# Patient Record
Sex: MALE | Race: WHITE | NOT HISPANIC OR LATINO | Employment: OTHER | URBAN - METROPOLITAN AREA
[De-identification: names, ages, dates, MRNs, and addresses within clinical notes are randomized per-mention and may not be internally consistent; named-entity substitution may affect disease eponyms.]

---

## 2018-12-20 ENCOUNTER — HOSPITAL ENCOUNTER (EMERGENCY)
Facility: HOSPITAL | Age: 83
Discharge: HOME | End: 2018-12-20
Attending: EMERGENCY MEDICINE | Admitting: EMERGENCY MEDICINE

## 2018-12-20 VITALS
HEIGHT: 67 IN | TEMPERATURE: 98.3 F | WEIGHT: 169 LBS | OXYGEN SATURATION: 96 % | RESPIRATION RATE: 18 BRPM | BODY MASS INDEX: 26.53 KG/M2 | SYSTOLIC BLOOD PRESSURE: 167 MMHG | DIASTOLIC BLOOD PRESSURE: 84 MMHG | HEART RATE: 78 BPM

## 2018-12-20 DIAGNOSIS — N47.2 PARAPHIMOSIS: Primary | ICD-10-CM

## 2018-12-20 LAB
BACTERIA UR QL AUTO: ABNORMAL /HPF
BILIRUB UR QL STRIP: NEGATIVE
CLARITY UR: CLEAR
COLOR UR: YELLOW
GLUCOSE UR STRIP-MCNC: NEGATIVE MG/DL
HGB UR QL STRIP.AUTO: ABNORMAL
HYALINE CASTS UR QL AUTO: ABNORMAL /LPF
KETONES UR QL STRIP: NEGATIVE
LEUKOCYTE ESTERASE UR QL STRIP.AUTO: NEGATIVE
NITRITE UR QL STRIP: NEGATIVE
PH UR STRIP.AUTO: 6.5 [PH] (ref 5–8)
PROT UR QL STRIP: ABNORMAL
RBC # UR: ABNORMAL /HPF
REF LAB TEST METHOD: ABNORMAL
SP GR UR STRIP: 1.01 (ref 1–1.03)
SQUAMOUS #/AREA URNS HPF: ABNORMAL /HPF
UROBILINOGEN UR QL STRIP: ABNORMAL
WBC UR QL AUTO: ABNORMAL /HPF

## 2018-12-20 PROCEDURE — 81001 URINALYSIS AUTO W/SCOPE: CPT | Performed by: EMERGENCY MEDICINE

## 2018-12-20 PROCEDURE — 99283 EMERGENCY DEPT VISIT LOW MDM: CPT

## 2018-12-20 NOTE — ED TRIAGE NOTES
Pt to ED from [facility] for difficulty urinating and swelling of penis since this morning

## 2018-12-20 NOTE — ED PROVIDER NOTES
" EMERGENCY DEPARTMENT ENCOUNTER    Room Number:  06/06  PCP: Jose Bingham MD  Historian: Patient, Family      HPI  Chief Complaint: Penile Swelling  Context: Alireza Carreon is an 83 y.o. male who reports that he is uncircumcised. Pt presents to the ED c/o penile swelling onset several days ago. Pt states that he has also had penile pain and dysuria. Pt states that when he attempts to urinate, pt's urinary stream will begin to spray.  States he thinks that he did not get his genitals clean.Pt denies documented fever, abdominal pain, N/V/D, chest pain, and dyspnea. [facility] for this and was referred to the ER for further evaluation. There are no other complaints at this time.       Location: Penis  Radiation: None  Character: \"swelling\"  Duration: Onset several days ago  Severity: Moderate  Progression: Unchanged  Aggravating Factors: Nothing  Alleviating Factors: Nothing          PAST MEDICAL HISTORY  Active Ambulatory Problems     Diagnosis Date Noted   • No Active Ambulatory Problems     Resolved Ambulatory Problems     Diagnosis Date Noted   • No Resolved Ambulatory Problems     No Additional Past Medical History         PAST SURGICAL HISTORY  No past surgical history on file.      FAMILY HISTORY  No family history on file.      SOCIAL HISTORY  Social History     Socioeconomic History   • Marital status:      Spouse name: Not on file   • Number of children: Not on file   • Years of education: Not on file   • Highest education level: Not on file   Social Needs   • Financial resource strain: Not on file   • Food insecurity - worry: Not on file   • Food insecurity - inability: Not on file   • Transportation needs - medical: Not on file   • Transportation needs - non-medical: Not on file   Occupational History   • Not on file   Tobacco Use   • Smoking status: Not on file   Substance and Sexual Activity   • Alcohol use: Not on file   • Drug use: Not on file "   • Sexual activity: Not on file   Other Topics Concern   • Not on file   Social History Narrative   • Not on file         ALLERGIES  Patient has no known allergies.        REVIEW OF SYSTEMS  Review of Systems   Constitutional: Negative for chills and fever.   HENT: Negative for congestion, rhinorrhea and sore throat.    Eyes: Negative for pain.   Respiratory: Negative for cough and shortness of breath.    Cardiovascular: Negative for chest pain, palpitations and leg swelling.   Gastrointestinal: Negative for abdominal pain, diarrhea, nausea and vomiting.   Genitourinary: Positive for difficulty urinating, dysuria, penile pain and penile swelling. Negative for flank pain.   Musculoskeletal: Negative for myalgias, neck pain and neck stiffness.   Skin: Negative for rash.   Neurological: Negative for dizziness, speech difficulty, weakness, light-headedness, numbness and headaches.   Psychiatric/Behavioral: Negative.    All other systems reviewed and are negative.           PHYSICAL EXAM  .  ED Triage Vitals   Temp Heart Rate Resp BP SpO2   12/20/18 1319 12/20/18 1319 12/20/18 1319 12/20/18 1410 12/20/18 1319   98.3 °F (36.8 °C) 81 18 169/86 96 %      Temp src Heart Rate Source Patient Position BP Location FiO2 (%)   12/20/18 1319 -- 12/20/18 1410 -- --   Tympanic  Sitting         Physical Exam   Constitutional: He is oriented to person, place, and time. No distress.   HENT:   Head: Normocephalic.   Mouth/Throat: Mucous membranes are normal.   Eyes: EOM are normal. Pupils are equal, round, and reactive to light.   Neck: Normal range of motion. Neck supple.   Cardiovascular: Normal rate, regular rhythm and normal heart sounds.   Pulmonary/Chest: Effort normal and breath sounds normal. No respiratory distress. He has no decreased breath sounds. He has no wheezes. He has no rhonchi. He has no rales.   Abdominal: Soft. There is no tenderness. There is no rebound and no guarding.   Genitourinary:   Genitourinary Comments:  Swollen foreskin trapped behind the meatus c/w paraphimosis.   Musculoskeletal: Normal range of motion.   Neurological: He is alert and oriented to person, place, and time. He has normal sensation.   Skin: Skin is warm and dry.   Psychiatric: Mood and affect normal.   Nursing note and vitals reviewed.          LAB RESULTS  Recent Results (from the past 24 hour(s))   Urinalysis With Microscopic If Indicated (No Culture) - Urine, Clean Catch    Collection Time: 12/20/18  2:27 PM   Result Value Ref Range    Color, UA Yellow Yellow, Straw    Appearance, UA Clear Clear    pH, UA 6.5 5.0 - 8.0    Specific Gravity, UA 1.015 1.005 - 1.030    Glucose, UA Negative Negative    Ketones, UA Negative Negative    Bilirubin, UA Negative Negative    Blood, UA Trace (A) Negative    Protein, UA 30 mg/dL (1+) (A) Negative    Leuk Esterase, UA Negative Negative    Nitrite, UA Negative Negative    Urobilinogen, UA 0.2 E.U./dL 0.2 - 1.0 E.U./dL   Urinalysis, Microscopic Only - Urine, Clean Catch    Collection Time: 12/20/18  2:27 PM   Result Value Ref Range    RBC, UA 3-5 (A) None Seen, 0-2 /HPF    WBC, UA 0-2 None Seen, 0-2 /HPF    Bacteria, UA None Seen None Seen /HPF    Squamous Epithelial Cells, UA 0-2 None Seen, 0-2 /HPF    Hyaline Casts, UA None Seen None Seen /LPF    Methodology Automated Microscopy        Ordered the above labs and reviewed the results.        PROCEDURES  Procedures      MEDICATIONS GIVEN IN ER  Medications - No data to display          PROGRESS AND CONSULTS  ED Course as of Dec 20 1623   Thu Dec 20, 2018   1517 3:18 PM  Patient with paraphimosis.  Was reduced with manual pressure.  States he feels better now.  Able to urinate.  Discussed with Dr. Pedroza who will see patient in the office.  Will hold off on flomax as he is urinating normally after the paraphimosis reduction. Had 200 cc in bladder and then was able to urinate here afterwards.  [SL]      ED Course User Index  [SL] Rio Isabel MD       2:04  PM:  Informed pt that his symptoms are c/w paraphimosis. Pt's foreskin was reduced.     2:06 PM:  Placed call to the urologist to discuss further course of care. Pt will be bladder scanned.     2:16 PM:  Pt's bladder was scanned - there are 218 ccs of urine in the bladder.     Discussed the case with Dr. Pedroza, urologist. He does not want a tracy catheter inserted in pt at this time. He would like pt prescribed with rx for Flomax. He will f/u with pt in the office.     2:18 PM:  UA ordered for further evaluation.     3:12 PM:  Rechecked pt. Pt is resting comfortably and appears in no acute distress. Pt reports that he feels better after the foreskin was reduced. Pt has been able to provide a urine specimen without significant difficulty - Will hold on Flomax at this time. Pt will be referred to the urologist for f/u. RTER warnings given. Pt agrees with plan for discharge.         MEDICAL DECISION MAKING      MDM  Number of Diagnoses or Management Options     Amount and/or Complexity of Data Reviewed  Clinical lab tests: ordered and reviewed  Discuss the patient with other providers: yes (Discussed the case with Dr. Pedroza, urologist.)    Patient Progress  Patient progress: stable             DIAGNOSIS  Final diagnoses:   Paraphimosis           DISPOSITION  Pt discharged.    DISCHARGE    Patient discharged in stable condition.    Reviewed implications of results, diagnosis, meds, responsibility to follow up, warning signs and symptoms of possible worsening, potential complications and reasons to return to ER.    Patient/Family voiced understanding of above instructions.    Discussed plan for discharge, as there is no emergent indication for admission. Pt/family is agreeable and understands need for follow up and repeat testing. Pt is aware that discharge does not mean that nothing is wrong but it indicates no emergency is present that requires admission and they must continue care with follow-up as given below or  physician of their choice.     FOLLOW-UP  [place] Pedroza Jr., MD  [address] [place] [address] [place] [id number]  [phone]    Schedule an appointment as soon as possible for a visit           Latest Documented Vital Signs:  As of 3:34 PM  BP- 169/86 HR- 81 Temp- 98.3 °F (36.8 °C) (Tympanic) O2 sat- 96%        --  Documentation assistance provided by ivania Lock for Dr. Chalo MD.  Information recorded by the scribe was done at my direction and has been verified and validated by me.       Constance Lock  12/20/18 153       Rio Isabel MD  12/20/18 [id number]

## 2019-07-23 ENCOUNTER — TELEPHONE (OUTPATIENT)
Dept: OTHER | Facility: OTHER | Age: 84
End: 2019-07-23

## 2019-07-23 NOTE — TELEPHONE ENCOUNTER
PATIENT CALLED TO MAKE APPOINTMENT WITH DR BRIZUELA HE IS SCHEDULED 9/17/2019 FOR 2 MONTH  FOLLOW UP FOR BLOOD WORK. PRIOR APPOINTMENT WAS IN June. HE WILL NEED LAB ORDERS FOR THIS PRIOR TO HIS APPOINTMENT DUE TO SCHEDULING.   HE WILL ALSO NEED MED REFILL BEFORE HIS APPOINTMENT.     WARFARIN 5 MG TAB  LISINOPRIL 20 MG.    QUANTITY  TORSEMIDE 10 MG  [facility]    PLEASE CALL PATIENT AND ADVISE FOR LABS [phone]  THANK YOU

## 2019-07-24 DIAGNOSIS — Z79.01 ANTICOAGULATED ON COUMADIN: Primary | ICD-10-CM

## 2019-07-24 DIAGNOSIS — R60.9 EDEMA, UNSPECIFIED TYPE: ICD-10-CM

## 2019-07-24 DIAGNOSIS — I10 HYPERTENSION, UNSPECIFIED TYPE: ICD-10-CM

## 2019-07-24 RX ORDER — PHENOBARBITAL 64.8 MG/1
64.8 TABLET ORAL DAILY
COMMUNITY
End: 2019-08-13 | Stop reason: SDUPTHER

## 2019-07-24 RX ORDER — TORSEMIDE 10 MG/1
10 TABLET ORAL DAILY
COMMUNITY
End: 2019-07-24 | Stop reason: SDUPTHER

## 2019-07-24 RX ORDER — LISINOPRIL 20 MG/1
20 TABLET ORAL DAILY
COMMUNITY
End: 2019-07-24 | Stop reason: SDUPTHER

## 2019-07-24 RX ORDER — TORSEMIDE 10 MG/1
10 TABLET ORAL DAILY
Qty: 90 TABLET | Refills: 0 | Status: SHIPPED | OUTPATIENT
Start: 2019-07-24 | End: 2019-07-31 | Stop reason: SDUPTHER

## 2019-07-24 RX ORDER — WARFARIN SODIUM 5 MG/1
5 TABLET ORAL DAILY
Qty: 90 TABLET | Refills: 0 | Status: SHIPPED | OUTPATIENT
Start: 2019-07-24 | End: 2019-07-31 | Stop reason: SDUPTHER

## 2019-07-24 RX ORDER — WARFARIN SODIUM 5 MG/1
5 TABLET ORAL DAILY
COMMUNITY
End: 2019-07-24 | Stop reason: SDUPTHER

## 2019-07-24 RX ORDER — LISINOPRIL 20 MG/1
20 TABLET ORAL 2 TIMES DAILY
Qty: 180 TABLET | Refills: 0 | Status: SHIPPED | OUTPATIENT
Start: 2019-07-24 | End: 2019-07-31 | Stop reason: SDUPTHER

## 2019-07-24 NOTE — TELEPHONE ENCOUNTER
LOV received, I sent over refills. Would you like to put in labs for pt or wait until his 9/17 visit?

## 2019-07-24 NOTE — TELEPHONE ENCOUNTER
If he is just needing the INR we can do it in office day of visit.  Unfortunately, I do not yet have the old labs or chart so am unsure what he will need.  Likely best to just wait for the visit to do the labs.

## 2019-07-31 DIAGNOSIS — I10 HYPERTENSION, UNSPECIFIED TYPE: ICD-10-CM

## 2019-07-31 DIAGNOSIS — R60.9 EDEMA, UNSPECIFIED TYPE: ICD-10-CM

## 2019-07-31 DIAGNOSIS — Z79.01 ANTICOAGULATED ON COUMADIN: ICD-10-CM

## 2019-07-31 RX ORDER — WARFARIN SODIUM 5 MG/1
5 TABLET ORAL DAILY
Qty: 90 TABLET | Refills: 0 | Status: SHIPPED | OUTPATIENT
Start: 2019-07-31 | End: 2019-09-17 | Stop reason: SDUPTHER

## 2019-07-31 RX ORDER — LISINOPRIL 20 MG/1
20 TABLET ORAL 2 TIMES DAILY
Qty: 180 TABLET | Refills: 0 | Status: SHIPPED | OUTPATIENT
Start: 2019-07-31 | End: 2020-01-13 | Stop reason: SDUPTHER

## 2019-07-31 RX ORDER — TORSEMIDE 10 MG/1
10 TABLET ORAL DAILY
Qty: 90 TABLET | Refills: 0 | Status: SHIPPED | OUTPATIENT
Start: 2019-07-31 | End: 2019-09-17 | Stop reason: SDUPTHER

## 2019-08-13 DIAGNOSIS — G40.909 NONINTRACTABLE EPILEPSY WITHOUT STATUS EPILEPTICUS, UNSPECIFIED EPILEPSY TYPE (HCC): ICD-10-CM

## 2019-08-13 DIAGNOSIS — R56.9 GENERALIZED CONVULSIVE SEIZURE (HCC): Primary | ICD-10-CM

## 2019-08-13 DIAGNOSIS — R60.9 EDEMA, UNSPECIFIED TYPE: ICD-10-CM

## 2019-08-13 RX ORDER — PHENOBARBITAL 64.8 MG/1
64.8 TABLET ORAL DAILY
Qty: 90 TABLET | Refills: 0 | Status: SHIPPED | OUTPATIENT
Start: 2019-08-13 | End: 2019-11-15 | Stop reason: SDUPTHER

## 2019-09-16 ENCOUNTER — TELEPHONE (OUTPATIENT)
Dept: FAMILY MEDICINE CLINIC | Facility: CLINIC | Age: 84
End: 2019-09-16

## 2019-09-16 NOTE — TELEPHONE ENCOUNTER
Hpi Title: Evaluation of Skin Lesions When we see this patient in the office on Tuesday we can check his vision using the children's shape chart.

## 2019-09-16 NOTE — TELEPHONE ENCOUNTER
Pt spouse call to get directions to office.   Pt spouse also wanted you know that the pt hearing and eyesight has gotten worse and he has memorized the eye test. She is concerned and he should not be driving.

## 2019-09-17 ENCOUNTER — OFFICE VISIT (OUTPATIENT)
Dept: FAMILY MEDICINE CLINIC | Facility: CLINIC | Age: 84
End: 2019-09-17

## 2019-09-17 VITALS
BODY MASS INDEX: 27.03 KG/M2 | WEIGHT: 172.2 LBS | DIASTOLIC BLOOD PRESSURE: 74 MMHG | SYSTOLIC BLOOD PRESSURE: 142 MMHG | HEIGHT: 67 IN | OXYGEN SATURATION: 96 % | HEART RATE: 62 BPM | TEMPERATURE: 97.7 F

## 2019-09-17 DIAGNOSIS — R60.9 EDEMA, UNSPECIFIED TYPE: ICD-10-CM

## 2019-09-17 DIAGNOSIS — Z79.01 ANTICOAGULATED ON WARFARIN: ICD-10-CM

## 2019-09-17 DIAGNOSIS — Z79.01 ANTICOAGULATED ON COUMADIN: ICD-10-CM

## 2019-09-17 DIAGNOSIS — I48.20 CHRONIC ATRIAL FIBRILLATION (HCC): ICD-10-CM

## 2019-09-17 DIAGNOSIS — Z00.00 MEDICARE ANNUAL WELLNESS VISIT, SUBSEQUENT: Primary | ICD-10-CM

## 2019-09-17 DIAGNOSIS — R41.3 MILD MEMORY DISTURBANCES NOT AMOUNTING TO DEMENTIA: ICD-10-CM

## 2019-09-17 DIAGNOSIS — E78.5 HYPERLIPIDEMIA, UNSPECIFIED HYPERLIPIDEMIA TYPE: ICD-10-CM

## 2019-09-17 DIAGNOSIS — I10 ESSENTIAL HYPERTENSION: ICD-10-CM

## 2019-09-17 PROBLEM — M10.9 GOUT: Status: ACTIVE | Noted: 2019-09-17

## 2019-09-17 PROBLEM — I48.91 ATRIAL FIBRILLATION (HCC): Status: ACTIVE | Noted: 2019-09-17

## 2019-09-17 LAB — INR PPP: 1.3 (ref 2–3)

## 2019-09-17 PROCEDURE — 85610 PROTHROMBIN TIME: CPT | Performed by: INTERNAL MEDICINE

## 2019-09-17 PROCEDURE — 99213 OFFICE O/P EST LOW 20 MIN: CPT | Performed by: INTERNAL MEDICINE

## 2019-09-17 PROCEDURE — G0439 PPPS, SUBSEQ VISIT: HCPCS | Performed by: INTERNAL MEDICINE

## 2019-09-17 PROCEDURE — 96160 PT-FOCUSED HLTH RISK ASSMT: CPT | Performed by: INTERNAL MEDICINE

## 2019-09-17 PROCEDURE — 36416 COLLJ CAPILLARY BLOOD SPEC: CPT | Performed by: INTERNAL MEDICINE

## 2019-09-17 RX ORDER — TORSEMIDE 10 MG/1
10 TABLET ORAL DAILY
Qty: 90 TABLET | Refills: 1 | Status: SHIPPED | OUTPATIENT
Start: 2019-09-17 | End: 2020-01-13 | Stop reason: SDUPTHER

## 2019-09-17 RX ORDER — WARFARIN SODIUM 5 MG/1
TABLET ORAL
Qty: 180 TABLET | Refills: 0 | Status: SHIPPED | OUTPATIENT
Start: 2019-09-17 | End: 2020-01-13 | Stop reason: SDUPTHER

## 2019-09-17 NOTE — PROGRESS NOTES
Subsequent Medicare Wellness Visit   The ABC's of the Annual Wellness Visit    Chief Complaint   Patient presents with   • A1C     check up       HPI:  Alireza Carreon, -1935, is a 84 y.o. male who presents for a Subsequent Medicare Wellness Visit.  Here for follow-up of diabetes.  Patient states to have been compliant with medications.  Blood sugar monitoring - patient states has not been followed at home.  No episodes of hypoglycemia, nausea, vomiting, new rashes, syncope or other issues.  Denies any difficulties with the current medication regimen  Follow-up for hypertension.  Currently has been feeling well and asymptomatic without any headaches,vision changes, cough, chest pain, shortness of breath, swelling, focal neurologic deficit, memory loss or syncope.  Has been taking the medications regularly and adherent with the regimen, Denies medication side effects and no significant interval events.   Wife has been concerned about patients vision and hearing.  Concerned about him driving.Patient has no seizures in the last 6 months.  Here for follow up INR evaluation.  Currently anticoagulated with warfarin for atrial fibrillation.  The patient is taking warfarin 10 M/W/F and 7.5 mg rest of the week.  The current INR goal is 2-3.  The INR is currently 1.3.  No significant interval events, easy bleeding, bruising, epistaxis, fever, weakness or numbness.    Has had a rash on the right forearm after poison ivy exposure 2 weeks ago and is resolving.  Was draining but not now.  Has fallen 3 times in the last year.  Once lost balance on golf course, once throwing the garden hose and once trying to sit on barstool.    Recent Hospitalizations:  No hospitalization(s) within the last year..    Current Medical Providers:  Patient Care Team:  Jose Bingham MD as PCP - General (Internal Medicine)    Health Habits and Functional and Cognitive Screening and Depression Screening:  No flowsheet data  found.    Compared to one year ago, the patient feels his physical health is the same and his mental health is the same.    Depression Screen:  PHQ-2/PHQ-9 Depression Screening 9/17/2019   Little interest or pleasure in doing things 0   Feeling down, depressed, or hopeless 0   Total Score 0         Past Medical/Family/Social History:  The following portions of the patient's history were reviewed and updated as appropriate: allergies, current medications, past family history, past medical history, past social history, past surgical history and problem list.    Allergies   Allergen Reactions   • Codeine Itching   • Levetiracetam Dizziness   • Phenytoin Itching   • Pseudoephedrine Dizziness   • Statins Itching         Current Outpatient Medications:   •  cimetidine (TAGAMET) 200 MG tablet, , Disp: , Rfl:   •  lisinopril (PRINIVIL,ZESTRIL) 20 MG tablet, Take 1 tablet by mouth 2 (Two) Times a Day., Disp: 180 tablet, Rfl: 0  •  PHENobarbital (LUMINAL) 64.8 MG tablet, Take 1 tablet by mouth Daily., Disp: 90 tablet, Rfl: 0  •  Potassium 99 MG tablet, Take 99 mg by mouth Daily., Disp: , Rfl:   •  torsemide (DEMADEX) 10 MG tablet, Take 1 tablet by mouth Daily., Disp: 90 tablet, Rfl: 0  •  warfarin (COUMADIN) 5 MG tablet, Take 1 tablet by mouth Daily., Disp: 90 tablet, Rfl: 0    Aspirin use counseling: Does not need ASA (and currently is not on it)    Current medication list contains no high risk medications.  No harmful drug interactions have been identified.     Family History   Problem Relation Age of Onset   • Hyperlipidemia Mother    • Dementia Mother        Social History     Tobacco Use   • Smoking status: Former Smoker   Substance Use Topics   • Alcohol use: Yes       Past Surgical History:   Procedure Laterality Date   • CORONARY ARTERY BYPASS GRAFT     • OTHER SURGICAL HISTORY      carotid thromboandarterectomy   • SHOULDER SURGERY         Patient Active Problem List   Diagnosis   • Hypertension   • Anal pruritus   •  "Atrial fibrillation (CMS/HCC)   • Edema   • Gout   • Hyperlipidemia       Review of Systems   Constitutional: Negative for fatigue, fever and unexpected weight change.   HENT: Negative for rhinorrhea, trouble swallowing and voice change.    Eyes: Positive for visual disturbance (wife concerned).   Respiratory: Negative for cough, shortness of breath and wheezing.    Cardiovascular: Negative for chest pain, palpitations and leg swelling.   Gastrointestinal: Negative for abdominal pain, blood in stool, constipation, diarrhea, nausea and vomiting.   Genitourinary: Negative for difficulty urinating and dysuria.   Neurological: Negative for syncope, weakness and headaches.   Hematological: Negative for adenopathy. Does not bruise/bleed easily.   Psychiatric/Behavioral: Negative for decreased concentration, hallucinations and sleep disturbance. The patient is not nervous/anxious.        Objective     Vitals:    09/17/19 1010   BP: 142/74   BP Location: Left arm   Patient Position: Sitting   Cuff Size: Adult   Pulse: 62   Temp: 97.7 °F (36.5 °C)   SpO2: 96%   Weight: 78.1 kg (172 lb 3.2 oz)   Height: 170.2 cm (67\")   PainSc: 0-No pain       Patient's Body mass index is 26.97 kg/m². BMI is above normal parameters. Recommendations include: exercise counseling and nutrition counseling.      No exam data present    The patient has no evidence of cognitive impairment. Clock drawing test was normal. (see scanned document)    Physical Exam   Constitutional: He is oriented to person, place, and time. He appears well-developed and well-nourished. No distress.   HENT:   Head: Normocephalic and atraumatic.   Right Ear: External ear normal.   Left Ear: External ear normal.   Nose: Nose normal.   Mouth/Throat: Oropharynx is clear and moist.   Eyes: Conjunctivae and EOM are normal. Pupils are equal, round, and reactive to light.   Neck: Normal range of motion. Neck supple. No tracheal deviation present. No thyromegaly present. "   Cardiovascular: Normal rate, regular rhythm, normal heart sounds and intact distal pulses. Exam reveals no gallop and no friction rub.   No murmur heard.  Pulmonary/Chest: Effort normal and breath sounds normal. No respiratory distress.   Abdominal: Soft. Bowel sounds are normal. He exhibits no mass. There is no tenderness. There is no guarding.   Musculoskeletal: Normal range of motion. He exhibits no edema.   Lymphadenopathy:     He has no cervical adenopathy.   Neurological: He is alert and oriented to person, place, and time. He displays normal reflexes. He exhibits normal muscle tone.   Skin: Skin is warm and dry. Capillary refill takes less than 2 seconds. No rash noted. He is not diaphoretic.   Psychiatric: He has a normal mood and affect. His behavior is normal. Judgment and thought content normal.   Nursing note and vitals reviewed.      Recent Lab Results:     Lab Results   Component Value Date    TRIG 150 09/06/2014    HDL 44 09/06/2014       Assessment/Plan   Age-appropriate Screening Schedule:  Refer to the list below for future screening recommendations based on patient's age, sex and/or medical conditions.      Health Maintenance   Topic Date Due   • TDAP/TD VACCINES (1 - Tdap) 03/11/1954   • ZOSTER VACCINE (2 of 3) 07/15/2015   • INFLUENZA VACCINE  08/01/2019   • LIPID PANEL  09/16/2019   • PNEUMOCOCCAL VACCINES (65+ LOW/MEDIUM RISK)  Completed       Medicare Risks and Personalized Health Plan:  Advance Directive Discussion  Fall Risk  Glaucoma Risk  Hearing Problem  Obesity/Overweight       CMS-Preventive Services Quick Reference  Medicare Preventive Services Addressed:  Annual Wellness Visit (AWV)  Glaucoma screening (for individuals with diabetes mellitus, family history of glaucoma, -Americans (> or =) age 50, -Americans (> or =) age 65)  Influenza Vaccine and Administration    Advance Care Planning:  Patient has an advance directive - a copy has not been provided. Have asked the  patient to send this to us to add to record    There are no diagnoses linked to this encounter.    An After Visit Summary and PPPS with all of these plans were given to the patient.      Follow Up:  No Follow-up on file.        Recommend avoiding throw rugs and using grab bars in the bathroom.  Discussed the hearing and continued hearing issues even with the hearing aids.  Recommend annual eye exams for evaluation and monitor for glaucoma.  Recommend flu shot annually in October.  No evidence of significant memory or vision issues.  Continue the current medications and encouraged to follow a healthy diet and exercise as tolerated.    Take 10 mg for the next 2 days then return to the 10 mg on M/W/F and 7.5 mg rest of the week.  Follow up in 2 weeks to recheck the iNR.

## 2019-09-30 ENCOUNTER — OFFICE VISIT (OUTPATIENT)
Dept: FAMILY MEDICINE CLINIC | Facility: CLINIC | Age: 84
End: 2019-09-30

## 2019-09-30 VITALS
HEART RATE: 68 BPM | SYSTOLIC BLOOD PRESSURE: 130 MMHG | TEMPERATURE: 98 F | WEIGHT: 170.4 LBS | DIASTOLIC BLOOD PRESSURE: 72 MMHG | OXYGEN SATURATION: 96 % | HEIGHT: 67 IN | BODY MASS INDEX: 26.74 KG/M2

## 2019-09-30 DIAGNOSIS — Z79.01 ANTICOAGULATED ON WARFARIN: Primary | ICD-10-CM

## 2019-09-30 DIAGNOSIS — I48.20 CHRONIC ATRIAL FIBRILLATION (HCC): ICD-10-CM

## 2019-09-30 LAB — INR PPP: 1.7 (ref 2–3)

## 2019-09-30 PROCEDURE — 99213 OFFICE O/P EST LOW 20 MIN: CPT | Performed by: INTERNAL MEDICINE

## 2019-09-30 PROCEDURE — 36416 COLLJ CAPILLARY BLOOD SPEC: CPT | Performed by: INTERNAL MEDICINE

## 2019-09-30 PROCEDURE — 85610 PROTHROMBIN TIME: CPT | Performed by: INTERNAL MEDICINE

## 2019-09-30 RX ORDER — CLOBETASOL PROPIONATE 0.5 MG/G
CREAM TOPICAL
COMMUNITY
Start: 2019-09-19 | End: 2021-11-30

## 2019-09-30 NOTE — PROGRESS NOTES
Subjective   Alireza Carreon is a 84 y.o. male.     Chief Complaint   Patient presents with   • Atrial Fibrillation     f/u       History of Present Illness   Here for follow up INR evaluation.  Currently anticoagulated with warfarin for atrial fibrillation.  The patient is taking warfarin 10 M/W/F and 7.5 mg rest of the week.  The current INR goal is 2-3.  The INR is currently 1.3.  No significant interval events, easy bleeding, bruising, epistaxis, fever, weakness or numbness.    Has rash behind the right knee for the last month and using cream from the dermatologist given for poison Ivy last month.    The following portions of the patient's history were reviewed and updated as appropriate: allergies, current medications, past family history, past medical history, past social history, past surgical history and problem list.    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    • Nocturnal leg cramps    • OA (osteoarthritis)    • Prepatellar effusion    • Pulmonary embolism (CMS/HCC)    • Puncture wound of hand, right    • Screening for cardiovascular condition    • Stasis dermatitis    • Statin intolerance    • Taking drug for chronic disease    • Thoracic back pain    • Transient ischemic attack        Past Surgical History:   Procedure Laterality Date   • CORONARY ARTERY BYPASS GRAFT     • OTHER SURGICAL HISTORY      carotid thromboandarterectomy   • SHOULDER SURGERY         Family History   Problem Relation Age of Onset   • Hyperlipidemia Mother    • Dementia Mother        Social History     Socioeconomic  History   • Marital status:      Spouse name: Not on file   • Number of children: Not on file   • Years of education: Not on file   • Highest education level: Not on file   Tobacco Use   • Smoking status: Former Smoker   • Smokeless tobacco: Never Used   Substance and Sexual Activity   • Alcohol use: Yes   • Drug use: No   • Sexual activity: Defer       Review of Systems   Constitutional: Negative for activity change, appetite change, fatigue, unexpected weight gain and unexpected weight loss.   HENT: Negative for nosebleeds, rhinorrhea and trouble swallowing.    Eyes: Negative for visual disturbance.   Respiratory: Negative for cough, chest tightness, shortness of breath and wheezing.    Cardiovascular: Negative for chest pain, palpitations and leg swelling.   Gastrointestinal: Negative for abdominal pain, blood in stool, constipation, diarrhea, nausea, vomiting, GERD and indigestion.   Genitourinary: Negative for dysuria and hematuria.   Musculoskeletal: Negative for arthralgias, back pain and myalgias.   Skin: Negative for rash and bruise.   Neurological: Negative for dizziness, tremors, weakness, light-headedness, numbness and headache.   Hematological: Negative for adenopathy. Does not bruise/bleed easily.   Psychiatric/Behavioral: Negative for sleep disturbance and depressed mood. The patient is not nervous/anxious.        Objective   Vitals:    09/30/19 1007   BP: 130/72   Pulse: 68   Temp: 98 °F (36.7 °C)   SpO2: 96%     Body mass index is 26.69 kg/m².  Physical Exam   Constitutional: He is oriented to person, place, and time. He appears well-developed and well-nourished. No distress.   HENT:   Head: Normocephalic and atraumatic.   Right Ear: External ear normal.   Left Ear: External ear normal.   Eyes: Conjunctivae and EOM are normal. Pupils are equal, round, and reactive to light.   Neck: Normal range of motion. Neck supple. No tracheal deviation present. No thyromegaly present.   Cardiovascular:  Normal rate and intact distal pulses. An irregularly irregular rhythm present. Exam reveals no gallop and no friction rub.   No murmur heard.  Pulmonary/Chest: Effort normal and breath sounds normal. No respiratory distress.   Abdominal: Soft. Bowel sounds are normal. He exhibits no mass. There is no tenderness. There is no guarding.   Musculoskeletal: Normal range of motion. He exhibits no edema.   Lymphadenopathy:     He has no cervical adenopathy.   Neurological: He is alert and oriented to person, place, and time. He displays normal reflexes. He exhibits normal muscle tone.   Skin: Skin is warm and dry. Capillary refill takes less than 2 seconds. No rash noted. He is not diaphoretic.   Psychiatric: He has a normal mood and affect. His behavior is normal. Judgment and thought content normal.   Nursing note and vitals reviewed.      Recent Results (from the past 2016 hour(s))   POC INR    Collection Time: 09/17/19  1:01 PM   Result Value Ref Range    INR 1.30 (A) 2 - 3   POC INR    Collection Time: 09/30/19 10:43 AM   Result Value Ref Range    INR 1.70 (A) 2 - 3     Assessment/Plan   Alireza was seen today for atrial fibrillation.    Diagnoses and all orders for this visit:    Anticoagulated on warfarin  -     POC INR    Chronic atrial fibrillation  -     POC INR        Will increase the warfarin to 10 mg on S/T/Th/Sat and 7.5 mg M/W/F secondary to insufficient anticoagulation dose of warfarin.  Follow up in 2-3 weeks.

## 2019-10-14 ENCOUNTER — OFFICE VISIT (OUTPATIENT)
Dept: FAMILY MEDICINE CLINIC | Facility: CLINIC | Age: 84
End: 2019-10-14

## 2019-10-14 VITALS
TEMPERATURE: 97.7 F | HEIGHT: 67 IN | HEART RATE: 64 BPM | SYSTOLIC BLOOD PRESSURE: 118 MMHG | DIASTOLIC BLOOD PRESSURE: 60 MMHG | WEIGHT: 173 LBS | BODY MASS INDEX: 27.15 KG/M2 | OXYGEN SATURATION: 95 %

## 2019-10-14 DIAGNOSIS — Z79.01 ANTICOAGULATED ON WARFARIN: Primary | ICD-10-CM

## 2019-10-14 DIAGNOSIS — I10 ESSENTIAL HYPERTENSION: ICD-10-CM

## 2019-10-14 DIAGNOSIS — E78.5 HYPERLIPIDEMIA, UNSPECIFIED HYPERLIPIDEMIA TYPE: ICD-10-CM

## 2019-10-14 DIAGNOSIS — I48.20 CHRONIC ATRIAL FIBRILLATION (HCC): ICD-10-CM

## 2019-10-14 LAB — INR PPP: 2.2 (ref 2–3)

## 2019-10-14 PROCEDURE — 99213 OFFICE O/P EST LOW 20 MIN: CPT | Performed by: INTERNAL MEDICINE

## 2019-10-14 PROCEDURE — 85610 PROTHROMBIN TIME: CPT | Performed by: INTERNAL MEDICINE

## 2019-10-14 PROCEDURE — 36416 COLLJ CAPILLARY BLOOD SPEC: CPT | Performed by: INTERNAL MEDICINE

## 2019-10-14 NOTE — PROGRESS NOTES
Subjective   Alireza Carreon is a 84 y.o. male.     Chief Complaint   Patient presents with   • Anticoagulated on warfarin       History of Present Illness   Here for follow up INR evaluation.  Currently anticoagulated with warfarin for atrial fibrillation.  The patient is taking warfarin 10 M/W/F and 7.5 mg rest of the week.  The current INR goal is 2-3.  The INR is currently 1.3.  No significant interval events, easy bleeding, bruising, epistaxis, fever, weakness or numbness.    Patient seeing Dr Ruiz cardiology and given repatha but had rash and could not tolerate.  Patient is no longer taking.  The following portions of the patient's history were reviewed and updated as appropriate: allergies, current medications, past family history, past medical history, past social history, past surgical history and problem list.    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    • Nocturnal leg cramps    • OA (osteoarthritis)    • Prepatellar effusion    • Pulmonary embolism (CMS/HCC)    • Puncture wound of hand, right    • Screening for cardiovascular condition    • Stasis dermatitis    • Statin intolerance    • Taking drug for chronic disease    • Thoracic back pain    • Transient ischemic attack        Past Surgical History:   Procedure Laterality Date   • CORONARY ARTERY BYPASS GRAFT     • OTHER SURGICAL HISTORY      carotid thromboandarterectomy   • SHOULDER SURGERY         Family History   Problem Relation Age of Onset   • Hyperlipidemia Mother    • Dementia Mother        Social History     Socioeconomic History    • Marital status:      Spouse name: Not on file   • Number of children: Not on file   • Years of education: Not on file   • Highest education level: Not on file   Tobacco Use   • Smoking status: Former Smoker   • Smokeless tobacco: Never Used   Substance and Sexual Activity   • Alcohol use: Yes   • Drug use: No   • Sexual activity: Defer       Review of Systems   Constitutional: Negative for activity change, appetite change, fatigue, fever, unexpected weight gain and unexpected weight loss.   HENT: Negative for nosebleeds, rhinorrhea, trouble swallowing and voice change.    Eyes: Negative for visual disturbance.   Respiratory: Negative for cough, chest tightness, shortness of breath and wheezing.    Cardiovascular: Positive for leg swelling. Negative for chest pain and palpitations.   Gastrointestinal: Negative for abdominal pain, blood in stool, constipation, diarrhea, nausea, vomiting, GERD and indigestion.   Genitourinary: Negative for dysuria, frequency and hematuria.   Musculoskeletal: Negative for arthralgias, back pain and myalgias.   Skin: Negative for rash and bruise.   Neurological: Negative for dizziness, tremors, weakness, light-headedness, numbness, headache and memory problem.   Hematological: Negative for adenopathy. Does not bruise/bleed easily.   Psychiatric/Behavioral: Negative for sleep disturbance and depressed mood. The patient is not nervous/anxious.        Objective   Vitals:    10/14/19 1008   BP: 118/60   Pulse: 64   Temp: 97.7 °F (36.5 °C)   SpO2: 95%     Body mass index is 27.1 kg/m².  Physical Exam   Constitutional: He is oriented to person, place, and time. He appears well-developed and well-nourished. No distress.   HENT:   Head: Normocephalic and atraumatic.   Right Ear: External ear normal.   Left Ear: External ear normal.   Nose: Nose normal.   Mouth/Throat: Oropharynx is clear and moist.   Eyes: Conjunctivae and EOM are normal. Pupils are equal, round, and reactive to  light.   Neck: Normal range of motion. Neck supple. No tracheal deviation present. No thyromegaly present.   Cardiovascular: Normal rate, normal heart sounds and intact distal pulses. An irregularly irregular rhythm present. Exam reveals no gallop and no friction rub.   No murmur heard.  Pulmonary/Chest: Effort normal and breath sounds normal. No respiratory distress.   Abdominal: Soft. Bowel sounds are normal. He exhibits no mass. There is no tenderness. There is no guarding.   Musculoskeletal: Normal range of motion. He exhibits no edema.   Lymphadenopathy:     He has no cervical adenopathy.   Neurological: He is alert and oriented to person, place, and time. He displays normal reflexes. He exhibits normal muscle tone.   Skin: Skin is warm and dry. Capillary refill takes less than 2 seconds. No rash noted. He is not diaphoretic.   Psychiatric: He has a normal mood and affect. His behavior is normal. Judgment and thought content normal.   Nursing note and vitals reviewed.      Recent Results (from the past 2016 hour(s))   POC INR    Collection Time: 09/17/19  1:01 PM   Result Value Ref Range    INR 1.30 (A) 2 - 3   POC INR    Collection Time: 09/30/19 10:43 AM   Result Value Ref Range    INR 1.70 (A) 2 - 3   POC INR    Collection Time: 10/14/19 10:22 AM   Result Value Ref Range    INR 2.20 (A) 2 - 3     Assessment/Plan   Alireza was seen today for anticoagulated on warfarin.    Diagnoses and all orders for this visit:    Anticoagulated on warfarin    Chronic atrial fibrillation    Essential hypertension    Hyperlipidemia, unspecified hyperlipidemia type    Other orders  -     POC INR      Continue the current warfarin dosing unchanged.  Discussed other medications and the cholesterol.  Patient intolerant to repatha and statins.  He refuses to try additional medications.  Follow up in 1 month.

## 2019-10-25 ENCOUNTER — OFFICE VISIT (OUTPATIENT)
Dept: FAMILY MEDICINE CLINIC | Facility: CLINIC | Age: 84
End: 2019-10-25

## 2019-10-25 VITALS
BODY MASS INDEX: 27.62 KG/M2 | HEART RATE: 67 BPM | HEIGHT: 67 IN | OXYGEN SATURATION: 95 % | WEIGHT: 176 LBS | SYSTOLIC BLOOD PRESSURE: 124 MMHG | DIASTOLIC BLOOD PRESSURE: 62 MMHG | TEMPERATURE: 98.1 F

## 2019-10-25 DIAGNOSIS — L03.116 CELLULITIS OF LEFT LOWER EXTREMITY: ICD-10-CM

## 2019-10-25 DIAGNOSIS — Z79.01 ANTICOAGULATED ON WARFARIN: ICD-10-CM
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
## 2019-10-25 DIAGNOSIS — S81.852A CAT BITE OF LEFT LOWER LEG, INITIAL ENCOUNTER: Primary | ICD-10-CM

## 2019-10-25 PROCEDURE — 99213 OFFICE O/P EST LOW 20 MIN: CPT | Performed by: NURSE PRACTITIONER

## 2019-10-25 RX ORDER — AMOXICILLIN AND CLAVULANATE POTASSIUM 500; 125 MG/1; MG/1
1 TABLET, FILM COATED ORAL 3 TIMES DAILY
Qty: 30 TABLET | Refills: 0 | Status: SHIPPED | OUTPATIENT
Start: 2019-10-25 | End: 2019-11-04

## 2019-10-25 NOTE — PROGRESS NOTES
Subjective   Alireza Carreon is a 84 y.o. male.     Chief Complaint   Patient presents with   • Chills   • Fever     last night    • Animal Bite     cat bite on left LLE       History of Present Illness   Patient presents with c/o cat bite on left LLE; when trying to put cat in cage 2 days ago to take to vet the cat bit his leg; had a lot of bleeding from site at the time; bled for about 15 minutes and then stopped bleeding; has had some drainage from area; had fever and chills during the night last night and felt ill this morning; temp 100.7; has only had 1/2 piece of toast and banana today, typically a good eater; has had some weakness, no dizziness; has pain in leg at site of bite.    F/U CAC: takes Warfarin daily for atrial fibrillation; last INR 2.2; takes Warfarin 7.5 mg on Mondays, Wednesdays, and Fridays and Warfarin 10 mg all other days, no changes in diet; no more bleeding since initial cat bite.    Also, has spot behind left knee would like checked; no tenderness; sees derm regularly.    Here with spouse.    The following portions of the patient's history were reviewed and updated as appropriate: allergies, current medications, past family history, past medical history, past social history, past surgical history and problem list.    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    • Nocturnal leg cramps    • OA (osteoarthritis)    • Prepatellar effusion    • Pulmonary embolism (CMS/HCC)    • Puncture wound of hand, right    • Screening for cardiovascular condition    • Stasis dermatitis    • Statin  "intolerance    • Taking drug for chronic disease    • Thoracic back pain    • Transient ischemic attack        Past Surgical History:   Procedure Laterality Date   • CORONARY ARTERY BYPASS GRAFT     • OTHER SURGICAL HISTORY      carotid thromboandarterectomy   • SHOULDER SURGERY         Family History   Problem Relation Age of Onset   • Hyperlipidemia Mother    • Dementia Mother        Social History     Socioeconomic History   • Marital status:      Spouse name: Not on file   • Number of children: Not on file   • Years of education: Not on file   • Highest education level: Not on file   Tobacco Use   • Smoking status: Former Smoker   • Smokeless tobacco: Never Used   Substance and Sexual Activity   • Alcohol use: Yes   • Drug use: No   • Sexual activity: Defer       Review of Systems   Constitutional: Positive for appetite change, fatigue and fever. Negative for unexpected weight gain and unexpected weight loss.   HENT: Negative for ear pain and sore throat. Hearing loss: wears hearing aids.    Respiratory: Negative for cough, chest tightness and shortness of breath.    Cardiovascular: Positive for leg swelling (bilaterally, takes water pill; has not taken meds yet today). Negative for chest pain and palpitations.   Gastrointestinal: Negative for abdominal pain, blood in stool, constipation, diarrhea, nausea and vomiting.   Genitourinary: Positive for frequency (unchanged due to water pill). Negative for dysuria.   Musculoskeletal: Back pain: some discomfort in left lower back off and on, applying pressure with golf ball resolves.   Neurological: Negative for dizziness, light-headedness and headache (occasional slight headaches).       Objective   Vitals:    10/25/19 1143   BP: 124/62   BP Location: Right arm   Patient Position: Sitting   Cuff Size: Adult   Pulse: 67   Temp: 98.1 °F (36.7 °C)   SpO2: 95%   Weight: 79.8 kg (176 lb)   Height: 170.2 cm (67\")     Body mass index is 27.57 kg/m².       Physical " Exam   Constitutional: He is oriented to person, place, and time. Vital signs are normal. He appears well-developed and well-nourished. No distress.   HENT:   Head: Normocephalic.   Eyes: Conjunctivae are normal.   Neck: Normal range of motion. Neck supple. Carotid bruit is not present.   Cardiovascular: Normal rate, regular rhythm, normal heart sounds and intact distal pulses.   No murmur heard.  Pulmonary/Chest: Effort normal and breath sounds normal. No respiratory distress.   Abdominal: Soft. There is no tenderness.   Musculoskeletal: He exhibits edema (2+ bilaterally).   Neurological: He is alert and oriented to person, place, and time. Abnormal gait: limping on left.   Skin: Skin is warm and dry.        Psychiatric: He has a normal mood and affect. Judgment and thought content normal.   Nursing note and vitals reviewed.    2/12/19 BMP WNL except eGFR 48    Assessment/Plan .  Problem List Items Addressed This Visit     Anticoagulated on warfarin    Current Assessment & Plan     Will recheck PT/INR in 1 week.         Cat bite of left lower leg - Primary    Current Assessment & Plan     Make sure drinking adequate liquids.         Relevant Medications    amoxicillin-clavulanate (AUGMENTIN) 500-125 MG per tablet    Other Relevant Orders    Culture, Routine - Swab, Leg, Left    Cellulitis of left lower extremity    Current Assessment & Plan     Elevate legs when sitting.         Relevant Medications    amoxicillin-clavulanate (AUGMENTIN) 500-125 MG per tablet    Other Relevant Orders    Culture, Routine - Swab, Leg, Left          Return in about 1 week (around 11/1/2019), or if symptoms worsen or fail to improve, for Recheck.  ER warnings given.

## 2019-10-25 NOTE — PATIENT INSTRUCTIONS
Elevate legs when sitting.  Make sure drinking adequate liquids.  Follow up pending lab results.  Follow up in 1 week, or sooner if symptoms persist or worsen.

## 2019-10-28 LAB
BACTERIA SPEC AEROBE CULT: NORMAL
BACTERIA SPEC CULT: NORMAL

## 2019-10-30 ENCOUNTER — OFFICE VISIT (OUTPATIENT)
Dept: FAMILY MEDICINE CLINIC | Facility: CLINIC | Age: 84
End: 2019-10-30

## 2019-10-30 VITALS
TEMPERATURE: 98.2 F | BODY MASS INDEX: 27.21 KG/M2 | HEIGHT: 67 IN | DIASTOLIC BLOOD PRESSURE: 72 MMHG | OXYGEN SATURATION: 95 % | HEART RATE: 68 BPM | WEIGHT: 173.4 LBS | SYSTOLIC BLOOD PRESSURE: 130 MMHG
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
## 2019-10-30 DIAGNOSIS — S81.852D CAT BITE OF LEFT LOWER LEG, SUBSEQUENT ENCOUNTER: Primary | ICD-10-CM

## 2019-10-30 DIAGNOSIS — L03.116 CELLULITIS OF LEFT LOWER EXTREMITY: ICD-10-CM

## 2019-10-30 DIAGNOSIS — Z79.01 ANTICOAGULATED ON WARFARIN: ICD-10-CM

## 2019-10-30 LAB — INR PPP: 3 (ref 0.9–1.1)

## 2019-10-30 PROCEDURE — 85610 PROTHROMBIN TIME: CPT | Performed by: NURSE PRACTITIONER

## 2019-10-30 PROCEDURE — 99213 OFFICE O/P EST LOW 20 MIN: CPT | Performed by: NURSE PRACTITIONER

## 2019-10-30 PROCEDURE — 36416 COLLJ CAPILLARY BLOOD SPEC: CPT | Performed by: NURSE PRACTITIONER

## 2019-10-30 NOTE — PROGRESS NOTES
Subjective   Alireza Carreon is a 84 y.o. male.     Chief Complaint   Patient presents with   • Animal Bite     cat bite follow up        History of Present Illness   Patient presents for follow up cat bite infection; has been taking Augmentin TID; has had pus draining from wound x2 on left leg; wound seems to be improving at this point, but still has some swelling and warmth; redness was worse yesterday, but a little better today; no more fever; temp has been decreased; has been trying to elevate leg at times, could do better; has some soreness in leg, no pain with walking; no decrease in appetite.    F/U CAC: takes Warfarin daily for atrial fibrillation; takes Warfarin 10 mg on M-W-F and 7.5 mg all other days; INR goal 2-3; INR 3.0 today; took 2 tabs this morning; no signs of bleeding, no blood in BM, no epistaxis.    F/U HTN/edema: takes Lisinopril and Torsemide daily; stopped taking KCL 2 days ago due to concern should not be taking; was taking potassium gluconate for muscle cramps; started taking magnesium OTC and has not had any more leg cramps; has had hypokalemia in past; also drinks pickle juice for muscle cramps.    The following portions of the patient's history were reviewed and updated as appropriate: allergies, current medications, past family history, past medical history, past social history, past surgical history and problem list.    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    • Nocturnal leg cramps    • OA (osteoarthritis)    • Prepatellar effusion    • Pulmonary embolism (CMS/HCC)    •  "Puncture wound of hand, right    • Screening for cardiovascular condition    • Stasis dermatitis    • Statin intolerance    • Taking drug for chronic disease    • Thoracic back pain    • Transient ischemic attack        Past Surgical History:   Procedure Laterality Date   • CORONARY ARTERY BYPASS GRAFT     • OTHER SURGICAL HISTORY      carotid thromboandarterectomy   • SHOULDER SURGERY         Family History   Problem Relation Age of Onset   • Hyperlipidemia Mother    • Dementia Mother        Social History     Socioeconomic History   • Marital status:      Spouse name: Not on file   • Number of children: Not on file   • Years of education: Not on file   • Highest education level: Not on file   Tobacco Use   • Smoking status: Former Smoker     Types: Cigarettes     Last attempt to quit: 10/30/1979     Years since quittin.0   • Smokeless tobacco: Never Used   Substance and Sexual Activity   • Alcohol use: Yes   • Drug use: No   • Sexual activity: Defer       Review of Systems   Constitutional: Positive for fatigue. Negative for appetite change, fever, unexpected weight gain and unexpected weight loss.   HENT: Negative for ear pain and sore throat.    Respiratory: Negative for cough, chest tightness and shortness of breath.    Cardiovascular: Positive for leg swelling. Negative for chest pain and palpitations.   Gastrointestinal: Negative for abdominal pain, constipation, diarrhea, GERD and indigestion.   Neurological: Negative for dizziness and headache.       Objective   Vitals:    10/30/19 0924   BP: 130/72   BP Location: Left arm   Patient Position: Sitting   Cuff Size: Adult   Pulse: 68   Temp: 98.2 °F (36.8 °C)   SpO2: 95%   Weight: 78.7 kg (173 lb 6.4 oz)   Height: 170.2 cm (67\")     Body mass index is 27.16 kg/m².    Physical Exam   Constitutional: He is oriented to person, place, and time. Vital signs are normal. He appears well-developed and well-nourished. No distress.   HENT:   Head: " Normocephalic.   Eyes: Conjunctivae are normal.   Neck: Normal range of motion. Neck supple.   Cardiovascular: Normal rate, regular rhythm, normal heart sounds and intact distal pulses.   No murmur heard.  Pulmonary/Chest: Effort normal and breath sounds normal. No respiratory distress.   Abdominal: Soft. Bowel sounds are normal. There is no hepatosplenomegaly. There is no tenderness.   Musculoskeletal: He exhibits edema (1-2+ edema of LLE, trace edema of RLE).   Neurological: He is alert and oriented to person, place, and time. Abnormal gait: limping on left.   Skin: Skin is warm and dry.        Psychiatric: He has a normal mood and affect. Judgment and thought content normal.   Nursing note and vitals reviewed.        Assessment/Plan .  Problem List Items Addressed This Visit     Anticoagulated on warfarin    Current Assessment & Plan     Take Warfarin 5 mg 1 tablet tomorrow and then resume normal dosing of 10 mg on M-W-F and 7.5 mg all other days.         Relevant Orders    POCT INR (Completed)    Cat bite of left lower leg - Primary    Cellulitis of left lower extremity    Current Assessment & Plan     Elevate left leg when sitting.  Finish Augmentin as prescribed.         Relevant Orders    Comprehensive Metabolic Panel    CBC & Differential          Return in about 1 week (around 11/6/2019) for Recheck.  Patient had already taking Warfarin this morning; instructed to take Warfarin 1 tab only tomorrow and then back on normal schedule; will recheck PT/INR in 1 week.

## 2019-10-30 NOTE — PATIENT INSTRUCTIONS
Take Warfarin 5 mg 1 tablet tomorrow and then resume normal dosing of 10 mg on M-W-F and 7.5 mg all other days.  Elevate left leg when sitting.  Finish Augmentin as prescribed.  Follow up pending lab results.  Follow up in 1 week, or sooner if symptoms persist or worsen.

## 2019-10-31 LAB
ALBUMIN SERPL-MCNC: 4.2 G/DL (ref 3.5–5.2)
ALBUMIN/GLOB SERPL: 1.8 G/DL
ALP SERPL-CCNC: 58 U/L (ref 39–117)
ALT SERPL-CCNC: 22 U/L (ref 1–41)
AST SERPL-CCNC: 22 U/L (ref 1–40)
BASOPHILS # BLD AUTO: 0.07 10*3/MM3 (ref 0–0.2)
BASOPHILS NFR BLD AUTO: 1 % (ref 0–1.5)
BILIRUB SERPL-MCNC: 0.2 MG/DL (ref 0.2–1.2)
BUN SERPL-MCNC: 23 MG/DL (ref 8–23)
BUN/CREAT SERPL: 17 (ref 7–25)
CALCIUM SERPL-MCNC: 9.5 MG/DL (ref 8.6–10.5)
CHLORIDE SERPL-SCNC: 97 MMOL/L (ref 98–107)
CO2 SERPL-SCNC: 29.7 MMOL/L (ref 22–29)
CREAT SERPL-MCNC: 1.35 MG/DL (ref 0.76–1.27)
EOSINOPHIL # BLD AUTO: 0.21 10*3/MM3 (ref 0–0.4)
EOSINOPHIL NFR BLD AUTO: 3.1 % (ref 0.3–6.2)
ERYTHROCYTE [DISTWIDTH] IN BLOOD BY AUTOMATED COUNT: 13.1 % (ref 12.3–15.4)
GLOBULIN SER CALC-MCNC: 2.4 GM/DL
GLUCOSE SERPL-MCNC: 87 MG/DL (ref 65–99)
HCT VFR BLD AUTO: 42.8 % (ref 37.5–51)
HGB BLD-MCNC: 14.2 G/DL (ref 13–17.7)
IMM GRANULOCYTES # BLD AUTO: 0.08 10*3/MM3 (ref 0–0.05)
IMM GRANULOCYTES NFR BLD AUTO: 1.2 % (ref 0–0.5)
LYMPHOCYTES # BLD AUTO: 1.48 10*3/MM3 (ref 0.7–3.1)
LYMPHOCYTES NFR BLD AUTO: 21.7 % (ref 19.6–45.3)
MCH RBC QN AUTO: 29.8 PG (ref 26.6–33)
MCHC RBC AUTO-ENTMCNC: 33.2 G/DL (ref 31.5–35.7)
MCV RBC AUTO: 89.9 FL (ref 79–97)
MONOCYTES # BLD AUTO: 0.56 10*3/MM3 (ref 0.1–0.9)
MONOCYTES NFR BLD AUTO: 8.2 % (ref 5–12)
NEUTROPHILS # BLD AUTO: 4.43 10*3/MM3 (ref 1.7–7)
NEUTROPHILS NFR BLD AUTO: 64.8 % (ref 42.7–76)
NRBC BLD AUTO-RTO: 0 /100 WBC (ref 0–0.2)
PLATELET # BLD AUTO: 324 10*3/MM3 (ref 140–450)
POTASSIUM SERPL-SCNC: 5.2 MMOL/L (ref 3.5–5.2)
PROT SERPL-MCNC: 6.6 G/DL (ref 6–8.5)
RBC # BLD AUTO: 4.76 10*6/MM3 (ref 4.14–5.8)
SODIUM SERPL-SCNC: 138 MMOL/L (ref 136–145)
WBC # BLD AUTO: 6.83 10*3/MM3 (ref 3.4–10.8)

## 2019-10-31 NOTE — ASSESSMENT & PLAN NOTE
Take Warfarin 5 mg 1 tablet tomorrow and then resume normal dosing of 10 mg on M-W-F and 7.5 mg all other days.

## 2019-11-06 ENCOUNTER — OFFICE VISIT (OUTPATIENT)
Dept: FAMILY MEDICINE CLINIC | Facility: CLINIC | Age: 84
End: 2019-11-06

## 2019-11-06 VITALS
TEMPERATURE: 98.2 F | DIASTOLIC BLOOD PRESSURE: 66 MMHG | HEIGHT: 67 IN | BODY MASS INDEX: 27.12 KG/M2 | OXYGEN SATURATION: 95 % | WEIGHT: 172.8 LBS | HEART RATE: 70 BPM | SYSTOLIC BLOOD PRESSURE: 112 MMHG
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
## 2019-11-06 DIAGNOSIS — S81.852D CAT BITE OF LEFT LOWER LEG, SUBSEQUENT ENCOUNTER: Primary | ICD-10-CM

## 2019-11-06 DIAGNOSIS — Z79.01 ANTICOAGULATED ON WARFARIN: ICD-10-CM

## 2019-11-06 DIAGNOSIS — L03.116 CELLULITIS OF LEFT LOWER EXTREMITY: ICD-10-CM

## 2019-11-06 LAB — INR PPP: 1.6 (ref 0.9–1.1)

## 2019-11-06 PROCEDURE — 99213 OFFICE O/P EST LOW 20 MIN: CPT | Performed by: NURSE PRACTITIONER

## 2019-11-06 PROCEDURE — 85610 PROTHROMBIN TIME: CPT | Performed by: NURSE PRACTITIONER

## 2019-11-06 PROCEDURE — 36416 COLLJ CAPILLARY BLOOD SPEC: CPT | Performed by: NURSE PRACTITIONER

## 2019-11-06 RX ORDER — DOXYCYCLINE 100 MG/1
100 CAPSULE ORAL 2 TIMES DAILY
Qty: 20 CAPSULE | Refills: 0 | Status: SHIPPED | OUTPATIENT
Start: 2019-11-06 | End: 2019-11-16

## 2019-11-06 NOTE — PROGRESS NOTES
Subjective   Alireza Carreon is a 84 y.o. male.     Chief Complaint   Patient presents with   • Ankle Injury     follow up cat bite    • Fall       History of Present Illness   Patient presents for follow up infection on leg due to cat bite; finished Augmentin on 11/3/19 and symptoms have not resolved; no more fever; no decrease in appetite; has discomfort in left leg, has throbbing; no malaise; has been trying to keep leg elevated, could do better; swelling improved in mornings and then worse as day goes on; some days swelling better than others depending upon how much keeps it up; today area is a little more red than yesterday; leg feels warm; has noticed pus at area of puncture, no drainage of wound; will clean with peroxide and look better, then next day more noted yellowish discoloration at area of puncture.    F/U CAC: takes Warfarin 10 mg on M-W-F and 7.5 mg all other days; takes Warfarin for atrial fib; last INR 3.0 and only took 5 mg tab on 10/31/19 instead of 7.5 mg, then resumed normal dosing; INR goal 2-3; INR 1.6 today; no missed doses; no problems with bleeding; takes Warfarin in mornings.    Also, had fall 2 days ago; cat got out and pt went outside after him; fell over root and landed on his left side; did not hit head; got bump on left forearm and scratch on left leg; was not able to get up himself; spouse took him is cane, terrain is uneven, and he was able to walk back to house; no change in gait; no pain in arm; has bruise in area on left arm; no decreased ROM of arm.    The following portions of the patient's history were reviewed and updated as appropriate: allergies, current medications, past family history, past medical history, past social history, past surgical history and problem list.    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    • Nocturnal leg cramps    • OA (osteoarthritis)    • Prepatellar effusion    • Pulmonary embolism (CMS/HCC)    • Puncture wound of hand, right    • Screening for cardiovascular condition    • Stasis dermatitis    • Statin intolerance    • Taking drug for chronic disease    • Thoracic back pain    • Transient ischemic attack        Past Surgical History:   Procedure Laterality Date   • CORONARY ARTERY BYPASS GRAFT     • OTHER SURGICAL HISTORY      carotid thromboandarterectomy   • SHOULDER SURGERY         Family History   Problem Relation Age of Onset   • Hyperlipidemia Mother    • Dementia Mother        Social History     Socioeconomic History   • Marital status:      Spouse name: Not on file   • Number of children: Not on file   • Years of education: Not on file   • Highest education level: Not on file   Tobacco Use   • Smoking status: Former Smoker     Types: Cigarettes     Last attempt to quit: 10/30/1979     Years since quittin.0   • Smokeless tobacco: Never Used   Substance and Sexual Activity   • Alcohol use: Yes   • Drug use: No   • Sexual activity: Defer       Review of Systems   Constitutional: Negative for appetite change, chills, fatigue, fever, unexpected weight gain and unexpected weight loss.   HENT: Negative for ear pain and sore throat.    Respiratory: Negative for cough, chest tightness and shortness of breath.    Cardiovascular: Negative for chest pain and palpitations.   Gastrointestinal: Negative for abdominal pain, blood in stool, constipation, diarrhea, GERD and indigestion.   Neurological: Negative for dizziness and headache (occasional mild headaches).       Objective   Vitals:    19 0903   BP: 112/66   BP Location: Left  "arm   Patient Position: Sitting   Cuff Size: Adult   Pulse: 70   Temp: 98.2 °F (36.8 °C)   SpO2: 95%   Weight: 78.4 kg (172 lb 12.8 oz)   Height: 170.2 cm (67\")     Body mass index is 27.06 kg/m².    Physical Exam   Constitutional: He is oriented to person, place, and time. Vital signs are normal. He appears well-developed and well-nourished. No distress.   HENT:   Head: Normocephalic.   Eyes: Conjunctivae are normal.   Neck: Normal range of motion. Neck supple. Carotid bruit is not present.   Cardiovascular: Normal rate, regular rhythm, normal heart sounds and intact distal pulses.   No murmur heard.  Pulmonary/Chest: Effort normal and breath sounds normal. No respiratory distress.   Abdominal: Soft. Bowel sounds are normal. There is no tenderness.   Musculoskeletal: He exhibits edema (1+ LLE pretibial).   Bruising noted on left posterior forearm; no tenderness with palpation of left arm; full ROM of left arm; strength equal bilaterally   Neurological: He is alert and oriented to person, place, and time. Gait (limping on left) abnormal.   Skin: Skin is warm and dry.        No bruising on chest or abdomen   Psychiatric: He has a normal mood and affect. Judgment and thought content normal.   Nursing note and vitals reviewed.        Assessment/Plan .  Problem List Items Addressed This Visit     Anticoagulated on warfarin    Current Assessment & Plan     Increase Warfarin to 10 mg (2 tablets) tomorrow.  Then, resume normal dosing schedule of Warfarin 10 mg on M-W-F and 7.5 mg all other days.  Will recheck PT/INR next week.         Relevant Orders    POC INR (Completed)    Cat bite of left lower leg - Primary    Relevant Medications    doxycycline (MONODOX) 100 MG capsule    Cellulitis of left lower extremity    Current Assessment & Plan     Continue to elevate left leg when sitting.         Relevant Medications    doxycycline (MONODOX) 100 MG capsule          Return in about 1 week (around 11/13/2019) for Recheck. or " sooner if symptoms persist or worsen.  Patient will check with pharmacy about tetanus vaccine.

## 2019-11-06 NOTE — PATIENT INSTRUCTIONS
Make sure drinking adequate liquids.  Keep left leg elevated when sitting.  Try to stay off leg until wound has healed.  Increase Warfarin to 10 mg (2 tablets) tomorrow.  Then, resume normal dosing schedule of Warfarin 10 mg on M-W-F and 7.5 mg all other days.  Follow up in 1 week, or sooner if symptoms persist or worsen.

## 2019-11-06 NOTE — ASSESSMENT & PLAN NOTE
Increase Warfarin to 10 mg (2 tablets) tomorrow.  Then, resume normal dosing schedule of Warfarin 10 mg on M-W-F and 7.5 mg all other days.  Will recheck PT/INR next week.

## 2019-11-13 ENCOUNTER — OFFICE VISIT (OUTPATIENT)
Dept: FAMILY MEDICINE CLINIC | Facility: CLINIC | Age: 84
End: 2019-11-13

## 2019-11-13 VITALS
WEIGHT: 173 LBS | HEART RATE: 56 BPM | TEMPERATURE: 98.2 F | OXYGEN SATURATION: 95 % | SYSTOLIC BLOOD PRESSURE: 118 MMHG | DIASTOLIC BLOOD PRESSURE: 70 MMHG | BODY MASS INDEX: 27.15 KG/M2 | HEIGHT: 67 IN
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
## 2019-11-13 DIAGNOSIS — S81.852D CAT BITE OF LEFT LOWER LEG, SUBSEQUENT ENCOUNTER: Primary | ICD-10-CM

## 2019-11-13 DIAGNOSIS — Z79.01 ANTICOAGULATED ON WARFARIN: ICD-10-CM

## 2019-11-13 DIAGNOSIS — L03.116 CELLULITIS OF LEFT LOWER EXTREMITY: ICD-10-CM

## 2019-11-13 LAB — INR PPP: 4.1 (ref 2–3)

## 2019-11-13 PROCEDURE — 36416 COLLJ CAPILLARY BLOOD SPEC: CPT | Performed by: NURSE PRACTITIONER

## 2019-11-13 PROCEDURE — 99213 OFFICE O/P EST LOW 20 MIN: CPT | Performed by: NURSE PRACTITIONER

## 2019-11-13 PROCEDURE — 85610 PROTHROMBIN TIME: CPT | Performed by: NURSE PRACTITIONER

## 2019-11-13 RX ORDER — TETANUS TOXOID, REDUCED DIPHTHERIA TOXOID AND ACELLULAR PERTUSSIS VACCINE, ADSORBED 5; 2.5; 8; 8; 2.5 [IU]/.5ML; [IU]/.5ML; UG/.5ML; UG/.5ML; UG/.5ML
SUSPENSION INTRAMUSCULAR
COMMUNITY
Start: 2019-11-08 | End: 2019-11-13

## 2019-11-13 RX ORDER — INFLUENZA A VIRUS A/MICHIGAN/45/2015 X-275 (H1N1) ANTIGEN (FORMALDEHYDE INACTIVATED), INFLUENZA A VIRUS A/SINGAPORE/INFIMH-16-0019/2016 IVR-186 (H3N2) ANTIGEN (FORMALDEHYDE INACTIVATED), AND INFLUENZA B VIRUS B/MARYLAND/15/2016 BX-69A (A B/COLORADO/6/2017-LIKE VIRUS) ANTIGEN (FORMALDEHYDE INACTIVATED) 60; 60; 60 UG/.5ML; UG/.5ML; UG/.5ML
INJECTION, SUSPENSION INTRAMUSCULAR
COMMUNITY
Start: 2019-11-08 | End: 2019-11-13

## 2019-11-13 NOTE — PROGRESS NOTES
Subjective   Alireza Carreon is a 84 y.o. male.     Chief Complaint   Patient presents with   • Animal Bite     follow up       History of Present Illness   Patient presents for follow up cat bite infection; pt finished Augmentin and symptoms persisted; currently taking Doxycycline and symptoms have not improved; slight improvement in swelling; has had some drainage from area; slight pain with walking; has continued to be very active and does not sit much to keep elevated; no fever; no malaise; has 3 more days of antibiotic left.    F/U CAC: takes warfarin daily for atrial fib; takes warfarin 10 mg on M-W-F and 7.5 mg all other days; INR goal 2-3; last PT/INR check 1.6 and took extra 1/2 tab x1 day; INR 4.1 today; no epistaxis; no signs of bleeding.    The following portions of the patient's history were reviewed and updated as appropriate: allergies, current medications, past family history, past medical history, past social history, past surgical history and problem list.    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    • Nocturnal leg cramps    • OA (osteoarthritis)    • Prepatellar effusion    • Pulmonary embolism (CMS/HCC)    • Puncture wound of hand, right    • Screening for cardiovascular condition    • Stasis dermatitis    • Statin intolerance    • Taking drug for chronic disease    • Thoracic back pain    • Transient ischemic attack        Past Surgical History:   Procedure Laterality Date   • CORONARY ARTERY BYPASS GRAFT     • OTHER SURGICAL HISTORY      carotid thromboandarterectomy   • SHOULDER  "SURGERY         Family History   Problem Relation Age of Onset   • Hyperlipidemia Mother    • Dementia Mother        Social History     Socioeconomic History   • Marital status:      Spouse name: Not on file   • Number of children: Not on file   • Years of education: Not on file   • Highest education level: Not on file   Tobacco Use   • Smoking status: Former Smoker     Types: Cigarettes     Last attempt to quit: 10/30/1979     Years since quittin.0   • Smokeless tobacco: Never Used   Substance and Sexual Activity   • Alcohol use: Yes   • Drug use: No   • Sexual activity: Defer       Review of Systems   Constitutional: Negative for appetite change, fatigue, fever, unexpected weight gain and unexpected weight loss.   Respiratory: Negative for cough, chest tightness and shortness of breath.    Cardiovascular: Negative for chest pain and palpitations.   Gastrointestinal: Negative for blood in stool, diarrhea, nausea, vomiting, GERD and indigestion.   Genitourinary: Negative for hematuria.   Musculoskeletal: Back pain: has lower back discomfort with prolonged standing.   Neurological: Negative for dizziness and headache.       Objective   Vitals:    19 1319 19 1410   BP: 118/70    BP Location: Left arm    Patient Position: Sitting    Cuff Size: Large Adult    Pulse: 53 56   Temp: 98.2 °F (36.8 °C)    TempSrc: Oral    SpO2: 95%    Weight: 78.5 kg (173 lb)    Height: 170.2 cm (67\")    PainSc:   2    PainLoc: Leg      Body mass index is 27.1 kg/m².    Physical Exam   Constitutional: He is oriented to person, place, and time. Vital signs are normal. He appears well-developed and well-nourished. No distress.   HENT:   Head: Normocephalic.   Eyes: Conjunctivae are normal.   Neck: Normal range of motion. Neck supple. Carotid bruit is not present.   Cardiovascular: Normal rate, regular rhythm, normal heart sounds and intact distal pulses.   No murmur heard.  Pulmonary/Chest: Effort normal and breath " sounds normal. No respiratory distress.   Abdominal: Soft. There is no tenderness.   Musculoskeletal: He exhibits edema (1+ pretibial on right, 2+ pretibial and ankle on left).   Neurological: He is alert and oriented to person, place, and time. Abnormal gait: limping on left.   Skin: Skin is warm and dry.        Psychiatric: He has a normal mood and affect. His behavior is normal. Judgment and thought content normal.   Nursing note and vitals reviewed.        Assessment/Plan .  Problem List Items Addressed This Visit     Anticoagulated on warfarin    Current Assessment & Plan     Only take Warfarin 5 mg (1 tablet) tomorrow.  Then, resume normal dosing schedule of Warfarin 10 mg on Mondays, Wednesdays, and Fridays and 7.5 mg all other days.         Relevant Orders    POC INR (Completed)    Cat bite of left lower leg - Primary    Relevant Medications    mupirocin (BACTROBAN) 2 % ointment    Other Relevant Orders    Ambulatory Referral to Wound Clinic    CBC & Differential    Wound Culture - Wound, Leg, Left    Cellulitis of left lower extremity    Current Assessment & Plan     Elevate legs when sitting.         Relevant Orders    Basic Metabolic Panel          Return if symptoms worsen or fail to improve, for follow up as scheduled on 11/25/19.  Will consider extra dose of Torsemide pending lab results.

## 2019-11-13 NOTE — ASSESSMENT & PLAN NOTE
Only take Warfarin 5 mg (1 tablet) tomorrow.  Then, resume normal dosing schedule of Warfarin 10 mg on Mondays, Wednesdays, and Fridays and 7.5 mg all other days.

## 2019-11-14 LAB
BASOPHILS # BLD AUTO: 0.1 X10E3/UL (ref 0–0.2)
BASOPHILS NFR BLD AUTO: 1 %
BUN SERPL-MCNC: 16 MG/DL (ref 8–27)
BUN/CREAT SERPL: 13 (ref 10–24)
CALCIUM SERPL-MCNC: 9.2 MG/DL (ref 8.6–10.2)
CHLORIDE SERPL-SCNC: 100 MMOL/L (ref 96–106)
CO2 SERPL-SCNC: 26 MMOL/L (ref 20–29)
CREAT SERPL-MCNC: 1.21 MG/DL (ref 0.76–1.27)
EOSINOPHIL # BLD AUTO: 0.2 X10E3/UL (ref 0–0.4)
EOSINOPHIL NFR BLD AUTO: 4 %
ERYTHROCYTE [DISTWIDTH] IN BLOOD BY AUTOMATED COUNT: 14.3 % (ref 12.3–15.4)
GLUCOSE SERPL-MCNC: 86 MG/DL (ref 65–99)
HCT VFR BLD AUTO: 41.9 % (ref 37.5–51)
HGB BLD-MCNC: 13.9 G/DL (ref 13–17.7)
IMM GRANULOCYTES # BLD AUTO: 0 X10E3/UL (ref 0–0.1)
IMM GRANULOCYTES NFR BLD AUTO: 0 %
LYMPHOCYTES # BLD AUTO: 1.9 X10E3/UL (ref 0.7–3.1)
LYMPHOCYTES NFR BLD AUTO: 29 %
MCH RBC QN AUTO: 29.4 PG (ref 26.6–33)
MCHC RBC AUTO-ENTMCNC: 33.2 G/DL (ref 31.5–35.7)
MCV RBC AUTO: 89 FL (ref 79–97)
MONOCYTES # BLD AUTO: 0.5 X10E3/UL (ref 0.1–0.9)
MONOCYTES NFR BLD AUTO: 8 %
NEUTROPHILS # BLD AUTO: 3.8 X10E3/UL (ref 1.4–7)
NEUTROPHILS NFR BLD AUTO: 58 %
PLATELET # BLD AUTO: 307 X10E3/UL (ref 150–450)
POTASSIUM SERPL-SCNC: 4.6 MMOL/L (ref 3.5–5.2)
RBC # BLD AUTO: 4.72 X10E6/UL (ref 4.14–5.8)
SODIUM SERPL-SCNC: 141 MMOL/L (ref 134–144)
WBC # BLD AUTO: 6.5 X10E3/UL (ref 3.4–10.8)

## 2019-11-15 DIAGNOSIS — R56.9 GENERALIZED CONVULSIVE SEIZURE (HCC): ICD-10-CM

## 2019-11-17 LAB
BACTERIA SPEC AEROBE CULT: NORMAL
BACTERIA SPEC CULT: NORMAL

## 2019-11-18 RX ORDER — PHENOBARBITAL 64.8 MG/1
TABLET ORAL
Qty: 90 TABLET | Refills: 5 | Status: SHIPPED | OUTPATIENT
Start: 2019-11-18 | End: 2020-07-13

## 2019-11-25 ENCOUNTER — OFFICE VISIT (OUTPATIENT)
Dept: FAMILY MEDICINE CLINIC | Facility: CLINIC | Age: 84
End: 2019-11-25

## 2019-11-25 VITALS
HEART RATE: 65 BPM | OXYGEN SATURATION: 96 % | DIASTOLIC BLOOD PRESSURE: 68 MMHG | WEIGHT: 171.4 LBS | HEIGHT: 67 IN | BODY MASS INDEX: 26.9 KG/M2 | SYSTOLIC BLOOD PRESSURE: 132 MMHG | TEMPERATURE: 98.1 F

## 2019-11-25 DIAGNOSIS — R60.0 LOCALIZED EDEMA: ICD-10-CM

## 2019-11-25 DIAGNOSIS — I48.20 CHRONIC ATRIAL FIBRILLATION (HCC): ICD-10-CM

## 2019-11-25 DIAGNOSIS — Z79.01 ANTICOAGULATED ON WARFARIN: Primary | ICD-10-CM

## 2019-11-25 LAB — INR PPP: 6 (ref 2–3)

## 2019-11-25 PROCEDURE — 85610 PROTHROMBIN TIME: CPT | Performed by: INTERNAL MEDICINE

## 2019-11-25 PROCEDURE — 99213 OFFICE O/P EST LOW 20 MIN: CPT | Performed by: INTERNAL MEDICINE

## 2019-11-25 PROCEDURE — 36416 COLLJ CAPILLARY BLOOD SPEC: CPT | Performed by: INTERNAL MEDICINE

## 2019-11-25 RX ORDER — TRIAMCINOLONE ACETONIDE 1 MG/G
CREAM TOPICAL
COMMUNITY
Start: 2019-09-03 | End: 2021-11-30

## 2019-11-25 NOTE — PROGRESS NOTES
Subjective   Alireza Carreon is a 84 y.o. male.     Chief Complaint   Patient presents with   • Atrial Fibrillation       History of Present Illness   Here for follow up INR evaluation.  Currently anticoagulated with warfarin for atrial fibrillation.  The patient is taking warfarin 10 M/W/F and 7.5 mg rest of the week.  The current INR goal is 2-3.  The INR is currently 6.0 (this after decreased dose 10 days ago).  No significant interval events, easy bleeding, bruising, epistaxis, fever, weakness or numbness.  Patient took an extra torsemide a week ago that helped the swelling slightly.  Patient did not make it to the wound care center.  Still with a small non-healing wound of the left leg with no drainage now.    The following portions of the patient's history were reviewed and updated as appropriate: allergies, current medications, past family history, past medical history, past social history, past surgical history and problem list.    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    • Nocturnal leg cramps    • OA (osteoarthritis)    • Prepatellar effusion    • Pulmonary embolism (CMS/HCC)    • Puncture wound of hand, right    • Screening for cardiovascular condition    • Stasis dermatitis    • Statin intolerance    • Taking drug for chronic disease    • Thoracic back pain    • Transient ischemic attack        Past Surgical History:   Procedure Laterality Date   • CORONARY ARTERY BYPASS GRAFT     • OTHER SURGICAL HISTORY      carotid thromboandarterectomy   • SHOULDER SURGERY         Family History   Problem  Relation Age of Onset   • Hyperlipidemia Mother    • Dementia Mother        Social History     Socioeconomic History   • Marital status:      Spouse name: Not on file   • Number of children: Not on file   • Years of education: Not on file   • Highest education level: Not on file   Tobacco Use   • Smoking status: Former Smoker     Types: Cigarettes     Last attempt to quit: 10/30/1979     Years since quittin.0   • Smokeless tobacco: Never Used   Substance and Sexual Activity   • Alcohol use: Yes   • Drug use: No   • Sexual activity: Defer       Current Outpatient Medications   Medication Sig Dispense Refill   • cimetidine (TAGAMET) 200 MG tablet      • clobetasol (TEMOVATE) 0.05 % cream      • lisinopril (PRINIVIL,ZESTRIL) 20 MG tablet Take 1 tablet by mouth 2 (Two) Times a Day. 180 tablet 0   • PHENobarbital (LUMINAL) 64.8 MG tablet TAKE ONE TABLET BY MOUTH DAILY 90 tablet 5   • Potassium 99 MG tablet Take 99 mg by mouth Daily.     • torsemide (DEMADEX) 10 MG tablet Take 1 tablet by mouth Daily. (Patient taking differently: Take 10 mg by mouth Daily. Taking 2 tablets daily for legs swelling) 90 tablet 1   • triamcinolone (KENALOG) 0.1 % cream      • warfarin (COUMADIN) 5 MG tablet Use as directed up to 2 tabs per day. 180 tablet 0     No current facility-administered medications for this visit.        Review of Systems   Constitutional: Negative for activity change, appetite change, fatigue, fever, unexpected weight gain and unexpected weight loss.   HENT: Negative for nosebleeds, rhinorrhea, trouble swallowing and voice change.    Eyes: Negative for visual disturbance.   Respiratory: Negative for cough, chest tightness, shortness of breath and wheezing.    Cardiovascular: Positive for leg swelling. Negative for chest pain and palpitations.   Gastrointestinal: Negative for abdominal pain, blood in stool, constipation, diarrhea, nausea, vomiting, GERD and indigestion.   Genitourinary: Negative for  dysuria, frequency and hematuria.   Musculoskeletal: Negative for arthralgias, back pain and myalgias.   Skin: Negative for rash and bruise.   Neurological: Negative for dizziness, tremors, weakness, light-headedness, numbness, headache and memory problem.   Hematological: Negative for adenopathy. Does not bruise/bleed easily.   Psychiatric/Behavioral: Negative for sleep disturbance and depressed mood. The patient is not nervous/anxious.        Objective   Vitals:    11/25/19 0955   BP: 132/68   Pulse: 65   Temp: 98.1 °F (36.7 °C)   SpO2: 96%     Body mass index is 26.85 kg/m².  Physical Exam   Constitutional: He is oriented to person, place, and time. He appears well-developed and well-nourished. No distress.   HENT:   Head: Normocephalic and atraumatic.   Right Ear: External ear normal.   Left Ear: External ear normal.   Nose: Nose normal.   Mouth/Throat: Oropharynx is clear and moist.   Eyes: Conjunctivae and EOM are normal. Pupils are equal, round, and reactive to light.   Neck: Normal range of motion. Neck supple. No tracheal deviation present. No thyromegaly present.   Cardiovascular: Normal rate, regular rhythm, normal heart sounds and intact distal pulses. Exam reveals no gallop and no friction rub.   No murmur heard.  Pulmonary/Chest: Effort normal and breath sounds normal. No respiratory distress.   Abdominal: Soft. Bowel sounds are normal. He exhibits no mass. There is no tenderness. There is no guarding.   Musculoskeletal: Normal range of motion. He exhibits no edema.   Lymphadenopathy:     He has no cervical adenopathy.   Neurological: He is alert and oriented to person, place, and time. He displays normal reflexes. He exhibits normal muscle tone.   Skin: Skin is warm and dry. Capillary refill takes less than 2 seconds. No rash noted. He is not diaphoretic.   Left distal leg with 1+ swelling with a 0.5 cm ulcer with no drainage.   Psychiatric: He has a normal mood and affect. His behavior is normal.  Judgment and thought content normal.   Nursing note and vitals reviewed.      Recent Results (from the past 2016 hour(s))   POC INR    Collection Time: 09/17/19  1:01 PM   Result Value Ref Range    INR 1.30 (A) 2 - 3   POC INR    Collection Time: 09/30/19 10:43 AM   Result Value Ref Range    INR 1.70 (A) 2 - 3   POC INR    Collection Time: 10/14/19 10:22 AM   Result Value Ref Range    INR 2.20 (A) 2 - 3   Culture, Routine - Swab, Leg, Left    Collection Time: 10/25/19  2:40 PM   Result Value Ref Range    Culture Final report     Result 1 Comment    POCT INR    Collection Time: 10/30/19 10:14 AM   Result Value Ref Range    INR 3.0 (A) 0.9 - 1.1   Comprehensive Metabolic Panel    Collection Time: 10/30/19 10:34 AM   Result Value Ref Range    Glucose 87 65 - 99 mg/dL    BUN 23 8 - 23 mg/dL    Creatinine 1.35 (H) 0.76 - 1.27 mg/dL    eGFR Non African Am 50 (L) >60 mL/min/1.73    eGFR African Am 61 >60 mL/min/1.73    BUN/Creatinine Ratio 17.0 7.0 - 25.0    Sodium 138 136 - 145 mmol/L    Potassium 5.2 3.5 - 5.2 mmol/L    Chloride 97 (L) 98 - 107 mmol/L    Total CO2 29.7 (H) 22.0 - 29.0 mmol/L    Calcium 9.5 8.6 - 10.5 mg/dL    Total Protein 6.6 6.0 - 8.5 g/dL    Albumin 4.20 3.50 - 5.20 g/dL    Globulin 2.4 gm/dL    A/G Ratio 1.8 g/dL    Total Bilirubin 0.2 0.2 - 1.2 mg/dL    Alkaline Phosphatase 58 39 - 117 U/L    AST (SGOT) 22 1 - 40 U/L    ALT (SGPT) 22 1 - 41 U/L   CBC & Differential    Collection Time: 10/30/19 10:34 AM   Result Value Ref Range    WBC 6.83 3.40 - 10.80 10*3/mm3    RBC 4.76 4.14 - 5.80 10*6/mm3    Hemoglobin 14.2 13.0 - 17.7 g/dL    Hematocrit 42.8 37.5 - 51.0 %    MCV 89.9 79.0 - 97.0 fL    MCH 29.8 26.6 - 33.0 pg    MCHC 33.2 31.5 - 35.7 g/dL    RDW 13.1 12.3 - 15.4 %    Platelets 324 140 - 450 10*3/mm3    Neutrophil Rel % 64.8 42.7 - 76.0 %    Lymphocyte Rel % 21.7 19.6 - 45.3 %    Monocyte Rel % 8.2 5.0 - 12.0 %    Eosinophil Rel % 3.1 0.3 - 6.2 %    Basophil Rel % 1.0 0.0 - 1.5 %    Neutrophils  Absolute 4.43 1.70 - 7.00 10*3/mm3    Lymphocytes Absolute 1.48 0.70 - 3.10 10*3/mm3    Monocytes Absolute 0.56 0.10 - 0.90 10*3/mm3    Eosinophils Absolute 0.21 0.00 - 0.40 10*3/mm3    Basophils Absolute 0.07 0.00 - 0.20 10*3/mm3    Immature Granulocyte Rel % 1.2 (H) 0.0 - 0.5 %    Immature Grans Absolute 0.08 (H) 0.00 - 0.05 10*3/mm3    nRBC 0.0 0.0 - 0.2 /100 WBC   POC INR    Collection Time: 11/06/19 10:16 AM   Result Value Ref Range    INR 1.6 (A) 0.9 - 1.1   POC INR    Collection Time: 11/13/19  1:28 PM   Result Value Ref Range    INR 4.10 (A) 2 - 3   Basic Metabolic Panel    Collection Time: 11/13/19  2:09 PM   Result Value Ref Range    Glucose 86 65 - 99 mg/dL    BUN 16 8 - 27 mg/dL    Creatinine 1.21 0.76 - 1.27 mg/dL    eGFR Non African Am 55 (L) >59 mL/min/1.73    eGFR African Am 63 >59 mL/min/1.73    BUN/Creatinine Ratio 13 10 - 24    Sodium 141 134 - 144 mmol/L    Potassium 4.6 3.5 - 5.2 mmol/L    Chloride 100 96 - 106 mmol/L    Total CO2 26 20 - 29 mmol/L    Calcium 9.2 8.6 - 10.2 mg/dL   CBC & Differential    Collection Time: 11/13/19  2:09 PM   Result Value Ref Range    WBC 6.5 3.4 - 10.8 x10E3/uL    RBC 4.72 4.14 - 5.80 x10E6/uL    Hemoglobin 13.9 13.0 - 17.7 g/dL    Hematocrit 41.9 37.5 - 51.0 %    MCV 89 79 - 97 fL    MCH 29.4 26.6 - 33.0 pg    MCHC 33.2 31.5 - 35.7 g/dL    RDW 14.3 12.3 - 15.4 %    Platelets 307 150 - 450 x10E3/uL    Neutrophil Rel % 58 Not Estab. %    Lymphocyte Rel % 29 Not Estab. %    Monocyte Rel % 8 Not Estab. %    Eosinophil Rel % 4 Not Estab. %    Basophil Rel % 1 Not Estab. %    Neutrophils Absolute 3.8 1.4 - 7.0 x10E3/uL    Lymphocytes Absolute 1.9 0.7 - 3.1 x10E3/uL    Monocytes Absolute 0.5 0.1 - 0.9 x10E3/uL    Eosinophils Absolute 0.2 0.0 - 0.4 x10E3/uL    Basophils Absolute 0.1 0.0 - 0.2 x10E3/uL    Immature Granulocyte Rel % 0 Not Estab. %    Immature Grans Absolute 0.0 0.0 - 0.1 x10E3/uL   Culture, Routine - ,    Collection Time: 11/13/19  2:40 PM   Result Value  Ref Range    Culture Final report     Result 1 Mixed skin hermelindo    POC INR    Collection Time: 11/25/19 10:07 AM   Result Value Ref Range    INR 6.00 (A) 2 - 3     Assessment/Plan   Alireza was seen today for atrial fibrillation.    Diagnoses and all orders for this visit:    Anticoagulated on warfarin  -     Protime-INR    Chronic atrial fibrillation  -     Protime-INR    Localized edema    Other orders  -     POC INR    Hold the warfarin till 11/28/19 then 7.5 mg every day except Monday and Friday 10 mg.  Repeat the INR in 1-2 weeks.  Encouraged patient to contact wound care and elevation of the leg.

## 2019-11-26 LAB
INR PPP: 3.11 (ref 0.9–1.1)
PROTHROMBIN TIME: 31.7 SECONDS (ref 11.7–14.2)

## 2019-11-27 ENCOUNTER — TELEPHONE (OUTPATIENT)
Dept: FAMILY MEDICINE CLINIC | Facility: CLINIC | Age: 84
End: 2019-11-27

## 2019-11-27 NOTE — TELEPHONE ENCOUNTER
Called and spoke with patient and explained the letter for INR , patient understood.              ----- Message from Jose Bingham MD sent at 11/26/2019  7:35 PM EST -----  Please call patient and inform that the INR is actually lower than what we found in the office.  He can restart the warfarin now and continue the dosing as we discussed in the office.

## 2019-12-04 ENCOUNTER — OFFICE VISIT (OUTPATIENT)
Dept: WOUND CARE | Facility: HOSPITAL | Age: 84
End: 2019-12-04

## 2019-12-04 ENCOUNTER — LAB REQUISITION (OUTPATIENT)
Dept: LAB | Facility: HOSPITAL | Age: 84
End: 2019-12-04

## 2019-12-04 DIAGNOSIS — L97.822 NON-PRESSURE CHRONIC ULCER OF OTHER PART OF LEFT LOWER LEG WITH FAT LAYER EXPOSED (HCC): ICD-10-CM

## 2019-12-04 PROCEDURE — 87205 SMEAR GRAM STAIN: CPT | Performed by: NURSE PRACTITIONER

## 2019-12-04 PROCEDURE — G0463 HOSPITAL OUTPT CLINIC VISIT: HCPCS

## 2019-12-04 PROCEDURE — 87076 CULTURE ANAEROBE IDENT EACH: CPT | Performed by: NURSE PRACTITIONER

## 2019-12-04 PROCEDURE — 87075 CULTR BACTERIA EXCEPT BLOOD: CPT | Performed by: NURSE PRACTITIONER

## 2019-12-04 PROCEDURE — 87070 CULTURE OTHR SPECIMN AEROBIC: CPT | Performed by: NURSE PRACTITIONER

## 2019-12-04 PROCEDURE — 87147 CULTURE TYPE IMMUNOLOGIC: CPT | Performed by: NURSE PRACTITIONER

## 2019-12-06 LAB
BACTERIA SPEC AEROBE CULT: ABNORMAL
GRAM STN SPEC: ABNORMAL

## 2019-12-09 ENCOUNTER — OFFICE VISIT (OUTPATIENT)
Dept: FAMILY MEDICINE CLINIC | Facility: CLINIC | Age: 84
End: 2019-12-09

## 2019-12-09 VITALS
HEART RATE: 65 BPM | TEMPERATURE: 98.2 F | OXYGEN SATURATION: 95 % | SYSTOLIC BLOOD PRESSURE: 128 MMHG | WEIGHT: 172 LBS | HEIGHT: 67 IN | BODY MASS INDEX: 27 KG/M2 | DIASTOLIC BLOOD PRESSURE: 64 MMHG

## 2019-12-09 DIAGNOSIS — Z79.01 ANTICOAGULATED ON WARFARIN: Primary | ICD-10-CM

## 2019-12-09 DIAGNOSIS — I48.20 CHRONIC ATRIAL FIBRILLATION (HCC): ICD-10-CM

## 2019-12-09 LAB — INR PPP: 3.5 (ref 2–3)

## 2019-12-09 PROCEDURE — 99213 OFFICE O/P EST LOW 20 MIN: CPT | Performed by: INTERNAL MEDICINE

## 2019-12-09 PROCEDURE — 36416 COLLJ CAPILLARY BLOOD SPEC: CPT | Performed by: INTERNAL MEDICINE

## 2019-12-09 PROCEDURE — 85610 PROTHROMBIN TIME: CPT | Performed by: INTERNAL MEDICINE

## 2019-12-09 NOTE — PROGRESS NOTES
Subjective   Alireza Carreon is a 84 y.o. male.     Chief Complaint   Patient presents with   • Anticoagulation       History of Present Illness   Here for follow up INR evaluation.  Currently anticoagulated with warfarin for atrial fibrillation.  The patient is taking warfarin 10 M/W/F and 7.5 mg rest of the week.  The current INR goal is 2-3.  The INR is currently 6.0 (this after decreased dose 10 days ago).  No significant interval events, easy bleeding, bruising, epistaxis, fever, weakness or numbness.  Patient went to wound care and had the wound excised and having home health [facility] coming for wound care and the swelling is improved.  Follow-up for hypertension.  Currently has been feeling well and asymptomatic without any headaches,vision changes, cough, chest pain, shortness of breath, swelling, focal neurologic deficit, memory loss or syncope.  Has been taking the medications regularly and adherent with the regimen, Denies medication side effects and no significant interval events.     The following portions of the patient's history were reviewed and updated as appropriate: allergies, current medications, past family history, past medical history, past social history, past surgical history and problem list.    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    • Nocturnal leg cramps    • OA (osteoarthritis)    • Prepatellar effusion    • Pulmonary embolism (CMS/HCC)    • Puncture wound of hand, right    • Screening for cardiovascular condition    • Stasis dermatitis    • Statin intolerance    • Taking drug  for chronic disease    • Thoracic back pain    • Transient ischemic attack        Past Surgical History:   Procedure Laterality Date   • CORONARY ARTERY BYPASS GRAFT     • OTHER SURGICAL HISTORY      carotid thromboandarterectomy   • SHOULDER SURGERY         Family History   Problem Relation Age of Onset   • Hyperlipidemia Mother    • Dementia Mother        Social History     Socioeconomic History   • Marital status:      Spouse name: Not on file   • Number of children: Not on file   • Years of education: Not on file   • Highest education level: Not on file   Tobacco Use   • Smoking status: Former Smoker     Types: Cigarettes     Last attempt to quit: 10/30/1979     Years since quittin.1   • Smokeless tobacco: Never Used   Substance and Sexual Activity   • Alcohol use: Yes   • Drug use: No   • Sexual activity: Defer       Current Outpatient Medications   Medication Sig Dispense Refill   • clobetasol (TEMOVATE) 0.05 % cream      • lisinopril (PRINIVIL,ZESTRIL) 20 MG tablet Take 1 tablet by mouth 2 (Two) Times a Day. 180 tablet 0   • PHENobarbital (LUMINAL) 64.8 MG tablet TAKE ONE TABLET BY MOUTH DAILY 90 tablet 5   • Potassium 99 MG tablet Take 99 mg by mouth Daily.     • torsemide (DEMADEX) 10 MG tablet Take 1 tablet by mouth Daily. (Patient taking differently: Take 10 mg by mouth Daily. Taking 2 tablets daily for legs swelling) 90 tablet 1   • triamcinolone (KENALOG) 0.1 % cream      • warfarin (COUMADIN) 5 MG tablet Use as directed up to 2 tabs per day. 180 tablet 0   • cimetidine (TAGAMET) 200 MG tablet        No current facility-administered medications for this visit.        Review of Systems   Constitutional: Negative for activity change, appetite change, fatigue, fever, unexpected weight gain and unexpected weight loss.   HENT: Negative for nosebleeds, rhinorrhea, trouble swallowing and voice change.    Eyes: Negative for visual disturbance.   Respiratory: Negative for cough, chest tightness,  shortness of breath and wheezing.    Cardiovascular: Positive for leg swelling. Negative for chest pain and palpitations.   Gastrointestinal: Negative for abdominal pain, blood in stool, constipation, diarrhea, nausea, vomiting, GERD and indigestion.   Genitourinary: Negative for dysuria, frequency and hematuria.   Musculoskeletal: Negative for arthralgias, back pain and myalgias.   Skin: Negative for rash and bruise.   Neurological: Negative for dizziness, tremors, weakness, light-headedness, numbness, headache and memory problem.   Hematological: Negative for adenopathy. Does not bruise/bleed easily.   Psychiatric/Behavioral: Negative for sleep disturbance and depressed mood. The patient is not nervous/anxious.        Objective   Vitals:    12/09/19 0803   BP: 128/64   Pulse: 65   Temp: 98.2 °F (36.8 °C)   SpO2: 95%     Body mass index is 26.93 kg/m².  Physical Exam   Constitutional: He is oriented to person, place, and time. He appears well-developed and well-nourished. No distress.   HENT:   Head: Normocephalic and atraumatic.   Right Ear: External ear normal.   Left Ear: External ear normal.   Nose: Nose normal.   Mouth/Throat: Oropharynx is clear and moist.   Eyes: Pupils are equal, round, and reactive to light. Conjunctivae and EOM are normal.   Neck: Normal range of motion. Neck supple. No tracheal deviation present. No thyromegaly present.   Cardiovascular: Normal rate, regular rhythm, normal heart sounds and intact distal pulses. Exam reveals no gallop and no friction rub.   No murmur heard.  Pulmonary/Chest: Effort normal and breath sounds normal. No respiratory distress.   Abdominal: Soft. Bowel sounds are normal. He exhibits no mass. There is no tenderness. There is no guarding.   Musculoskeletal: Normal range of motion. He exhibits no edema.   Lymphadenopathy:     He has no cervical adenopathy.   Neurological: He is alert and oriented to person, place, and time. He displays normal reflexes. He exhibits  normal muscle tone.   Skin: Skin is warm and dry. Capillary refill takes less than 2 seconds. No rash noted. He is not diaphoretic.   Left distal leg with swelling bandaged by wound care..    Psychiatric: He has a normal mood and affect. His behavior is normal. Judgment and thought content normal.   Nursing note and vitals reviewed.      Recent Results (from the past 2016 hour(s))   POC INR    Collection Time: 09/17/19  1:01 PM   Result Value Ref Range    INR 1.30 (A) 2 - 3   POC INR    Collection Time: 09/30/19 10:43 AM   Result Value Ref Range    INR 1.70 (A) 2 - 3   POC INR    Collection Time: 10/14/19 10:22 AM   Result Value Ref Range    INR 2.20 (A) 2 - 3   Culture, Routine - Swab, Leg, Left    Collection Time: 10/25/19  2:40 PM   Result Value Ref Range    Culture Final report     Result 1 Comment    POCT INR    Collection Time: 10/30/19 10:14 AM   Result Value Ref Range    INR 3.0 (A) 0.9 - 1.1   Comprehensive Metabolic Panel    Collection Time: 10/30/19 10:34 AM   Result Value Ref Range    Glucose 87 65 - 99 mg/dL    BUN 23 8 - 23 mg/dL    Creatinine 1.35 (H) 0.76 - 1.27 mg/dL    eGFR Non African Am 50 (L) >60 mL/min/1.73    eGFR African Am 61 >60 mL/min/1.73    BUN/Creatinine Ratio 17.0 7.0 - 25.0    Sodium 138 136 - 145 mmol/L    Potassium 5.2 3.5 - 5.2 mmol/L    Chloride 97 (L) 98 - 107 mmol/L    Total CO2 29.7 (H) 22.0 - 29.0 mmol/L    Calcium 9.5 8.6 - 10.5 mg/dL    Total Protein 6.6 6.0 - 8.5 g/dL    Albumin 4.20 3.50 - 5.20 g/dL    Globulin 2.4 gm/dL    A/G Ratio 1.8 g/dL    Total Bilirubin 0.2 0.2 - 1.2 mg/dL    Alkaline Phosphatase 58 39 - 117 U/L    AST (SGOT) 22 1 - 40 U/L    ALT (SGPT) 22 1 - 41 U/L   CBC & Differential    Collection Time: 10/30/19 10:34 AM   Result Value Ref Range    WBC 6.83 3.40 - 10.80 10*3/mm3    RBC 4.76 4.14 - 5.80 10*6/mm3    Hemoglobin 14.2 13.0 - 17.7 g/dL    Hematocrit 42.8 37.5 - 51.0 %    MCV 89.9 79.0 - 97.0 fL    MCH 29.8 26.6 - 33.0 pg    MCHC 33.2 31.5 - 35.7 g/dL     RDW 13.1 12.3 - 15.4 %    Platelets 324 140 - 450 10*3/mm3    Neutrophil Rel % 64.8 42.7 - 76.0 %    Lymphocyte Rel % 21.7 19.6 - 45.3 %    Monocyte Rel % 8.2 5.0 - 12.0 %    Eosinophil Rel % 3.1 0.3 - 6.2 %    Basophil Rel % 1.0 0.0 - 1.5 %    Neutrophils Absolute 4.43 1.70 - 7.00 10*3/mm3    Lymphocytes Absolute 1.48 0.70 - 3.10 10*3/mm3    Monocytes Absolute 0.56 0.10 - 0.90 10*3/mm3    Eosinophils Absolute 0.21 0.00 - 0.40 10*3/mm3    Basophils Absolute 0.07 0.00 - 0.20 10*3/mm3    Immature Granulocyte Rel % 1.2 (H) 0.0 - 0.5 %    Immature Grans Absolute 0.08 (H) 0.00 - 0.05 10*3/mm3    nRBC 0.0 0.0 - 0.2 /100 WBC   POC INR    Collection Time: 11/06/19 10:16 AM   Result Value Ref Range    INR 1.6 (A) 0.9 - 1.1   POC INR    Collection Time: 11/13/19  1:28 PM   Result Value Ref Range    INR 4.10 (A) 2 - 3   Basic Metabolic Panel    Collection Time: 11/13/19  2:09 PM   Result Value Ref Range    Glucose 86 65 - 99 mg/dL    BUN 16 8 - 27 mg/dL    Creatinine 1.21 0.76 - 1.27 mg/dL    eGFR Non African Am 55 (L) >59 mL/min/1.73    eGFR African Am 63 >59 mL/min/1.73    BUN/Creatinine Ratio 13 10 - 24    Sodium 141 134 - 144 mmol/L    Potassium 4.6 3.5 - 5.2 mmol/L    Chloride 100 96 - 106 mmol/L    Total CO2 26 20 - 29 mmol/L    Calcium 9.2 8.6 - 10.2 mg/dL   CBC & Differential    Collection Time: 11/13/19  2:09 PM   Result Value Ref Range    WBC 6.5 3.4 - 10.8 x10E3/uL    RBC 4.72 4.14 - 5.80 x10E6/uL    Hemoglobin 13.9 13.0 - 17.7 g/dL    Hematocrit 41.9 37.5 - 51.0 %    MCV 89 79 - 97 fL    MCH 29.4 26.6 - 33.0 pg    MCHC 33.2 31.5 - 35.7 g/dL    RDW 14.3 12.3 - 15.4 %    Platelets 307 150 - 450 x10E3/uL    Neutrophil Rel % 58 Not Estab. %    Lymphocyte Rel % 29 Not Estab. %    Monocyte Rel % 8 Not Estab. %    Eosinophil Rel % 4 Not Estab. %    Basophil Rel % 1 Not Estab. %    Neutrophils Absolute 3.8 1.4 - 7.0 x10E3/uL    Lymphocytes Absolute 1.9 0.7 - 3.1 x10E3/uL    Monocytes Absolute 0.5 0.1 - 0.9 x10E3/uL     Eosinophils Absolute 0.2 0.0 - 0.4 x10E3/uL    Basophils Absolute 0.1 0.0 - 0.2 x10E3/uL    Immature Granulocyte Rel % 0 Not Estab. %    Immature Grans Absolute 0.0 0.0 - 0.1 x10E3/uL   Culture, Routine - ,    Collection Time: 11/13/19  2:40 PM   Result Value Ref Range    Culture Final report     Result 1 Mixed skin hermelindo    POC INR    Collection Time: 11/25/19 10:07 AM   Result Value Ref Range    INR 6.00 (A) 2 - 3   Protime-INR    Collection Time: 11/25/19 10:59 AM   Result Value Ref Range    INR 3.11 (H) 0.90 - 1.10    Protime 31.7 (H) 11.7 - 14.2 Seconds   Wound Culture - Swab, Leg, Left    Collection Time: 12/04/19  7:10 PM   Result Value Ref Range    Wound Culture Rare Staphylococcus, coagulase negative (A)     Gram Stain No WBCs or organisms seen    Anaerobic Culture - Swab, Leg, Left    Collection Time: 12/04/19  7:10 PM   Result Value Ref Range    Anaerobic Culture No anaerobes isolated at 3 days    POC INR    Collection Time: 12/09/19  8:20 AM   Result Value Ref Range    INR 3.50 (A) 2 - 3     Assessment/Plan   Alireza was seen today for anticoagulation.    Diagnoses and all orders for this visit:    Anticoagulated on warfarin    Chronic atrial fibrillation    Other orders  -     POC INR    Hold the warfarin till for one day then 7.5 mg every day except Monday and Friday 10 mg.  Follow up with wound care and home health as scheduled.  No changes needed at this time.

## 2019-12-11 ENCOUNTER — OFFICE VISIT (OUTPATIENT)
Dept: WOUND CARE | Facility: HOSPITAL | Age: 84
End: 2019-12-11

## 2019-12-11 LAB — BACTERIA SPEC ANAEROBE CULT: ABNORMAL

## 2019-12-11 PROCEDURE — G0463 HOSPITAL OUTPT CLINIC VISIT: HCPCS

## 2019-12-18 ENCOUNTER — OFFICE VISIT (OUTPATIENT)
Dept: WOUND CARE | Facility: HOSPITAL | Age: 84
End: 2019-12-18

## 2019-12-18 PROCEDURE — G0463 HOSPITAL OUTPT CLINIC VISIT: HCPCS

## 2020-01-06 ENCOUNTER — OFFICE VISIT (OUTPATIENT)
Dept: FAMILY MEDICINE CLINIC | Facility: CLINIC | Age: 85
End: 2020-01-06

## 2020-01-06 VITALS
HEIGHT: 67 IN | BODY MASS INDEX: 27 KG/M2 | DIASTOLIC BLOOD PRESSURE: 80 MMHG | HEART RATE: 72 BPM | SYSTOLIC BLOOD PRESSURE: 124 MMHG | WEIGHT: 172 LBS | OXYGEN SATURATION: 96 %

## 2020-01-06 DIAGNOSIS — Z79.01 ANTICOAGULATED ON WARFARIN: Primary | ICD-10-CM

## 2020-01-06 DIAGNOSIS — I48.20 CHRONIC ATRIAL FIBRILLATION (HCC): ICD-10-CM
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
## 2020-01-06 DIAGNOSIS — S81.852D CAT BITE OF LEFT LOWER LEG, SUBSEQUENT ENCOUNTER: ICD-10-CM

## 2020-01-06 PROBLEM — L03.116 CELLULITIS OF LEFT LOWER EXTREMITY: Status: RESOLVED | Noted: 2019-10-25 | Resolved: 2020-01-06

## 2020-01-06 LAB — INR PPP: 3.2 (ref 2–3)

## 2020-01-06 PROCEDURE — 99213 OFFICE O/P EST LOW 20 MIN: CPT | Performed by: INTERNAL MEDICINE

## 2020-01-06 PROCEDURE — 85610 PROTHROMBIN TIME: CPT | Performed by: INTERNAL MEDICINE

## 2020-01-06 RX ORDER — PREDNISOLONE ACETATE 10 MG/ML
SUSPENSION/ DROPS OPHTHALMIC AS NEEDED
COMMUNITY
Start: 2019-12-27 | End: 2021-11-30

## 2020-01-06 NOTE — PROGRESS NOTES
Subjective   Alireza Carreon is a 84 y.o. male.     Chief Complaint   Patient presents with   • Anticoagulation       History of Present Illness   Here for follow up INR evaluation.  Currently anticoagulated with warfarin for atrial fibrillation.  The patient is taking warfarin 10 M/W/F and 7.5 mg rest of the week.  The current INR goal is 2-3.  The INR is currently 3.2.  No significant interval events, easy bleeding, bruising, epistaxis, fever, weakness or numbness.  Patient went to wound care and had the wound excised and having home health [facility] coming for wound care and the swelling is improved.  Has appointment with eye doctor on 1/31/20 for the eyes and chronic dry eyes with Asiya.    The following portions of the patient's history were reviewed and updated as appropriate: allergies, current medications, past family history, past medical history, past social history, past surgical history and problem list.    Depression Screen:  PHQ-2/PHQ-9 Depression Screening 9/17/2019   Little interest or pleasure in doing things 0   Feeling down, depressed, or hopeless 0   Trouble falling or staying asleep, or sleeping too much 0   Feeling tired or having little energy 0   Poor appetite or overeating 0   Feeling bad about yourself - or that you are a failure or have let yourself or your family down 0   Trouble concentrating on things, such as reading the newspaper or watching television 0   Moving or speaking so slowly that other people could have noticed. Or the opposite - being so fidgety or restless that you have been moving around a lot more than usual 0   Thoughts that you would be better off dead, or of hurting yourself in some way 0   Total Score 0       Past Medical History:   Diagnosis Date   • 3-vessel CAD    • Anticoagulated on warfarin    • Atrial fibrillation (CMS/[facility])    • BMI 28.0-28.9,adult    • Bunion, right foot    • Complaints of memory disturbance    • Edema    • Encounter for immunization     • Generalized convulsive seizure (CMS/HCC)    • Gout    • History of double vision    • Hyperlipidemia    • Hypertension    • Left foot pain    • 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    • Nocturnal leg cramps    • OA (osteoarthritis)    • Prepatellar effusion    • Pulmonary embolism (CMS/HCC)    • Puncture wound of hand, right    • Screening for cardiovascular condition    • Stasis dermatitis    • Statin intolerance    • Taking drug for chronic disease    • Thoracic back pain    • Transient ischemic attack        Past Surgical History:   Procedure Laterality Date   • CORONARY ARTERY BYPASS GRAFT     • OTHER SURGICAL HISTORY      carotid thromboandarterectomy   • SHOULDER SURGERY         Family History   Problem Relation Age of Onset   • Hyperlipidemia Mother    • Dementia Mother        Social History     Socioeconomic History   • Marital status:      Spouse name: Not on file   • Number of children: Not on file   • Years of education: Not on file   • Highest education level: Not on file   Tobacco Use   • Smoking status: Former Smoker     Types: Cigarettes     Last attempt to quit: 10/30/1979     Years since quittin.2   • Smokeless tobacco: Never Used   Substance and Sexual Activity   • Alcohol use: Yes   • Drug use: No   • Sexual activity: Defer       Current Outpatient Medications   Medication Sig Dispense Refill   • cimetidine (TAGAMET) 200 MG tablet      • clobetasol (TEMOVATE) 0.05 % cream      • lisinopril (PRINIVIL,ZESTRIL) 20 MG tablet Take 1 tablet by mouth 2 (Two) Times a Day. 180 tablet 0   • Magnesium Hydroxide (MAGNESIA PO) Take  by mouth.     • PHENobarbital (LUMINAL) 64.8 MG tablet TAKE ONE TABLET BY MOUTH DAILY 90 tablet 5   • torsemide (DEMADEX) 10 MG tablet Take 1 tablet by mouth Daily. (Patient taking differently: Take 10 mg by mouth Daily. Taking 2 tablets daily for legs swelling) 90 tablet 1   • triamcinolone (KENALOG) 0.1 % cream      • warfarin (COUMADIN) 5 MG tablet  "Use as directed up to 2 tabs per day. 180 tablet 0   • Potassium 99 MG tablet Take 99 mg by mouth Daily.     • prednisoLONE acetate (PRED FORTE) 1 % ophthalmic suspension        No current facility-administered medications for this visit.        Review of Systems   Constitutional: Negative for activity change, appetite change, fatigue, fever, unexpected weight gain and unexpected weight loss.   HENT: Negative for nosebleeds, rhinorrhea, trouble swallowing and voice change.    Eyes: Negative for visual disturbance.   Respiratory: Negative for cough, chest tightness, shortness of breath and wheezing.    Cardiovascular: Negative for chest pain, palpitations and leg swelling.   Gastrointestinal: Negative for abdominal pain, blood in stool, constipation, diarrhea, nausea, vomiting, GERD and indigestion.   Genitourinary: Negative for dysuria, frequency and hematuria.   Musculoskeletal: Negative for arthralgias, back pain and myalgias.   Skin: Negative for rash and bruise.   Neurological: Negative for dizziness, tremors, weakness, light-headedness, numbness, headache and memory problem.   Hematological: Negative for adenopathy. Does not bruise/bleed easily.   Psychiatric/Behavioral: Negative for sleep disturbance and depressed mood. The patient is not nervous/anxious.        Objective   /80 (BP Location: Left arm, Patient Position: Sitting, Cuff Size: Adult)   Pulse 72   Ht 170.2 cm (67.01\")   Wt 78 kg (172 lb)   SpO2 96%   BMI 26.93 kg/m²     Physical Exam   Constitutional: He is oriented to person, place, and time. He appears well-developed and well-nourished. No distress.   HENT:   Head: Normocephalic and atraumatic.   Right Ear: External ear normal.   Left Ear: External ear normal.   Nose: Nose normal.   Mouth/Throat: Oropharynx is clear and moist.   Eyes: Pupils are equal, round, and reactive to light. Conjunctivae and EOM are normal.   Neck: Normal range of motion. Neck supple. No tracheal deviation " present. No thyromegaly present.   Cardiovascular: Normal rate, regular rhythm, normal heart sounds and intact distal pulses. Exam reveals no gallop and no friction rub.   No murmur heard.  Pulmonary/Chest: Effort normal and breath sounds normal. No respiratory distress.   Abdominal: Soft. Bowel sounds are normal. He exhibits no mass. There is no tenderness. There is no guarding.   Musculoskeletal: Normal range of motion. He exhibits no edema.   Lymphadenopathy:     He has no cervical adenopathy.   Neurological: He is alert and oriented to person, place, and time. He displays normal reflexes. He exhibits normal muscle tone.   Skin: Skin is warm and dry. Capillary refill takes less than 2 seconds. No rash noted. He is not diaphoretic.   Left leg with 2 mm scabbed lesion on the distal left leg without erythema.   Psychiatric: He has a normal mood and affect. His behavior is normal. Judgment and thought content normal.   Nursing note and vitals reviewed.      Recent Results (from the past 2016 hour(s))   POC INR    Collection Time: 10/14/19 10:22 AM   Result Value Ref Range    INR 2.20 (A) 2 - 3   Culture, Routine - Swab, Leg, Left    Collection Time: 10/25/19  2:40 PM   Result Value Ref Range    Culture Final report     Result 1 Comment    POCT INR    Collection Time: 10/30/19 10:14 AM   Result Value Ref Range    INR 3.0 (A) 0.9 - 1.1   Comprehensive Metabolic Panel    Collection Time: 10/30/19 10:34 AM   Result Value Ref Range    Glucose 87 65 - 99 mg/dL    BUN 23 8 - 23 mg/dL    Creatinine 1.35 (H) 0.76 - 1.27 mg/dL    eGFR Non African Am 50 (L) >60 mL/min/1.73    eGFR African Am 61 >60 mL/min/1.73    BUN/Creatinine Ratio 17.0 7.0 - 25.0    Sodium 138 136 - 145 mmol/L    Potassium 5.2 3.5 - 5.2 mmol/L    Chloride 97 (L) 98 - 107 mmol/L    Total CO2 29.7 (H) 22.0 - 29.0 mmol/L    Calcium 9.5 8.6 - 10.5 mg/dL    Total Protein 6.6 6.0 - 8.5 g/dL    Albumin 4.20 3.50 - 5.20 g/dL    Globulin 2.4 gm/dL    A/G Ratio 1.8  g/dL    Total Bilirubin 0.2 0.2 - 1.2 mg/dL    Alkaline Phosphatase 58 39 - 117 U/L    AST (SGOT) 22 1 - 40 U/L    ALT (SGPT) 22 1 - 41 U/L   CBC & Differential    Collection Time: 10/30/19 10:34 AM   Result Value Ref Range    WBC 6.83 3.40 - 10.80 10*3/mm3    RBC 4.76 4.14 - 5.80 10*6/mm3    Hemoglobin 14.2 13.0 - 17.7 g/dL    Hematocrit 42.8 37.5 - 51.0 %    MCV 89.9 79.0 - 97.0 fL    MCH 29.8 26.6 - 33.0 pg    MCHC 33.2 31.5 - 35.7 g/dL    RDW 13.1 12.3 - 15.4 %    Platelets 324 140 - 450 10*3/mm3    Neutrophil Rel % 64.8 42.7 - 76.0 %    Lymphocyte Rel % 21.7 19.6 - 45.3 %    Monocyte Rel % 8.2 5.0 - 12.0 %    Eosinophil Rel % 3.1 0.3 - 6.2 %    Basophil Rel % 1.0 0.0 - 1.5 %    Neutrophils Absolute 4.43 1.70 - 7.00 10*3/mm3    Lymphocytes Absolute 1.48 0.70 - 3.10 10*3/mm3    Monocytes Absolute 0.56 0.10 - 0.90 10*3/mm3    Eosinophils Absolute 0.21 0.00 - 0.40 10*3/mm3    Basophils Absolute 0.07 0.00 - 0.20 10*3/mm3    Immature Granulocyte Rel % 1.2 (H) 0.0 - 0.5 %    Immature Grans Absolute 0.08 (H) 0.00 - 0.05 10*3/mm3    nRBC 0.0 0.0 - 0.2 /100 WBC   POC INR    Collection Time: 11/06/19 10:16 AM   Result Value Ref Range    INR 1.6 (A) 0.9 - 1.1   POC INR    Collection Time: 11/13/19  1:28 PM   Result Value Ref Range    INR 4.10 (A) 2 - 3   Basic Metabolic Panel    Collection Time: 11/13/19  2:09 PM   Result Value Ref Range    Glucose 86 65 - 99 mg/dL    BUN 16 8 - 27 mg/dL    Creatinine 1.21 0.76 - 1.27 mg/dL    eGFR Non African Am 55 (L) >59 mL/min/1.73    eGFR African Am 63 >59 mL/min/1.73    BUN/Creatinine Ratio 13 10 - 24    Sodium 141 134 - 144 mmol/L    Potassium 4.6 3.5 - 5.2 mmol/L    Chloride 100 96 - 106 mmol/L    Total CO2 26 20 - 29 mmol/L    Calcium 9.2 8.6 - 10.2 mg/dL   CBC & Differential    Collection Time: 11/13/19  2:09 PM   Result Value Ref Range    WBC 6.5 3.4 - 10.8 x10E3/uL    RBC 4.72 4.14 - 5.80 x10E6/uL    Hemoglobin 13.9 13.0 - 17.7 g/dL    Hematocrit 41.9 37.5 - 51.0 %    MCV 89 79  - 97 fL    MCH 29.4 26.6 - 33.0 pg    MCHC 33.2 31.5 - 35.7 g/dL    RDW 14.3 12.3 - 15.4 %    Platelets 307 150 - 450 x10E3/uL    Neutrophil Rel % 58 Not Estab. %    Lymphocyte Rel % 29 Not Estab. %    Monocyte Rel % 8 Not Estab. %    Eosinophil Rel % 4 Not Estab. %    Basophil Rel % 1 Not Estab. %    Neutrophils Absolute 3.8 1.4 - 7.0 x10E3/uL    Lymphocytes Absolute 1.9 0.7 - 3.1 x10E3/uL    Monocytes Absolute 0.5 0.1 - 0.9 x10E3/uL    Eosinophils Absolute 0.2 0.0 - 0.4 x10E3/uL    Basophils Absolute 0.1 0.0 - 0.2 x10E3/uL    Immature Granulocyte Rel % 0 Not Estab. %    Immature Grans Absolute 0.0 0.0 - 0.1 x10E3/uL   Culture, Routine - ,    Collection Time: 11/13/19  2:40 PM   Result Value Ref Range    Culture Final report     Result 1 Mixed skin hermelindo    POC INR    Collection Time: 11/25/19 10:07 AM   Result Value Ref Range    INR 6.00 (A) 2 - 3   Protime-INR    Collection Time: 11/25/19 10:59 AM   Result Value Ref Range    INR 3.11 (H) 0.90 - 1.10    Protime 31.7 (H) 11.7 - 14.2 Seconds   Wound Culture - Swab, Leg, Left    Collection Time: 12/04/19  7:10 PM   Result Value Ref Range    Wound Culture Rare Staphylococcus, coagulase negative (A)     Gram Stain No WBCs or organisms seen    Anaerobic Culture - Swab, Leg, Left    Collection Time: 12/04/19  7:10 PM   Result Value Ref Range    Anaerobic Culture Prevotella bivia (A)    POC INR    Collection Time: 12/09/19  8:20 AM   Result Value Ref Range    INR 3.50 (A) 2 - 3   POC INR    Collection Time: 01/06/20  8:46 AM   Result Value Ref Range    INR 3.20 (A) 2 - 3     Assessment/Plan   Alireza was seen today for anticoagulation.    Diagnoses and all orders for this visit:    Anticoagulated on warfarin    Chronic atrial fibrillation    Cat bite of left lower leg, subsequent encounter    Other orders  -     POC INR    Hold the warfarin till for one day then 7.5 mg every day except Friday 10 mg.  Recheck in 1 month.  The leg wound from cat bite is nearly healed  completely.  No further home health needed and not in wound care now.  BP is controlled at this time.

## 2020-01-13 ENCOUNTER — TELEPHONE (OUTPATIENT)
Dept: FAMILY MEDICINE CLINIC | Facility: CLINIC | Age: 85
End: 2020-01-13

## 2020-01-13 DIAGNOSIS — R60.9 EDEMA, UNSPECIFIED TYPE: ICD-10-CM

## 2020-01-13 DIAGNOSIS — I10 HYPERTENSION, UNSPECIFIED TYPE: ICD-10-CM

## 2020-01-13 DIAGNOSIS — Z79.01 ANTICOAGULATED ON COUMADIN: ICD-10-CM

## 2020-01-13 RX ORDER — TORSEMIDE 10 MG/1
10 TABLET ORAL DAILY
Qty: 180 TABLET | Refills: 0 | Status: SHIPPED | OUTPATIENT
Start: 2020-01-13 | End: 2022-01-01 | Stop reason: HOSPADM

## 2020-01-13 RX ORDER — WARFARIN SODIUM 5 MG/1
TABLET ORAL
Qty: 180 TABLET | Refills: 0 | Status: SHIPPED | OUTPATIENT
Start: 2020-01-13 | End: 2020-06-25 | Stop reason: SDUPTHER

## 2020-01-13 RX ORDER — LISINOPRIL 20 MG/1
20 TABLET ORAL 2 TIMES DAILY
Qty: 180 TABLET | Refills: 0 | Status: SHIPPED | OUTPATIENT
Start: 2020-01-13 | End: 2020-06-25 | Stop reason: SDUPTHER

## 2020-01-13 NOTE — TELEPHONE ENCOUNTER
REFILL on his Warfarin 5 mg tab  Lisinopril 20 mg 2 tab daily  Torsemide 10 mg daily  Sent to his new mail order pharm

## 2020-02-03 ENCOUNTER — TELEPHONE (OUTPATIENT)
Dept: FAMILY MEDICINE CLINIC | Facility: CLINIC | Age: 85
End: 2020-02-03

## 2020-02-03 ENCOUNTER — OFFICE VISIT (OUTPATIENT)
Dept: FAMILY MEDICINE CLINIC | Facility: CLINIC | Age: 85
End: 2020-02-03

## 2020-02-03 VITALS
HEIGHT: 67 IN | BODY MASS INDEX: 27.47 KG/M2 | HEART RATE: 73 BPM | OXYGEN SATURATION: 95 % | DIASTOLIC BLOOD PRESSURE: 72 MMHG | WEIGHT: 175 LBS | SYSTOLIC BLOOD PRESSURE: 138 MMHG

## 2020-02-03 DIAGNOSIS — Z79.01 ANTICOAGULATED ON WARFARIN: Primary | ICD-10-CM

## 2020-02-03 DIAGNOSIS — I10 ESSENTIAL HYPERTENSION: ICD-10-CM

## 2020-02-03 DIAGNOSIS — I48.20 CHRONIC ATRIAL FIBRILLATION (HCC): ICD-10-CM

## 2020-02-03 LAB — INR PPP: 1.6 (ref 2–3)

## 2020-02-03 PROCEDURE — 85610 PROTHROMBIN TIME: CPT | Performed by: INTERNAL MEDICINE

## 2020-02-03 PROCEDURE — 99213 OFFICE O/P EST LOW 20 MIN: CPT | Performed by: INTERNAL MEDICINE

## 2020-02-03 NOTE — PROGRESS NOTES
Subjective   Alireza Carreon is a 84 y.o. male.     Chief Complaint   Patient presents with   • Anticoagulation       History of Present Illness   Here for follow up INR evaluation.  Currently anticoagulated with warfarin for atrial fibrillation.  The patient is taking warfarin 10 M/W/F and 7.5 mg rest of the week.  The current INR goal is 2-3.  The INR is currently 1.6.  No significant interval events, easy bleeding, bruising, epistaxis, fever, weakness or numbness.  Patient went to wound care and had the wound excised and having home health [facility] coming for wound care and the swelling is improved.    The following portions of the patient's history were reviewed and updated as appropriate: allergies, current medications, past family history, past medical history, past social history, past surgical history and problem list.    Depression Screen:  PHQ-2/PHQ-9 Depression Screening 9/17/2019   Little interest or pleasure in doing things 0   Feeling down, depressed, or hopeless 0   Trouble falling or staying asleep, or sleeping too much 0   Feeling tired or having little energy 0   Poor appetite or overeating 0   Feeling bad about yourself - or that you are a failure or have let yourself or your family down 0   Trouble concentrating on things, such as reading the newspaper or watching television 0   Moving or speaking so slowly that other people could have noticed. Or the opposite - being so fidgety or restless that you have been moving around a lot more than usual 0   Thoughts that you would be better off dead, or of hurting yourself in some way 0   Total Score 0       Past Medical History:   Diagnosis Date   • 3-vessel CAD    • Anticoagulated on warfarin    • Atrial fibrillation (CMS/HCC)    • BMI 28.0-28.9,adult    • Bunion, right foot    • Complaints of memory disturbance    • Edema    • Encounter for immunization    • Generalized convulsive seizure (CMS/HCC)    • Gout    • History of double vision    •  Hyperlipidemia    • Hypertension    • Left foot pain    • 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    • Nocturnal leg cramps    • OA (osteoarthritis)    • Prepatellar effusion    • Pulmonary embolism (CMS/HCC)    • Puncture wound of hand, right    • Screening for cardiovascular condition    • Stasis dermatitis    • Statin intolerance    • Taking drug for chronic disease    • Thoracic back pain    • Transient ischemic attack        Past Surgical History:   Procedure Laterality Date   • CORONARY ARTERY BYPASS GRAFT     • OTHER SURGICAL HISTORY      carotid thromboandarterectomy   • SHOULDER SURGERY         Family History   Problem Relation Age of Onset   • Hyperlipidemia Mother    • Dementia Mother        Social History     Socioeconomic History   • Marital status:      Spouse name: Not on file   • Number of children: Not on file   • Years of education: Not on file   • Highest education level: Not on file   Tobacco Use   • Smoking status: Former Smoker     Types: Cigarettes     Last attempt to quit: 10/30/1979     Years since quittin.2   • Smokeless tobacco: Never Used   Substance and Sexual Activity   • Alcohol use: Yes   • Drug use: No   • Sexual activity: Defer       Current Outpatient Medications   Medication Sig Dispense Refill   • cimetidine (TAGAMET) 200 MG tablet      • clobetasol (TEMOVATE) 0.05 % cream      • lisinopril (PRINIVIL,ZESTRIL) 20 MG tablet Take 1 tablet by mouth 2 (Two) Times a Day. 180 tablet 0   • Magnesium Hydroxide (MAGNESIA PO) Take  by mouth.     • PHENobarbital (LUMINAL) 64.8 MG tablet TAKE ONE TABLET BY MOUTH DAILY 90 tablet 5   • Potassium 99 MG tablet Take 99 mg by mouth Daily.     • prednisoLONE acetate (PRED FORTE) 1 % ophthalmic suspension      • torsemide (DEMADEX) 10 MG tablet Take 1 tablet by mouth Daily. 180 tablet 0   • triamcinolone (KENALOG) 0.1 % cream      • warfarin (COUMADIN) 5 MG tablet Use as directed up to 2 tabs per day. 180 tablet 0     No  "current facility-administered medications for this visit.        Review of Systems   Constitutional: Negative for activity change, appetite change, fatigue, fever, unexpected weight gain and unexpected weight loss.   HENT: Negative for nosebleeds, rhinorrhea, trouble swallowing and voice change.    Eyes: Negative for visual disturbance.   Respiratory: Negative for cough, chest tightness, shortness of breath and wheezing.    Cardiovascular: Negative for chest pain, palpitations and leg swelling.   Gastrointestinal: Negative for abdominal pain, blood in stool, constipation, diarrhea, nausea, vomiting, GERD and indigestion.   Genitourinary: Negative for dysuria, frequency and hematuria.   Musculoskeletal: Negative for arthralgias, back pain and myalgias.   Skin: Negative for rash and bruise.   Neurological: Negative for dizziness, tremors, weakness, light-headedness, numbness, headache and memory problem.   Hematological: Negative for adenopathy. Does not bruise/bleed easily.   Psychiatric/Behavioral: Negative for sleep disturbance and depressed mood. The patient is not nervous/anxious.        Objective   /72 (BP Location: Left arm, Patient Position: Sitting, Cuff Size: Adult)   Pulse 73   Ht 170.2 cm (67.01\")   Wt 79.4 kg (175 lb)   SpO2 95%   BMI 27.40 kg/m²     Physical Exam   Constitutional: He is oriented to person, place, and time. He appears well-developed and well-nourished. No distress.   HENT:   Head: Normocephalic and atraumatic.   Right Ear: External ear normal.   Left Ear: External ear normal.   Nose: Nose normal.   Mouth/Throat: Oropharynx is clear and moist.   Eyes: Pupils are equal, round, and reactive to light. Conjunctivae and EOM are normal.   Neck: Normal range of motion. Neck supple. No tracheal deviation present. No thyromegaly present.   Cardiovascular: Normal rate, regular rhythm, normal heart sounds and intact distal pulses. Exam reveals no gallop and no friction rub.   No murmur " heard.  Pulmonary/Chest: Effort normal and breath sounds normal. No respiratory distress.   Abdominal: Soft. Bowel sounds are normal. He exhibits no mass. There is no tenderness. There is no guarding.   Musculoskeletal: Normal range of motion. He exhibits no edema.   Lymphadenopathy:     He has no cervical adenopathy.   Neurological: He is alert and oriented to person, place, and time. He displays normal reflexes. He exhibits normal muscle tone.   Skin: Skin is warm and dry. Capillary refill takes less than 2 seconds. No rash noted. He is not diaphoretic.   Psychiatric: He has a normal mood and affect. His behavior is normal. Judgment and thought content normal.   Nursing note and vitals reviewed.      Recent Results (from the past 2016 hour(s))   POC INR    Collection Time: 11/13/19  1:28 PM   Result Value Ref Range    INR 4.10 (A) 2 - 3   Basic Metabolic Panel    Collection Time: 11/13/19  2:09 PM   Result Value Ref Range    Glucose 86 65 - 99 mg/dL    BUN 16 8 - 27 mg/dL    Creatinine 1.21 0.76 - 1.27 mg/dL    eGFR Non African Am 55 (L) >59 mL/min/1.73    eGFR African Am 63 >59 mL/min/1.73    BUN/Creatinine Ratio 13 10 - 24    Sodium 141 134 - 144 mmol/L    Potassium 4.6 3.5 - 5.2 mmol/L    Chloride 100 96 - 106 mmol/L    Total CO2 26 20 - 29 mmol/L    Calcium 9.2 8.6 - 10.2 mg/dL   CBC & Differential    Collection Time: 11/13/19  2:09 PM   Result Value Ref Range    WBC 6.5 3.4 - 10.8 x10E3/uL    RBC 4.72 4.14 - 5.80 x10E6/uL    Hemoglobin 13.9 13.0 - 17.7 g/dL    Hematocrit 41.9 37.5 - 51.0 %    MCV 89 79 - 97 fL    MCH 29.4 26.6 - 33.0 pg    MCHC 33.2 31.5 - 35.7 g/dL    RDW 14.3 12.3 - 15.4 %    Platelets 307 150 - 450 x10E3/uL    Neutrophil Rel % 58 Not Estab. %    Lymphocyte Rel % 29 Not Estab. %    Monocyte Rel % 8 Not Estab. %    Eosinophil Rel % 4 Not Estab. %    Basophil Rel % 1 Not Estab. %    Neutrophils Absolute 3.8 1.4 - 7.0 x10E3/uL    Lymphocytes Absolute 1.9 0.7 - 3.1 x10E3/uL    Monocytes  Absolute 0.5 0.1 - 0.9 x10E3/uL    Eosinophils Absolute 0.2 0.0 - 0.4 x10E3/uL    Basophils Absolute 0.1 0.0 - 0.2 x10E3/uL    Immature Granulocyte Rel % 0 Not Estab. %    Immature Grans Absolute 0.0 0.0 - 0.1 x10E3/uL   Culture, Routine - ,    Collection Time: 11/13/19  2:40 PM   Result Value Ref Range    Culture Final report     Result 1 Mixed skin hermelindo    POC INR    Collection Time: 11/25/19 10:07 AM   Result Value Ref Range    INR 6.00 (A) 2 - 3   Protime-INR    Collection Time: 11/25/19 10:59 AM   Result Value Ref Range    INR 3.11 (H) 0.90 - 1.10    Protime 31.7 (H) 11.7 - 14.2 Seconds   Wound Culture - Swab, Leg, Left    Collection Time: 12/04/19  7:10 PM   Result Value Ref Range    Wound Culture Rare Staphylococcus, coagulase negative (A)     Gram Stain No WBCs or organisms seen    Anaerobic Culture - Swab, Leg, Left    Collection Time: 12/04/19  7:10 PM   Result Value Ref Range    Anaerobic Culture Prevotella bivia (A)    POC INR    Collection Time: 12/09/19  8:20 AM   Result Value Ref Range    INR 3.50 (A) 2 - 3   POC INR    Collection Time: 01/06/20  8:46 AM   Result Value Ref Range    INR 3.20 (A) 2 - 3   POC INR    Collection Time: 02/03/20 10:05 AM   Result Value Ref Range    INR 1.60 (A) 2 - 3     Assessment/Plan   Alireza was seen today for anticoagulation.    Diagnoses and all orders for this visit:    Anticoagulated on warfarin    Chronic atrial fibrillation    Essential hypertension    Other orders  -     POC INR    Take an extra 1/2 tab tomorrow and then 10 mg Monday and Friday.  Recheck in 1-2 weeks.  If still thick at that time then increase the dose.  Continue the current medications otherwise unchanged.

## 2020-02-17 ENCOUNTER — OFFICE VISIT (OUTPATIENT)
Dept: FAMILY MEDICINE CLINIC | Facility: CLINIC | Age: 85
End: 2020-02-17

## 2020-02-17 VITALS
HEIGHT: 67 IN | DIASTOLIC BLOOD PRESSURE: 78 MMHG | SYSTOLIC BLOOD PRESSURE: 124 MMHG | OXYGEN SATURATION: 95 % | HEART RATE: 71 BPM | BODY MASS INDEX: 27.31 KG/M2 | WEIGHT: 174 LBS

## 2020-02-17 DIAGNOSIS — Z79.01 ANTICOAGULATED ON WARFARIN: Primary | ICD-10-CM

## 2020-02-17 DIAGNOSIS — R60.0 LOCALIZED EDEMA: ICD-10-CM

## 2020-02-17 DIAGNOSIS — I48.20 CHRONIC ATRIAL FIBRILLATION (HCC): ICD-10-CM

## 2020-02-17 LAB — INR PPP: 2.1 (ref 2–3)

## 2020-02-17 PROCEDURE — 85610 PROTHROMBIN TIME: CPT | Performed by: INTERNAL MEDICINE

## 2020-02-17 PROCEDURE — 99213 OFFICE O/P EST LOW 20 MIN: CPT | Performed by: INTERNAL MEDICINE

## 2020-02-17 PROCEDURE — 36416 COLLJ CAPILLARY BLOOD SPEC: CPT | Performed by: INTERNAL MEDICINE

## 2020-02-17 RX ORDER — DICLOFENAC SODIUM 1 MG/ML
SOLUTION/ DROPS OPHTHALMIC AS NEEDED
COMMUNITY
Start: 2020-02-11 | End: 2021-11-30

## 2020-02-17 NOTE — PROGRESS NOTES
Subjective   Alireza Carreon is a 84 y.o. male.     Chief Complaint   Patient presents with   • Leg Swelling     left    • Anticoagulation       History of Present Illness   Here for follow up INR evaluation.  Currently anticoagulated with warfarin for atrial fibrillation.  The patient is taking warfarin 10 M/W/F and 7.5 mg rest of the week.  The current INR goal is 2-3.  The INR is currently 1.6.  No significant interval events, easy bleeding, bruising, epistaxis, fever, weakness or numbness.  Patient went to wound care and had the wound excised and having home health [facility] coming for wound care and the swelling is improved today.  The swelling has been present for 12 weeks.  Does not elevate the leg.    The following portions of the patient's history were reviewed and updated as appropriate: allergies, current medications, past family history, past medical history, past social history, past surgical history and problem list.    Depression Screen:  PHQ-2/PHQ-9 Depression Screening 9/17/2019   Little interest or pleasure in doing things 0   Feeling down, depressed, or hopeless 0   Trouble falling or staying asleep, or sleeping too much 0   Feeling tired or having little energy 0   Poor appetite or overeating 0   Feeling bad about yourself - or that you are a failure or have let yourself or your family down 0   Trouble concentrating on things, such as reading the newspaper or watching television 0   Moving or speaking so slowly that other people could have noticed. Or the opposite - being so fidgety or restless that you have been moving around a lot more than usual 0   Thoughts that you would be better off dead, or of hurting yourself in some way 0   Total Score 0       Past Medical History:   Diagnosis Date   • 3-vessel CAD    • Anticoagulated on warfarin    • Atrial fibrillation (CMS/[facility])    • BMI 28.0-28.9,adult    • Bunion, right foot    • Complaints of memory disturbance    • Edema    • Encounter for  immunization    • Generalized convulsive seizure (CMS/HCC)    • Gout    • History of double vision    • Hyperlipidemia    • Hypertension    • Left foot pain    • 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    • Nocturnal leg cramps    • OA (osteoarthritis)    • Prepatellar effusion    • Pulmonary embolism (CMS/HCC)    • Puncture wound of hand, right    • Screening for cardiovascular condition    • Stasis dermatitis    • Statin intolerance    • Taking drug for chronic disease    • Thoracic back pain    • Transient ischemic attack        Past Surgical History:   Procedure Laterality Date   • CORONARY ARTERY BYPASS GRAFT     • OTHER SURGICAL HISTORY      carotid thromboandarterectomy   • SHOULDER SURGERY         Family History   Problem Relation Age of Onset   • Hyperlipidemia Mother    • Dementia Mother        Social History     Socioeconomic History   • Marital status:      Spouse name: Not on file   • Number of children: Not on file   • Years of education: Not on file   • Highest education level: Not on file   Tobacco Use   • Smoking status: Former Smoker     Types: Cigarettes     Last attempt to quit: 10/30/1979     Years since quittin.3   • Smokeless tobacco: Never Used   Substance and Sexual Activity   • Alcohol use: Yes   • Drug use: No   • Sexual activity: Defer       Current Outpatient Medications   Medication Sig Dispense Refill   • cimetidine (TAGAMET) 200 MG tablet      • clobetasol (TEMOVATE) 0.05 % cream      • diclofenac (VOLTAREN) 0.1 % ophthalmic solution      • lisinopril (PRINIVIL,ZESTRIL) 20 MG tablet Take 1 tablet by mouth 2 (Two) Times a Day. 180 tablet 0   • Magnesium Hydroxide (MAGNESIA PO) Take  by mouth.     • PHENobarbital (LUMINAL) 64.8 MG tablet TAKE ONE TABLET BY MOUTH DAILY 90 tablet 5   • Potassium 99 MG tablet Take 99 mg by mouth Daily.     • prednisoLONE acetate (PRED FORTE) 1 % ophthalmic suspension      • torsemide (DEMADEX) 10 MG tablet Take 1 tablet by mouth  "Daily. 180 tablet 0   • triamcinolone (KENALOG) 0.1 % cream      • warfarin (COUMADIN) 5 MG tablet Use as directed up to 2 tabs per day. 180 tablet 0     No current facility-administered medications for this visit.        Review of Systems   Constitutional: Negative for activity change, appetite change, fatigue, fever, unexpected weight gain and unexpected weight loss.   HENT: Negative for nosebleeds, rhinorrhea, trouble swallowing and voice change.    Eyes: Negative for visual disturbance.   Respiratory: Negative for cough, chest tightness, shortness of breath and wheezing.    Cardiovascular: Positive for leg swelling. Negative for chest pain and palpitations.   Gastrointestinal: Negative for abdominal pain, blood in stool, constipation, diarrhea, nausea, vomiting, GERD and indigestion.   Genitourinary: Negative for dysuria, frequency and hematuria.   Musculoskeletal: Negative for arthralgias, back pain and myalgias.   Skin: Negative for rash and bruise.   Neurological: Negative for dizziness, tremors, weakness, light-headedness, numbness, headache and memory problem.   Hematological: Negative for adenopathy. Does not bruise/bleed easily.   Psychiatric/Behavioral: Negative for sleep disturbance and depressed mood. The patient is not nervous/anxious.        Objective   /78 (BP Location: Left arm, Patient Position: Sitting, Cuff Size: Adult)   Pulse 71   Ht 170.2 cm (67.01\")   Wt 78.9 kg (174 lb)   SpO2 95%   BMI 27.25 kg/m²     Physical Exam   Constitutional: He is oriented to person, place, and time. He appears well-developed and well-nourished. No distress.   HENT:   Head: Normocephalic and atraumatic.   Right Ear: External ear normal.   Left Ear: External ear normal.   Nose: Nose normal.   Mouth/Throat: Oropharynx is clear and moist.   Using hearing aids bilaterally.   Eyes: Pupils are equal, round, and reactive to light. Conjunctivae and EOM are normal.   Neck: Normal range of motion. Neck supple. No " tracheal deviation present. No thyromegaly present.   Cardiovascular: Normal rate, regular rhythm, normal heart sounds and intact distal pulses. Exam reveals no gallop and no friction rub.   No murmur heard.  Pulmonary/Chest: Effort normal and breath sounds normal. No respiratory distress.   Abdominal: Soft. Bowel sounds are normal. He exhibits no mass. There is no tenderness. There is no guarding.   Musculoskeletal: Normal range of motion. He exhibits edema.   Left greater than right distal lower extremity edema with no erythema or drainage.  The old cat bite wound is nearly completely healed.  Arthritic changes in the hands.  Walks with mild shuffling gain but no assistive devices.   Lymphadenopathy:     He has no cervical adenopathy.   Neurological: He is alert and oriented to person, place, and time. He displays normal reflexes. He exhibits normal muscle tone.   Skin: Skin is warm and dry. Capillary refill takes less than 2 seconds. No rash noted. He is not diaphoretic.   Psychiatric: He has a normal mood and affect. His behavior is normal. Judgment and thought content normal.   Nursing note and vitals reviewed.      Recent Results (from the past 2016 hour(s))   Protime-INR    Collection Time: 11/25/19 10:59 AM   Result Value Ref Range    INR 3.11 (H) 0.90 - 1.10    Protime 31.7 (H) 11.7 - 14.2 Seconds   Wound Culture - Swab, Leg, Left    Collection Time: 12/04/19  7:10 PM   Result Value Ref Range    Wound Culture Rare Staphylococcus, coagulase negative (A)     Gram Stain No WBCs or organisms seen    Anaerobic Culture - Swab, Leg, Left    Collection Time: 12/04/19  7:10 PM   Result Value Ref Range    Anaerobic Culture Prevotella bivia (A)    POC INR    Collection Time: 12/09/19  8:20 AM   Result Value Ref Range    INR 3.50 (A) 2 - 3   POC INR    Collection Time: 01/06/20  8:46 AM   Result Value Ref Range    INR 3.20 (A) 2 - 3   POC INR    Collection Time: 02/03/20 10:05 AM   Result Value Ref Range    INR 1.60  (A) 2 - 3   POC INR    Collection Time: 02/17/20 10:06 AM   Result Value Ref Range    INR 2.10 (A) 2 - 3     Assessment/Plan   Alireza was seen today for leg swelling and anticoagulation.    Diagnoses and all orders for this visit:    Anticoagulated on warfarin    Chronic atrial fibrillation    Localized edema    Other orders  -     POC INR    Continue the current medication and warfarin dosage unchanged.  Follow up INR in 4-6 weeks.  Discussed the leg edema and encouraged to elevate the feet and legs multiple times a day and encouraged to use the compression stockings at home.

## 2020-05-01 ENCOUNTER — OFFICE VISIT (OUTPATIENT)
Dept: FAMILY MEDICINE CLINIC | Facility: CLINIC | Age: 85
End: 2020-05-01

## 2020-05-01 DIAGNOSIS — I48.20 CHRONIC ATRIAL FIBRILLATION (HCC): Primary | ICD-10-CM

## 2020-05-01 DIAGNOSIS — R60.0 LOCALIZED EDEMA: ICD-10-CM

## 2020-05-01 DIAGNOSIS — Z79.01 ANTICOAGULATED ON WARFARIN: ICD-10-CM

## 2020-05-01 PROCEDURE — 99442 PR PHYS/QHP TELEPHONE EVALUATION 11-20 MIN: CPT | Performed by: INTERNAL MEDICINE

## 2020-05-01 NOTE — PROGRESS NOTES
Subjective   Alireza Carreon is a 85 y.o. male.     Chief Complaint   Patient presents with   • anticoagulation   • swelling       History of Present Illness   You have chosen to receive care through a telephone visit. Do you consent to use a telephone visit for your medical care today? Yes  Wife was present during the history-taking and subsequent discussion (and for part of the physical exam) with this patient.  Patient agrees to the presence of the individual during this visit.      Here for follow up INR evaluation.  Currently anticoagulated with warfarin for atrial fibrillation.  The patient is taking warfarin 10 M/W/F and 7.5 mg rest of the week.  The current INR goal is 2-3.  The last INR was currently 2.1 on 2/17/2020.  No significant interval events, easy bleeding, bruising, epistaxis, fever, weakness or numbness.    Patient wife concerned about persistent swelling.  The swelling has been present for 4-5 months.  Does not elevate the leg.  Denies pain or redness currently.  Patient is not so much worried about it as his wife is.  Home weight was 170 pounds today.  On questioning patient admits he has not taken the torsemide for the last 2 days.    The following portions of the patient's history were reviewed and updated as appropriate: allergies, current medications, past family history, past medical history, past social history, past surgical history and problem list.    Depression Screen:  PHQ-2/PHQ-9 Depression Screening 9/17/2019   Little interest or pleasure in doing things 0   Feeling down, depressed, or hopeless 0   Trouble falling or staying asleep, or sleeping too much 0   Feeling tired or having little energy 0   Poor appetite or overeating 0   Feeling bad about yourself - or that you are a failure or have let yourself or your family down 0   Trouble concentrating on things, such as reading the newspaper or watching television 0   Moving or speaking so slowly that other people could have  noticed. Or the opposite - being so fidgety or restless that you have been moving around a lot more than usual 0   Thoughts that you would be better off dead, or of hurting yourself in some way 0   Total Score 0       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    • Nocturnal leg cramps    • OA (osteoarthritis)    • Prepatellar effusion    • Pulmonary embolism (CMS/HCC)    • Puncture wound of hand, right    • Screening for cardiovascular condition    • Stasis dermatitis    • Statin intolerance    • Taking drug for chronic disease    • Thoracic back pain    • Transient ischemic attack        Past Surgical History:   Procedure Laterality Date   • CORONARY ARTERY BYPASS GRAFT     • OTHER SURGICAL HISTORY      carotid thromboandarterectomy   • SHOULDER SURGERY         Family History   Problem Relation Age of Onset   • Hyperlipidemia Mother    • Dementia Mother        Social History     Socioeconomic History   • Marital status:      Spouse name: Not on file   • Number of children: Not on file   • Years of education: Not on file   • Highest education level: Not on file   Tobacco Use   • Smoking status: Former Smoker     Types: Cigarettes     Last attempt to quit: 10/30/1979     Years since quittin.5   • Smokeless tobacco: Never Used   Substance and Sexual Activity   • Alcohol use: Yes   • Drug use: No   • Sexual activity: Defer       Current Outpatient Medications   Medication Sig Dispense Refill   • Hydrocort-Pramoxine, Perianal, (PROCTOFOAM-HS) 1-1 % rectal foam Insert 1 applicator into the rectum.     •  cimetidine (TAGAMET) 200 MG tablet      • clobetasol (TEMOVATE) 0.05 % cream      • diclofenac (VOLTAREN) 0.1 % ophthalmic solution      • lisinopril (PRINIVIL,ZESTRIL) 20 MG tablet Take 1 tablet by mouth 2 (Two) Times a Day. 180 tablet 0   • Magnesium Hydroxide (MAGNESIA PO) Take  by mouth.     • PHENobarbital (LUMINAL) 64.8 MG tablet TAKE ONE TABLET BY MOUTH DAILY 90 tablet 5   • Potassium 99 MG tablet Take 99 mg by mouth Daily.     • prednisoLONE acetate (PRED FORTE) 1 % ophthalmic suspension      • torsemide (DEMADEX) 10 MG tablet Take 1 tablet by mouth Daily. 180 tablet 0   • triamcinolone (KENALOG) 0.1 % cream      • warfarin (COUMADIN) 5 MG tablet Use as directed up to 2 tabs per day. 180 tablet 0     No current facility-administered medications for this visit.        Review of Systems   Constitutional: Negative for activity change, appetite change, fatigue, fever, unexpected weight gain and unexpected weight loss.   HENT: Negative for nosebleeds, rhinorrhea, trouble swallowing and voice change.    Eyes: Negative for visual disturbance.   Respiratory: Negative for cough, chest tightness, shortness of breath and wheezing.    Cardiovascular: Negative for chest pain, palpitations and leg swelling.   Gastrointestinal: Negative for abdominal pain, blood in stool, constipation, diarrhea, nausea, vomiting, GERD and indigestion.   Genitourinary: Negative for dysuria, frequency and hematuria.   Musculoskeletal: Negative for arthralgias, back pain and myalgias.   Skin: Negative for rash and bruise.   Neurological: Negative for dizziness, tremors, weakness, light-headedness, numbness, headache and memory problem.   Hematological: Negative for adenopathy. Does not bruise/bleed easily.   Psychiatric/Behavioral: Negative for sleep disturbance and depressed mood. The patient is not nervous/anxious.        Objective   There were no vitals taken for this visit.    Physical Exam   Constitutional: No distress.   Limited  secondary to telephone visit.   Pulmonary/Chest: Effort normal.  No respiratory distress.  Neurological: He is alert.   Psychiatric: He has a normal mood and affect. He mood appears normal. His affect is normal. His behavior is normal. Thought content is normal. He does not express abnormal judgement.       Recent Results (from the past 2016 hour(s))   POC INR    Collection Time: 02/17/20 10:06 AM   Result Value Ref Range    INR 2.10 (A) 2 - 3     Assessment/Plan   Alireza was seen today for anticoagulation and swelling.    Diagnoses and all orders for this visit:    Chronic atrial fibrillation  -     Protime-INR    Anticoagulated on warfarin  -     Protime-INR    Localized edema  -     Basic Metabolic Panel      Continue the current medication and warfarin dosage unchanged.  Will have patient go to [facility] draw station to have INR and BMP tested.  Follow up INR in 4-6 weeks if INR is in range.  Discussed the leg edema and encouraged to elevate the feet and legs multiple times a day and encouraged to use the compression stockings at home.  He is to take the torsemide every day and for the next 2 days to take an extra 10 mg torsemide and follow the daily weights at home.       Telephone visit completed.  Time of visit in discussion and care of patient and one-on-one care not including charting time of 13 minutes.

## 2020-05-02 LAB
BUN SERPL-MCNC: 17 MG/DL (ref 8–23)
BUN/CREAT SERPL: 14.5 (ref 7–25)
CALCIUM SERPL-MCNC: 9.4 MG/DL (ref 8.6–10.5)
CHLORIDE SERPL-SCNC: 98 MMOL/L (ref 98–107)
CO2 SERPL-SCNC: 28.9 MMOL/L (ref 22–29)
CREAT SERPL-MCNC: 1.17 MG/DL (ref 0.76–1.27)
GLUCOSE SERPL-MCNC: 115 MG/DL (ref 65–99)
INR PPP: 1.22 (ref 0.9–1.1)
POTASSIUM SERPL-SCNC: 4.3 MMOL/L (ref 3.5–5.2)
PROTHROMBIN TIME: 15.1 SECONDS (ref 11.7–14.2)
SODIUM SERPL-SCNC: 138 MMOL/L (ref 136–145)

## 2020-05-04 DIAGNOSIS — I48.20 CHRONIC ATRIAL FIBRILLATION (HCC): Primary | ICD-10-CM

## 2020-05-04 DIAGNOSIS — Z79.01 ANTICOAGULATED ON WARFARIN: ICD-10-CM

## 2020-05-18 ENCOUNTER — RESULTS ENCOUNTER (OUTPATIENT)
Dept: FAMILY MEDICINE CLINIC | Facility: CLINIC | Age: 85
End: 2020-05-18

## 2020-05-18 DIAGNOSIS — Z79.01 ANTICOAGULATED ON WARFARIN: ICD-10-CM

## 2020-05-18 DIAGNOSIS — I48.20 CHRONIC ATRIAL FIBRILLATION (HCC): ICD-10-CM

## 2020-06-25 DIAGNOSIS — Z79.01 ANTICOAGULATED ON COUMADIN: ICD-10-CM

## 2020-06-25 DIAGNOSIS — I10 HYPERTENSION, UNSPECIFIED TYPE: ICD-10-CM

## 2020-06-25 RX ORDER — WARFARIN SODIUM 5 MG/1
TABLET ORAL
Qty: 180 TABLET | Refills: 0 | Status: SHIPPED | OUTPATIENT
Start: 2020-06-25 | End: 2020-10-14

## 2020-06-25 RX ORDER — LISINOPRIL 20 MG/1
20 TABLET ORAL 2 TIMES DAILY
Qty: 180 TABLET | Refills: 0 | Status: SHIPPED | OUTPATIENT
Start: 2020-06-25 | End: 2021-01-27 | Stop reason: SDUPTHER

## 2020-06-25 NOTE — TELEPHONE ENCOUNTER
PATIENT NEEDS REFILLS ON THE FOLLOWING MEDICATIONS:    warfarin (COUMADIN) 5 MG tablet (COMPLETELY OUT OF MEDICATION)    lisinopril (PRINIVIL,ZESTRIL) 20 MG tablet     [facility] - [place], [place] - [address] [facility] - [phone] [facility] [phone]   [phone]    PLEASE CALL TO ADVISE -[phone]

## 2020-07-06 ENCOUNTER — OFFICE VISIT (OUTPATIENT)
Dept: FAMILY MEDICINE CLINIC | Facility: CLINIC | Age: 85
End: 2020-07-06

## 2020-07-06 VITALS
WEIGHT: 173 LBS | HEIGHT: 67 IN | SYSTOLIC BLOOD PRESSURE: 136 MMHG | DIASTOLIC BLOOD PRESSURE: 74 MMHG | HEART RATE: 68 BPM | TEMPERATURE: 98.6 F | BODY MASS INDEX: 27.15 KG/M2 | OXYGEN SATURATION: 98 %

## 2020-07-06 DIAGNOSIS — Z79.01 ANTICOAGULATED ON WARFARIN: ICD-10-CM

## 2020-07-06 DIAGNOSIS — F03.91 DEMENTIA WITH BEHAVIORAL DISTURBANCE, UNSPECIFIED DEMENTIA TYPE: ICD-10-CM

## 2020-07-06 DIAGNOSIS — N50.89 SWELLING OF RIGHT TESTICLE: ICD-10-CM

## 2020-07-06 DIAGNOSIS — R26.9 GAIT ABNORMALITY: ICD-10-CM

## 2020-07-06 DIAGNOSIS — R60.0 LOCALIZED EDEMA: ICD-10-CM

## 2020-07-06 DIAGNOSIS — I48.20 CHRONIC ATRIAL FIBRILLATION (HCC): Primary | ICD-10-CM

## 2020-07-06 PROBLEM — F03.90 DEMENTIA: Status: ACTIVE | Noted: 2020-07-06

## 2020-07-06 PROBLEM — S81.852A CAT BITE OF LEFT LOWER LEG: Status: RESOLVED | Noted: 2019-10-25 | Resolved: 2020-07-06
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
## 2020-07-06 LAB — INR PPP: 1.4 (ref 2–3)

## 2020-07-06 PROCEDURE — 99214 OFFICE O/P EST MOD 30 MIN: CPT | Performed by: INTERNAL MEDICINE

## 2020-07-06 PROCEDURE — 36416 COLLJ CAPILLARY BLOOD SPEC: CPT | Performed by: INTERNAL MEDICINE

## 2020-07-06 PROCEDURE — 85610 PROTHROMBIN TIME: CPT | Performed by: INTERNAL MEDICINE

## 2020-07-06 RX ORDER — EZETIMIBE 10 MG/1
10 TABLET ORAL DAILY
COMMUNITY
Start: 2020-06-29 | End: 2022-01-01 | Stop reason: HOSPADM

## 2020-07-06 RX ORDER — DONEPEZIL HYDROCHLORIDE 5 MG/1
5 TABLET, FILM COATED ORAL NIGHTLY
Qty: 30 TABLET | Refills: 3 | Status: SHIPPED | OUTPATIENT
Start: 2020-07-06 | End: 2020-08-25

## 2020-07-06 NOTE — PROGRESS NOTES
Subjective   Alireza Carreon is a 85 y.o. male.     Chief Complaint   Patient presents with   • Cerumen Impaction   • Groin Swelling   • Edema   • Immobility   • Anticoagulation       History of Present Illness   Here for follow up INR evaluation.  Currently anticoagulated with warfarin for atrial fibrillation.  The patient is taking warfarin 10 M/W/F and 7.5 mg rest of the week.  The current INR goal is 2-3.  The INR is currently 1.4.  No significant interval events, easy bleeding, bruising, epistaxis, fever, weakness or numbness.  Was out of the warfarin for a few weeks and started back about a week ago.    Has surgery for the eyes with Dr Wheeler on 7/20/2020 for excessive watering of the eyes and has been told he must be off of his warfarin for least 5 days before.    For the last 3 months has noted the testicle has been bigger with no pain and no injuries    Has had increasing difficulty hearing.  Has hearing aids in place but not working well now    Seen by Dr Ruiz and started on the zetia.  Is tolerating well and no new issues with the medication.    Wife is concerned with worsening memory issues and difficulty hearing.  He has been having increased mobility and walking issues.  Now using a cane to walk.  The swelling in the legs continues and has been present for 6 or more months.    The following portions of the patient's history were reviewed and updated as appropriate: allergies, current medications, past family history, past medical history, past social history, past surgical history and problem list.    Depression Screen:  PHQ-2/PHQ-9 Depression Screening 9/17/2019   Little interest or pleasure in doing things 0   Feeling down, depressed, or hopeless 0   Trouble falling or staying asleep, or sleeping too much 0   Feeling tired or having little energy 0   Poor appetite or overeating 0   Feeling bad about yourself - or that you are a failure or have let yourself or your family down 0   Trouble  concentrating on things, such as reading the newspaper or watching television 0   Moving or speaking so slowly that other people could have noticed. Or the opposite - being so fidgety or restless that you have been moving around a lot more than usual 0   Thoughts that you would be better off dead, or of hurting yourself in some way 0   Total Score 0       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    • Nocturnal leg cramps    • OA (osteoarthritis)    • Prepatellar effusion    • Pulmonary embolism (CMS/HCC)    • Puncture wound of hand, right    • Screening for cardiovascular condition    • Stasis dermatitis    • Statin intolerance    • Taking drug for chronic disease    • Thoracic back pain    • Transient ischemic attack        Past Surgical History:   Procedure Laterality Date   • CORONARY ARTERY BYPASS GRAFT     • OTHER SURGICAL HISTORY      carotid thromboandarterectomy   • SHOULDER SURGERY         Family History   Problem Relation Age of Onset   • Hyperlipidemia Mother    • Dementia Mother        Social History     Socioeconomic History   • Marital status:      Spouse name: Not on file   • Number of children: Not on file   • Years of education: Not on file   • Highest education level: Not on file   Tobacco Use   • Smoking status: Former Smoker     Types: Cigarettes     Last attempt to quit: 10/30/1979     Years since quittin.7   • Smokeless tobacco: Never Used   Substance and Sexual Activity   • Alcohol use: Yes   • Drug use: No   • Sexual activity: Defer       Current Outpatient Medications    Medication Sig Dispense Refill   • cimetidine (TAGAMET) 200 MG tablet      • clobetasol (TEMOVATE) 0.05 % cream      • diclofenac (VOLTAREN) 0.1 % ophthalmic solution      • ezetimibe (ZETIA) 10 MG tablet Take 10 mg by mouth Daily.     • Hydrocort-Pramoxine, Perianal, (PROCTOFOAM-HS) 1-1 % rectal foam Insert 1 applicator into the rectum.     • lisinopril (PRINIVIL,ZESTRIL) 20 MG tablet Take 1 tablet by mouth 2 (Two) Times a Day. 180 tablet 0   • Magnesium Hydroxide (MAGNESIA PO) Take  by mouth.     • PHENobarbital (LUMINAL) 64.8 MG tablet TAKE ONE TABLET BY MOUTH DAILY 90 tablet 5   • Potassium 99 MG tablet Take 99 mg by mouth Daily.     • prednisoLONE acetate (PRED FORTE) 1 % ophthalmic suspension      • torsemide (DEMADEX) 10 MG tablet Take 1 tablet by mouth Daily. 180 tablet 0   • triamcinolone (KENALOG) 0.1 % cream      • warfarin (COUMADIN) 5 MG tablet Use as directed up to 2 tabs per day. 180 tablet 0   • donepezil (ARICEPT) 5 MG tablet Take 1 tablet by mouth Every Night. 30 tablet 3     No current facility-administered medications for this visit.        Review of Systems   Constitutional: Negative for activity change, appetite change, fatigue, fever, unexpected weight gain and unexpected weight loss.   HENT: Negative for nosebleeds, rhinorrhea, trouble swallowing and voice change.    Eyes: Negative for visual disturbance.   Respiratory: Negative for cough, chest tightness, shortness of breath and wheezing.    Cardiovascular: Positive for leg swelling. Negative for chest pain and palpitations.   Gastrointestinal: Negative for abdominal pain, blood in stool, constipation, diarrhea, nausea, vomiting, GERD and indigestion.   Genitourinary: Negative for dysuria, frequency and hematuria.   Musculoskeletal: Positive for gait problem. Negative for arthralgias, back pain and myalgias.   Skin: Negative for rash and bruise.   Neurological: Positive for memory problem. Negative for dizziness, tremors, weakness,  "light-headedness, numbness and headache.   Hematological: Negative for adenopathy. Does not bruise/bleed easily.   Psychiatric/Behavioral: Negative for sleep disturbance and depressed mood. The patient is not nervous/anxious.        Objective   /74 (BP Location: Left arm, Patient Position: Sitting, Cuff Size: Large Adult)   Pulse 68   Temp 98.6 °F (37 °C) (Temporal)   Ht 170.2 cm (67.01\")   Wt 78.5 kg (173 lb)   SpO2 98%   BMI 27.09 kg/m²     Physical Exam   Constitutional: He is oriented to person, place, and time. He appears well-developed and well-nourished. No distress.   HENT:   Head: Normocephalic and atraumatic.   Right Ear: External ear normal.   Left Ear: External ear normal.   Nose: Nose normal.   Mouth/Throat: Oropharynx is clear and moist.   Hearing aids present bilaterally. Right TM with chronic perforation.  No cerumen present bilaterally.   Eyes: Pupils are equal, round, and reactive to light. Conjunctivae and EOM are normal.   Neck: Normal range of motion. Neck supple. No tracheal deviation present. No thyromegaly present.   Cardiovascular: Normal rate, regular rhythm, normal heart sounds and intact distal pulses. Exam reveals no gallop and no friction rub.   No murmur heard.  Pulmonary/Chest: Effort normal and breath sounds normal. No respiratory distress.   Abdominal: Soft. Bowel sounds are normal. He exhibits no mass. There is no tenderness. There is no guarding.   Musculoskeletal: Normal range of motion. He exhibits edema.   Arthritic changes in hands.  Walks slowly with shuffling gait.  Using cane.  Bilateral lower extremities with 2+ edema from midshin down.  No pain cords or discoloration.   Lymphadenopathy:     He has no cervical adenopathy.   Neurological: He is alert and oriented to person, place, and time. He displays normal reflexes. He exhibits normal muscle tone.   Skin: Skin is warm and dry. Capillary refill takes less than 2 seconds. No rash noted. He is not diaphoretic. " "  Psychiatric: He has a normal mood and affect. His behavior is normal. Judgment and thought content normal.   Nursing note and vitals reviewed.  MMSE  Orientation   What is the year? Season? Date? Day of the Week? Month? (5 points)score 5    Where are we now: State? Country? Town/city? Hospital? Floor? (5 points)score5    Registration  The examiner names 3 unrelated objects clearly and slowly, then asked the patient to name all 3 of them.  The patient's response is used for scoring. Examiner repeats them until patient learns all of them if possible.(3 points)score3    Attention and Calculation(5 points)  \"I would like you to count backward from 100 by sevens\".(93, 86,79,72,65)  Stop after 5 answers.  Alternative: \"Spell WORLD backwards\"(D-L-R-O-W)(5 points)score3    Recall   \"Earlier I told you the names of 3 things can you remember what those were?\"(3 points)score3    Language and Praxis  Naming  Show the patient to simple objects, symptoms such as a watch and a pencil, and asked the patient to name them.(2 points)score2    Repetition  \"Repeat the phrase: \"No if's, and's, or but's.\"\"(1point)score1    3 stage command  \"Take the paper in your right hand, folded in half, and put it on the floor.\"  (The examiner gives the patient a piece of blank paper)(3points)score3    Reading  \"Please read this and do what it says.\"  (Written instruction is \"close your eyes.\")(1point)score1    Writing  \"Make up and write a sentence about anything.\"  (This sentence must contain a noun and a verb.)(1 point)score1    Copying  \"Please copy this picture\" (the examiner gives the patient a blank is a paper and ask him or her to draw the symbol of 2 intersecting pentagons.  All 10 angles must be present and to must intersect.)(1 point)score0    Total Score 27 out of possible 30    Interpretation :  Cutoff :<24 Abnormal  Range: <21 increased odds of dementia; >25 decreased odds of dementia  Education: <21 abnormal for 8th grade " education;<23 abnormal high school education;<24  abnormal for college education  Severity: 24-30 no cognitive impairment;18-23 mild cognitive impairment; 0-17 severe cognitive              impairment      Recent Results (from the past 2016 hour(s))   Protime-INR    Collection Time: 05/01/20 11:53 AM   Result Value Ref Range    INR 1.22 (H) 0.90 - 1.10    Protime 15.1 (H) 11.7 - 14.2 Seconds   Basic Metabolic Panel    Collection Time: 05/01/20 11:53 AM   Result Value Ref Range    Glucose 115 (H) 65 - 99 mg/dL    BUN 17 8 - 23 mg/dL    Creatinine 1.17 0.76 - 1.27 mg/dL    eGFR Non African Am 59 (L) >60 mL/min/1.73    eGFR African Am 72 >60 mL/min/1.73    BUN/Creatinine Ratio 14.5 7.0 - 25.0    Sodium 138 136 - 145 mmol/L    Potassium 4.3 3.5 - 5.2 mmol/L    Chloride 98 98 - 107 mmol/L    Total CO2 28.9 22.0 - 29.0 mmol/L    Calcium 9.4 8.6 - 10.5 mg/dL   POC INR    Collection Time: 07/06/20 10:24 AM   Result Value Ref Range    INR 1.40 (A) 2 - 3     Assessment/Plan   Alireza was seen today for cerumen impaction, groin swelling, edema, immobility and anticoagulation.    Diagnoses and all orders for this visit:    Chronic atrial fibrillation (CMS/HCC)    Anticoagulated on warfarin    Localized edema    Dementia with behavioral disturbance, unspecified dementia type (CMS/HCC)  -     donepezil (ARICEPT) 5 MG tablet; Take 1 tablet by mouth Every Night.  -     Ambulatory Referral to Neurology    Gait abnormality    Swelling of right testicle  -     US Scrotum & Testicles; Future    Other orders  -     POC INR    Patient with increasing worsening memory issues and gait disturbance.  Gait is more shuffling and more akin to Parkinson's type gait but no tremor is been noted.  MMSE has recently changed from a 30 out of 30 to 27 out of 30 today.  Will add donepizil for the memory and refer to neurology for evaluation of the memory    Patient is to take 10 mg of warfarin for the next 3 days then go back to his regular routine is  7.5 mg every day except for Monday and Friday 10 mg.  Would normally ask for him to come back within approximately 2 weeks but is going to be off of the warfarin in 1 week for surgery.  Therefore, follow up 2 weeks after the eye surgery when back on the warfarin.  Recheck the hearing aids.  Ultasound of the right testicle for the swelling which may be a hydrocele.  Based upon his results we will determine if need to be sent to urology.  Patient with chronic swelling in the lower extremities bilaterally without pain.  We will continue with his torsemide unchanged and again encouraged elevating his legs and staying away from salt.

## 2020-07-10 ENCOUNTER — HOSPITAL ENCOUNTER (OUTPATIENT)
Dept: ULTRASOUND IMAGING | Facility: HOSPITAL | Age: 85
Discharge: HOME | End: 2020-07-10
Admitting: INTERNAL MEDICINE

## 2020-07-10 DIAGNOSIS — N50.89 SWELLING OF RIGHT TESTICLE: ICD-10-CM

## 2020-07-10 DIAGNOSIS — R56.9 GENERALIZED CONVULSIVE SEIZURE (HCC): ICD-10-CM

## 2020-07-10 PROCEDURE — 76870 US EXAM SCROTUM: CPT

## 2020-07-13 RX ORDER — PHENOBARBITAL 64.8 MG/1
TABLET ORAL
Qty: 90 TABLET | Refills: 4 | Status: SHIPPED | OUTPATIENT
Start: 2020-07-13 | End: 2021-02-08

## 2020-07-14 ENCOUNTER — TELEPHONE (OUTPATIENT)
Dept: FAMILY MEDICINE CLINIC | Facility: CLINIC | Age: 85
End: 2020-07-14

## 2020-07-14 DIAGNOSIS — N50.89 SCROTUM SWELLING: ICD-10-CM

## 2020-07-14 DIAGNOSIS — N50.89 SWELLING OF RIGHT TESTICLE: Primary | ICD-10-CM

## 2020-08-18 ENCOUNTER — TELEPHONE (OUTPATIENT)
Dept: NEUROLOGY | Facility: CLINIC | Age: 85
End: 2020-08-18

## 2020-08-18 NOTE — TELEPHONE ENCOUNTER
MELLY, spoke to pt's wife. Informed her that unfortunately until we have established care with the patient there isn't too much that we can do. I did advise her to reach out to pcp in the meantime of waiting for appt next week, as well as seek emergent treatment for pt if she felt he was a danger to her or himself. She stated understanding and thanks.

## 2020-08-18 NOTE — TELEPHONE ENCOUNTER
Received call from Patient spouse Jeanette who wanted to let us know that patient is having some issues that she wants us to be aware of. She state that the other day she noticed that the phone was in use and she picked up the phone and she heard the person on line was with 911 and  had called 911. When she asked what he was calling the police for he said he wanted to let them know how they can get rid of the protesters downtown and it was not in a nice way.     She stated that this is one example of many that she wanted us to be aware of prior to his appt,

## 2020-08-25 ENCOUNTER — OFFICE VISIT (OUTPATIENT)
Dept: NEUROLOGY | Facility: CLINIC | Age: 85
End: 2020-08-25

## 2020-08-25 ENCOUNTER — LAB (OUTPATIENT)
Dept: LAB | Facility: HOSPITAL | Age: 85
End: 2020-08-25

## 2020-08-25 VITALS
SYSTOLIC BLOOD PRESSURE: 122 MMHG | DIASTOLIC BLOOD PRESSURE: 78 MMHG | OXYGEN SATURATION: 91 % | BODY MASS INDEX: 26.06 KG/M2 | HEART RATE: 88 BPM | HEIGHT: 67 IN | WEIGHT: 166 LBS

## 2020-08-25 DIAGNOSIS — G62.9 NEUROPATHY: ICD-10-CM

## 2020-08-25 DIAGNOSIS — R26.89 BALANCE DISORDER: ICD-10-CM

## 2020-08-25 DIAGNOSIS — I21.A9 OTHER MYOCARDIAL INFARCTION TYPE (HCC): ICD-10-CM

## 2020-08-25 DIAGNOSIS — R41.3 MEMORY LOSS: Primary | ICD-10-CM

## 2020-08-25 LAB
HBA1C MFR BLD: 5.5 % (ref 4.8–5.6)
TSH SERPL DL<=0.05 MIU/L-ACNC: 1.77 UIU/ML (ref 0.27–4.2)
VIT B12 BLD-MCNC: 668 PG/ML (ref 211–946)

## 2020-08-25 PROCEDURE — 83036 HEMOGLOBIN GLYCOSYLATED A1C: CPT | Performed by: PSYCHIATRY & NEUROLOGY

## 2020-08-25 PROCEDURE — 82784 ASSAY IGA/IGD/IGG/IGM EACH: CPT | Performed by: PSYCHIATRY & NEUROLOGY

## 2020-08-25 PROCEDURE — 84443 ASSAY THYROID STIM HORMONE: CPT | Performed by: PSYCHIATRY & NEUROLOGY

## 2020-08-25 PROCEDURE — 36415 COLL VENOUS BLD VENIPUNCTURE: CPT | Performed by: PSYCHIATRY & NEUROLOGY

## 2020-08-25 PROCEDURE — 86334 IMMUNOFIX E-PHORESIS SERUM: CPT | Performed by: PSYCHIATRY & NEUROLOGY

## 2020-08-25 PROCEDURE — 99205 OFFICE O/P NEW HI 60 MIN: CPT | Performed by: PSYCHIATRY & NEUROLOGY

## 2020-08-25 PROCEDURE — 84155 ASSAY OF PROTEIN SERUM: CPT | Performed by: PSYCHIATRY & NEUROLOGY

## 2020-08-25 PROCEDURE — 82607 VITAMIN B-12: CPT | Performed by: PSYCHIATRY & NEUROLOGY

## 2020-08-25 PROCEDURE — 84165 PROTEIN E-PHORESIS SERUM: CPT | Performed by: PSYCHIATRY & NEUROLOGY

## 2020-08-25 RX ORDER — DONEPEZIL HYDROCHLORIDE 10 MG/1
10 TABLET, FILM COATED ORAL NIGHTLY
Qty: 30 TABLET | Refills: 11 | Status: SHIPPED | OUTPATIENT
Start: 2020-08-25 | End: 2021-03-01 | Stop reason: SDUPTHER

## 2020-08-25 NOTE — PATIENT INSTRUCTIONS
**Eat a high fiber diet    **Exercise regularly (physically and mentally)    **Floss daily    **Consider eating yogurt regularly    **Consider drinking green tea    **Limit soda and alcohol    **Ensure safety in the home    **Check out th Alzheimer's Association (www.alz.org).    **If you are interested in participating in a clinical trial, check out the following centers:   [facility] [facility] at [facility]:  [email or web address].[email or web address]      [facility]:  [email or web address]     [facility]:  [email or web address]    **If you live in [facility], consider [facility]. It is a free agency that helps find placement for seniors. They do an assessment and find out the need and know which facilities have openings and would be the best fit. They help figure out the financial aspects as well.  Bethany Mina is the nurse navigator and her number is [phone].

## 2020-08-26 LAB
ALBUMIN SERPL-MCNC: 3.5 G/DL (ref 2.9–4.4)
ALBUMIN/GLOB SERPL: 1.4 {RATIO} (ref 0.7–1.7)
ALPHA1 GLOB FLD ELPH-MCNC: 0.2 G/DL (ref 0–0.4)
ALPHA2 GLOB SERPL ELPH-MCNC: 0.5 G/DL (ref 0.4–1)
B-GLOBULIN SERPL ELPH-MCNC: 0.9 G/DL (ref 0.7–1.3)
GAMMA GLOB SERPL ELPH-MCNC: 0.8 G/DL (ref 0.4–1.8)
GLOBULIN SER CALC-MCNC: 2.5 G/DL (ref 2.2–3.9)
IGA SERPL-MCNC: 177 MG/DL (ref 61–437)
IGG SERPL-MCNC: 772 MG/DL (ref 603–1613)
IGM SERPL-MCNC: 43 MG/DL (ref 15–143)
Lab: NORMAL
M-SPIKE: NORMAL G/DL
PROT PATTERN SERPL ELPH-IMP: NORMAL
PROT PATTERN SERPL IFE-IMP: NORMAL
PROT SERPL-MCNC: 6 G/DL (ref 6–8.5)

## 2020-08-27 ENCOUNTER — TELEPHONE (OUTPATIENT)
Dept: NEUROLOGY | Facility: CLINIC | Age: 85
End: 2020-08-27

## 2020-08-27 ENCOUNTER — TELEPHONE (OUTPATIENT)
Dept: ONCOLOGY | Facility: CLINIC | Age: 85
End: 2020-08-27

## 2020-08-27 DIAGNOSIS — D47.2 MONOCLONAL GAMMOPATHY: Primary | ICD-10-CM

## 2020-08-27 NOTE — TELEPHONE ENCOUNTER
I spoke to the patient's wife regarding his abnormal blood work.  I would like to send him to a hematologist.  Unfortunately during the conversation we got cut off.  I called them immediately back and there was no answer.

## 2020-08-28 ENCOUNTER — TELEPHONE (OUTPATIENT)
Dept: NEUROLOGY | Facility: CLINIC | Age: 85
End: 2020-08-28

## 2020-08-28 DIAGNOSIS — R41.3 MEMORY LOSS: Primary | ICD-10-CM

## 2020-08-28 NOTE — TELEPHONE ENCOUNTER
SHANTANU FROM  CALLED REGARDING THE OT REFERRAL PUT IN BY DR. GOMEZ. SHE SAID THE DX LISTED IS MEMORY LOSS AND THAT PT WOULD NEED A SPEECH THERAPY ORDER PUT IN AS WELL FOR THE DX LISTED. SHE SPOKE WITH PT'S SPOUSE WHO SAID HE WAS HAVING UPPER BODY ISSUES SO THE OT ORDER IS FINE TO LEAVE BUT A SPEECH ORDER WOULD NEED TO BE ADDED. SHE STATES IT'S FINE TO BE PUT INTO Epic.

## 2020-08-28 NOTE — TELEPHONE ENCOUNTER
"Okay to place order for speech therapy?    Please review.   Thank you

## 2020-09-09 ENCOUNTER — APPOINTMENT (OUTPATIENT)
Dept: PHYSICAL THERAPY | Facility: HOSPITAL | Age: 85
End: 2020-09-09

## 2020-09-09 ENCOUNTER — APPOINTMENT (OUTPATIENT)
Dept: OCCUPATIONAL THERAPY | Facility: HOSPITAL | Age: 85
End: 2020-09-09

## 2020-09-09 ENCOUNTER — APPOINTMENT (OUTPATIENT)
Dept: SPEECH THERAPY | Facility: HOSPITAL | Age: 85
End: 2020-09-09

## 2020-09-11 ENCOUNTER — HOSPITAL ENCOUNTER (OUTPATIENT)
Dept: MRI IMAGING | Facility: HOSPITAL | Age: 85
Discharge: HOME | End: 2020-09-11
Admitting: PSYCHIATRY & NEUROLOGY

## 2020-09-11 DIAGNOSIS — R41.3 MEMORY LOSS: ICD-10-CM

## 2020-09-11 PROCEDURE — 70551 MRI BRAIN STEM W/O DYE: CPT

## 2020-09-14 ENCOUNTER — OFFICE VISIT (OUTPATIENT)
Dept: FAMILY MEDICINE CLINIC | Facility: CLINIC | Age: 85
End: 2020-09-14

## 2020-09-14 ENCOUNTER — TELEPHONE (OUTPATIENT)
Dept: FAMILY MEDICINE CLINIC | Facility: CLINIC | Age: 85
End: 2020-09-14

## 2020-09-14 ENCOUNTER — TELEPHONE (OUTPATIENT)
Dept: NEUROLOGY | Facility: CLINIC | Age: 85
End: 2020-09-14

## 2020-09-14 DIAGNOSIS — F01.518 VASCULAR DEMENTIA WITH BEHAVIOR DISTURBANCE (HCC): Primary | ICD-10-CM

## 2020-09-14 PROCEDURE — 99442 PR PHYS/QHP TELEPHONE EVALUATION 11-20 MIN: CPT | Performed by: INTERNAL MEDICINE

## 2020-09-14 NOTE — TELEPHONE ENCOUNTER
Patient wants Dr. Bingham to call him.  Patient is unset because he doesn't want another MD to setup his appts he wants Dr. Bingham to take care of him.    Please advise  Verified phone #

## 2020-09-14 NOTE — TELEPHONE ENCOUNTER
----- Message from Zahira Gee MD sent at 9/14/2020 11:48 AM EDT -----  Regarding: f/u appt  This patient was supposed to f/u with an APRN after testing.  He just had his MRI completed, but no f/u has been scheduled.  Thanks!

## 2020-09-14 NOTE — TELEPHONE ENCOUNTER
Patient is scheduled for a telephone visit, he is concerned about a few doctors who he sees and is not happy about it so he wants Dr. Bingham to call and speak to him

## 2020-09-14 NOTE — PROGRESS NOTES
Subjective   Alireza Carreon is a 85 y.o. male.     Chief Complaint   Patient presents with   • memory issues   • question about other providers       History of Present Illness   You have chosen to receive care through a telephone visit. Do you consent to use a telephone visit for your medical care today? Yes  Patient was seen by Dr. Gee of neurology.  Noted to have vascular dementia and vascular parkinsonism.  He does not believe that he has any problems however his memory test at the neurologist did show that he had some issues and abnormalities.  An MRI was ordered but patient is concerned and wants to hear from me and not from his specialist about the result.  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  He was supposed to have his 's evaluation test but he declined to do that because we did not order it.  He wants this to be from his primary doctor telling him to do it.  Otherwise he denies any other new problems or changes.  The following portions of the patient's history were reviewed and updated as appropriate: allergies, current medications, past family history, past medical history, past social history, past surgical history and problem list.    Depression Screen:  PHQ-2/PHQ-9 Depression Screening 9/17/2019   Little interest or pleasure in doing things 0   Feeling down, depressed, or hopeless 0   Trouble falling or staying asleep, or sleeping too much 0   Feeling tired or having little energy 0   Poor appetite or overeating 0   Feeling bad about yourself - or that you are a failure or have let yourself or your family down 0   Trouble concentrating on things, such as reading the newspaper or watching television 0   Moving or speaking so slowly that other people could have noticed. Or the opposite - being so fidgety or restless that you have been moving around a lot more than usual 0    Thoughts that you would be better off dead, or of hurting yourself in some way 0   Total Score 0       Past Medical History:   Diagnosis Date   • 3-vessel CAD    • Anticoagulated on warfarin    • Atrial fibrillation (CMS/HCC)    • BMI 28.0-28.9,adult    • Bunion, right foot    • Complaints of memory disturbance    • Dementia (CMS/HCC)    • Edema    • Encounter for immunization    • Generalized convulsive seizure (CMS/HCC)    • Gout    • History of double vision    • HL (hearing loss)    • Hyperlipidemia    • Hypertension    • Left foot pain    • 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    • Movement disorder    • Nocturnal leg cramps    • OA (osteoarthritis)    • Prepatellar effusion    • Pulmonary embolism (CMS/HCC)    • Puncture wound of hand, right    • Screening for cardiovascular condition    • Sleep apnea    • Stasis dermatitis    • Statin intolerance    • Stroke (CMS/HCC)    • Taking drug for chronic disease    • Thoracic back pain    • Transient ischemic attack        Past Surgical History:   Procedure Laterality Date   • APPENDECTOMY     • CATARACT EXTRACTION, BILATERAL     • CORONARY ARTERY BYPASS GRAFT     • CORONARY ARTERY BYPASS GRAFT     • CYSTOSCOPY TRANSURETHRAL RESECTION OF PROSTATE     • HERNIA REPAIR     • OTHER SURGICAL HISTORY      carotid thromboandarterectomy   • SHOULDER SURGERY         Family History   Problem Relation Age of Onset   • Hyperlipidemia Mother    • Dementia Mother        Social History     Socioeconomic History   • Marital status:      Spouse name: Not on file   • Number of children: Not on file   • Years of education: Not on file   • Highest education level: Not on file   Tobacco Use   • Smoking status: Former Smoker     Types: Cigarettes     Quit date: 10/30/1979     Years since quittin.9   • Smokeless tobacco: Never Used   Substance and Sexual Activity   • Alcohol use: Yes   • Drug use: No   • Sexual activity: Defer       Current Outpatient  Medications   Medication Sig Dispense Refill   • cimetidine (TAGAMET) 200 MG tablet      • clobetasol (TEMOVATE) 0.05 % cream      • diclofenac (VOLTAREN) 0.1 % ophthalmic solution As Needed.     • donepezil (ARICEPT) 10 MG tablet Take 1 tablet by mouth Every Night. 30 tablet 11   • ezetimibe (ZETIA) 10 MG tablet Take 10 mg by mouth Daily.     • Hydrocort-Pramoxine, Perianal, (PROCTOFOAM-HS) 1-1 % rectal foam Insert 1 applicator into the rectum.     • lisinopril (PRINIVIL,ZESTRIL) 20 MG tablet Take 1 tablet by mouth 2 (Two) Times a Day. 180 tablet 0   • Magnesium Hydroxide (MAGNESIA PO) Take  by mouth.     • PHENobarbital (LUMINAL) 64.8 MG tablet TAKE ONE TABLET BY MOUTH DAILY 90 tablet 4   • prednisoLONE acetate (PRED FORTE) 1 % ophthalmic suspension As Needed.     • torsemide (DEMADEX) 10 MG tablet Take 1 tablet by mouth Daily. 180 tablet 0   • triamcinolone (KENALOG) 0.1 % cream      • warfarin (COUMADIN) 5 MG tablet Use as directed up to 2 tabs per day. 180 tablet 0     No current facility-administered medications for this visit.        Review of Systems   Constitutional: Negative for activity change, appetite change, fatigue, fever, unexpected weight gain and unexpected weight loss.   HENT: Negative for nosebleeds, rhinorrhea, trouble swallowing and voice change.    Eyes: Negative for visual disturbance.   Respiratory: Negative for cough, chest tightness, shortness of breath and wheezing.    Cardiovascular: Positive for leg swelling. Negative for chest pain and palpitations.   Gastrointestinal: Negative for abdominal pain, blood in stool, constipation, diarrhea, nausea, vomiting, GERD and indigestion.   Genitourinary: Negative for dysuria, frequency and hematuria.   Musculoskeletal: Positive for gait problem. Negative for arthralgias, back pain and myalgias.   Skin: Negative for rash and bruise.   Neurological: Positive for memory problem. Negative for dizziness, tremors, weakness, light-headedness, numbness  and headache.   Hematological: Negative for adenopathy. Does not bruise/bleed easily.   Psychiatric/Behavioral: Negative for sleep disturbance and depressed mood. The patient is not nervous/anxious.        Objective   There were no vitals taken for this visit.    Physical Exam   Constitutional: No distress.   Pulmonary/Chest: Effort normal.  No respiratory distress.  Neurological: He is alert.   Psychiatric: He mood appears normal. His affect is normal. His behavior is normal. Thought content is normal. He does not express abnormal judgement.       Recent Results (from the past 2016 hour(s))   POC INR    Collection Time: 07/06/20 10:24 AM    Specimen: Blood   Result Value Ref Range    INR 1.40 (A) 2 - 3   Vitamin B12    Collection Time: 08/25/20 11:01 AM    Specimen: Blood   Result Value Ref Range    Vitamin B-12 668 211 - 946 pg/mL   TSH Rfx On Abnormal To Free T4    Collection Time: 08/25/20 11:01 AM    Specimen: Blood   Result Value Ref Range    TSH 1.770 0.270 - 4.200 uIU/mL   Protein Elec + Interp, Serum    Collection Time: 08/25/20 11:01 AM    Specimen: Blood   Result Value Ref Range    Total Protein 6.0 6.0 - 8.5 g/dL    Albumin 3.5 2.9 - 4.4 g/dL    Alpha-1-Globulin 0.2 0.0 - 0.4 g/dL    Alpha-2-Globulin 0.5 0.4 - 1.0 g/dL    Beta Globulin 0.9 0.7 - 1.3 g/dL    Gamma Globulin 0.8 0.4 - 1.8 g/dL    M-Michael Comment: Not Observed g/dL    Globulin 2.5 2.2 - 3.9 g/dL    A/G Ratio 1.4 0.7 - 1.7    Please note Comment     SPE Interpretation Comment    Immunofixation, Serum    Collection Time: 08/25/20 11:01 AM    Specimen: Blood   Result Value Ref Range    Immunofixation Result, Serum Comment     IgG 772 603 - 1613 mg/dL    IgA 177 61 - 437 mg/dL    IgM 43 15 - 143 mg/dL   Hemoglobin A1c    Collection Time: 08/25/20 11:01 AM    Specimen: Blood   Result Value Ref Range    Hemoglobin A1C 5.50 4.80 - 5.60 %     Assessment/Plan   Alireza was seen today for memory issues and question about other providers.    Diagnoses  and all orders for this visit:    Vascular dementia with behavior disturbance (CMS/[facility])      Recommend to follow up with Dr Gee and to have the drivers test at [facility].  Discussed the MRI and findings of old strokes but no acute issues or masses.  Continue the current medications at this time unchanged.  He will need to be following up for his INR testing for his anticoagulation.    Telephone visit completed.  Time of visit in discussion and care of patient and one-on-one care not including charting time of 16 minutes.

## 2020-09-15 ENCOUNTER — TELEPHONE (OUTPATIENT)
Dept: NEUROLOGY | Facility: CLINIC | Age: 85
End: 2020-09-15

## 2020-09-15 NOTE — TELEPHONE ENCOUNTER
----- Message from Zahira Gee MD sent at 9/15/2020 12:52 PM EDT -----  Regarding: MRI results  Can you let his family know that his brain MRI is overall ok?  There is nothing on there that would change his plan of care.  His brain has gotten smaller since his last MRI, but that can be expected with progressive memory loss.  We can go over in more detail at his clinic f/u.  Thanks!  ----- Message -----  From: luma-idise In  Sent: 9/11/2020   4:31 PM EDT  To: Zahira Gee MD

## 2020-09-16 ENCOUNTER — APPOINTMENT (OUTPATIENT)
Dept: OCCUPATIONAL THERAPY | Facility: HOSPITAL | Age: 85
End: 2020-09-16

## 2020-09-16 ENCOUNTER — APPOINTMENT (OUTPATIENT)
Dept: PHYSICAL THERAPY | Facility: HOSPITAL | Age: 85
End: 2020-09-16

## 2020-09-16 ENCOUNTER — APPOINTMENT (OUTPATIENT)
Dept: SPEECH THERAPY | Facility: HOSPITAL | Age: 85
End: 2020-09-16

## 2020-09-21 ENCOUNTER — CONSULT (OUTPATIENT)
Dept: ONCOLOGY | Facility: CLINIC | Age: 85
End: 2020-09-21

## 2020-09-21 ENCOUNTER — LAB (OUTPATIENT)
Dept: LAB | Facility: HOSPITAL | Age: 85
End: 2020-09-21

## 2020-09-21 VITALS
OXYGEN SATURATION: 92 % | HEIGHT: 69 IN | HEART RATE: 72 BPM | RESPIRATION RATE: 14 BRPM | SYSTOLIC BLOOD PRESSURE: 149 MMHG | BODY MASS INDEX: 24.78 KG/M2 | WEIGHT: 167.3 LBS | TEMPERATURE: 99.1 F | DIASTOLIC BLOOD PRESSURE: 73 MMHG

## 2020-09-21 DIAGNOSIS — D47.2 MONOCLONAL GAMMOPATHY: Primary | ICD-10-CM

## 2020-09-21 DIAGNOSIS — D47.2 MONOCLONAL GAMMOPATHY OF UNKNOWN SIGNIFICANCE (MGUS): Primary | ICD-10-CM

## 2020-09-21 LAB
BASOPHILS # BLD AUTO: 0.07 10*3/MM3 (ref 0–0.2)
BASOPHILS NFR BLD AUTO: 0.9 % (ref 0–1.5)
DEPRECATED RDW RBC AUTO: 47.4 FL (ref 37–54)
EOSINOPHIL # BLD AUTO: 0.2 10*3/MM3 (ref 0–0.4)
EOSINOPHIL NFR BLD AUTO: 2.5 % (ref 0.3–6.2)
ERYTHROCYTE [DISTWIDTH] IN BLOOD BY AUTOMATED COUNT: 14.7 % (ref 12.3–15.4)
HCT VFR BLD AUTO: 43.7 % (ref 37.5–51)
HGB BLD-MCNC: 14.6 G/DL (ref 13–17.7)
IMM GRANULOCYTES # BLD AUTO: 0.02 10*3/MM3 (ref 0–0.05)
IMM GRANULOCYTES NFR BLD AUTO: 0.2 % (ref 0–0.5)
LYMPHOCYTES # BLD AUTO: 1.51 10*3/MM3 (ref 0.7–3.1)
LYMPHOCYTES NFR BLD AUTO: 18.9 % (ref 19.6–45.3)
MCH RBC QN AUTO: 29.1 PG (ref 26.6–33)
MCHC RBC AUTO-ENTMCNC: 33.4 G/DL (ref 31.5–35.7)
MCV RBC AUTO: 87.1 FL (ref 79–97)
MONOCYTES # BLD AUTO: 0.62 10*3/MM3 (ref 0.1–0.9)
MONOCYTES NFR BLD AUTO: 7.7 % (ref 5–12)
NEUTROPHILS NFR BLD AUTO: 5.59 10*3/MM3 (ref 1.7–7)
NEUTROPHILS NFR BLD AUTO: 69.8 % (ref 42.7–76)
NRBC BLD AUTO-RTO: 0 /100 WBC (ref 0–0.2)
PLATELET # BLD AUTO: 191 10*3/MM3 (ref 140–450)
PMV BLD AUTO: 9.1 FL (ref 6–12)
RBC # BLD AUTO: 5.02 10*6/MM3 (ref 4.14–5.8)
WBC # BLD AUTO: 8.01 10*3/MM3 (ref 3.4–10.8)

## 2020-09-21 PROCEDURE — 36415 COLL VENOUS BLD VENIPUNCTURE: CPT

## 2020-09-21 PROCEDURE — 85025 COMPLETE CBC W/AUTO DIFF WBC: CPT

## 2020-09-21 PROCEDURE — 99203 OFFICE O/P NEW LOW 30 MIN: CPT | Performed by: INTERNAL MEDICINE

## 2020-09-21 NOTE — PROGRESS NOTES
Subjective     REASON FOR CONSULTATION:  Abnormal SPEP/QUOC  Provide an opinion on any further workup or treatment                             REQUESTING PHYSICIAN:  Dr. Gee    RECORDS OBTAINED:  Records of the patients history including those obtained from the referring provider were reviewed and summarized in detail.    HISTORY OF PRESENT ILLNESS:  The patient is a 85 y.o. year old male who is here for an opinion about the above issue.    History of Present Illness   This is an 85-year-old man with multiple comorbidities listed below.  The patient was recently seen by neurology 8/25/2020 for evaluation of memory loss.  As a part of his evaluation he had a serum protein electrophoresis and immunofixation performed the day of visit which showed an asymmetrical gamma band on the electrophoresis but no measurable M spike.  Immunofixation showed an IgG monoclonal protein with kappa light chain specificity.  The patient has no history of significant renal dysfunction or hypercalcemia.  He has no significant anemia or thrombocytopenia.    Past Medical History:   Diagnosis Date   • 3-vessel CAD    • Anticoagulated on warfarin    • Atrial fibrillation (CMS/HCC)    • BMI 28.0-28.9,adult    • Bunion, right foot    • Complaints of memory disturbance    • Dementia (CMS/HCC)    • Edema    • Encounter for immunization    • Generalized convulsive seizure (CMS/HCC)    • Gout    • History of double vision    • HL (hearing loss)    • Hyperlipidemia    • Hypertension    • Left foot pain    • 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    • Movement disorder    • Nocturnal leg cramps    • OA (osteoarthritis)    • Prepatellar effusion    • Pulmonary embolism (CMS/HCC)    • Puncture wound of hand, right    • Screening for cardiovascular condition    • Sleep apnea    • Stasis dermatitis    • Statin intolerance    • Stroke (CMS/HCC)    • Taking drug for chronic disease    • Thoracic back pain    • Transient ischemic attack          Past Surgical History:   Procedure Laterality Date   • ADENOIDECTOMY     • APPENDECTOMY     • CATARACT EXTRACTION, BILATERAL     • CORONARY ARTERY BYPASS GRAFT     • CORONARY ARTERY BYPASS GRAFT     • CYSTOSCOPY TRANSURETHRAL RESECTION OF PROSTATE     • HERNIA REPAIR     • OTHER SURGICAL HISTORY      carotid thromboendarterectomy   • SHOULDER SURGERY          Current Outpatient Medications on File Prior to Visit   Medication Sig Dispense Refill   • cimetidine (TAGAMET) 200 MG tablet      • clobetasol (TEMOVATE) 0.05 % cream      • diclofenac (VOLTAREN) 0.1 % ophthalmic solution As Needed.     • donepezil (ARICEPT) 10 MG tablet Take 1 tablet by mouth Every Night. 30 tablet 11   • ezetimibe (ZETIA) 10 MG tablet Take 10 mg by mouth Daily.     • Hydrocort-Pramoxine, Perianal, (PROCTOFOAM-HS) 1-1 % rectal foam Insert 1 applicator into the rectum.     • lisinopril (PRINIVIL,ZESTRIL) 20 MG tablet Take 1 tablet by mouth 2 (Two) Times a Day. 180 tablet 0   • Magnesium Hydroxide (MAGNESIA PO) Take  by mouth.     • PHENobarbital (LUMINAL) 64.8 MG tablet TAKE ONE TABLET BY MOUTH DAILY 90 tablet 4   • prednisoLONE acetate (PRED FORTE) 1 % ophthalmic suspension As Needed.     • torsemide (DEMADEX) 10 MG tablet Take 1 tablet by mouth Daily. 180 tablet 0   • triamcinolone (KENALOG) 0.1 % cream      • warfarin (COUMADIN) 5 MG tablet Use as directed up to 2 tabs per day. 180 tablet 0     No current facility-administered medications on file prior to visit.         ALLERGIES:    Allergies   Allergen Reactions   • Codeine Itching   • Levetiracetam Dizziness   • Phenytoin Itching   • Pseudoephedrine Dizziness   • Repatha [Evolocumab] Rash   • Statins Itching        Social History     Socioeconomic History   • Marital status:      Spouse name: Elizabeth   • Number of children: Not on file   • Years of education: Not on file   • Highest education level: Not on file   Occupational History     Employer: RETIRED   Tobacco Use   •  "Smoking status: Former Smoker     Types: Cigarettes     Quit date: 10/30/1979     Years since quittin.9   • Smokeless tobacco: Never Used   Substance and Sexual Activity   • Alcohol use: Yes   • Drug use: No   • Sexual activity: Defer        Family History   Problem Relation Age of Onset   • Hyperlipidemia Mother    • Dementia Mother         Review of Systems   HENT: Negative.    Respiratory: Negative.    Cardiovascular: Negative.    Gastrointestinal: Negative.    Genitourinary: Negative.    Musculoskeletal: Positive for arthralgias and back pain.   Neurological: Positive for weakness. Negative for dizziness, seizures and light-headedness.   Hematological: Negative for adenopathy. Does not bruise/bleed easily.   Psychiatric/Behavioral: Negative for confusion and dysphoric mood.        Mild memory impairment reported          Objective     Vitals:    20 1431   BP: 149/73   Pulse: 72   Resp: 14   Temp: 99.1 °F (37.3 °C)   TempSrc: Tympanic   SpO2: 92%   Weight: 75.9 kg (167 lb 4.8 oz)   Height: 175 cm (68.9\")  Comment: new ht   PainSc: 0-No pain     Current Status 2020   ECOG score 1       Physical Exam    CON: pleasant well-developed elderly man  HEENT: no icterus, no thrush, moist membranes  NECK: no jvd  LYMPH: no cervical or supraclavicular lad  RESP: cta bilat, no wheezing, no rales  GI: soft, non-tender, no splenomegaly, +bs  MUSC: no edema, normal gait  NEURO: alert and oriented x3, normal strength  PSYCH: normal mood and affect  Skin: No excessive bruising, no induration  RECENT LABS:  Hematology WBC   Date Value Ref Range Status   2020 8.01 3.40 - 10.80 10*3/mm3 Final   2019 6.5 3.4 - 10.8 x10E3/uL Final     RBC   Date Value Ref Range Status   2020 5.02 4.14 - 5.80 10*6/mm3 Final   2019 4.72 4.14 - 5.80 x10E6/uL Final     Hemoglobin   Date Value Ref Range Status   2020 14.6 13.0 - 17.7 g/dL Final     Hematocrit   Date Value Ref Range Status   2020 43.7 37.5 " - 51.0 % Final     Platelets   Date Value Ref Range Status   09/21/2020 191 140 - 450 10*3/mm3 Final        Lab Results   Component Value Date    GLUCOSE 88 09/05/2014    BUN 17 05/01/2020    CREATININE 1.17 05/01/2020    EGFRIFNONA 59 (L) 05/01/2020    EGFRIFAFRI 72 05/01/2020    BCR 14.5 05/01/2020    K 4.3 05/01/2020    CO2 28.9 05/01/2020    CALCIUM 9.4 05/01/2020    PROTENTOTREF 6.0 08/25/2020    ALBUMIN 3.5 08/25/2020    LABIL2 1.4 08/25/2020    AST 22 10/30/2019    ALT 22 10/30/2019         Assessment/Plan     This is an 85-year-old man with an abnormal protein electrophoresis showing an asymmetrical gamma band with no measurable M spike.  Immunofixation shows an IgG monoclonal protein with kappa light chain specificity.  The patient has no history of anemia, thrombocytopenia, significant renal dysfunction or hypercalcemia.  The abnormal QUOC is likely inconsequential for the patient.  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  I recommended repeat labs in 6 months with CBC, CMP, SPEP, QUOC and a free light chain ratio.    Thank you for allowing me to participate in the patient's care.

## 2020-09-23 ENCOUNTER — APPOINTMENT (OUTPATIENT)
Dept: PHYSICAL THERAPY | Facility: HOSPITAL | Age: 85
End: 2020-09-23

## 2020-09-23 ENCOUNTER — APPOINTMENT (OUTPATIENT)
Dept: SPEECH THERAPY | Facility: HOSPITAL | Age: 85
End: 2020-09-23

## 2020-09-23 ENCOUNTER — APPOINTMENT (OUTPATIENT)
Dept: OCCUPATIONAL THERAPY | Facility: HOSPITAL | Age: 85
End: 2020-09-23

## 2020-09-30 ENCOUNTER — APPOINTMENT (OUTPATIENT)
Dept: OCCUPATIONAL THERAPY | Facility: HOSPITAL | Age: 85
End: 2020-09-30

## 2020-09-30 ENCOUNTER — APPOINTMENT (OUTPATIENT)
Dept: PHYSICAL THERAPY | Facility: HOSPITAL | Age: 85
End: 2020-09-30

## 2020-09-30 ENCOUNTER — APPOINTMENT (OUTPATIENT)
Dept: SPEECH THERAPY | Facility: HOSPITAL | Age: 85
End: 2020-09-30

## 2020-10-02 ENCOUNTER — TELEPHONE (OUTPATIENT)
Dept: NEUROLOGY | Facility: CLINIC | Age: 85
End: 2020-10-02

## 2020-10-02 NOTE — TELEPHONE ENCOUNTER
Pt called upset that he has an appt with Brooke Arango and that Dr. Gee referred him for a driving eval. Pt sts he does not need a driving eval. Pt sts he does not want to continue to take aricept, he doesn't need it and he does not want to f/u here. I cancelled his appt with Brooke Arango.

## 2020-10-02 NOTE — TELEPHONE ENCOUNTER
Called pt to offer phone consult for 10/08/20 visit due to pt high score on COVID-19 risk of complications. No answer and no voicemail.

## 2020-10-07 ENCOUNTER — APPOINTMENT (OUTPATIENT)
Dept: PHYSICAL THERAPY | Facility: HOSPITAL | Age: 85
End: 2020-10-07

## 2020-10-07 ENCOUNTER — APPOINTMENT (OUTPATIENT)
Dept: OCCUPATIONAL THERAPY | Facility: HOSPITAL | Age: 85
End: 2020-10-07

## 2020-10-07 ENCOUNTER — APPOINTMENT (OUTPATIENT)
Dept: SPEECH THERAPY | Facility: HOSPITAL | Age: 85
End: 2020-10-07

## 2020-10-14 ENCOUNTER — APPOINTMENT (OUTPATIENT)
Dept: OCCUPATIONAL THERAPY | Facility: HOSPITAL | Age: 85
End: 2020-10-14

## 2020-10-14 ENCOUNTER — APPOINTMENT (OUTPATIENT)
Dept: PHYSICAL THERAPY | Facility: HOSPITAL | Age: 85
End: 2020-10-14

## 2020-10-14 ENCOUNTER — OFFICE VISIT (OUTPATIENT)
Dept: FAMILY MEDICINE CLINIC | Facility: CLINIC | Age: 85
End: 2020-10-14

## 2020-10-14 ENCOUNTER — APPOINTMENT (OUTPATIENT)
Dept: SPEECH THERAPY | Facility: HOSPITAL | Age: 85
End: 2020-10-14

## 2020-10-14 VITALS — TEMPERATURE: 98.3 F | HEIGHT: 69 IN | BODY MASS INDEX: 24.29 KG/M2 | WEIGHT: 164 LBS

## 2020-10-14 DIAGNOSIS — F01.518 VASCULAR DEMENTIA WITH BEHAVIOR DISTURBANCE (HCC): ICD-10-CM

## 2020-10-14 DIAGNOSIS — Z79.01 ANTICOAGULATED ON WARFARIN: Primary | ICD-10-CM

## 2020-10-14 DIAGNOSIS — I48.20 CHRONIC ATRIAL FIBRILLATION (HCC): ICD-10-CM

## 2020-10-14 LAB — INR PPP: 1.1 (ref 2–3)

## 2020-10-14 PROCEDURE — 99214 OFFICE O/P EST MOD 30 MIN: CPT | Performed by: INTERNAL MEDICINE

## 2020-10-14 PROCEDURE — 36416 COLLJ CAPILLARY BLOOD SPEC: CPT | Performed by: INTERNAL MEDICINE

## 2020-10-14 PROCEDURE — 85610 PROTHROMBIN TIME: CPT | Performed by: INTERNAL MEDICINE

## 2020-10-14 RX ORDER — INFLUENZA A VIRUS A/MICHIGAN/45/2015 X-275 (H1N1) ANTIGEN (FORMALDEHYDE INACTIVATED), INFLUENZA A VIRUS A/SINGAPORE/INFIMH-16-0019/2016 IVR-186 (H3N2) ANTIGEN (FORMALDEHYDE INACTIVATED), INFLUENZA B VIRUS B/PHUKET/3073/2013 ANTIGEN (FORMALDEHYDE INACTIVATED), AND INFLUENZA B VIRUS B/MARYLAND/15/2016 BX-69A ANTIGEN (FORMALDEHYDE INACTIVATED) 60; 60; 60; 60 UG/.7ML; UG/.7ML; UG/.7ML; UG/.7ML
INJECTION, SUSPENSION INTRAMUSCULAR
COMMUNITY
Start: 2020-09-02 | End: 2021-11-30

## 2020-10-14 NOTE — PROGRESS NOTES
Subjective   Alireza Carreon is a 85 y.o. male.     Chief Complaint   Patient presents with   • INR Check   • MRI results       History of Present Illness   Here for follow up INR evaluation.  Currently anticoagulated with warfarin for atrial fibrillation.  The patient is not taking warfarin 7.5 mg every Monday and Friday 10 mg,  Has only been taking 1 or 2 times per week.  The current INR goal is 2-3.  The INR is currently 1.1.  No significant interval events, easy bleeding, bruising, epistaxis, fever, weakness or numbness.  Patient fell in a parking lot 4-6 weeks ago.  Does not fall frequently but patient wife is watching him.    The following portions of the patient's history were reviewed and updated as appropriate: allergies, current medications, past family history, past medical history, past social history, past surgical history and problem list.    Depression Screen:  PHQ-2/PHQ-9 Depression Screening 9/21/2020   Little interest or pleasure in doing things 0   Feeling down, depressed, or hopeless 0   Trouble falling or staying asleep, or sleeping too much 0   Feeling tired or having little energy 0   Poor appetite or overeating 0   Feeling bad about yourself - or that you are a failure or have let yourself or your family down 0   Trouble concentrating on things, such as reading the newspaper or watching television 0   Moving or speaking so slowly that other people could have noticed. Or the opposite - being so fidgety or restless that you have been moving around a lot more than usual 0   Thoughts that you would be better off dead, or of hurting yourself in some way 0   Total Score 0   If you checked off any problems, how difficult have these problems made it for you to do your work, take care of things at home, or get along with other people? Not difficult at all       Past Medical History:   Diagnosis Date   • 3-vessel CAD    • Anticoagulated on warfarin    • Atrial fibrillation (CMS/HCC)    • BMI  28.0-28.9,adult    • Bunion, right foot    • Complaints of memory disturbance    • Dementia (CMS/HCC)    • Edema    • Encounter for immunization    • Generalized convulsive seizure (CMS/HCC)    • Gout    • History of double vision    • HL (hearing loss)    • Hyperlipidemia    • Hypertension    • Left foot pain    • 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    • Movement disorder    • Nocturnal leg cramps    • OA (osteoarthritis)    • Prepatellar effusion    • Pulmonary embolism (CMS/HCC)    • Puncture wound of hand, right    • Screening for cardiovascular condition    • Sleep apnea    • Stasis dermatitis    • Statin intolerance    • Stroke (CMS/HCC)    • Taking drug for chronic disease    • Thoracic back pain    • Transient ischemic attack        Past Surgical History:   Procedure Laterality Date   • ADENOIDECTOMY     • APPENDECTOMY     • CATARACT EXTRACTION, BILATERAL     • CORONARY ARTERY BYPASS GRAFT     • CORONARY ARTERY BYPASS GRAFT     • CYSTOSCOPY TRANSURETHRAL RESECTION OF PROSTATE     • HERNIA REPAIR     • OTHER SURGICAL HISTORY      carotid thromboendarterectomy   • SHOULDER SURGERY         Family History   Problem Relation Age of Onset   • Hyperlipidemia Mother    • Dementia Mother        Social History     Socioeconomic History   • Marital status:      Spouse name: Elizabeth   • Number of children: Not on file   • Years of education: Not on file   • Highest education level: Not on file   Occupational History     Employer: RETIRED   Tobacco Use   • Smoking status: Former Smoker     Types: Cigarettes     Quit date: 10/30/1979     Years since quittin.9   • Smokeless tobacco: Never Used   Substance and Sexual Activity   • Alcohol use: Yes   • Drug use: No   • Sexual activity: Defer       Current Outpatient Medications   Medication Sig Dispense Refill   • cimetidine (TAGAMET) 200 MG tablet      • clobetasol (TEMOVATE) 0.05 % cream      • diclofenac (VOLTAREN) 0.1 % ophthalmic solution As  Needed.     • donepezil (ARICEPT) 10 MG tablet Take 1 tablet by mouth Every Night. 30 tablet 11   • lisinopril (PRINIVIL,ZESTRIL) 20 MG tablet Take 1 tablet by mouth 2 (Two) Times a Day. 180 tablet 0   • Magnesium Hydroxide (MAGNESIA PO) Take  by mouth.     • PHENobarbital (LUMINAL) 64.8 MG tablet TAKE ONE TABLET BY MOUTH DAILY 90 tablet 4   • torsemide (DEMADEX) 10 MG tablet Take 1 tablet by mouth Daily. 180 tablet 0   • ezetimibe (ZETIA) 10 MG tablet Take 10 mg by mouth Daily.     • Fluzone High-Dose Quadrivalent 0.7 ML suspension prefilled syringe      • Hydrocort-Pramoxine, Perianal, (PROCTOFOAM-HS) 1-1 % rectal foam Insert 1 applicator into the rectum.     • prednisoLONE acetate (PRED FORTE) 1 % ophthalmic suspension As Needed.     • rivaroxaban (Xarelto) 15 MG tablet Take 1 tablet by mouth Daily. 30 tablet 3   • triamcinolone (KENALOG) 0.1 % cream        No current facility-administered medications for this visit.        Review of Systems   Constitutional: Negative for activity change, appetite change, fatigue, fever, unexpected weight gain and unexpected weight loss.   HENT: Negative for nosebleeds, rhinorrhea, trouble swallowing and voice change.    Eyes: Negative for visual disturbance.   Respiratory: Negative for cough, chest tightness, shortness of breath and wheezing.    Cardiovascular: Negative for chest pain, palpitations and leg swelling.   Gastrointestinal: Negative for abdominal pain, blood in stool, constipation, diarrhea, nausea, vomiting, GERD and indigestion.   Genitourinary: Negative for dysuria, frequency and hematuria.   Musculoskeletal: Positive for gait problem. Negative for arthralgias, back pain and myalgias.   Skin: Negative for rash and bruise.   Neurological: Positive for memory problem. Negative for dizziness, tremors, weakness, light-headedness, numbness and headache.   Hematological: Negative for adenopathy. Does not bruise/bleed easily.   Psychiatric/Behavioral: Negative for sleep  "disturbance and depressed mood. The patient is not nervous/anxious.        Objective   Temp 98.3 °F (36.8 °C) (Infrared)   Ht 175 cm (68.9\")   Wt 74.4 kg (164 lb)   BMI 24.29 kg/m²     Physical Exam  Vitals signs and nursing note reviewed.   Constitutional:       General: He is not in acute distress.     Appearance: He is well-developed. He is not diaphoretic.   HENT:      Head: Normocephalic and atraumatic.      Right Ear: External ear normal.      Left Ear: External ear normal.      Nose: Nose normal.   Eyes:      Conjunctiva/sclera: Conjunctivae normal.      Pupils: Pupils are equal, round, and reactive to light.   Neck:      Musculoskeletal: Normal range of motion and neck supple.      Thyroid: No thyromegaly.      Trachea: No tracheal deviation.   Cardiovascular:      Rate and Rhythm: Normal rate and regular rhythm.      Heart sounds: Normal heart sounds. No murmur. No friction rub. No gallop.    Pulmonary:      Effort: Pulmonary effort is normal. No respiratory distress.      Breath sounds: Normal breath sounds.   Abdominal:      General: Bowel sounds are normal.      Palpations: Abdomen is soft. There is no mass.      Tenderness: There is no abdominal tenderness. There is no guarding.   Musculoskeletal: Normal range of motion.   Lymphadenopathy:      Cervical: No cervical adenopathy.   Skin:     General: Skin is warm and dry.      Capillary Refill: Capillary refill takes less than 2 seconds.      Findings: No rash.   Neurological:      Mental Status: He is alert and oriented to person, place, and time.      Motor: No abnormal muscle tone.      Deep Tendon Reflexes: Reflexes normal.      Comments: Patient with some some mild short-term memory issues but becomes defensive when discussing his memory.  He has a short shuffling type gait and utilizes a cane.   Psychiatric:         Behavior: Behavior normal.         Thought Content: Thought content normal.         Judgment: Judgment normal.         Recent " Results (from the past 2016 hour(s))   Vitamin B12    Collection Time: 08/25/20 11:01 AM    Specimen: Blood   Result Value Ref Range    Vitamin B-12 668 211 - 946 pg/mL   TSH Rfx On Abnormal To Free T4    Collection Time: 08/25/20 11:01 AM    Specimen: Blood   Result Value Ref Range    TSH 1.770 0.270 - 4.200 uIU/mL   Protein Elec + Interp, Serum    Collection Time: 08/25/20 11:01 AM    Specimen: Blood   Result Value Ref Range    Total Protein 6.0 6.0 - 8.5 g/dL    Albumin 3.5 2.9 - 4.4 g/dL    Alpha-1-Globulin 0.2 0.0 - 0.4 g/dL    Alpha-2-Globulin 0.5 0.4 - 1.0 g/dL    Beta Globulin 0.9 0.7 - 1.3 g/dL    Gamma Globulin 0.8 0.4 - 1.8 g/dL    M-Michael Comment: Not Observed g/dL    Globulin 2.5 2.2 - 3.9 g/dL    A/G Ratio 1.4 0.7 - 1.7    Please note Comment     SPE Interpretation Comment    Immunofixation, Serum    Collection Time: 08/25/20 11:01 AM    Specimen: Blood   Result Value Ref Range    Immunofixation Result, Serum Comment     IgG 772 603 - 1613 mg/dL    IgA 177 61 - 437 mg/dL    IgM 43 15 - 143 mg/dL   Hemoglobin A1c    Collection Time: 08/25/20 11:01 AM    Specimen: Blood   Result Value Ref Range    Hemoglobin A1C 5.50 4.80 - 5.60 %   CBC Auto Differential    Collection Time: 09/21/20  2:15 PM    Specimen: Blood   Result Value Ref Range    WBC 8.01 3.40 - 10.80 10*3/mm3    RBC 5.02 4.14 - 5.80 10*6/mm3    Hemoglobin 14.6 13.0 - 17.7 g/dL    Hematocrit 43.7 37.5 - 51.0 %    MCV 87.1 79.0 - 97.0 fL    MCH 29.1 26.6 - 33.0 pg    MCHC 33.4 31.5 - 35.7 g/dL    RDW 14.7 12.3 - 15.4 %    RDW-SD 47.4 37.0 - 54.0 fl    MPV 9.1 6.0 - 12.0 fL    Platelets 191 140 - 450 10*3/mm3    Neutrophil % 69.8 42.7 - 76.0 %    Lymphocyte % 18.9 (L) 19.6 - 45.3 %    Monocyte % 7.7 5.0 - 12.0 %    Eosinophil % 2.5 0.3 - 6.2 %    Basophil % 0.9 0.0 - 1.5 %    Immature Grans % 0.2 0.0 - 0.5 %    Neutrophils, Absolute 5.59 1.70 - 7.00 10*3/mm3    Lymphocytes, Absolute 1.51 0.70 - 3.10 10*3/mm3    Monocytes, Absolute 0.62 0.10 - 0.90  10*3/mm3    Eosinophils, Absolute 0.20 0.00 - 0.40 10*3/mm3    Basophils, Absolute 0.07 0.00 - 0.20 10*3/mm3    Immature Grans, Absolute 0.02 0.00 - 0.05 10*3/mm3    nRBC 0.0 0.0 - 0.2 /100 WBC   POCT INR    Collection Time: 10/14/20 10:49 AM    Specimen: Blood   Result Value Ref Range    INR 1.1 (A) 2.00 - 3.00     Assessment/Plan   Diagnoses and all orders for this visit:    1. Anticoagulated on warfarin (Primary)  -     POCT INR    2. Chronic atrial fibrillation (CMS/HCC)  -     rivaroxaban (Xarelto) 15 MG tablet; Take 1 tablet by mouth Daily.  Dispense: 30 tablet; Refill: 3    3. Vascular dementia with behavior disturbance (CMS/HCC)    Other orders  -     Cancel: POCT INR    Discussed and reviewed his anticoagulation secondary to atrial fibrillation.  He has not really been compliant with the warfarin and I believe that in the past he has not been compliant as well.  I discussed with he and his wife that we may try an alternative to the warfarin that does not require checking the levels but he should take on a daily basis.  Wife and he were agreeable to try and a prescription for Xarelto was sent to the pharmacy.  15 mg was sent secondary to some mild renal insufficiency.  Reviewed the MRI results with patient indicating he has the consistent findings of vascular dementia with encephalomalacia or atrophy of the brain.  He has had multiple strokes some which have been large and surprisingly he has a few as few side effects from this as he could have.  We will continue his current medications other than stopping the warfarin and trying Xarelto.

## 2020-10-21 ENCOUNTER — APPOINTMENT (OUTPATIENT)
Dept: OCCUPATIONAL THERAPY | Facility: HOSPITAL | Age: 85
End: 2020-10-21

## 2020-10-21 ENCOUNTER — APPOINTMENT (OUTPATIENT)
Dept: PHYSICAL THERAPY | Facility: HOSPITAL | Age: 85
End: 2020-10-21

## 2020-10-21 ENCOUNTER — APPOINTMENT (OUTPATIENT)
Dept: SPEECH THERAPY | Facility: HOSPITAL | Age: 85
End: 2020-10-21

## 2020-10-28 ENCOUNTER — APPOINTMENT (OUTPATIENT)
Dept: OCCUPATIONAL THERAPY | Facility: HOSPITAL | Age: 85
End: 2020-10-28

## 2020-10-28 ENCOUNTER — APPOINTMENT (OUTPATIENT)
Dept: PHYSICAL THERAPY | Facility: HOSPITAL | Age: 85
End: 2020-10-28

## 2020-10-28 ENCOUNTER — APPOINTMENT (OUTPATIENT)
Dept: SPEECH THERAPY | Facility: HOSPITAL | Age: 85
End: 2020-10-28

## 2020-10-30 ENCOUNTER — HOSPITAL ENCOUNTER (EMERGENCY)
Facility: HOSPITAL | Age: 85
Discharge: HOME | End: 2020-10-30
Attending: EMERGENCY MEDICINE | Admitting: EMERGENCY MEDICINE

## 2020-10-30 VITALS
OXYGEN SATURATION: 99 % | RESPIRATION RATE: 15 BRPM | WEIGHT: 160 LBS | BODY MASS INDEX: 26.66 KG/M2 | TEMPERATURE: 98.5 F | DIASTOLIC BLOOD PRESSURE: 87 MMHG | HEIGHT: 65 IN | HEART RATE: 68 BPM | SYSTOLIC BLOOD PRESSURE: 154 MMHG

## 2020-10-30 DIAGNOSIS — L24.5 IRRITANT CONTACT DERMATITIS DUE TO OTHER CHEMICAL PRODUCTS: Primary | ICD-10-CM

## 2020-10-30 PROCEDURE — 99282 EMERGENCY DEPT VISIT SF MDM: CPT

## 2020-10-30 RX ORDER — MUPIROCIN CALCIUM 20 MG/G
CREAM TOPICAL 3 TIMES DAILY
Qty: 15 G | Refills: 0 | Status: SHIPPED | OUTPATIENT
Start: 2020-10-30 | End: 2021-11-30

## 2020-10-30 RX ORDER — HYDROXYZINE HYDROCHLORIDE 25 MG/1
25 TABLET, FILM COATED ORAL EVERY 6 HOURS PRN
Qty: 12 TABLET | Refills: 0 | Status: SHIPPED | OUTPATIENT
Start: 2020-10-30 | End: 2021-11-30

## 2020-11-04 ENCOUNTER — APPOINTMENT (OUTPATIENT)
Dept: SPEECH THERAPY | Facility: HOSPITAL | Age: 85
End: 2020-11-04

## 2020-11-04 ENCOUNTER — APPOINTMENT (OUTPATIENT)
Dept: OCCUPATIONAL THERAPY | Facility: HOSPITAL | Age: 85
End: 2020-11-04

## 2020-11-04 ENCOUNTER — APPOINTMENT (OUTPATIENT)
Dept: PHYSICAL THERAPY | Facility: HOSPITAL | Age: 85
End: 2020-11-04

## 2020-11-11 ENCOUNTER — APPOINTMENT (OUTPATIENT)
Dept: PHYSICAL THERAPY | Facility: HOSPITAL | Age: 85
End: 2020-11-11

## 2020-11-11 ENCOUNTER — APPOINTMENT (OUTPATIENT)
Dept: SPEECH THERAPY | Facility: HOSPITAL | Age: 85
End: 2020-11-11

## 2020-11-11 ENCOUNTER — APPOINTMENT (OUTPATIENT)
Dept: OCCUPATIONAL THERAPY | Facility: HOSPITAL | Age: 85
End: 2020-11-11

## 2020-11-18 ENCOUNTER — APPOINTMENT (OUTPATIENT)
Dept: SPEECH THERAPY | Facility: HOSPITAL | Age: 85
End: 2020-11-18

## 2020-11-18 ENCOUNTER — APPOINTMENT (OUTPATIENT)
Dept: OCCUPATIONAL THERAPY | Facility: HOSPITAL | Age: 85
End: 2020-11-18

## 2020-11-18 ENCOUNTER — APPOINTMENT (OUTPATIENT)
Dept: PHYSICAL THERAPY | Facility: HOSPITAL | Age: 85
End: 2020-11-18

## 2020-11-25 ENCOUNTER — APPOINTMENT (OUTPATIENT)
Dept: OCCUPATIONAL THERAPY | Facility: HOSPITAL | Age: 85
End: 2020-11-25

## 2020-11-25 ENCOUNTER — APPOINTMENT (OUTPATIENT)
Dept: PHYSICAL THERAPY | Facility: HOSPITAL | Age: 85
End: 2020-11-25

## 2020-11-25 ENCOUNTER — APPOINTMENT (OUTPATIENT)
Dept: SPEECH THERAPY | Facility: HOSPITAL | Age: 85
End: 2020-11-25

## 2020-12-02 ENCOUNTER — APPOINTMENT (OUTPATIENT)
Dept: OCCUPATIONAL THERAPY | Facility: HOSPITAL | Age: 85
End: 2020-12-02

## 2020-12-02 ENCOUNTER — APPOINTMENT (OUTPATIENT)
Dept: PHYSICAL THERAPY | Facility: HOSPITAL | Age: 85
End: 2020-12-02

## 2020-12-02 ENCOUNTER — APPOINTMENT (OUTPATIENT)
Dept: SPEECH THERAPY | Facility: HOSPITAL | Age: 85
End: 2020-12-02

## 2021-01-27 ENCOUNTER — OFFICE VISIT (OUTPATIENT)
Dept: FAMILY MEDICINE CLINIC | Facility: CLINIC | Age: 86
End: 2021-01-27

## 2021-01-27 VITALS
TEMPERATURE: 98 F | HEIGHT: 65 IN | BODY MASS INDEX: 26.66 KG/M2 | SYSTOLIC BLOOD PRESSURE: 152 MMHG | WEIGHT: 160 LBS | HEART RATE: 74 BPM | OXYGEN SATURATION: 98 % | DIASTOLIC BLOOD PRESSURE: 88 MMHG

## 2021-01-27 DIAGNOSIS — Z79.01 CHRONIC ANTICOAGULATION: ICD-10-CM

## 2021-01-27 DIAGNOSIS — Z00.00 MEDICARE ANNUAL WELLNESS VISIT, SUBSEQUENT: Primary | ICD-10-CM

## 2021-01-27 DIAGNOSIS — E78.5 HYPERLIPIDEMIA, UNSPECIFIED HYPERLIPIDEMIA TYPE: ICD-10-CM

## 2021-01-27 DIAGNOSIS — I10 ESSENTIAL HYPERTENSION: ICD-10-CM

## 2021-01-27 DIAGNOSIS — I48.20 CHRONIC ATRIAL FIBRILLATION (HCC): ICD-10-CM

## 2021-01-27 DIAGNOSIS — F01.518 VASCULAR DEMENTIA WITH BEHAVIOR DISTURBANCE (HCC): ICD-10-CM

## 2021-01-27 DIAGNOSIS — I10 HYPERTENSION, UNSPECIFIED TYPE: ICD-10-CM

## 2021-01-27 PROCEDURE — G0439 PPPS, SUBSEQ VISIT: HCPCS | Performed by: INTERNAL MEDICINE

## 2021-01-27 RX ORDER — LISINOPRIL 20 MG/1
20 TABLET ORAL 2 TIMES DAILY
Qty: 180 TABLET | Refills: 1 | Status: SHIPPED | OUTPATIENT
Start: 2021-01-27 | End: 2021-08-02

## 2021-01-27 NOTE — PROGRESS NOTES
Subsequent Medicare Wellness Visit   The ABC's of the Annual Wellness Visit    Chief Complaint   Patient presents with   • Annual Exam       HPI:  Alireza Carreon, -1935, is a 85 y.o. male who presents for a Subsequent Medicare Wellness Visit.  Here for follow up INR evaluation.  Currently anticoagulated with xarelto for atrial fibrillation.  The patient is not taking warfarin anymore and is on xarelto daily now.  No significant interval events, easy bleeding, bruising, epistaxis, fever, weakness or numbness.    Follow-up for hypertension.  Currently has been feeling well and asymptomatic without any headaches, vision changes, cough, chest pain, shortness of breath, swelling, focal neurologic deficit, memory loss or syncope.  Has been taking the medications regularly and adherent with the regimen of lisinopril 20 mg daily and torsemide 10 mg daily.  Denies medication side effects and no significant interval events.      Follow-up for cholesterol.  Currently, has been feeling well without any myalgias, muscle aches, weakness, numbness, chest pain, short of breath or other issues.  Currently, is adherent with medication regimen of Zetia 10 mg daily and denies medication side effects. Is due for lab follow-up.    Patient has had some history of memory problems which is mostly concerned by the wife.  We will continue on the donepezil 10 mg daily      Recent Hospitalizations:  No hospitalization(s) within the last year..    Current Medical Providers:  Patient Care Team:  Jose Bingham MD as PCP - General (Internal Medicine)  Yennifer Ruiz MD as Consulting Physician (Cardiology)  Cristian Taylor OD (Optometry)  PARKER Wheeler MD as Consulting Physician (Ophthalmology)  Obdulio Morelos MD as Consulting Physician (Hematology and Oncology)  Zahira Gee MD as Referring Physician (Neurology)    Health Habits and Functional and Cognitive Screening and Depression  Screening:  Functional & Cognitive Status 1/27/2021   Do you have difficulty preparing food and eating? No   Do you have difficulty bathing yourself, getting dressed or grooming yourself? No   Do you have difficulty using the toilet? No   Do you have difficulty moving around from place to place? No   Do you have trouble with steps or getting out of a bed or a chair? No   Current Diet Well Balanced Diet   Dental Exam Up to date   Eye Exam Up to date   Exercise (times per week) 2 times per week   Current Exercises Include Other   Current Exercise Activities Include -   Do you need help using the phone?  No   Are you deaf or do you have serious difficulty hearing?  Yes   Do you need help with transportation? No   Do you need help shopping? No   Do you need help preparing meals?  No   Do you need help with housework?  No   Do you need help with laundry? No   Do you need help taking your medications? No   Do you need help managing money? No   Do you ever drive or ride in a car without wearing a seat belt? No   Have you felt unusual stress, anger or loneliness in the last month? No   Who do you live with? Spouse   If you need help, do you have trouble finding someone available to you? No   Have you been bothered in the last four weeks by sexual problems? No   Do you have difficulty concentrating, remembering or making decisions? No       Compared to one year ago, the patient feels his physical health is the same and his mental health is the same.    Depression Screen:  PHQ-2/PHQ-9 Depression Screening 1/27/2021   Little interest or pleasure in doing things 0   Feeling down, depressed, or hopeless 0   Trouble falling or staying asleep, or sleeping too much 0   Feeling tired or having little energy 0   Poor appetite or overeating 0   Feeling bad about yourself - or that you are a failure or have let yourself or your family down 0   Trouble concentrating on things, such as reading the newspaper or watching television 0    Moving or speaking so slowly that other people could have noticed. Or the opposite - being so fidgety or restless that you have been moving around a lot more than usual 0   Thoughts that you would be better off dead, or of hurting yourself in some way 0   Total Score 0   If you checked off any problems, how difficult have these problems made it for you to do your work, take care of things at home, or get along with other people? -       Falls Risk Assessment:  MANDA Fall Risk Clinician Key Questions   Have you fallen in the past year?: Yes      Past Medical/Family/Social History:  The following portions of the patient's history were reviewed and updated as appropriate: allergies, current medications, past family history, past medical history, past social history, past surgical history and problem list.    Allergies   Allergen Reactions   • Codeine Itching   • Levetiracetam Dizziness   • Phenytoin Itching   • Pseudoephedrine Dizziness   • Repatha [Evolocumab] Rash   • Statins Itching         Current Outpatient Medications:   •  cimetidine (TAGAMET) 200 MG tablet, , Disp: , Rfl:   •  clobetasol (TEMOVATE) 0.05 % cream, , Disp: , Rfl:   •  diclofenac (VOLTAREN) 0.1 % ophthalmic solution, As Needed., Disp: , Rfl:   •  donepezil (ARICEPT) 10 MG tablet, Take 1 tablet by mouth Every Night., Disp: 30 tablet, Rfl: 11  •  ezetimibe (ZETIA) 10 MG tablet, Take 10 mg by mouth Daily., Disp: , Rfl:   •  Fluzone High-Dose Quadrivalent 0.7 ML suspension prefilled syringe, , Disp: , Rfl:   •  Hydrocort-Pramoxine, Perianal, (PROCTOFOAM-HS) 1-1 % rectal foam, Insert 1 applicator into the rectum., Disp: , Rfl:   •  hydrOXYzine (ATARAX) 25 MG tablet, Take 1 tablet by mouth Every 6 (Six) Hours As Needed for Itching., Disp: 12 tablet, Rfl: 0  •  lisinopril (PRINIVIL,ZESTRIL) 20 MG tablet, Take 1 tablet by mouth 2 (Two) Times a Day., Disp: 180 tablet, Rfl: 1  •  Magnesium Hydroxide (MAGNESIA PO), Take  by mouth., Disp: , Rfl:   •   mupirocin (BACTROBAN) 2 % cream, Apply  topically to the appropriate area as directed 3 (Three) Times a Day., Disp: 15 g, Rfl: 0  •  PHENobarbital (LUMINAL) 64.8 MG tablet, TAKE ONE TABLET BY MOUTH DAILY, Disp: 90 tablet, Rfl: 4  •  prednisoLONE acetate (PRED FORTE) 1 % ophthalmic suspension, As Needed., Disp: , Rfl:   •  rivaroxaban (Xarelto) 15 MG tablet, Take 1 tablet by mouth Daily., Disp: 30 tablet, Rfl: 5  •  torsemide (DEMADEX) 10 MG tablet, Take 1 tablet by mouth Daily., Disp: 180 tablet, Rfl: 0  •  triamcinolone (KENALOG) 0.1 % cream, , Disp: , Rfl:     Aspirin use counseling: Does not need ASA (and currently is not on it)    Current medication list contains no high risk medications.  No harmful drug interactions have been identified.     Family History   Problem Relation Age of Onset   • Hyperlipidemia Mother    • Dementia Mother        Social History     Tobacco Use   • Smoking status: Former Smoker     Types: Cigarettes     Quit date: 10/30/1979     Years since quittin.2   • Smokeless tobacco: Never Used   Substance Use Topics   • Alcohol use: Yes       Past Surgical History:   Procedure Laterality Date   • ADENOIDECTOMY     • APPENDECTOMY     • CATARACT EXTRACTION, BILATERAL     • CORONARY ARTERY BYPASS GRAFT     • CORONARY ARTERY BYPASS GRAFT     • CYSTOSCOPY TRANSURETHRAL RESECTION OF PROSTATE     • HERNIA REPAIR     • OTHER SURGICAL HISTORY      carotid thromboendarterectomy   • SHOULDER SURGERY     • SKIN CANCER EXCISION         Patient Active Problem List   Diagnosis   • Hypertension   • Anal pruritus   • Atrial fibrillation (CMS/HCC)   • Edema   • Gout   • Hyperlipidemia   • Medicare annual wellness visit, subsequent   • 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   • Dementia (CMS/HCC)   • Swelling of right testicle   • Gait abnormality   • Vascular dementia with behavior disturbance (CMS/HCC)   • Monoclonal gammopathy of unknown significance (MGUS)   • Chronic anticoagulation  "      Review of Systems   Constitutional: Negative for activity change, appetite change, fatigue, fever, unexpected weight gain and unexpected weight loss.   HENT: Negative for nosebleeds, rhinorrhea, trouble swallowing and voice change.    Eyes: Negative for visual disturbance.   Respiratory: Negative for cough, chest tightness, shortness of breath and wheezing.    Cardiovascular: Negative for chest pain, palpitations and leg swelling.   Gastrointestinal: Negative for abdominal pain, blood in stool, constipation, diarrhea, nausea, vomiting, GERD and indigestion.   Genitourinary: Negative for dysuria, frequency and hematuria.   Musculoskeletal: Positive for gait problem. Negative for arthralgias, back pain and myalgias.   Skin: Negative for rash and bruise.   Neurological: Positive for memory problem. Negative for dizziness, tremors, weakness, light-headedness, numbness and headache.   Hematological: Negative for adenopathy. Does not bruise/bleed easily.   Psychiatric/Behavioral: Negative for sleep disturbance and depressed mood. The patient is not nervous/anxious.        Objective     Vitals:    01/27/21 1027   BP: 152/88   BP Location: Right arm   Patient Position: Sitting   Cuff Size: Adult   Pulse: 74   Temp: 98 °F (36.7 °C)   TempSrc: Temporal   SpO2: 98%   Weight: 72.6 kg (160 lb)   Height: 165.1 cm (65\")       Patient's Body mass index is 26.63 kg/m². BMI is above normal parameters. Recommendations include: exercise counseling and nutrition counseling.      No exam data present    Patient has history of some cognitive difficulties and is currently on medications.    Physical Exam  Vitals signs and nursing note reviewed.   Constitutional:       General: He is not in acute distress.     Appearance: He is well-developed. He is not diaphoretic.   HENT:      Head: Normocephalic and atraumatic.      Right Ear: External ear normal.      Left Ear: External ear normal.      Ears:      Comments: Using bilateral hearing " aids.     Nose: Nose normal.   Eyes:      Conjunctiva/sclera: Conjunctivae normal.      Pupils: Pupils are equal, round, and reactive to light.   Neck:      Musculoskeletal: Normal range of motion and neck supple.      Thyroid: No thyromegaly.      Trachea: No tracheal deviation.   Cardiovascular:      Rate and Rhythm: Normal rate. Rhythm irregularly irregular.      Heart sounds: Normal heart sounds. No murmur. No friction rub. No gallop.    Pulmonary:      Effort: Pulmonary effort is normal. No respiratory distress.      Breath sounds: Normal breath sounds.   Abdominal:      General: Bowel sounds are normal.      Palpations: Abdomen is soft. There is no mass.      Tenderness: There is no abdominal tenderness. There is no guarding.   Musculoskeletal: Normal range of motion.   Lymphadenopathy:      Cervical: No cervical adenopathy.   Skin:     General: Skin is warm and dry.      Capillary Refill: Capillary refill takes less than 2 seconds.      Findings: No rash.      Comments: Forehead healing central wound from dermatology surgery.   Neurological:      Mental Status: He is alert and oriented to person, place, and time.      Motor: No abnormal muscle tone.      Deep Tendon Reflexes: Reflexes normal.   Psychiatric:         Behavior: Behavior normal.         Thought Content: Thought content normal.         Judgment: Judgment normal.         Recent Lab Results:  Lab Results   Component Value Date     (H) 05/01/2020     Lab Results   Component Value Date    TRIG 150 09/06/2014    HDL 44 09/06/2014       Assessment/Plan   Age-appropriate Screening Schedule:  Refer to the list below for future screening recommendations based on patient's age, sex and/or medical conditions.      Health Maintenance   Topic Date Due   • ZOSTER VACCINE (2 of 3) 07/15/2015   • LIPID PANEL  09/16/2019   • TDAP/TD VACCINES (2 - Td) 11/08/2029   • INFLUENZA VACCINE  Completed   • HEMOGLOBIN A1C  Discontinued   • DIABETIC EYE EXAM   Discontinued   • URINE MICROALBUMIN  Discontinued       Medicare Risks and Personalized Health Plan:  Advance Directive Discussion  Glaucoma Risk  Hearing Problem  Immunizations Discussed/Encouraged (specific immunizations; Shingrix )  Obesity/Overweight       CMS-Preventive Services Quick Reference  Medicare Preventive Services Addressed:  Annual Wellness Visit (AWV)  Diabetes Screening-Lab Order for either glucose quantitative blood (except reagent strip), glucose;post glucose dose(includes glucose), or glucose tolerance test-3 specimens(includes glucose)  Glaucoma screening (for individuals with diabetes mellitus, family history of glaucoma, -Americans (> or =) age 50, -Americans (> or =) age 65)    Advance Care Planning:  ACP discussion was held with the patient during this visit. Patient has an advance directive (not in EMR), copy requested.    Diagnoses and all orders for this visit:    1. Medicare annual wellness visit, subsequent (Primary)    2. Essential hypertension  -     Comprehensive Metabolic Panel  -     Lipid Panel    3. Chronic anticoagulation    4. Vascular dementia with behavior disturbance (CMS/[facility])    5. Chronic atrial fibrillation (CMS/[facility])  -     rivaroxaban (Xarelto) 15 MG tablet; Take 1 tablet by mouth Daily.  Dispense: 30 tablet; Refill: 5    6. Hypertension, unspecified type  -     lisinopril (PRINIVIL,ZESTRIL) 20 MG tablet; Take 1 tablet by mouth 2 (Two) Times a Day.  Dispense: 180 tablet; Refill: 1  -     Comprehensive Metabolic Panel  -     Lipid Panel    7. Hyperlipidemia, unspecified hyperlipidemia type  -     Comprehensive Metabolic Panel  -     Lipid Panel      Annual wellness visit reviewed with patient.  All past history, medications, social history, and problem list were reviewed.  Discussed advanced directives and living will.  Patient has living will: Living will: yes and patient will bring copy to office.  Discussed fall risk and precautions encourage removing  throw rugs and using grab bars within the home and bathroom.  Will check the labs as ordered above to evaluate the blood sugars, kidney, liver, cholesterol for screening.  Discussed flu shot recommended to get the high-dose influenza vaccine annually in the fall.  Prevnar-13 and pneumovax-23 up to date and appropriate.  Shingrix vaccination series recommended.  Encourage follow-up with the eye doctor on annual basis for glaucoma evaluation.  Discussed weight and encouraged exercise as tolerated while following a healthy diet.        An After Visit Summary and PPPS with all of these plans were given to the patient.      Follow Up:  Return in about 6 months (around 7/27/2021) for Next scheduled follow up.           · COVID-19 Precautions - Patient was compliant in wearing a mask. When I saw the patient, I used appropriate personal protective equipment (PPE) including mask and eye shield (standard procedure).  Additionally, I used gown and gloves if indicated.  Hand hygiene was completed before and after seeing the patient.  · Dictated utilizing Dragon Dictation

## 2021-01-28 LAB
ALBUMIN SERPL-MCNC: 4.2 G/DL (ref 3.5–5.2)
ALBUMIN/GLOB SERPL: 1.9 G/DL
ALP SERPL-CCNC: 53 U/L (ref 39–117)
ALT SERPL-CCNC: 18 U/L (ref 1–41)
AST SERPL-CCNC: 27 U/L (ref 1–40)
BILIRUB SERPL-MCNC: 0.4 MG/DL (ref 0–1.2)
BUN SERPL-MCNC: 17 MG/DL (ref 8–23)
BUN/CREAT SERPL: 16.3 (ref 7–25)
CALCIUM SERPL-MCNC: 9.5 MG/DL (ref 8.6–10.5)
CHLORIDE SERPL-SCNC: 101 MMOL/L (ref 98–107)
CHOLEST SERPL-MCNC: 221 MG/DL (ref 0–200)
CO2 SERPL-SCNC: 28 MMOL/L (ref 22–29)
CREAT SERPL-MCNC: 1.04 MG/DL (ref 0.76–1.27)
GLOBULIN SER CALC-MCNC: 2.2 GM/DL
GLUCOSE SERPL-MCNC: 89 MG/DL (ref 65–99)
HDLC SERPL-MCNC: 52 MG/DL (ref 40–60)
LDLC SERPL CALC-MCNC: 145 MG/DL (ref 0–100)
POTASSIUM SERPL-SCNC: 4.7 MMOL/L (ref 3.5–5.2)
PROT SERPL-MCNC: 6.4 G/DL (ref 6–8.5)
SODIUM SERPL-SCNC: 139 MMOL/L (ref 136–145)
TRIGL SERPL-MCNC: 135 MG/DL (ref 0–150)
VLDLC SERPL CALC-MCNC: 24 MG/DL (ref 5–40)

## 2021-02-08 DIAGNOSIS — R56.9 GENERALIZED CONVULSIVE SEIZURE (HCC): ICD-10-CM

## 2021-02-08 RX ORDER — PHENOBARBITAL 64.8 MG/1
TABLET ORAL
Qty: 90 TABLET | Refills: 3 | Status: SHIPPED | OUTPATIENT
Start: 2021-02-08 | End: 2021-10-06

## 2021-02-18 DIAGNOSIS — R56.9 GENERALIZED CONVULSIVE SEIZURE (HCC): ICD-10-CM

## 2021-02-18 RX ORDER — PHENOBARBITAL 64.8 MG/1
64.8 TABLET ORAL DAILY
Qty: 90 TABLET | Refills: 3 | OUTPATIENT
Start: 2021-02-18

## 2021-02-18 NOTE — TELEPHONE ENCOUNTER
LOV 01/27/2021   NOV 07/28/2021   Last filled 02/08/2021 qty 90 w/ 3 refills     Called pharmacy, they have new script on file. Going to fill prescription for patient today.

## 2021-02-18 NOTE — TELEPHONE ENCOUNTER
Caller: Alireza Carreon    Relationship: Self    Best call back number: [phone]    Medication needed:   Requested Prescriptions     Pending Prescriptions Disp Refills   • PHENobarbital (LUMINAL) 64.8 MG tablet 90 tablet 3     Sig: Take 1 tablet by mouth Daily.       When do you need the refill by: TODAY 02/18/2021    What details did the patient provide when requesting the medication: PATIENT IS COMPLETELY OUT OF MEDICATION. PHARMACY HAS NO PRESCRIPTION FOR REFILLS    Does the patient have less than a 3 day supply:  [x] Yes  [] No    What is the patient's preferred pharmacy: [facility] [address] [place] [address] AT [address] - [phone]  - [phone] FX

## 2021-03-01 ENCOUNTER — TELEPHONE (OUTPATIENT)
Dept: ONCOLOGY | Facility: CLINIC | Age: 86
End: 2021-03-01

## 2021-03-01 ENCOUNTER — TELEPHONE (OUTPATIENT)
Dept: FAMILY MEDICINE CLINIC | Facility: CLINIC | Age: 86
End: 2021-03-01

## 2021-03-01 ENCOUNTER — OFFICE VISIT (OUTPATIENT)
Dept: FAMILY MEDICINE CLINIC | Facility: CLINIC | Age: 86
End: 2021-03-01

## 2021-03-01 ENCOUNTER — APPOINTMENT (OUTPATIENT)
Dept: LAB | Facility: HOSPITAL | Age: 86
End: 2021-03-01

## 2021-03-01 DIAGNOSIS — D47.2 MONOCLONAL GAMMOPATHY OF UNKNOWN SIGNIFICANCE (MGUS): ICD-10-CM

## 2021-03-01 DIAGNOSIS — F03.91 DEMENTIA WITH BEHAVIORAL DISTURBANCE, UNSPECIFIED DEMENTIA TYPE: Primary | ICD-10-CM

## 2021-03-01 DIAGNOSIS — F01.518 VASCULAR DEMENTIA WITH BEHAVIOR DISTURBANCE (HCC): ICD-10-CM

## 2021-03-01 PROCEDURE — 99443 PR PHYS/QHP TELEPHONE EVALUATION 21-30 MIN: CPT | Performed by: INTERNAL MEDICINE

## 2021-03-01 RX ORDER — DONEPEZIL HYDROCHLORIDE 10 MG/1
10 TABLET, FILM COATED ORAL NIGHTLY
Qty: 90 TABLET | Refills: 1 | Status: SHIPPED | OUTPATIENT
Start: 2021-03-01 | End: 2021-07-28 | Stop reason: SDUPTHER

## 2021-03-01 NOTE — TELEPHONE ENCOUNTER
PT IS CALLING STATING THAT DR GOMEZ IS MAKING APPOINTMENTS FOR HIM TO SEE DR BACON AND HE STATES THAT DR LINO IS HIS PROVIDER NOT DR BACON AND HE WANTS TO BE CALLED BACK ABOUT THIS.    PT CALL BACK  [phone]

## 2021-03-01 NOTE — TELEPHONE ENCOUNTER
Alireza called to cancel his 03/01 and 03/08 appts. I told him he can call back to reschedule whenever.    Ph# [phone]

## 2021-03-01 NOTE — PROGRESS NOTES
Subjective   Alireza Carreon is a 85 y.o. male.     Chief Complaint   Patient presents with   • memory   • questions about hematology appt.       History of Present Illness   You have chosen to receive care through a telephone visit. Do you consent to use a telephone visit for your medical care today? Yes  Wife was present during the history-taking and subsequent discussion with this patient on a second line.  Patient agrees to the presence of the individual during this visit.    Patient has concerns.  States he was seen by Dr. Gee 6-7 months ago and for some reason is being referred to a hematologist now.  He does not want to go to his hematologist and has canceled it.  Patient states he does not want to have Dr. Gee doing his care and he does not understand what is going on.  He does not remember ever seeing a Dr. Morelos before.  Patient does have a history of vascular dementia but appears he has not been taking his donepezil because had Dr. Gee's name on it.    The following portions of the patient's history were reviewed and updated as appropriate: allergies, current medications, past family history, past medical history, past social history, past surgical history and problem list.    Depression Screen:  PHQ-2/PHQ-9 Depression Screening 1/27/2021   Little interest or pleasure in doing things 0   Feeling down, depressed, or hopeless 0   Trouble falling or staying asleep, or sleeping too much 0   Feeling tired or having little energy 0   Poor appetite or overeating 0   Feeling bad about yourself - or that you are a failure or have let yourself or your family down 0   Trouble concentrating on things, such as reading the newspaper or watching television 0   Moving or speaking so slowly that other people could have noticed. Or the opposite - being so fidgety or restless that you have been moving around a lot more than usual 0   Thoughts that you would be better off dead, or of hurting yourself in some  way 0   Total Score 0   If you checked off any problems, how difficult have these problems made it for you to do your work, take care of things at home, or get along with other people? -       Past Medical History:   Diagnosis Date   • 3-vessel CAD    • Anticoagulated on warfarin    • Atrial fibrillation (CMS/HCC)    • BMI 28.0-28.9,adult    • Bunion, right foot    • Complaints of memory disturbance    • Dementia (CMS/HCC)    • Edema    • Encounter for immunization    • Generalized convulsive seizure (CMS/HCC)    • Gout    • History of double vision    • HL (hearing loss)    • Hyperlipidemia    • Hypertension    • Left foot pain    • 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    • Movement disorder    • Nocturnal leg cramps    • OA (osteoarthritis)    • Prepatellar effusion    • Pulmonary embolism (CMS/HCC)    • Puncture wound of hand, right    • Screening for cardiovascular condition    • Sleep apnea    • Stasis dermatitis    • Statin intolerance    • Stroke (CMS/HCC)    • Taking drug for chronic disease    • Thoracic back pain    • Transient ischemic attack        Past Surgical History:   Procedure Laterality Date   • ADENOIDECTOMY     • APPENDECTOMY     • CATARACT EXTRACTION, BILATERAL     • CORONARY ARTERY BYPASS GRAFT     • CORONARY ARTERY BYPASS GRAFT     • CYSTOSCOPY TRANSURETHRAL RESECTION OF PROSTATE     • HERNIA REPAIR     • OTHER SURGICAL HISTORY      carotid thromboendarterectomy   • SHOULDER SURGERY     • SKIN CANCER EXCISION         Family History   Problem Relation Age of Onset   • Hyperlipidemia Mother    • Dementia Mother        Social History     Socioeconomic History   • Marital status:      Spouse name: Elizabeth   • Number of children: Not on file   • Years of education: Not on file   • Highest education level: Not on file   Occupational History     Employer: RETIRED   Tobacco Use   • Smoking status: Former Smoker     Types: Cigarettes     Quit date: 10/30/1979     Years since  quittin.3   • Smokeless tobacco: Never Used   Substance and Sexual Activity   • Alcohol use: Yes   • Drug use: No   • Sexual activity: Defer       Current Outpatient Medications   Medication Sig Dispense Refill   • cimetidine (TAGAMET) 200 MG tablet      • clobetasol (TEMOVATE) 0.05 % cream      • diclofenac (VOLTAREN) 0.1 % ophthalmic solution As Needed.     • donepezil (ARICEPT) 10 MG tablet Take 1 tablet by mouth Every Night. 90 tablet 1   • ezetimibe (ZETIA) 10 MG tablet Take 10 mg by mouth Daily.     • Fluzone High-Dose Quadrivalent 0.7 ML suspension prefilled syringe      • Hydrocort-Pramoxine, Perianal, (PROCTOFOAM-HS) 1-1 % rectal foam Insert 1 applicator into the rectum.     • hydrOXYzine (ATARAX) 25 MG tablet Take 1 tablet by mouth Every 6 (Six) Hours As Needed for Itching. 12 tablet 0   • lisinopril (PRINIVIL,ZESTRIL) 20 MG tablet Take 1 tablet by mouth 2 (Two) Times a Day. 180 tablet 1   • Magnesium Hydroxide (MAGNESIA PO) Take  by mouth.     • mupirocin (BACTROBAN) 2 % cream Apply  topically to the appropriate area as directed 3 (Three) Times a Day. 15 g 0   • PHENobarbital (LUMINAL) 64.8 MG tablet TAKE ONE TABLET BY MOUTH DAILY 90 tablet 3   • prednisoLONE acetate (PRED FORTE) 1 % ophthalmic suspension As Needed.     • rivaroxaban (Xarelto) 15 MG tablet Take 1 tablet by mouth Daily. 30 tablet 5   • torsemide (DEMADEX) 10 MG tablet Take 1 tablet by mouth Daily. 180 tablet 0   • triamcinolone (KENALOG) 0.1 % cream        No current facility-administered medications for this visit.        Review of Systems   Constitutional: Negative for activity change, appetite change, fatigue, fever, unexpected weight gain and unexpected weight loss.   HENT: Negative for nosebleeds, rhinorrhea, trouble swallowing and voice change.    Eyes: Negative for visual disturbance.   Respiratory: Negative for cough, chest tightness, shortness of breath and wheezing.    Cardiovascular: Negative for chest pain, palpitations  and leg swelling.   Gastrointestinal: Negative for abdominal pain, blood in stool, constipation, diarrhea, nausea, vomiting, GERD and indigestion.   Genitourinary: Negative for dysuria, frequency and hematuria.   Musculoskeletal: Negative for arthralgias, back pain and myalgias.   Skin: Negative for rash and bruise.   Neurological: Positive for memory problem. Negative for dizziness, tremors, weakness, light-headedness, numbness and headache.   Hematological: Negative for adenopathy. Does not bruise/bleed easily.   Psychiatric/Behavioral: Negative for sleep disturbance and depressed mood. The patient is not nervous/anxious.        Objective   There were no vitals taken for this visit.    Physical Exam   Constitutional: No distress.   Pulmonary/Chest: Effort normal.  No respiratory distress.  Neurological: He is alert.   Poor memory on discussion does not remember seeing Dr. Morelos but there is actually a note documented in September.   Psychiatric: He mood appears normal. His affect is normal. His behavior is normal. Thought content is normal. He does not express abnormal judgement.       Recent Results (from the past 2016 hour(s))   Comprehensive Metabolic Panel    Collection Time: 01/27/21 11:04 AM    Specimen: Blood   Result Value Ref Range    Glucose 89 65 - 99 mg/dL    BUN 17 8 - 23 mg/dL    Creatinine 1.04 0.76 - 1.27 mg/dL    eGFR Non African Am 68 >60 mL/min/1.73    eGFR African Am 82 >60 mL/min/1.73    BUN/Creatinine Ratio 16.3 7.0 - 25.0    Sodium 139 136 - 145 mmol/L    Potassium 4.7 3.5 - 5.2 mmol/L    Chloride 101 98 - 107 mmol/L    Total CO2 28.0 22.0 - 29.0 mmol/L    Calcium 9.5 8.6 - 10.5 mg/dL    Total Protein 6.4 6.0 - 8.5 g/dL    Albumin 4.20 3.50 - 5.20 g/dL    Globulin 2.2 gm/dL    A/G Ratio 1.9 g/dL    Total Bilirubin 0.4 0.0 - 1.2 mg/dL    Alkaline Phosphatase 53 39 - 117 U/L    AST (SGOT) 27 1 - 40 U/L    ALT (SGPT) 18 1 - 41 U/L   Lipid Panel    Collection Time: 01/27/21 11:04 AM     Specimen: Blood   Result Value Ref Range    Total Cholesterol 221 (H) 0 - 200 mg/dL    Triglycerides 135 0 - 150 mg/dL    HDL Cholesterol 52 40 - 60 mg/dL    VLDL Cholesterol Robert 24 5 - 40 mg/dL    LDL Chol Calc (NIH) 145 (H) 0 - 100 mg/dL     Assessment/Plan   Diagnoses and all orders for this visit:    1. Dementia with behavioral disturbance, unspecified dementia type (CMS/HCC) (Primary)  -     donepezil (ARICEPT) 10 MG tablet; Take 1 tablet by mouth Every Night.  Dispense: 90 tablet; Refill: 1    2. Vascular dementia with behavior disturbance (CMS/HCC)  -     donepezil (ARICEPT) 10 MG tablet; Take 1 tablet by mouth Every Night.  Dispense: 90 tablet; Refill: 1    3. Monoclonal gammopathy of unknown significance (MGUS)    I discussed with patient and his wife concerning the referral to hematology.  This was due to a possible monoclonal gammopathy and he was actually seen by Dr. Morelos at that time.  Patient refuses to go back and does not remember even talking to Dr. Morelos.  After discussion he and his wife are agreeable that when he comes in on his next follow-up in approximately 3 months we will be doing the test that Dr. Morelos would have been doing along with her own.  I did want him to start back on his donepezil for his memory which she is agreeable to.  Patient and wife are understanding and agreeable to this plan of action.      Telephone visit completed.    I spent 25 minutes caring for Alireza on this date of service. This time includes time spent by me in the following activities:reviewing tests, obtaining and/or reviewing a separately obtained history, counseling and educating the patient/family/caregiver, ordering medications, tests, or procedures and documenting information in the medical record

## 2021-04-15 ENCOUNTER — TELEPHONE (OUTPATIENT)
Dept: FAMILY MEDICINE CLINIC | Facility: CLINIC | Age: 86
End: 2021-04-15

## 2021-04-15 NOTE — TELEPHONE ENCOUNTER
The only thing that I can recommend is a vitamin B12 or B complex vitamin that is available over-the-counter that he can take daily.

## 2021-04-15 NOTE — TELEPHONE ENCOUNTER
Patient states he has been having some energy issues, he says he gets tired pretty easy.  He wants to know if there is a vitamin or something that could be  Prescribed or that he could take that would help him have more energy.  He says he has too much work to do around the house to not have energy.  His phone number is [phone].  Pharmacy is [facility].

## 2021-04-29 ENCOUNTER — TELEPHONE (OUTPATIENT)
Dept: FAMILY MEDICINE CLINIC | Facility: CLINIC | Age: 86
End: 2021-04-29

## 2021-04-29 DIAGNOSIS — H92.03 EAR PAIN, BILATERAL: Primary | ICD-10-CM

## 2021-04-29 NOTE — TELEPHONE ENCOUNTER
Caller: Alireza Carreon    Relationship to patient: Self    Best call back number: [phone]    Patient is needing: PATIENT IS CALLING TO REQUEST A REFERRAL FOR AN ENT SPECIALIST.  HE STATES HE IS HAVING SORENESS IN BOTH EARS.  HE STATES IT IS FROM A DEVICE THAT IS SUPPOSE TO HELP WITH HEARING.    PLEASE ADVISE.

## 2021-07-28 ENCOUNTER — OFFICE VISIT (OUTPATIENT)
Dept: FAMILY MEDICINE CLINIC | Facility: CLINIC | Age: 86
End: 2021-07-28

## 2021-07-28 VITALS
SYSTOLIC BLOOD PRESSURE: 122 MMHG | HEART RATE: 75 BPM | TEMPERATURE: 97.9 F | BODY MASS INDEX: 25.66 KG/M2 | HEIGHT: 65 IN | OXYGEN SATURATION: 97 % | WEIGHT: 154 LBS | DIASTOLIC BLOOD PRESSURE: 84 MMHG

## 2021-07-28 DIAGNOSIS — E78.5 HYPERLIPIDEMIA, UNSPECIFIED HYPERLIPIDEMIA TYPE: ICD-10-CM

## 2021-07-28 DIAGNOSIS — F01.518 VASCULAR DEMENTIA WITH BEHAVIOR DISTURBANCE (HCC): ICD-10-CM

## 2021-07-28 DIAGNOSIS — I10 ESSENTIAL HYPERTENSION: Primary | ICD-10-CM

## 2021-07-28 DIAGNOSIS — F03.91 DEMENTIA WITH BEHAVIORAL DISTURBANCE, UNSPECIFIED DEMENTIA TYPE: ICD-10-CM

## 2021-07-28 PROBLEM — I63.9 CEREBROVASCULAR ACCIDENT (CVA): Status: ACTIVE | Noted: 2021-07-28

## 2021-07-28 PROBLEM — I25.10 ARTERIOSCLEROSIS OF CORONARY ARTERY: Status: ACTIVE | Noted: 2021-07-28

## 2021-07-28 PROBLEM — G40.909 SEIZURE DISORDER (HCC): Status: ACTIVE | Noted: 2021-07-28

## 2021-07-28 PROCEDURE — 99213 OFFICE O/P EST LOW 20 MIN: CPT | Performed by: INTERNAL MEDICINE

## 2021-07-28 RX ORDER — DONEPEZIL HYDROCHLORIDE 10 MG/1
10 TABLET, FILM COATED ORAL NIGHTLY
Qty: 90 TABLET | Refills: 3 | Status: SHIPPED | OUTPATIENT
Start: 2021-07-28 | End: 2022-01-01

## 2021-07-28 NOTE — PROGRESS NOTES
Subjective   Alireza Carreon is a 86 y.o. male.     Chief Complaint   Patient presents with   • Hypertension   • Dementia       History of Present Illness     Follow-up for hypertension.  Currently has been feeling well and asymptomatic without any headaches, vision changes, cough, chest pain, shortness of breath, focal neurologic deficit, memory loss or syncope.  Has had issues with some swelling in the distal legs with no pain or issues with it.  Has been taking the medications regularly and adherent with the regimen of lisinopril 20 mg daily and torsemide 10 mg daily.  Denies medication side effects and no significant interval events.       Follow-up for cholesterol.  Currently, has been feeling well without any myalgias, muscle aches, weakness, numbness, chest pain, short of breath or other issues.  Currently, is adherent with medication regimen of Zetia 10 mg daily and denies medication side effects. Is due for lab follow-up.     Patient has had some history of memory problems which is mostly concerned by the wife.  We will continue on the donepezil 10 mg daily but ran out about a month ago.  No problems or side effects with the medication.  Patient complains that his wife has memory problems during the discussion.          The following portions of the patient's history were reviewed and updated as appropriate: allergies, current medications, past family history, past medical history, past social history, past surgical history and problem list.    Depression Screen:  PHQ-2/PHQ-9 Depression Screening 1/27/2021   Little interest or pleasure in doing things 0   Feeling down, depressed, or hopeless 0   Trouble falling or staying asleep, or sleeping too much 0   Feeling tired or having little energy 0   Poor appetite or overeating 0   Feeling bad about yourself - or that you are a failure or have let yourself or your family down 0   Trouble concentrating on things, such as reading the newspaper or watching  television 0   Moving or speaking so slowly that other people could have noticed. Or the opposite - being so fidgety or restless that you have been moving around a lot more than usual 0   Thoughts that you would be better off dead, or of hurting yourself in some way 0   Total Score 0   If you checked off any problems, how difficult have these problems made it for you to do your work, take care of things at home, or get along with other people? -       Past Medical History:   Diagnosis Date   • 3-vessel CAD    • Anticoagulated on warfarin    • Atrial fibrillation (CMS/HCC)    • BMI 28.0-28.9,adult    • Bunion, right foot    • Complaints of memory disturbance    • Dementia (CMS/HCC)    • Edema    • Encounter for immunization    • Generalized convulsive seizure (CMS/HCC)    • Gout    • History of double vision    • HL (hearing loss)    • Hyperlipidemia    • Hypertension    • Left foot pain    • 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    • Movement disorder    • Nocturnal leg cramps    • OA (osteoarthritis)    • Prepatellar effusion    • Pulmonary embolism (CMS/HCC)    • Puncture wound of hand, right    • Screening for cardiovascular condition    • Sleep apnea    • Stasis dermatitis    • Statin intolerance    • Stroke (CMS/HCC)    • Taking drug for chronic disease    • Thoracic back pain    • Transient ischemic attack        Past Surgical History:   Procedure Laterality Date   • ADENOIDECTOMY     • APPENDECTOMY     • CATARACT EXTRACTION, BILATERAL     • CORONARY ARTERY BYPASS GRAFT     • CORONARY ARTERY BYPASS GRAFT     • CYSTOSCOPY TRANSURETHRAL RESECTION OF PROSTATE     • HERNIA REPAIR     • OTHER SURGICAL HISTORY      carotid thromboendarterectomy   • SHOULDER SURGERY     • SKIN CANCER EXCISION         Family History   Problem Relation Age of Onset   • Hyperlipidemia Mother    • Dementia Mother        Social History     Socioeconomic History   • Marital status:      Spouse name: Elizabeth   • [place]  of children: Not on file   • Years of education: Not on file   • Highest education level: Not on file   Tobacco Use   • Smoking status: Former Smoker     Types: Cigarettes     Quit date: 10/30/1979     Years since quittin.7   • Smokeless tobacco: Never Used   Substance and Sexual Activity   • Alcohol use: Yes   • Drug use: No   • Sexual activity: Defer       Current Outpatient Medications   Medication Sig Dispense Refill   • clobetasol (TEMOVATE) 0.05 % cream      • diclofenac (VOLTAREN) 0.1 % ophthalmic solution As Needed.     • donepezil (ARICEPT) 10 MG tablet Take 1 tablet by mouth Every Night. 90 tablet 3   • ezetimibe (ZETIA) 10 MG tablet Take 10 mg by mouth Daily.     • Fluzone High-Dose Quadrivalent 0.7 ML suspension prefilled syringe      • Hydrocort-Pramoxine, Perianal, (PROCTOFOAM-HS) 1-1 % rectal foam Insert 1 applicator into the rectum.     • hydrOXYzine (ATARAX) 25 MG tablet Take 1 tablet by mouth Every 6 (Six) Hours As Needed for Itching. 12 tablet 0   • lisinopril (PRINIVIL,ZESTRIL) 20 MG tablet Take 1 tablet by mouth 2 (Two) Times a Day. 180 tablet 1   • Magnesium Hydroxide (MAGNESIA PO) Take  by mouth.     • mupirocin (BACTROBAN) 2 % cream Apply  topically to the appropriate area as directed 3 (Three) Times a Day. 15 g 0   • PHENobarbital (LUMINAL) 64.8 MG tablet TAKE ONE TABLET BY MOUTH DAILY 90 tablet 3   • prednisoLONE acetate (PRED FORTE) 1 % ophthalmic suspension As Needed.     • rivaroxaban (Xarelto) 15 MG tablet Take 1 tablet by mouth Daily. 30 tablet 5   • torsemide (DEMADEX) 10 MG tablet Take 1 tablet by mouth Daily. 180 tablet 0   • triamcinolone (KENALOG) 0.1 % cream        No current facility-administered medications for this visit.       Review of Systems   Constitutional: Negative for activity change, appetite change, fatigue, fever, unexpected weight gain and unexpected weight loss.   HENT: Negative for nosebleeds, rhinorrhea, trouble swallowing and voice change.    Eyes:  "Negative for visual disturbance.   Respiratory: Negative for cough, chest tightness, shortness of breath and wheezing.    Cardiovascular: Negative for chest pain, palpitations and leg swelling.   Gastrointestinal: Negative for abdominal pain, blood in stool, constipation, diarrhea, nausea, vomiting, GERD and indigestion.   Genitourinary: Negative for dysuria, frequency and hematuria.   Musculoskeletal: Negative for arthralgias, back pain and myalgias.   Skin: Negative for rash and bruise.   Neurological: Positive for memory problem. Negative for dizziness, tremors, weakness, light-headedness, numbness and headache.   Hematological: Negative for adenopathy. Does not bruise/bleed easily.   Psychiatric/Behavioral: Negative for sleep disturbance and depressed mood. The patient is not nervous/anxious.        Objective   /84 (BP Location: Left arm, Patient Position: Sitting, Cuff Size: Adult)   Pulse 75   Temp 97.9 °F (36.6 °C) (Temporal)   Ht 165.1 cm (65\")   Wt 69.9 kg (154 lb)   SpO2 97%   BMI 25.63 kg/m²     Physical Exam  Vitals and nursing note reviewed.   Constitutional:       General: He is not in acute distress.     Appearance: He is well-developed. He is not diaphoretic.   HENT:      Head: Normocephalic and atraumatic.      Right Ear: External ear normal.      Left Ear: External ear normal.      Ears:      Comments: Hearing aids in place bilaterally.     Nose: Nose normal.   Eyes:      Conjunctiva/sclera: Conjunctivae normal.      Pupils: Pupils are equal, round, and reactive to light.   Neck:      Thyroid: No thyromegaly.      Trachea: No tracheal deviation.   Cardiovascular:      Rate and Rhythm: Normal rate and regular rhythm.      Heart sounds: Normal heart sounds. No murmur heard.   No friction rub. No gallop.    Pulmonary:      Effort: Pulmonary effort is normal. No respiratory distress.      Breath sounds: Normal breath sounds.   Abdominal:      General: Bowel sounds are normal.      " Palpations: Abdomen is soft. There is no mass.      Tenderness: There is no abdominal tenderness. There is no guarding.   Musculoskeletal:         General: Normal range of motion.      Cervical back: Normal range of motion and neck supple.      Comments: Using cane for mobility with some shuffling in gait   Lymphadenopathy:      Cervical: No cervical adenopathy.   Skin:     General: Skin is warm and dry.      Capillary Refill: Capillary refill takes less than 2 seconds.      Findings: No rash.   Neurological:      Mental Status: He is alert and oriented to person, place, and time.      Motor: No abnormal muscle tone.      Deep Tendon Reflexes: Reflexes normal.   Psychiatric:         Behavior: Behavior normal.         Thought Content: Thought content normal.         Judgment: Judgment normal.      Comments: Mild memory issues         No results found for this or any previous visit (from the past 2016 hour(s)).  Assessment/Plan   Diagnoses and all orders for this visit:    1. Essential hypertension (Primary)    2. Vascular dementia with behavior disturbance (CMS/HCC)  -     donepezil (ARICEPT) 10 MG tablet; Take 1 tablet by mouth Every Night.  Dispense: 90 tablet; Refill: 3    3. Dementia with behavioral disturbance, unspecified dementia type (CMS/HCC)  -     donepezil (ARICEPT) 10 MG tablet; Take 1 tablet by mouth Every Night.  Dispense: 90 tablet; Refill: 3    4. Hyperlipidemia, unspecified hyperlipidemia type      Hypertension currently well controlled.  Continue current medication unchanged.  I discussed with patient concerning his memory which is a chronic issue.  We will continue the donepezil 10 mg once a day and have restarted this.  He is concerned about his wife and I instructed him that unless she is having an issue or expresses concern there is not much that we can do for her and we need to focus on him.         · COVID-19 Precautions - Patient was compliant in wearing a mask. When I saw the patient, I  used appropriate personal protective equipment (PPE) including mask and eye shield (standard procedure).  Additionally, I used gown and gloves if indicated.  Hand hygiene was completed before and after seeing the patient.  · Dictated utilizing Dragon Dictation

## 2021-07-31 DIAGNOSIS — I10 HYPERTENSION, UNSPECIFIED TYPE: ICD-10-CM

## 2021-08-02 RX ORDER — LISINOPRIL 20 MG/1
TABLET ORAL
Qty: 180 TABLET | Refills: 1 | Status: SHIPPED | OUTPATIENT
Start: 2021-08-02 | End: 2022-01-01 | Stop reason: SDUPTHER

## 2021-08-20 ENCOUNTER — TELEPHONE (OUTPATIENT)
Dept: FAMILY MEDICINE CLINIC | Facility: CLINIC | Age: 86
End: 2021-08-20

## 2021-08-20 NOTE — TELEPHONE ENCOUNTER
Caller: RAVEN HERNANDEZ    Relationship: Other    Best call back number: [phone]    What is the best time to reach you: ANY TIME    Who are you requesting to speak with (clinical staff, provider,  specific staff member): CLINICAL    What was the call regarding: PATIENT'S DAUGHTER - NOT ON VERBAL - CALLED SAYING THAT PATIENT HAS BEEN  HAVING MUSCLE CRAMPS IN THE BACK OF HIS LEFT LEG RIGHT ABOVE HIS KNEE. HIS LEG IS CURRENTLY SWOLLEN AND PITTED. SHE CALLED TO ASK DR. LINO WHAT HE SHOULD DO FOR THIS.  PATIENT'S DAUGHTER NOTED THAT HE TAKES 1 POTASSIUM PILL IN THE MORNING AND 2 MAGNESIUM PILLS AT NIGHT, BUT PATIENT ACCIDENTALLY TOOK 2 POTASSIUM PILLS TODAY. DAUGHTER NOTED THAT HE HAS DEMENTIA AND IS FORGETFUL SOMETIMES. SHE ALSO NOTED THAT HE USUALLY TAKES WATER PILLS IN MORNING BUT HAS NOT TAKEN IT YET.    PATIENT'S DAUGHTER ALSO ASKED IF THEY WOULD BE ABLE TO START GETTING A [facility] NURSE TO COME CHECK ON HIM.  DAUGHTER MAY NOT BE WITH PATIENT WHEN THE OFFICE CALLS BACK, AND SHE IS NOT SURE HOW TO GO ABOUT PERMISSION FOR THEM TO SPEAK WITH HER.

## 2021-08-31 ENCOUNTER — OFFICE VISIT (OUTPATIENT)
Dept: FAMILY MEDICINE CLINIC | Facility: CLINIC | Age: 86
End: 2021-08-31

## 2021-08-31 VITALS
TEMPERATURE: 98.3 F | HEIGHT: 65 IN | HEART RATE: 87 BPM | BODY MASS INDEX: 26.49 KG/M2 | DIASTOLIC BLOOD PRESSURE: 80 MMHG | SYSTOLIC BLOOD PRESSURE: 136 MMHG | OXYGEN SATURATION: 97 % | WEIGHT: 159 LBS

## 2021-08-31 DIAGNOSIS — R60.0 LOCALIZED EDEMA: ICD-10-CM

## 2021-08-31 DIAGNOSIS — R25.2 MUSCLE CRAMPS: ICD-10-CM

## 2021-08-31 DIAGNOSIS — E78.5 HYPERLIPIDEMIA, UNSPECIFIED HYPERLIPIDEMIA TYPE: ICD-10-CM

## 2021-08-31 DIAGNOSIS — I10 ESSENTIAL HYPERTENSION: Primary | ICD-10-CM

## 2021-08-31 PROCEDURE — 99214 OFFICE O/P EST MOD 30 MIN: CPT | Performed by: INTERNAL MEDICINE

## 2021-08-31 NOTE — PROGRESS NOTES
Subjective   Alireza Carreon is a 86 y.o. male.     Chief Complaint   Patient presents with   • Leg Swelling       History of Present Illness   Daughter, Adalgisa, was present during the history-taking and subsequent discussion (and for part of the physical exam) with this patient.  Patient agrees to the presence of the individual during this visit.    Per daughter patient was having leg spasms and swelling.  Leg cramps are only in the mornings.  Has bee taking OTC magnesium 400 mg up to 2 tabs and OTC potassium daily.  His other daughter stated she saw him take extra in the morning.  Is not taking the torsemide every day and at most every other day.    History of hypertension.  Currently has been feeling well and asymptomatic without any headaches, vision changes, cough, chest pain, shortness of breath, focal neurologic deficit, memory loss or syncope.  Has had issues with some swelling in the distal legs with no pain or issues with it.  Has been taking the medications regularly and adherent with the regimen of lisinopril 20 mg daily and torsemide 10 mg daily.  Denies medication side effects and no significant interval events.       History of high cholesterol.  Currently, has been feeling well without any myalgias, muscle aches, weakness, numbness, chest pain, short of breath or other issues.  Currently, is adherent with medication regimen of Zetia 10 mg daily and denies medication side effects. Is due for lab follow-up.     Patient has had some history of memory problems which is mostly concerned by the wife.  We will continue on the donepezil 10 mg daily but ran out about a month ago.  No problems or side effects with the medication.  Patient complains that his wife has memory problems during the discussion.     The following portions of the patient's history were reviewed and updated as appropriate: allergies, current medications, past family history, past medical history, past social history, past surgical  history and problem list.    Depression Screen:  PHQ-2/PHQ-9 Depression Screening 1/27/2021   Little interest or pleasure in doing things 0   Feeling down, depressed, or hopeless 0   Trouble falling or staying asleep, or sleeping too much 0   Feeling tired or having little energy 0   Poor appetite or overeating 0   Feeling bad about yourself - or that you are a failure or have let yourself or your family down 0   Trouble concentrating on things, such as reading the newspaper or watching television 0   Moving or speaking so slowly that other people could have noticed. Or the opposite - being so fidgety or restless that you have been moving around a lot more than usual 0   Thoughts that you would be better off dead, or of hurting yourself in some way 0   Total Score 0   If you checked off any problems, how difficult have these problems made it for you to do your work, take care of things at home, or get along with other people? -       Past Medical History:   Diagnosis Date   • 3-vessel CAD    • Anticoagulated on warfarin    • Atrial fibrillation (CMS/HCC)    • BMI 28.0-28.9,adult    • Bunion, right foot    • Complaints of memory disturbance    • Dementia (CMS/HCC)    • Edema    • Encounter for immunization    • Generalized convulsive seizure (CMS/HCC)    • Gout    • History of double vision    • HL (hearing loss)    • Hyperlipidemia    • Hypertension    • Left foot pain    • 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    • Movement disorder    • Nocturnal leg cramps    • OA (osteoarthritis)    • Prepatellar effusion    • Pulmonary embolism (CMS/HCC)    • Puncture wound of hand, right    • Screening for cardiovascular condition    • Sleep apnea    • Stasis dermatitis    • Statin intolerance    • Stroke (CMS/HCC)    • Taking drug for chronic disease    • Thoracic back pain    • Transient ischemic attack        Past Surgical History:   Procedure Laterality Date   • ADENOIDECTOMY     • APPENDECTOMY     • CATARACT  EXTRACTION, BILATERAL     • CORONARY ARTERY BYPASS GRAFT     • CORONARY ARTERY BYPASS GRAFT     • CYSTOSCOPY TRANSURETHRAL RESECTION OF PROSTATE     • HERNIA REPAIR     • OTHER SURGICAL HISTORY      carotid thromboendarterectomy   • SHOULDER SURGERY     • SKIN CANCER EXCISION         Family History   Problem Relation Age of Onset   • Hyperlipidemia Mother    • Dementia Mother        Social History     Socioeconomic History   • Marital status:      Spouse name: Elizabeth   • Number of children: Not on file   • Years of education: Not on file   • Highest education level: Not on file   Tobacco Use   • Smoking status: Former Smoker     Types: Cigarettes     Quit date: 10/30/1979     Years since quittin.8   • Smokeless tobacco: Never Used   Substance and Sexual Activity   • Alcohol use: Yes   • Drug use: No   • Sexual activity: Defer       Current Outpatient Medications   Medication Sig Dispense Refill   • clobetasol (TEMOVATE) 0.05 % cream      • diclofenac (VOLTAREN) 0.1 % ophthalmic solution As Needed.     • donepezil (ARICEPT) 10 MG tablet Take 1 tablet by mouth Every Night. 90 tablet 3   • ezetimibe (ZETIA) 10 MG tablet Take 10 mg by mouth Daily.     • Fluzone High-Dose Quadrivalent 0.7 ML suspension prefilled syringe      • Hydrocort-Pramoxine, Perianal, (PROCTOFOAM-HS) 1-1 % rectal foam Insert 1 applicator into the rectum.     • hydrOXYzine (ATARAX) 25 MG tablet Take 1 tablet by mouth Every 6 (Six) Hours As Needed for Itching. 12 tablet 0   • lisinopril (PRINIVIL,ZESTRIL) 20 MG tablet TAKE ONE TABLET BY MOUTH TWICE A  tablet 1   • Magnesium Hydroxide (MAGNESIA PO) Take  by mouth.     • mupirocin (BACTROBAN) 2 % cream Apply  topically to the appropriate area as directed 3 (Three) Times a Day. 15 g 0   • PHENobarbital (LUMINAL) 64.8 MG tablet TAKE ONE TABLET BY MOUTH DAILY 90 tablet 3   • Potassium (POTASSIMIN PO) Take  by mouth.     • prednisoLONE acetate (PRED FORTE) 1 % ophthalmic suspension As  "Needed.     • rivaroxaban (Xarelto) 15 MG tablet Take 1 tablet by mouth Daily. 30 tablet 5   • torsemide (DEMADEX) 10 MG tablet Take 1 tablet by mouth Daily. 180 tablet 0   • triamcinolone (KENALOG) 0.1 % cream        No current facility-administered medications for this visit.       Review of Systems   Constitutional: Negative for activity change, appetite change, fatigue, fever, unexpected weight gain and unexpected weight loss.   HENT: Negative for nosebleeds, rhinorrhea, trouble swallowing and voice change.    Eyes: Negative for visual disturbance.   Respiratory: Negative for cough, chest tightness, shortness of breath and wheezing.    Cardiovascular: Positive for leg swelling. Negative for chest pain and palpitations.   Gastrointestinal: Negative for abdominal pain, blood in stool, constipation, diarrhea, nausea, vomiting, GERD and indigestion.   Genitourinary: Negative for dysuria, frequency and hematuria.   Musculoskeletal: Negative for arthralgias, back pain and myalgias.   Skin: Negative for rash and bruise.   Neurological: Positive for memory problem. Negative for dizziness, tremors, weakness, light-headedness, numbness and headache.   Hematological: Negative for adenopathy. Does not bruise/bleed easily.   Psychiatric/Behavioral: Negative for sleep disturbance and depressed mood. The patient is not nervous/anxious.        Objective   /80 (BP Location: Right arm, Patient Position: Sitting, Cuff Size: Adult)   Pulse 87   Temp 98.3 °F (36.8 °C) (Temporal)   Ht 165.1 cm (65\")   Wt 72.1 kg (159 lb)   SpO2 97%   BMI 26.46 kg/m²     Physical Exam  Vitals and nursing note reviewed.   Constitutional:       General: He is not in acute distress.     Appearance: He is well-developed. He is not diaphoretic.   HENT:      Head: Normocephalic and atraumatic.      Right Ear: External ear normal.      Left Ear: External ear normal.      Ears:      Comments: Hearing aids but still hearing difficulty.     Nose: " Nose normal.   Eyes:      Conjunctiva/sclera: Conjunctivae normal.      Pupils: Pupils are equal, round, and reactive to light.   Neck:      Thyroid: No thyromegaly.      Trachea: No tracheal deviation.   Cardiovascular:      Rate and Rhythm: Normal rate and regular rhythm.      Heart sounds: Normal heart sounds. No murmur heard.   No friction rub. No gallop.       Comments: 2+bilateral lower extremity edema bilaterally.  Pulmonary:      Effort: Pulmonary effort is normal. No respiratory distress.      Breath sounds: Normal breath sounds.   Abdominal:      General: Bowel sounds are normal.      Palpations: Abdomen is soft. There is no mass.      Tenderness: There is no abdominal tenderness. There is no guarding.   Musculoskeletal:         General: Swelling present. Normal range of motion.      Cervical back: Normal range of motion and neck supple.   Lymphadenopathy:      Cervical: No cervical adenopathy.   Skin:     General: Skin is warm and dry.      Capillary Refill: Capillary refill takes less than 2 seconds.      Findings: No rash.   Neurological:      Mental Status: He is alert and oriented to person, place, and time.      Motor: No abnormal muscle tone.      Deep Tendon Reflexes: Reflexes normal.   Psychiatric:         Behavior: Behavior normal.         Thought Content: Thought content normal.         Judgment: Judgment normal.         No results found for this or any previous visit (from the past 2016 hour(s)).  Assessment/Plan   Diagnoses and all orders for this visit:    1. Essential hypertension (Primary)  -     Magnesium  -     Lipid Panel    2. Hyperlipidemia, unspecified hyperlipidemia type  -     Magnesium  -     Lipid Panel    3. Localized edema  -     Comprehensive Metabolic Panel  -     Magnesium    4. Muscle cramps  -     Comprehensive Metabolic Panel  -     Magnesium  -     CK    Discussed with patient and his daughter in the room.  Hypertension is controlled at this time but is noted to have some  swelling.  This may be secondary to him not utilizing his torsemide diuretic pill as prescribed and possibly may be related to renal or even electrolyte disturbance.  We will check labs as noted above.    Instructed to take the torsemide every morning and will check the labs as ordered.  Encouraged to elevate the legs at home.       · COVID-19 Precautions - Patient was compliant in wearing a mask. When I saw the patient, I used appropriate personal protective equipment (PPE) including mask and eye shield (standard procedure).  Additionally, I used gown and gloves if indicated.  Hand hygiene was completed before and after seeing the patient.  · Dictated utilizing Dragon Dictation

## 2021-09-01 LAB
ALBUMIN SERPL-MCNC: 4 G/DL (ref 3.5–5.2)
ALBUMIN/GLOB SERPL: 2 G/DL
ALP SERPL-CCNC: 54 U/L (ref 39–117)
ALT SERPL-CCNC: 28 U/L (ref 1–41)
AST SERPL-CCNC: 32 U/L (ref 1–40)
BILIRUB SERPL-MCNC: 0.3 MG/DL (ref 0–1.2)
BUN SERPL-MCNC: 17 MG/DL (ref 8–23)
BUN/CREAT SERPL: 15 (ref 7–25)
CALCIUM SERPL-MCNC: 9.4 MG/DL (ref 8.6–10.5)
CHLORIDE SERPL-SCNC: 100 MMOL/L (ref 98–107)
CHOLEST SERPL-MCNC: 216 MG/DL (ref 0–200)
CK SERPL-CCNC: 161 U/L (ref 20–200)
CO2 SERPL-SCNC: 28 MMOL/L (ref 22–29)
CREAT SERPL-MCNC: 1.13 MG/DL (ref 0.76–1.27)
GLOBULIN SER CALC-MCNC: 2 GM/DL
GLUCOSE SERPL-MCNC: 97 MG/DL (ref 65–99)
HDLC SERPL-MCNC: 59 MG/DL (ref 40–60)
LDLC SERPL CALC-MCNC: 142 MG/DL (ref 0–100)
MAGNESIUM SERPL-MCNC: 2 MG/DL (ref 1.6–2.4)
POTASSIUM SERPL-SCNC: 4.5 MMOL/L (ref 3.5–5.2)
PROT SERPL-MCNC: 6 G/DL (ref 6–8.5)
SODIUM SERPL-SCNC: 137 MMOL/L (ref 136–145)
TRIGL SERPL-MCNC: 87 MG/DL (ref 0–150)
VLDLC SERPL CALC-MCNC: 15 MG/DL (ref 5–40)

## 2021-10-06 DIAGNOSIS — R56.9 GENERALIZED CONVULSIVE SEIZURE (HCC): ICD-10-CM

## 2021-10-06 RX ORDER — PHENOBARBITAL 64.8 MG/1
TABLET ORAL
Qty: 90 TABLET | Refills: 0 | Status: SHIPPED | OUTPATIENT
Start: 2021-10-06 | End: 2022-02-04

## 2021-10-06 NOTE — TELEPHONE ENCOUNTER
Rx Refill Note  Requested Prescriptions     Pending Prescriptions Disp Refills   • PHENobarbital (LUMINAL) 64.8 MG tablet [Pharmacy Med Name: PHENOBARBITAL 64.8 MG TABLET] 90 tablet      Sig: TAKE ONE TABLET BY MOUTH DAILY      Last office visit with prescribing clinician: 8/31/2021      Next office visit with prescribing clinician: 1/27/2022            Brittanie Ferguson MA  10/06/21, 16:18 EDT

## 2021-10-26 ENCOUNTER — CLINICAL SUPPORT (OUTPATIENT)
Dept: FAMILY MEDICINE CLINIC | Facility: CLINIC | Age: 86
End: 2021-10-26

## 2021-10-26 DIAGNOSIS — Z23 NEED FOR INFLUENZA VACCINATION: Primary | ICD-10-CM

## 2021-10-26 PROCEDURE — 90662 IIV NO PRSV INCREASED AG IM: CPT | Performed by: NURSE PRACTITIONER

## 2021-10-26 PROCEDURE — G0008 ADMIN INFLUENZA VIRUS VAC: HCPCS | Performed by: NURSE PRACTITIONER

## 2021-11-30 ENCOUNTER — HOSPITAL ENCOUNTER (OUTPATIENT)
Dept: CARDIOLOGY | Facility: HOSPITAL | Age: 86
Discharge: HOME | End: 2021-11-30
Admitting: NURSE PRACTITIONER

## 2021-11-30 ENCOUNTER — OFFICE VISIT (OUTPATIENT)
Dept: FAMILY MEDICINE CLINIC | Facility: CLINIC | Age: 86
End: 2021-11-30

## 2021-11-30 VITALS
HEART RATE: 62 BPM | DIASTOLIC BLOOD PRESSURE: 70 MMHG | WEIGHT: 156.6 LBS | SYSTOLIC BLOOD PRESSURE: 126 MMHG | BODY MASS INDEX: 26.09 KG/M2 | TEMPERATURE: 98.6 F | HEIGHT: 65 IN | OXYGEN SATURATION: 94 %

## 2021-11-30 DIAGNOSIS — I10 PRIMARY HYPERTENSION: ICD-10-CM

## 2021-11-30 DIAGNOSIS — R60.0 LOCALIZED EDEMA: Primary | ICD-10-CM

## 2021-11-30 DIAGNOSIS — M79.89 SWELLING OF LEFT LOWER EXTREMITY: ICD-10-CM

## 2021-11-30 LAB
BH CV LOWER VASCULAR LEFT COMMON FEMORAL AUGMENT: NORMAL
BH CV LOWER VASCULAR LEFT COMMON FEMORAL COMPETENT: NORMAL
BH CV LOWER VASCULAR LEFT COMMON FEMORAL COMPRESS: NORMAL
BH CV LOWER VASCULAR LEFT COMMON FEMORAL PHASIC: NORMAL
BH CV LOWER VASCULAR LEFT COMMON FEMORAL SPONT: NORMAL
BH CV LOWER VASCULAR LEFT DISTAL FEMORAL COMPRESS: NORMAL
BH CV LOWER VASCULAR LEFT GASTRONEMIUS COMPRESS: NORMAL
BH CV LOWER VASCULAR LEFT GREATER SAPH AK COMPRESS: NORMAL
BH CV LOWER VASCULAR LEFT GREATER SAPH BK COMPRESS: NORMAL
BH CV LOWER VASCULAR LEFT LESSER SAPH COMPRESS: NORMAL
BH CV LOWER VASCULAR LEFT MID FEMORAL AUGMENT: NORMAL
BH CV LOWER VASCULAR LEFT MID FEMORAL COMPETENT: NORMAL
BH CV LOWER VASCULAR LEFT MID FEMORAL COMPRESS: NORMAL
BH CV LOWER VASCULAR LEFT MID FEMORAL PHASIC: NORMAL
BH CV LOWER VASCULAR LEFT MID FEMORAL SPONT: NORMAL
BH CV LOWER VASCULAR LEFT PERONEAL COMPRESS: NORMAL
BH CV LOWER VASCULAR LEFT POPLITEAL AUGMENT: NORMAL
BH CV LOWER VASCULAR LEFT POPLITEAL COMPETENT: NORMAL
BH CV LOWER VASCULAR LEFT POPLITEAL COMPRESS: NORMAL
BH CV LOWER VASCULAR LEFT POPLITEAL PHASIC: NORMAL
BH CV LOWER VASCULAR LEFT POPLITEAL SPONT: NORMAL
BH CV LOWER VASCULAR LEFT POSTERIOR TIBIAL COMPRESS: NORMAL
BH CV LOWER VASCULAR LEFT PROFUNDA FEMORAL COMPRESS: NORMAL
BH CV LOWER VASCULAR LEFT PROXIMAL FEMORAL COMPRESS: NORMAL
BH CV LOWER VASCULAR LEFT SAPHENOFEMORAL JUNCTION COMPRESS: NORMAL
BH CV LOWER VASCULAR RIGHT COMMON FEMORAL AUGMENT: NORMAL
BH CV LOWER VASCULAR RIGHT COMMON FEMORAL COMPETENT: NORMAL
BH CV LOWER VASCULAR RIGHT COMMON FEMORAL COMPRESS: NORMAL
BH CV LOWER VASCULAR RIGHT COMMON FEMORAL PHASIC: NORMAL
BH CV LOWER VASCULAR RIGHT COMMON FEMORAL SPONT: NORMAL
MAXIMAL PREDICTED HEART RATE: 134 BPM
STRESS TARGET HR: 114 BPM

## 2021-11-30 PROCEDURE — 93971 EXTREMITY STUDY: CPT

## 2021-11-30 PROCEDURE — 99214 OFFICE O/P EST MOD 30 MIN: CPT | Performed by: NURSE PRACTITIONER

## 2021-11-30 RX ORDER — CALCIUM CARBONATE 300MG(750)
2 TABLET,CHEWABLE ORAL NIGHTLY
COMMUNITY
End: 2022-01-01 | Stop reason: SDUPTHER

## 2021-12-01 PROBLEM — M79.89 SWELLING OF LEFT LOWER EXTREMITY: Status: ACTIVE | Noted: 2021-12-01

## 2021-12-01 LAB
BASOPHILS # BLD AUTO: 0.1 X10E3/UL (ref 0–0.2)
BASOPHILS NFR BLD AUTO: 1 %
BUN SERPL-MCNC: 24 MG/DL (ref 8–27)
BUN/CREAT SERPL: 18 (ref 10–24)
CALCIUM SERPL-MCNC: 9.8 MG/DL (ref 8.6–10.2)
CHLORIDE SERPL-SCNC: 98 MMOL/L (ref 96–106)
CO2 SERPL-SCNC: 29 MMOL/L (ref 20–29)
CREAT SERPL-MCNC: 1.3 MG/DL (ref 0.76–1.27)
EOSINOPHIL # BLD AUTO: 0.2 X10E3/UL (ref 0–0.4)
EOSINOPHIL NFR BLD AUTO: 2 %
ERYTHROCYTE [DISTWIDTH] IN BLOOD BY AUTOMATED COUNT: 13.1 % (ref 11.6–15.4)
GLUCOSE SERPL-MCNC: 89 MG/DL (ref 65–99)
HCT VFR BLD AUTO: 43.4 % (ref 37.5–51)
HGB BLD-MCNC: 14.7 G/DL (ref 13–17.7)
IMM GRANULOCYTES # BLD AUTO: 0 X10E3/UL (ref 0–0.1)
IMM GRANULOCYTES NFR BLD AUTO: 0 %
LYMPHOCYTES # BLD AUTO: 1.6 X10E3/UL (ref 0.7–3.1)
LYMPHOCYTES NFR BLD AUTO: 23 %
MCH RBC QN AUTO: 29.4 PG (ref 26.6–33)
MCHC RBC AUTO-ENTMCNC: 33.9 G/DL (ref 31.5–35.7)
MCV RBC AUTO: 87 FL (ref 79–97)
MONOCYTES # BLD AUTO: 0.7 X10E3/UL (ref 0.1–0.9)
MONOCYTES NFR BLD AUTO: 9 %
NEUTROPHILS # BLD AUTO: 4.6 X10E3/UL (ref 1.4–7)
NEUTROPHILS NFR BLD AUTO: 65 %
PLATELET # BLD AUTO: 243 X10E3/UL (ref 150–450)
POTASSIUM SERPL-SCNC: 4.6 MMOL/L (ref 3.5–5.2)
RBC # BLD AUTO: 5 X10E6/UL (ref 4.14–5.8)
SODIUM SERPL-SCNC: 140 MMOL/L (ref 134–144)
WBC # BLD AUTO: 7.2 X10E3/UL (ref 3.4–10.8)

## 2021-12-01 NOTE — ASSESSMENT & PLAN NOTE
Continue Torsemide daily.  Decrease intake of salt and soft drinks in diet.  Continue to elevate legs when sitting.  Do not order medications from internet/magazines.

## 2021-12-01 NOTE — ASSESSMENT & PLAN NOTE
Hypertension is stable.  Continue current medications.  Ambulatory blood pressure monitoring.  Blood pressure will be reassessed 3 weeks.  Continue Lisinopril daily.

## 2021-12-13 ENCOUNTER — OFFICE VISIT (OUTPATIENT)
Dept: FAMILY MEDICINE CLINIC | Facility: CLINIC | Age: 86
End: 2021-12-13

## 2021-12-13 VITALS
HEART RATE: 74 BPM | OXYGEN SATURATION: 94 % | SYSTOLIC BLOOD PRESSURE: 122 MMHG | WEIGHT: 156 LBS | DIASTOLIC BLOOD PRESSURE: 68 MMHG | TEMPERATURE: 97.6 F | BODY MASS INDEX: 25.99 KG/M2 | HEIGHT: 65 IN

## 2021-12-13 DIAGNOSIS — M79.89 SWELLING OF LEFT LOWER EXTREMITY: ICD-10-CM

## 2021-12-13 DIAGNOSIS — R60.0 LOCALIZED EDEMA: ICD-10-CM

## 2021-12-13 DIAGNOSIS — I10 PRIMARY HYPERTENSION: Primary | ICD-10-CM

## 2021-12-13 PROCEDURE — 99214 OFFICE O/P EST MOD 30 MIN: CPT | Performed by: INTERNAL MEDICINE

## 2021-12-13 NOTE — PROGRESS NOTES
Subjective   Alireza Carreon is a 86 y.o. male.     Chief Complaint   Patient presents with   • Edema       History of Present Illness   Wife was present during the history-taking and subsequent discussion (and for part of the physical exam) with this patient.  Patient agrees to the presence of the individual during this visit.      Patient presents with persistent leg swelling.  Was seen by Chetna Bass nurse practitioner November 30.  Encouraged to decrease the intake of salt and soft drinks elevation of legs and continue current medications.  He is here today for follow-up.  He did have a venous Doppler ultrasound lower extremities which was negative.  He continues with the swelling unchanged in the left leg but some better in the right.  Currently, taking torsemide 10 mg daily.    The following portions of the patient's history were reviewed and updated as appropriate: allergies, current medications, past family history, past medical history, past social history, past surgical history and problem list.    Depression Screen:  PHQ-2/PHQ-9 Depression Screening 1/27/2021   Little interest or pleasure in doing things 0   Feeling down, depressed, or hopeless 0   Trouble falling or staying asleep, or sleeping too much 0   Feeling tired or having little energy 0   Poor appetite or overeating 0   Feeling bad about yourself - or that you are a failure or have let yourself or your family down 0   Trouble concentrating on things, such as reading the newspaper or watching television 0   Moving or speaking so slowly that other people could have noticed. Or the opposite - being so fidgety or restless that you have been moving around a lot more than usual 0   Thoughts that you would be better off dead, or of hurting yourself in some way 0   Total Score 0   If you checked off any problems, how difficult have these problems made it for you to do your work, take care of things at home, or get along with other people? -        Past Medical History:   Diagnosis Date   • 3-vessel CAD    • Anticoagulated on warfarin    • Atrial fibrillation (HCC)    • BMI 28.0-28.9,adult    • Bunion, right foot    • Complaints of memory disturbance    • Dementia (HCC)    • Edema    • Encounter for immunization    • Generalized convulsive seizure (HCC)    • Gout    • History of double vision    • HL (hearing loss)    • Hyperlipidemia    • Hypertension    • Left foot pain    • 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    • Movement disorder    • Nocturnal leg cramps    • OA (osteoarthritis)    • Prepatellar effusion    • Pulmonary embolism (HCC)    • Puncture wound of hand, right    • Screening for cardiovascular condition    • Sleep apnea    • Stasis dermatitis    • Statin intolerance    • Stroke (HCC)    • Taking drug for chronic disease    • Thoracic back pain    • Transient ischemic attack        Past Surgical History:   Procedure Laterality Date   • ADENOIDECTOMY     • APPENDECTOMY     • CATARACT EXTRACTION, BILATERAL     • CORONARY ARTERY BYPASS GRAFT     • CORONARY ARTERY BYPASS GRAFT     • CYSTOSCOPY TRANSURETHRAL RESECTION OF PROSTATE     • HERNIA REPAIR     • OTHER SURGICAL HISTORY      carotid thromboendarterectomy   • SHOULDER SURGERY     • SKIN CANCER EXCISION         Family History   Problem Relation Age of Onset   • Hyperlipidemia Mother    • Dementia Mother        Social History     Socioeconomic History   • Marital status:      Spouse name: Elizabeth   Tobacco Use   • Smoking status: Former Smoker     Types: Cigarettes     Quit date: 10/30/1979     Years since quittin.1   • Smokeless tobacco: Never Used   Substance and Sexual Activity   • Alcohol use: Yes   • Drug use: No   • Sexual activity: Defer       Current Outpatient Medications   Medication Sig Dispense Refill   • donepezil (ARICEPT) 10 MG tablet Take 1 tablet by mouth Every Night. 90 tablet 3   • ezetimibe (ZETIA) 10 MG tablet Take 10 mg by mouth Daily.     •  "lisinopril (PRINIVIL,ZESTRIL) 20 MG tablet TAKE ONE TABLET BY MOUTH TWICE A  tablet 1   • Magnesium 400 MG tablet Take 2 tablets by mouth Every Night.     • PHENobarbital (LUMINAL) 64.8 MG tablet TAKE ONE TABLET BY MOUTH DAILY 90 tablet 0   • Potassium (POTASSIMIN PO) Take 1 tablet by mouth Every Night.     • rivaroxaban (Xarelto) 15 MG tablet Take 1 tablet by mouth Daily. 30 tablet 5   • torsemide (DEMADEX) 10 MG tablet Take 1 tablet by mouth Daily. 180 tablet 0     No current facility-administered medications for this visit.       Review of Systems   Constitutional: Negative for activity change, appetite change, fatigue, fever, unexpected weight gain and unexpected weight loss.   HENT: Negative for nosebleeds, rhinorrhea, trouble swallowing and voice change.    Eyes: Negative for visual disturbance.   Respiratory: Negative for cough, chest tightness, shortness of breath and wheezing.    Cardiovascular: Positive for leg swelling. Negative for chest pain and palpitations.   Gastrointestinal: Negative for abdominal pain, blood in stool, constipation, diarrhea, nausea, vomiting, GERD and indigestion.   Genitourinary: Negative for dysuria, frequency and hematuria.   Musculoskeletal: Negative for arthralgias, back pain and myalgias.   Skin: Negative for rash and bruise.   Neurological: Negative for dizziness, tremors, weakness, light-headedness, numbness, headache and memory problem.   Hematological: Negative for adenopathy. Does not bruise/bleed easily.   Psychiatric/Behavioral: Negative for sleep disturbance and depressed mood. The patient is not nervous/anxious.        Objective   /68 (BP Location: Left arm, Patient Position: Sitting, Cuff Size: Adult)   Pulse 74   Temp 97.6 °F (36.4 °C) (Temporal)   Ht 165.1 cm (65\")   Wt 70.8 kg (156 lb)   SpO2 94%   BMI 25.96 kg/m²     Physical Exam  Vitals and nursing note reviewed.   Constitutional:       General: He is not in acute distress.     Appearance: " He is well-developed. He is not diaphoretic.   HENT:      Head: Normocephalic and atraumatic.      Right Ear: External ear normal.      Left Ear: External ear normal.      Nose: Nose normal.   Eyes:      Conjunctiva/sclera: Conjunctivae normal.      Pupils: Pupils are equal, round, and reactive to light.   Neck:      Thyroid: No thyromegaly.      Trachea: No tracheal deviation.   Cardiovascular:      Rate and Rhythm: Normal rate and regular rhythm.      Heart sounds: Normal heart sounds. No murmur heard.  No friction rub. No gallop.    Pulmonary:      Effort: Pulmonary effort is normal. No respiratory distress.      Breath sounds: Normal breath sounds.   Abdominal:      General: Bowel sounds are normal.      Palpations: Abdomen is soft. There is no mass.      Tenderness: There is no abdominal tenderness. There is no guarding.   Musculoskeletal:         General: Normal range of motion.      Cervical back: Normal range of motion and neck supple.      Comments: Left greater than right leg swelling with negative Zachary's sign bilaterally.   Lymphadenopathy:      Cervical: No cervical adenopathy.   Skin:     General: Skin is warm and dry.      Capillary Refill: Capillary refill takes less than 2 seconds.      Findings: No rash.   Neurological:      Mental Status: He is alert and oriented to person, place, and time.      Motor: No abnormal muscle tone.      Deep Tendon Reflexes: Reflexes normal.   Psychiatric:         Behavior: Behavior normal.         Thought Content: Thought content normal.         Judgment: Judgment normal.         Recent Results (from the past 2016 hour(s))   Basic Metabolic Panel    Collection Time: 11/30/21 10:53 AM    Specimen: Blood   Result Value Ref Range    Glucose 89 65 - 99 mg/dL    BUN 24 8 - 27 mg/dL    Creatinine 1.30 (H) 0.76 - 1.27 mg/dL    eGFR Non African Am 49 (L) >59 mL/min/1.73    eGFR  Am 57 (L) >59 mL/min/1.73    BUN/Creatinine Ratio 18 10 - 24    Sodium 140 134 - 144 mmol/L     Potassium 4.6 3.5 - 5.2 mmol/L    Chloride 98 96 - 106 mmol/L    Total CO2 29 20 - 29 mmol/L    Calcium 9.8 8.6 - 10.2 mg/dL   CBC & Differential    Collection Time: 11/30/21 10:53 AM    Specimen: Blood   Result Value Ref Range    WBC 7.2 3.4 - 10.8 x10E3/uL    RBC 5.00 4.14 - 5.80 x10E6/uL    Hemoglobin 14.7 13.0 - 17.7 g/dL    Hematocrit 43.4 37.5 - 51.0 %    MCV 87 79 - 97 fL    MCH 29.4 26.6 - 33.0 pg    MCHC 33.9 31.5 - 35.7 g/dL    RDW 13.1 11.6 - 15.4 %    Platelets 243 150 - 450 x10E3/uL    Neutrophil Rel % 65 Not Estab. %    Lymphocyte Rel % 23 Not Estab. %    Monocyte Rel % 9 Not Estab. %    Eosinophil Rel % 2 Not Estab. %    Basophil Rel % 1 Not Estab. %    Neutrophils Absolute 4.6 1.4 - 7.0 x10E3/uL    Lymphocytes Absolute 1.6 0.7 - 3.1 x10E3/uL    Monocytes Absolute 0.7 0.1 - 0.9 x10E3/uL    Eosinophils Absolute 0.2 0.0 - 0.4 x10E3/uL    Basophils Absolute 0.1 0.0 - 0.2 x10E3/uL    Immature Granulocyte Rel % 0 Not Estab. %    Immature Grans Absolute 0.0 0.0 - 0.1 x10E3/uL   Duplex Venous Lower Extremity - Left CAR    Collection Time: 11/30/21  4:12 PM   Result Value Ref Range    Target HR (85%) 114 bpm    Max. Pred. HR (100%) 134 bpm    Right Common Femoral Spont Y     Right Common Femoral Phasic Y     Right Common Femoral Augment Y     Right Common Femoral Competent Y     Right Common Femoral Compress C     Left Common Femoral Spont Y     Left Common Femoral Phasic Y     Left Common Femoral Augment Y     Left Common Femoral Competent Y     Left Common Femoral Compress C     Left Saphenofemoral Junction Compress C     Left Profunda Femoral Compress C     Left Proximal Femoral Compress C     Left Mid Femoral Spont Y     Left Mid Femoral Phasic Y     Left Mid Femoral Augment Y     Left Mid Femoral Competent Y     Left Mid Femoral Compress C     Left Distal Femoral Compress C     Left Popliteal Spont Y     Left Popliteal Phasic Y     Left Popliteal Augment Y     Left Popliteal Competent Y     Left  Popliteal Compress C     Left Posterior Tibial Compress C     Left Peroneal Compress C     Left Gastronemius Compress C     Left Greater Saph AK Compress C     Left Greater Saph BK Compress C     Left Lesser Saph Compress C      Assessment/Plan   Diagnoses and all orders for this visit:    1. Primary hypertension (Primary)  -     Basic Metabolic Panel    2. Swelling of left lower extremity  -     Basic Metabolic Panel    3. Localized edema  -     Basic Metabolic Panel      Persistent edema in the left greater than right lower extremity.  Not have negative venous Doppler.  Recommend elevation of the legs and use of compression stockings.  Concern of renal function.  No change in the diuretic at this time and will recheck the level.  Follow up with cardiology.  Has appointment with Dr Ruiz cardiology pending.       · COVID-19 Precautions - Patient was compliant in wearing a mask. When I saw the patient, I used appropriate personal protective equipment (PPE) including mask and eye shield (standard procedure).  Additionally, I used gown and gloves if indicated.  Hand hygiene was completed before and after seeing the patient.  · Dictated utilizing Dragon Dictation

## 2021-12-14 LAB
BUN SERPL-MCNC: 23 MG/DL (ref 8–27)
BUN/CREAT SERPL: 19 (ref 10–24)
CALCIUM SERPL-MCNC: 9.5 MG/DL (ref 8.6–10.2)
CHLORIDE SERPL-SCNC: 101 MMOL/L (ref 96–106)
CO2 SERPL-SCNC: 24 MMOL/L (ref 20–29)
CREAT SERPL-MCNC: 1.19 MG/DL (ref 0.76–1.27)
GLUCOSE SERPL-MCNC: 89 MG/DL (ref 65–99)
POTASSIUM SERPL-SCNC: 4.6 MMOL/L (ref 3.5–5.2)
SODIUM SERPL-SCNC: 140 MMOL/L (ref 134–144)

## 2021-12-29 ENCOUNTER — TELEPHONE (OUTPATIENT)
Dept: FAMILY MEDICINE CLINIC | Facility: CLINIC | Age: 86
End: 2021-12-29

## 2021-12-29 NOTE — TELEPHONE ENCOUNTER
Caller: KENNEDI GONG    Relationship:[facility]    Best call back number:    What is the best time to reach you:     Who are you requesting to speak with (clinical staff, provider,  specific staff member):     Do you know the name of the person who called:     What was the call regarding: PATIENTS LEFT  LEG IS SWOLLEN AND LOOKS WORSE  THAN IT DID YESTERDAY. KENNEDI WANTS TO KNOW IF DR LINO NEEDS TO SEE HIM OR IF HE NEEDS TO GO TO IMMEDIATE CARE OR ANOTHER FACILITY TO BE SEEN.     Do you require a callback: YES

## 2022-01-01 ENCOUNTER — OFFICE VISIT (OUTPATIENT)
Dept: FAMILY MEDICINE CLINIC | Facility: CLINIC | Age: 87
End: 2022-01-01

## 2022-01-01 ENCOUNTER — HOME CARE VISIT (OUTPATIENT)
Dept: HOME HEALTH SERVICES | Facility: HOME HEALTHCARE | Age: 87
End: 2022-01-01

## 2022-01-01 ENCOUNTER — APPOINTMENT (OUTPATIENT)
Dept: GENERAL RADIOLOGY | Facility: HOSPITAL | Age: 87
End: 2022-01-01

## 2022-01-01 ENCOUNTER — HOME HEALTH ADMISSION (OUTPATIENT)
Dept: HOME HEALTH SERVICES | Facility: HOME HEALTHCARE | Age: 87
End: 2022-01-01

## 2022-01-01 ENCOUNTER — TELEPHONE (OUTPATIENT)
Dept: FAMILY MEDICINE CLINIC | Facility: CLINIC | Age: 87
End: 2022-01-01

## 2022-01-01 ENCOUNTER — APPOINTMENT (OUTPATIENT)
Dept: CT IMAGING | Facility: HOSPITAL | Age: 87
End: 2022-01-01

## 2022-01-01 ENCOUNTER — OUTSIDE FACILITY SERVICE (OUTPATIENT)
Dept: FAMILY MEDICINE CLINIC | Facility: CLINIC | Age: 87
End: 2022-01-01
Payer: MEDICARE

## 2022-01-01 ENCOUNTER — HOSPITAL ENCOUNTER (INPATIENT)
Facility: HOSPITAL | Age: 87
LOS: 11 days | End: 2022-06-03
Attending: EMERGENCY MEDICINE | Admitting: INTERNAL MEDICINE

## 2022-01-01 ENCOUNTER — READMISSION MANAGEMENT (OUTPATIENT)
Dept: CALL CENTER | Facility: HOSPITAL | Age: 87
End: 2022-01-01

## 2022-01-01 ENCOUNTER — APPOINTMENT (OUTPATIENT)
Dept: NEUROLOGY | Facility: HOSPITAL | Age: 87
End: 2022-01-01

## 2022-01-01 ENCOUNTER — HOSPITAL ENCOUNTER (INPATIENT)
Facility: HOSPITAL | Age: 87
LOS: 14 days | Discharge: HOME HEALTH | End: 2022-06-17
Attending: PHYSICAL MEDICINE & REHABILITATION | Admitting: PHYSICAL MEDICINE & REHABILITATION

## 2022-01-01 ENCOUNTER — TRANSITIONAL CARE MANAGEMENT TELEPHONE ENCOUNTER (OUTPATIENT)
Dept: CALL CENTER | Facility: HOSPITAL | Age: 87
End: 2022-01-01

## 2022-01-01 ENCOUNTER — OUTSIDE FACILITY SERVICE (OUTPATIENT)
Dept: FAMILY MEDICINE CLINIC | Facility: CLINIC | Age: 87
End: 2022-01-01

## 2022-01-01 ENCOUNTER — APPOINTMENT (OUTPATIENT)
Dept: CARDIOLOGY | Facility: HOSPITAL | Age: 87
End: 2022-01-01

## 2022-01-01 VITALS
OXYGEN SATURATION: 97 % | SYSTOLIC BLOOD PRESSURE: 124 MMHG | WEIGHT: 147.31 LBS | BODY MASS INDEX: 24.51 KG/M2 | TEMPERATURE: 98.4 F | HEART RATE: 60 BPM | RESPIRATION RATE: 18 BRPM | DIASTOLIC BLOOD PRESSURE: 80 MMHG

## 2022-01-01 VITALS
OXYGEN SATURATION: 96 % | HEIGHT: 66 IN | DIASTOLIC BLOOD PRESSURE: 62 MMHG | WEIGHT: 137 LBS | TEMPERATURE: 98.2 F | HEART RATE: 57 BPM | BODY MASS INDEX: 22.02 KG/M2 | SYSTOLIC BLOOD PRESSURE: 102 MMHG

## 2022-01-01 VITALS
TEMPERATURE: 98.7 F | WEIGHT: 151.44 LBS | DIASTOLIC BLOOD PRESSURE: 60 MMHG | RESPIRATION RATE: 18 BRPM | HEART RATE: 68 BPM | OXYGEN SATURATION: 96 % | BODY MASS INDEX: 25.2 KG/M2 | SYSTOLIC BLOOD PRESSURE: 118 MMHG

## 2022-01-01 VITALS
DIASTOLIC BLOOD PRESSURE: 86 MMHG | RESPIRATION RATE: 18 BRPM | SYSTOLIC BLOOD PRESSURE: 156 MMHG | OXYGEN SATURATION: 96 % | HEART RATE: 66 BPM | TEMPERATURE: 98.2 F

## 2022-01-01 VITALS
TEMPERATURE: 98.6 F | HEART RATE: 66 BPM | SYSTOLIC BLOOD PRESSURE: 124 MMHG | RESPIRATION RATE: 18 BRPM | OXYGEN SATURATION: 95 % | DIASTOLIC BLOOD PRESSURE: 74 MMHG

## 2022-01-01 VITALS
DIASTOLIC BLOOD PRESSURE: 65 MMHG | HEIGHT: 66 IN | OXYGEN SATURATION: 95 % | BODY MASS INDEX: 22.42 KG/M2 | SYSTOLIC BLOOD PRESSURE: 149 MMHG | RESPIRATION RATE: 16 BRPM | TEMPERATURE: 96.9 F | HEART RATE: 54 BPM | WEIGHT: 139.5 LBS

## 2022-01-01 VITALS
RESPIRATION RATE: 18 BRPM | SYSTOLIC BLOOD PRESSURE: 128 MMHG | TEMPERATURE: 98.7 F | OXYGEN SATURATION: 97 % | DIASTOLIC BLOOD PRESSURE: 68 MMHG | HEART RATE: 62 BPM

## 2022-01-01 VITALS
RESPIRATION RATE: 18 BRPM | DIASTOLIC BLOOD PRESSURE: 76 MMHG | OXYGEN SATURATION: 97 % | WEIGHT: 152.06 LBS | HEART RATE: 54 BPM | BODY MASS INDEX: 25.3 KG/M2 | SYSTOLIC BLOOD PRESSURE: 138 MMHG | TEMPERATURE: 98.5 F

## 2022-01-01 VITALS
HEIGHT: 65 IN | WEIGHT: 147 LBS | BODY MASS INDEX: 24.49 KG/M2 | OXYGEN SATURATION: 96 % | DIASTOLIC BLOOD PRESSURE: 90 MMHG | HEART RATE: 62 BPM | SYSTOLIC BLOOD PRESSURE: 150 MMHG | TEMPERATURE: 98 F

## 2022-01-01 VITALS
TEMPERATURE: 98.7 F | DIASTOLIC BLOOD PRESSURE: 82 MMHG | RESPIRATION RATE: 18 BRPM | HEART RATE: 66 BPM | BODY MASS INDEX: 25.13 KG/M2 | WEIGHT: 151 LBS | SYSTOLIC BLOOD PRESSURE: 122 MMHG | OXYGEN SATURATION: 97 %

## 2022-01-01 VITALS
BODY MASS INDEX: 24.53 KG/M2 | WEIGHT: 147.44 LBS | DIASTOLIC BLOOD PRESSURE: 72 MMHG | SYSTOLIC BLOOD PRESSURE: 118 MMHG | TEMPERATURE: 98.2 F | OXYGEN SATURATION: 93 % | RESPIRATION RATE: 18 BRPM | HEART RATE: 74 BPM

## 2022-01-01 VITALS
SYSTOLIC BLOOD PRESSURE: 124 MMHG | HEART RATE: 64 BPM | WEIGHT: 315 LBS | TEMPERATURE: 96.9 F | RESPIRATION RATE: 18 BRPM | DIASTOLIC BLOOD PRESSURE: 70 MMHG | BODY MASS INDEX: 58.92 KG/M2 | OXYGEN SATURATION: 93 %

## 2022-01-01 VITALS
HEART RATE: 82 BPM | TEMPERATURE: 98.9 F | WEIGHT: 143 LBS | OXYGEN SATURATION: 97 % | DIASTOLIC BLOOD PRESSURE: 82 MMHG | HEIGHT: 66 IN | BODY MASS INDEX: 22.98 KG/M2 | SYSTOLIC BLOOD PRESSURE: 126 MMHG

## 2022-01-01 VITALS
RESPIRATION RATE: 18 BRPM | TEMPERATURE: 97.7 F | OXYGEN SATURATION: 90 % | HEIGHT: 66 IN | WEIGHT: 162.92 LBS | SYSTOLIC BLOOD PRESSURE: 121 MMHG | DIASTOLIC BLOOD PRESSURE: 56 MMHG | HEART RATE: 61 BPM | BODY MASS INDEX: 26.18 KG/M2

## 2022-01-01 VITALS
HEART RATE: 85 BPM | RESPIRATION RATE: 18 BRPM | DIASTOLIC BLOOD PRESSURE: 82 MMHG | OXYGEN SATURATION: 92 % | SYSTOLIC BLOOD PRESSURE: 122 MMHG

## 2022-01-01 VITALS
BODY MASS INDEX: 24.16 KG/M2 | HEART RATE: 56 BPM | TEMPERATURE: 98.4 F | WEIGHT: 145.19 LBS | DIASTOLIC BLOOD PRESSURE: 82 MMHG | SYSTOLIC BLOOD PRESSURE: 156 MMHG | OXYGEN SATURATION: 95 % | RESPIRATION RATE: 18 BRPM

## 2022-01-01 VITALS
SYSTOLIC BLOOD PRESSURE: 122 MMHG | BODY MASS INDEX: 24.51 KG/M2 | HEART RATE: 56 BPM | RESPIRATION RATE: 18 BRPM | WEIGHT: 147.31 LBS | TEMPERATURE: 98.6 F | OXYGEN SATURATION: 95 % | DIASTOLIC BLOOD PRESSURE: 72 MMHG

## 2022-01-01 VITALS
WEIGHT: 141 LBS | HEART RATE: 63 BPM | OXYGEN SATURATION: 94 % | DIASTOLIC BLOOD PRESSURE: 78 MMHG | SYSTOLIC BLOOD PRESSURE: 128 MMHG | TEMPERATURE: 98.9 F | BODY MASS INDEX: 22.76 KG/M2

## 2022-01-01 VITALS — TEMPERATURE: 97.5 F | HEART RATE: 93 BPM | RESPIRATION RATE: 18 BRPM | OXYGEN SATURATION: 55 %

## 2022-01-01 VITALS
BODY MASS INDEX: 24.51 KG/M2 | DIASTOLIC BLOOD PRESSURE: 76 MMHG | TEMPERATURE: 98.4 F | OXYGEN SATURATION: 97 % | SYSTOLIC BLOOD PRESSURE: 142 MMHG | RESPIRATION RATE: 18 BRPM | WEIGHT: 147.31 LBS | HEART RATE: 54 BPM

## 2022-01-01 DIAGNOSIS — R60.0 LEG EDEMA, LEFT: ICD-10-CM

## 2022-01-01 DIAGNOSIS — F03.91 DEMENTIA WITH BEHAVIORAL DISTURBANCE, UNSPECIFIED DEMENTIA TYPE: ICD-10-CM

## 2022-01-01 DIAGNOSIS — E87.20 LACTIC ACIDOSIS: ICD-10-CM

## 2022-01-01 DIAGNOSIS — Z79.01 CHRONIC ANTICOAGULATION: ICD-10-CM

## 2022-01-01 DIAGNOSIS — I10 HYPERTENSION, UNSPECIFIED TYPE: ICD-10-CM

## 2022-01-01 DIAGNOSIS — R41.82 ALTERED MENTAL STATUS, UNSPECIFIED ALTERED MENTAL STATUS TYPE: ICD-10-CM

## 2022-01-01 DIAGNOSIS — R60.0 LOCALIZED EDEMA: ICD-10-CM

## 2022-01-01 DIAGNOSIS — Z23 IMMUNIZATION DUE: ICD-10-CM

## 2022-01-01 DIAGNOSIS — R60.0 LEG EDEMA, LEFT: Primary | ICD-10-CM

## 2022-01-01 DIAGNOSIS — E87.5 HYPERKALEMIA: ICD-10-CM

## 2022-01-01 DIAGNOSIS — J96.01 ACUTE HYPOXEMIC RESPIRATORY FAILURE: ICD-10-CM

## 2022-01-01 DIAGNOSIS — Z86.73 HISTORY OF CARDIOEMBOLIC CEREBROVASCULAR ACCIDENT (CVA): ICD-10-CM

## 2022-01-01 DIAGNOSIS — F01.518 VASCULAR DEMENTIA WITH BEHAVIOR DISTURBANCE: ICD-10-CM

## 2022-01-01 DIAGNOSIS — N18.9 ACUTE RENAL FAILURE SUPERIMPOSED ON CHRONIC KIDNEY DISEASE, UNSPECIFIED CKD STAGE, UNSPECIFIED ACUTE RENAL FAILURE TYPE: ICD-10-CM

## 2022-01-01 DIAGNOSIS — G40.909 SEIZURE DISORDER: ICD-10-CM

## 2022-01-01 DIAGNOSIS — N17.9 ACUTE RENAL FAILURE SUPERIMPOSED ON CHRONIC KIDNEY DISEASE, UNSPECIFIED CKD STAGE, UNSPECIFIED ACUTE RENAL FAILURE TYPE: ICD-10-CM

## 2022-01-01 DIAGNOSIS — S81.802A LEG WOUND, LEFT, INITIAL ENCOUNTER: ICD-10-CM

## 2022-01-01 DIAGNOSIS — T14.8XXA ABRASION: ICD-10-CM

## 2022-01-01 DIAGNOSIS — R22.41 SKIN NODULE OF TOE OF RIGHT FOOT: ICD-10-CM

## 2022-01-01 DIAGNOSIS — I48.20 CHRONIC ATRIAL FIBRILLATION: ICD-10-CM

## 2022-01-01 DIAGNOSIS — I10 PRIMARY HYPERTENSION: ICD-10-CM

## 2022-01-01 DIAGNOSIS — G40.909 SEIZURE DISORDER: Primary | ICD-10-CM

## 2022-01-01 DIAGNOSIS — R60.0 LOCALIZED EDEMA: Primary | ICD-10-CM

## 2022-01-01 DIAGNOSIS — R56.9 GENERALIZED CONVULSIVE SEIZURE: ICD-10-CM

## 2022-01-01 DIAGNOSIS — I95.9 HYPOTENSION, UNSPECIFIED HYPOTENSION TYPE: ICD-10-CM

## 2022-01-01 DIAGNOSIS — R07.81 RIB PAIN ON RIGHT SIDE: ICD-10-CM

## 2022-01-01 DIAGNOSIS — T14.8XXA ABRASION: Primary | ICD-10-CM

## 2022-01-01 DIAGNOSIS — M79.89 SWELLING OF LEFT LOWER EXTREMITY: ICD-10-CM

## 2022-01-01 DIAGNOSIS — T14.8XXA BLISTER: ICD-10-CM

## 2022-01-01 DIAGNOSIS — R26.9 GAIT ABNORMALITY: ICD-10-CM

## 2022-01-01 DIAGNOSIS — G40.901 STATUS EPILEPTICUS: Primary | ICD-10-CM

## 2022-01-01 DIAGNOSIS — T14.8XXA BLISTER: Primary | ICD-10-CM

## 2022-01-01 DIAGNOSIS — Z23 NEED FOR COVID-19 VACCINE: ICD-10-CM

## 2022-01-01 LAB
ALBUMIN SERPL-MCNC: 3.2 G/DL (ref 3.5–5.2)
ALBUMIN SERPL-MCNC: 3.8 G/DL (ref 3.5–5.2)
ALBUMIN SERPL-MCNC: 3.9 G/DL (ref 3.5–5.2)
ALBUMIN SERPL-MCNC: 4 G/DL (ref 3.5–5.2)
ALBUMIN SERPL-MCNC: 4.2 G/DL (ref 3.5–5.2)
ALBUMIN SERPL-MCNC: 4.6 G/DL (ref 3.5–5.2)
ALBUMIN/GLOB SERPL: 1 G/DL
ALBUMIN/GLOB SERPL: 1.7 G/DL
ALBUMIN/GLOB SERPL: 1.9 G/DL
ALBUMIN/GLOB SERPL: 2 G/DL
ALBUMIN/GLOB SERPL: 2 G/DL
ALBUMIN/GLOB SERPL: 2.4 G/DL
ALP SERPL-CCNC: 57 U/L (ref 39–117)
ALP SERPL-CCNC: 66 U/L (ref 39–117)
ALP SERPL-CCNC: 69 U/L (ref 39–117)
ALP SERPL-CCNC: 74 U/L (ref 39–117)
ALP SERPL-CCNC: 75 U/L (ref 39–117)
ALP SERPL-CCNC: 76 U/L (ref 39–117)
ALT SERPL W P-5'-P-CCNC: 26 U/L (ref 1–41)
ALT SERPL W P-5'-P-CCNC: 27 U/L (ref 1–41)
ALT SERPL W P-5'-P-CCNC: 29 U/L (ref 1–41)
ALT SERPL W P-5'-P-CCNC: 29 U/L (ref 1–41)
ALT SERPL W P-5'-P-CCNC: 32 U/L (ref 1–41)
ALT SERPL W P-5'-P-CCNC: 47 U/L (ref 1–41)
AMPHET+METHAMPHET UR QL: NEGATIVE
ANION GAP SERPL CALCULATED.3IONS-SCNC: 10.3 MMOL/L (ref 5–15)
ANION GAP SERPL CALCULATED.3IONS-SCNC: 11 MMOL/L (ref 5–15)
ANION GAP SERPL CALCULATED.3IONS-SCNC: 13.8 MMOL/L (ref 5–15)
ANION GAP SERPL CALCULATED.3IONS-SCNC: 5 MMOL/L (ref 5–15)
ANION GAP SERPL CALCULATED.3IONS-SCNC: 7 MMOL/L (ref 5–15)
ANION GAP SERPL CALCULATED.3IONS-SCNC: 7 MMOL/L (ref 5–15)
ANION GAP SERPL CALCULATED.3IONS-SCNC: 7.3 MMOL/L (ref 5–15)
ANION GAP SERPL CALCULATED.3IONS-SCNC: 7.4 MMOL/L (ref 5–15)
ANION GAP SERPL CALCULATED.3IONS-SCNC: 7.8 MMOL/L (ref 5–15)
ANION GAP SERPL CALCULATED.3IONS-SCNC: 8 MMOL/L (ref 5–15)
ANION GAP SERPL CALCULATED.3IONS-SCNC: 8.2 MMOL/L (ref 5–15)
ANION GAP SERPL CALCULATED.3IONS-SCNC: 9 MMOL/L (ref 5–15)
APTT PPP: 25.1 SECONDS (ref 22.7–35.4)
AST SERPL-CCNC: 26 U/L (ref 1–40)
AST SERPL-CCNC: 28 U/L (ref 1–40)
AST SERPL-CCNC: 28 U/L (ref 1–40)
AST SERPL-CCNC: 29 U/L (ref 1–40)
AST SERPL-CCNC: 29 U/L (ref 1–40)
AST SERPL-CCNC: 35 U/L (ref 1–40)
BACTERIA SPEC AEROBE CULT: NORMAL
BACTERIA SPEC AEROBE CULT: NORMAL
BACTERIA UR QL AUTO: ABNORMAL /HPF
BARBITURATES UR QL SCN: POSITIVE
BASOPHILS # BLD AUTO: 0.06 10*3/MM3 (ref 0–0.2)
BASOPHILS # BLD AUTO: 0.07 10*3/MM3 (ref 0–0.2)
BASOPHILS # BLD AUTO: 0.09 10*3/MM3 (ref 0–0.2)
BASOPHILS # BLD AUTO: 0.12 10*3/MM3 (ref 0–0.2)
BASOPHILS NFR BLD AUTO: 0.7 % (ref 0–1.5)
BASOPHILS NFR BLD AUTO: 0.8 % (ref 0–1.5)
BASOPHILS NFR BLD AUTO: 1 % (ref 0–1.5)
BASOPHILS NFR BLD AUTO: 1 % (ref 0–1.5)
BASOPHILS NFR BLD AUTO: 1.1 % (ref 0–1.5)
BASOPHILS NFR BLD AUTO: 1.1 % (ref 0–1.5)
BENZODIAZ UR QL SCN: NEGATIVE
BH CV LOWER VASCULAR LEFT COMMON FEMORAL AUGMENT: NORMAL
BH CV LOWER VASCULAR LEFT COMMON FEMORAL COMPETENT: NORMAL
BH CV LOWER VASCULAR LEFT COMMON FEMORAL COMPRESS: NORMAL
BH CV LOWER VASCULAR LEFT COMMON FEMORAL PHASIC: NORMAL
BH CV LOWER VASCULAR LEFT COMMON FEMORAL SPONT: NORMAL
BH CV LOWER VASCULAR LEFT DISTAL FEMORAL COMPRESS: NORMAL
BH CV LOWER VASCULAR LEFT GASTRONEMIUS COMPRESS: NORMAL
BH CV LOWER VASCULAR LEFT GREATER SAPH AK COMPRESS: NORMAL
BH CV LOWER VASCULAR LEFT GREATER SAPH BK COMPRESS: NORMAL
BH CV LOWER VASCULAR LEFT LESSER SAPH COMPRESS: NORMAL
BH CV LOWER VASCULAR LEFT MID FEMORAL AUGMENT: NORMAL
BH CV LOWER VASCULAR LEFT MID FEMORAL COMPETENT: NORMAL
BH CV LOWER VASCULAR LEFT MID FEMORAL COMPRESS: NORMAL
BH CV LOWER VASCULAR LEFT MID FEMORAL PHASIC: NORMAL
BH CV LOWER VASCULAR LEFT MID FEMORAL SPONT: NORMAL
BH CV LOWER VASCULAR LEFT PERONEAL COMPRESS: NORMAL
BH CV LOWER VASCULAR LEFT POPLITEAL AUGMENT: NORMAL
BH CV LOWER VASCULAR LEFT POPLITEAL COMPETENT: NORMAL
BH CV LOWER VASCULAR LEFT POPLITEAL COMPRESS: NORMAL
BH CV LOWER VASCULAR LEFT POPLITEAL PHASIC: NORMAL
BH CV LOWER VASCULAR LEFT POPLITEAL SPONT: NORMAL
BH CV LOWER VASCULAR LEFT POSTERIOR TIBIAL COMPRESS: NORMAL
BH CV LOWER VASCULAR LEFT PROFUNDA FEMORAL COMPRESS: NORMAL
BH CV LOWER VASCULAR LEFT PROXIMAL FEMORAL COMPRESS: NORMAL
BH CV LOWER VASCULAR LEFT SAPHENOFEMORAL JUNCTION COMPRESS: NORMAL
BH CV LOWER VASCULAR RIGHT COMMON FEMORAL AUGMENT: NORMAL
BH CV LOWER VASCULAR RIGHT COMMON FEMORAL COMPETENT: NORMAL
BH CV LOWER VASCULAR RIGHT COMMON FEMORAL COMPRESS: NORMAL
BH CV LOWER VASCULAR RIGHT COMMON FEMORAL PHASIC: NORMAL
BH CV LOWER VASCULAR RIGHT COMMON FEMORAL SPONT: NORMAL
BH CV LOWER VASCULAR RIGHT DISTAL FEMORAL COMPRESS: NORMAL
BH CV LOWER VASCULAR RIGHT GASTRONEMIUS COMPRESS: NORMAL
BH CV LOWER VASCULAR RIGHT GREATER SAPH AK COMPRESS: NORMAL
BH CV LOWER VASCULAR RIGHT GREATER SAPH BK COMPRESS: NORMAL
BH CV LOWER VASCULAR RIGHT LESSER SAPH COMPRESS: NORMAL
BH CV LOWER VASCULAR RIGHT MID FEMORAL AUGMENT: NORMAL
BH CV LOWER VASCULAR RIGHT MID FEMORAL COMPETENT: NORMAL
BH CV LOWER VASCULAR RIGHT MID FEMORAL COMPRESS: NORMAL
BH CV LOWER VASCULAR RIGHT MID FEMORAL PHASIC: NORMAL
BH CV LOWER VASCULAR RIGHT MID FEMORAL SPONT: NORMAL
BH CV LOWER VASCULAR RIGHT PERONEAL COMPRESS: NORMAL
BH CV LOWER VASCULAR RIGHT POPLITEAL AUGMENT: NORMAL
BH CV LOWER VASCULAR RIGHT POPLITEAL COMPETENT: NORMAL
BH CV LOWER VASCULAR RIGHT POPLITEAL COMPRESS: NORMAL
BH CV LOWER VASCULAR RIGHT POPLITEAL PHASIC: NORMAL
BH CV LOWER VASCULAR RIGHT POPLITEAL SPONT: NORMAL
BH CV LOWER VASCULAR RIGHT POSTERIOR TIBIAL COMPRESS: NORMAL
BH CV LOWER VASCULAR RIGHT PROFUNDA FEMORAL COMPRESS: NORMAL
BH CV LOWER VASCULAR RIGHT PROXIMAL FEMORAL COMPRESS: NORMAL
BH CV LOWER VASCULAR RIGHT SAPHENOFEMORAL JUNCTION COMPRESS: NORMAL
BILIRUB SERPL-MCNC: 0.2 MG/DL (ref 0–1.2)
BILIRUB SERPL-MCNC: 0.2 MG/DL (ref 0–1.2)
BILIRUB SERPL-MCNC: 0.3 MG/DL (ref 0–1.2)
BILIRUB SERPL-MCNC: 0.3 MG/DL (ref 0–1.2)
BILIRUB SERPL-MCNC: 0.4 MG/DL (ref 0–1.2)
BILIRUB SERPL-MCNC: 0.4 MG/DL (ref 0–1.2)
BILIRUB UR QL STRIP: NEGATIVE
BUN SERPL-MCNC: 10 MG/DL (ref 8–23)
BUN SERPL-MCNC: 10 MG/DL (ref 8–23)
BUN SERPL-MCNC: 11 MG/DL (ref 8–23)
BUN SERPL-MCNC: 13 MG/DL (ref 8–23)
BUN SERPL-MCNC: 20 MG/DL (ref 8–23)
BUN SERPL-MCNC: 21 MG/DL (ref 8–23)
BUN SERPL-MCNC: 22 MG/DL (ref 8–23)
BUN SERPL-MCNC: 25 MG/DL (ref 8–23)
BUN SERPL-MCNC: 25 MG/DL (ref 8–23)
BUN SERPL-MCNC: 27 MG/DL (ref 8–23)
BUN SERPL-MCNC: 27 MG/DL (ref 8–23)
BUN SERPL-MCNC: 29 MG/DL (ref 8–23)
BUN SERPL-MCNC: 7 MG/DL (ref 8–23)
BUN SERPL-MCNC: 9 MG/DL (ref 8–23)
BUN/CREAT SERPL: 10.3 (ref 7–25)
BUN/CREAT SERPL: 11.7 (ref 7–25)
BUN/CREAT SERPL: 13.8 (ref 7–25)
BUN/CREAT SERPL: 13.9 (ref 7–25)
BUN/CREAT SERPL: 13.9 (ref 7–25)
BUN/CREAT SERPL: 16.9 (ref 7–25)
BUN/CREAT SERPL: 20.6 (ref 7–25)
BUN/CREAT SERPL: 20.6 (ref 7–25)
BUN/CREAT SERPL: 21 (ref 7–25)
BUN/CREAT SERPL: 21.1 (ref 7–25)
BUN/CREAT SERPL: 22.7 (ref 7–25)
BUN/CREAT SERPL: 22.8 (ref 7–25)
BUN/CREAT SERPL: 23.5 (ref 7–25)
BUN/CREAT SERPL: 24.5 (ref 7–25)
CALCIUM SPEC-SCNC: 7.9 MG/DL (ref 8.6–10.5)
CALCIUM SPEC-SCNC: 7.9 MG/DL (ref 8.6–10.5)
CALCIUM SPEC-SCNC: 8 MG/DL (ref 8.6–10.5)
CALCIUM SPEC-SCNC: 8.1 MG/DL (ref 8.6–10.5)
CALCIUM SPEC-SCNC: 8.3 MG/DL (ref 8.6–10.5)
CALCIUM SPEC-SCNC: 8.3 MG/DL (ref 8.6–10.5)
CALCIUM SPEC-SCNC: 8.7 MG/DL (ref 8.6–10.5)
CALCIUM SPEC-SCNC: 9 MG/DL (ref 8.6–10.5)
CALCIUM SPEC-SCNC: 9 MG/DL (ref 8.6–10.5)
CALCIUM SPEC-SCNC: 9.1 MG/DL (ref 8.6–10.5)
CALCIUM SPEC-SCNC: 9.2 MG/DL (ref 8.6–10.5)
CALCIUM SPEC-SCNC: 9.3 MG/DL (ref 8.6–10.5)
CALCIUM SPEC-SCNC: 9.3 MG/DL (ref 8.6–10.5)
CALCIUM SPEC-SCNC: 9.8 MG/DL (ref 8.6–10.5)
CANNABINOIDS SERPL QL: NEGATIVE
CHLORIDE SERPL-SCNC: 101 MMOL/L (ref 98–107)
CHLORIDE SERPL-SCNC: 102 MMOL/L (ref 98–107)
CHLORIDE SERPL-SCNC: 102 MMOL/L (ref 98–107)
CHLORIDE SERPL-SCNC: 105 MMOL/L (ref 98–107)
CHLORIDE SERPL-SCNC: 106 MMOL/L (ref 98–107)
CHLORIDE SERPL-SCNC: 107 MMOL/L (ref 98–107)
CHLORIDE SERPL-SCNC: 109 MMOL/L (ref 98–107)
CHLORIDE SERPL-SCNC: 95 MMOL/L (ref 98–107)
CHLORIDE SERPL-SCNC: 96 MMOL/L (ref 98–107)
CHLORIDE SERPL-SCNC: 97 MMOL/L (ref 98–107)
CHLORIDE SERPL-SCNC: 97 MMOL/L (ref 98–107)
CHLORIDE SERPL-SCNC: 99 MMOL/L (ref 98–107)
CK SERPL-CCNC: 117 U/L (ref 20–200)
CK SERPL-CCNC: 153 U/L (ref 20–200)
CLARITY UR: CLEAR
CO2 SERPL-SCNC: 17.2 MMOL/L (ref 22–29)
CO2 SERPL-SCNC: 24 MMOL/L (ref 22–29)
CO2 SERPL-SCNC: 24.6 MMOL/L (ref 22–29)
CO2 SERPL-SCNC: 24.8 MMOL/L (ref 22–29)
CO2 SERPL-SCNC: 25.7 MMOL/L (ref 22–29)
CO2 SERPL-SCNC: 26 MMOL/L (ref 22–29)
CO2 SERPL-SCNC: 26 MMOL/L (ref 22–29)
CO2 SERPL-SCNC: 27 MMOL/L (ref 22–29)
CO2 SERPL-SCNC: 27.2 MMOL/L (ref 22–29)
CO2 SERPL-SCNC: 28 MMOL/L (ref 22–29)
CO2 SERPL-SCNC: 29 MMOL/L (ref 22–29)
CO2 SERPL-SCNC: 29.7 MMOL/L (ref 22–29)
COCAINE UR QL: NEGATIVE
COLOR UR: YELLOW
CREAT BLDA-MCNC: 1.5 MG/DL (ref 0.6–1.3)
CREAT SERPL-MCNC: 0.68 MG/DL (ref 0.76–1.27)
CREAT SERPL-MCNC: 0.72 MG/DL (ref 0.76–1.27)
CREAT SERPL-MCNC: 0.72 MG/DL (ref 0.76–1.27)
CREAT SERPL-MCNC: 0.77 MG/DL (ref 0.76–1.27)
CREAT SERPL-MCNC: 0.77 MG/DL (ref 0.76–1.27)
CREAT SERPL-MCNC: 0.8 MG/DL (ref 0.76–1.27)
CREAT SERPL-MCNC: 0.95 MG/DL (ref 0.76–1.27)
CREAT SERPL-MCNC: 1 MG/DL (ref 0.76–1.27)
CREAT SERPL-MCNC: 1.02 MG/DL (ref 0.76–1.27)
CREAT SERPL-MCNC: 1.07 MG/DL (ref 0.76–1.27)
CREAT SERPL-MCNC: 1.1 MG/DL (ref 0.76–1.27)
CREAT SERPL-MCNC: 1.15 MG/DL (ref 0.76–1.27)
CREAT SERPL-MCNC: 1.27 MG/DL (ref 0.76–1.27)
CREAT SERPL-MCNC: 1.31 MG/DL (ref 0.76–1.27)
D-LACTATE SERPL-SCNC: 1.4 MMOL/L (ref 0.5–2)
D-LACTATE SERPL-SCNC: 8.3 MMOL/L (ref 0.5–2)
DEPRECATED RDW RBC AUTO: 40.6 FL (ref 37–54)
DEPRECATED RDW RBC AUTO: 41 FL (ref 37–54)
DEPRECATED RDW RBC AUTO: 41.6 FL (ref 37–54)
DEPRECATED RDW RBC AUTO: 42.4 FL (ref 37–54)
DEPRECATED RDW RBC AUTO: 42.7 FL (ref 37–54)
DEPRECATED RDW RBC AUTO: 43.1 FL (ref 37–54)
DEPRECATED RDW RBC AUTO: 43.2 FL (ref 37–54)
DEPRECATED RDW RBC AUTO: 44.4 FL (ref 37–54)
DEPRECATED RDW RBC AUTO: 44.4 FL (ref 37–54)
DEPRECATED RDW RBC AUTO: 44.8 FL (ref 37–54)
DEPRECATED RDW RBC AUTO: 45.1 FL (ref 37–54)
DEPRECATED RDW RBC AUTO: 45.6 FL (ref 37–54)
DEPRECATED RDW RBC AUTO: 46.7 FL (ref 37–54)
EGFRCR SERPLBLD CKD-EPI 2021: 52.7 ML/MIN/1.73
EGFRCR SERPLBLD CKD-EPI 2021: 54.7 ML/MIN/1.73
EGFRCR SERPLBLD CKD-EPI 2021: 61.6 ML/MIN/1.73
EGFRCR SERPLBLD CKD-EPI 2021: 65 ML/MIN/1.73
EGFRCR SERPLBLD CKD-EPI 2021: 67.2 ML/MIN/1.73
EGFRCR SERPLBLD CKD-EPI 2021: 71.1 ML/MIN/1.73
EGFRCR SERPLBLD CKD-EPI 2021: 72.8 ML/MIN/1.73
EGFRCR SERPLBLD CKD-EPI 2021: 77.5 ML/MIN/1.73
EGFRCR SERPLBLD CKD-EPI 2021: 85.7 ML/MIN/1.73
EGFRCR SERPLBLD CKD-EPI 2021: 86.6 ML/MIN/1.73
EGFRCR SERPLBLD CKD-EPI 2021: 86.6 ML/MIN/1.73
EGFRCR SERPLBLD CKD-EPI 2021: 88.4 ML/MIN/1.73
EGFRCR SERPLBLD CKD-EPI 2021: 88.4 ML/MIN/1.73
EGFRCR SERPLBLD CKD-EPI 2021: 90 ML/MIN/1.73
EOSINOPHIL # BLD AUTO: 0.18 10*3/MM3 (ref 0–0.4)
EOSINOPHIL # BLD AUTO: 0.22 10*3/MM3 (ref 0–0.4)
EOSINOPHIL # BLD AUTO: 0.24 10*3/MM3 (ref 0–0.4)
EOSINOPHIL # BLD AUTO: 0.27 10*3/MM3 (ref 0–0.4)
EOSINOPHIL # BLD AUTO: 0.33 10*3/MM3 (ref 0–0.4)
EOSINOPHIL # BLD AUTO: 0.35 10*3/MM3 (ref 0–0.4)
EOSINOPHIL NFR BLD AUTO: 2.5 % (ref 0.3–6.2)
EOSINOPHIL NFR BLD AUTO: 3.2 % (ref 0.3–6.2)
EOSINOPHIL NFR BLD AUTO: 3.3 % (ref 0.3–6.2)
EOSINOPHIL NFR BLD AUTO: 3.4 % (ref 0.3–6.2)
EOSINOPHIL NFR BLD AUTO: 3.6 % (ref 0.3–6.2)
EOSINOPHIL NFR BLD AUTO: 3.8 % (ref 0.3–6.2)
ERYTHROCYTE [DISTWIDTH] IN BLOOD BY AUTOMATED COUNT: 13 % (ref 12.3–15.4)
ERYTHROCYTE [DISTWIDTH] IN BLOOD BY AUTOMATED COUNT: 13 % (ref 12.3–15.4)
ERYTHROCYTE [DISTWIDTH] IN BLOOD BY AUTOMATED COUNT: 13.1 % (ref 12.3–15.4)
ERYTHROCYTE [DISTWIDTH] IN BLOOD BY AUTOMATED COUNT: 13.1 % (ref 12.3–15.4)
ERYTHROCYTE [DISTWIDTH] IN BLOOD BY AUTOMATED COUNT: 13.2 % (ref 12.3–15.4)
ERYTHROCYTE [DISTWIDTH] IN BLOOD BY AUTOMATED COUNT: 13.3 % (ref 12.3–15.4)
ERYTHROCYTE [DISTWIDTH] IN BLOOD BY AUTOMATED COUNT: 13.3 % (ref 12.3–15.4)
ERYTHROCYTE [DISTWIDTH] IN BLOOD BY AUTOMATED COUNT: 13.4 % (ref 12.3–15.4)
ERYTHROCYTE [DISTWIDTH] IN BLOOD BY AUTOMATED COUNT: 13.4 % (ref 12.3–15.4)
ERYTHROCYTE [DISTWIDTH] IN BLOOD BY AUTOMATED COUNT: 13.5 % (ref 12.3–15.4)
ERYTHROCYTE [DISTWIDTH] IN BLOOD BY AUTOMATED COUNT: 13.5 % (ref 12.3–15.4)
ETHANOL BLD-MCNC: <10 MG/DL (ref 0–10)
ETHANOL UR QL: <0.01 %
GLOBULIN UR ELPH-MCNC: 1.9 GM/DL
GLOBULIN UR ELPH-MCNC: 2 GM/DL
GLOBULIN UR ELPH-MCNC: 2.1 GM/DL
GLOBULIN UR ELPH-MCNC: 2.1 GM/DL
GLOBULIN UR ELPH-MCNC: 2.3 GM/DL
GLOBULIN UR ELPH-MCNC: 3.2 GM/DL
GLUCOSE BLDC GLUCOMTR-MCNC: 105 MG/DL (ref 70–130)
GLUCOSE BLDC GLUCOMTR-MCNC: 112 MG/DL (ref 70–130)
GLUCOSE BLDC GLUCOMTR-MCNC: 73 MG/DL (ref 70–130)
GLUCOSE BLDC GLUCOMTR-MCNC: 73 MG/DL (ref 70–130)
GLUCOSE BLDC GLUCOMTR-MCNC: 76 MG/DL (ref 70–130)
GLUCOSE BLDC GLUCOMTR-MCNC: 80 MG/DL (ref 70–130)
GLUCOSE BLDC GLUCOMTR-MCNC: 83 MG/DL (ref 70–130)
GLUCOSE BLDC GLUCOMTR-MCNC: 87 MG/DL (ref 70–130)
GLUCOSE BLDC GLUCOMTR-MCNC: 90 MG/DL (ref 70–130)
GLUCOSE BLDC GLUCOMTR-MCNC: 94 MG/DL (ref 70–130)
GLUCOSE BLDC GLUCOMTR-MCNC: 95 MG/DL (ref 70–130)
GLUCOSE BLDC GLUCOMTR-MCNC: 95 MG/DL (ref 70–130)
GLUCOSE SERPL-MCNC: 101 MG/DL (ref 65–99)
GLUCOSE SERPL-MCNC: 125 MG/DL (ref 65–99)
GLUCOSE SERPL-MCNC: 130 MG/DL (ref 65–99)
GLUCOSE SERPL-MCNC: 79 MG/DL (ref 65–99)
GLUCOSE SERPL-MCNC: 82 MG/DL (ref 65–99)
GLUCOSE SERPL-MCNC: 84 MG/DL (ref 65–99)
GLUCOSE SERPL-MCNC: 89 MG/DL (ref 65–99)
GLUCOSE SERPL-MCNC: 91 MG/DL (ref 65–99)
GLUCOSE SERPL-MCNC: 93 MG/DL (ref 65–99)
GLUCOSE SERPL-MCNC: 94 MG/DL (ref 65–99)
GLUCOSE UR STRIP-MCNC: NEGATIVE MG/DL
HCT VFR BLD AUTO: 38.2 % (ref 37.5–51)
HCT VFR BLD AUTO: 38.4 % (ref 37.5–51)
HCT VFR BLD AUTO: 40.4 % (ref 37.5–51)
HCT VFR BLD AUTO: 42.1 % (ref 37.5–51)
HCT VFR BLD AUTO: 42.1 % (ref 37.5–51)
HCT VFR BLD AUTO: 42.2 % (ref 37.5–51)
HCT VFR BLD AUTO: 42.6 % (ref 37.5–51)
HCT VFR BLD AUTO: 42.6 % (ref 37.5–51)
HCT VFR BLD AUTO: 42.8 % (ref 37.5–51)
HCT VFR BLD AUTO: 43.4 % (ref 37.5–51)
HCT VFR BLD AUTO: 43.6 % (ref 37.5–51)
HCT VFR BLD AUTO: 44 % (ref 37.5–51)
HCT VFR BLD AUTO: 48.8 % (ref 37.5–51)
HGB BLD-MCNC: 12.4 G/DL (ref 13–17.7)
HGB BLD-MCNC: 13.1 G/DL (ref 13–17.7)
HGB BLD-MCNC: 13.5 G/DL (ref 13–17.7)
HGB BLD-MCNC: 13.7 G/DL (ref 13–17.7)
HGB BLD-MCNC: 13.8 G/DL (ref 13–17.7)
HGB BLD-MCNC: 13.8 G/DL (ref 13–17.7)
HGB BLD-MCNC: 13.9 G/DL (ref 13–17.7)
HGB BLD-MCNC: 14 G/DL (ref 13–17.7)
HGB BLD-MCNC: 14.1 G/DL (ref 13–17.7)
HGB BLD-MCNC: 14.1 G/DL (ref 13–17.7)
HGB BLD-MCNC: 14.2 G/DL (ref 13–17.7)
HGB BLD-MCNC: 14.4 G/DL (ref 13–17.7)
HGB BLD-MCNC: 15.3 G/DL (ref 13–17.7)
HGB UR QL STRIP.AUTO: ABNORMAL
HYALINE CASTS UR QL AUTO: ABNORMAL /LPF
IMM GRANULOCYTES # BLD AUTO: 0.02 10*3/MM3 (ref 0–0.05)
IMM GRANULOCYTES # BLD AUTO: 0.02 10*3/MM3 (ref 0–0.05)
IMM GRANULOCYTES # BLD AUTO: 0.03 10*3/MM3 (ref 0–0.05)
IMM GRANULOCYTES # BLD AUTO: 0.04 10*3/MM3 (ref 0–0.05)
IMM GRANULOCYTES NFR BLD AUTO: 0.3 % (ref 0–0.5)
IMM GRANULOCYTES NFR BLD AUTO: 0.3 % (ref 0–0.5)
IMM GRANULOCYTES NFR BLD AUTO: 0.4 % (ref 0–0.5)
IMM GRANULOCYTES NFR BLD AUTO: 0.6 % (ref 0–0.5)
INR PPP: 1.06 (ref 0.9–1.1)
KETONES UR QL STRIP: NEGATIVE
LEUKOCYTE ESTERASE UR QL STRIP.AUTO: NEGATIVE
LYMPHOCYTES # BLD AUTO: 1.12 10*3/MM3 (ref 0.7–3.1)
LYMPHOCYTES # BLD AUTO: 1.52 10*3/MM3 (ref 0.7–3.1)
LYMPHOCYTES # BLD AUTO: 1.63 10*3/MM3 (ref 0.7–3.1)
LYMPHOCYTES # BLD AUTO: 1.79 10*3/MM3 (ref 0.7–3.1)
LYMPHOCYTES # BLD AUTO: 2 10*3/MM3 (ref 0.7–3.1)
LYMPHOCYTES # BLD AUTO: 3.41 10*3/MM3 (ref 0.7–3.1)
LYMPHOCYTES NFR BLD AUTO: 20.7 % (ref 19.6–45.3)
LYMPHOCYTES NFR BLD AUTO: 20.9 % (ref 19.6–45.3)
LYMPHOCYTES NFR BLD AUTO: 21 % (ref 19.6–45.3)
LYMPHOCYTES NFR BLD AUTO: 21.9 % (ref 19.6–45.3)
LYMPHOCYTES NFR BLD AUTO: 23.2 % (ref 19.6–45.3)
LYMPHOCYTES NFR BLD AUTO: 31.1 % (ref 19.6–45.3)
MAGNESIUM SERPL-MCNC: 2.1 MG/DL (ref 1.6–2.4)
MAXIMAL PREDICTED HEART RATE: 133 BPM
MCH RBC QN AUTO: 28.8 PG (ref 26.6–33)
MCH RBC QN AUTO: 28.8 PG (ref 26.6–33)
MCH RBC QN AUTO: 29 PG (ref 26.6–33)
MCH RBC QN AUTO: 29.1 PG (ref 26.6–33)
MCH RBC QN AUTO: 29.2 PG (ref 26.6–33)
MCH RBC QN AUTO: 29.3 PG (ref 26.6–33)
MCH RBC QN AUTO: 29.3 PG (ref 26.6–33)
MCH RBC QN AUTO: 29.4 PG (ref 26.6–33)
MCH RBC QN AUTO: 29.5 PG (ref 26.6–33)
MCH RBC QN AUTO: 29.5 PG (ref 26.6–33)
MCH RBC QN AUTO: 29.6 PG (ref 26.6–33)
MCHC RBC AUTO-ENTMCNC: 31.4 G/DL (ref 31.5–35.7)
MCHC RBC AUTO-ENTMCNC: 31.8 G/DL (ref 31.5–35.7)
MCHC RBC AUTO-ENTMCNC: 32.2 G/DL (ref 31.5–35.7)
MCHC RBC AUTO-ENTMCNC: 32.3 G/DL (ref 31.5–35.7)
MCHC RBC AUTO-ENTMCNC: 32.3 G/DL (ref 31.5–35.7)
MCHC RBC AUTO-ENTMCNC: 32.6 G/DL (ref 31.5–35.7)
MCHC RBC AUTO-ENTMCNC: 32.7 G/DL (ref 31.5–35.7)
MCHC RBC AUTO-ENTMCNC: 32.8 G/DL (ref 31.5–35.7)
MCHC RBC AUTO-ENTMCNC: 33.2 G/DL (ref 31.5–35.7)
MCHC RBC AUTO-ENTMCNC: 33.3 G/DL (ref 31.5–35.7)
MCHC RBC AUTO-ENTMCNC: 33.4 G/DL (ref 31.5–35.7)
MCHC RBC AUTO-ENTMCNC: 33.4 G/DL (ref 31.5–35.7)
MCHC RBC AUTO-ENTMCNC: 34.3 G/DL (ref 31.5–35.7)
MCV RBC AUTO: 86 FL (ref 79–97)
MCV RBC AUTO: 86.3 FL (ref 79–97)
MCV RBC AUTO: 88.1 FL (ref 79–97)
MCV RBC AUTO: 88.5 FL (ref 79–97)
MCV RBC AUTO: 89.3 FL (ref 79–97)
MCV RBC AUTO: 89.4 FL (ref 79–97)
MCV RBC AUTO: 89.6 FL (ref 79–97)
MCV RBC AUTO: 89.9 FL (ref 79–97)
MCV RBC AUTO: 90.2 FL (ref 79–97)
MCV RBC AUTO: 91.2 FL (ref 79–97)
MCV RBC AUTO: 91.6 FL (ref 79–97)
MCV RBC AUTO: 91.8 FL (ref 79–97)
MCV RBC AUTO: 91.9 FL (ref 79–97)
METHADONE UR QL SCN: NEGATIVE
MONOCYTES # BLD AUTO: 0.6 10*3/MM3 (ref 0.1–0.9)
MONOCYTES # BLD AUTO: 0.64 10*3/MM3 (ref 0.1–0.9)
MONOCYTES # BLD AUTO: 0.68 10*3/MM3 (ref 0.1–0.9)
MONOCYTES # BLD AUTO: 0.73 10*3/MM3 (ref 0.1–0.9)
MONOCYTES # BLD AUTO: 0.83 10*3/MM3 (ref 0.1–0.9)
MONOCYTES # BLD AUTO: 0.93 10*3/MM3 (ref 0.1–0.9)
MONOCYTES NFR BLD AUTO: 13.6 % (ref 5–12)
MONOCYTES NFR BLD AUTO: 7.4 % (ref 5–12)
MONOCYTES NFR BLD AUTO: 8.5 % (ref 5–12)
MONOCYTES NFR BLD AUTO: 8.5 % (ref 5–12)
MONOCYTES NFR BLD AUTO: 9.1 % (ref 5–12)
MONOCYTES NFR BLD AUTO: 9.4 % (ref 5–12)
NEUTROPHILS NFR BLD AUTO: 3.24 10*3/MM3 (ref 1.7–7)
NEUTROPHILS NFR BLD AUTO: 4.45 10*3/MM3 (ref 1.7–7)
NEUTROPHILS NFR BLD AUTO: 4.7 10*3/MM3 (ref 1.7–7)
NEUTROPHILS NFR BLD AUTO: 5.87 10*3/MM3 (ref 1.7–7)
NEUTROPHILS NFR BLD AUTO: 5.89 10*3/MM3 (ref 1.7–7)
NEUTROPHILS NFR BLD AUTO: 55.7 % (ref 42.7–76)
NEUTROPHILS NFR BLD AUTO: 6.1 10*3/MM3 (ref 1.7–7)
NEUTROPHILS NFR BLD AUTO: 60.6 % (ref 42.7–76)
NEUTROPHILS NFR BLD AUTO: 63.2 % (ref 42.7–76)
NEUTROPHILS NFR BLD AUTO: 64.3 % (ref 42.7–76)
NEUTROPHILS NFR BLD AUTO: 64.9 % (ref 42.7–76)
NEUTROPHILS NFR BLD AUTO: 68.1 % (ref 42.7–76)
NITRITE UR QL STRIP: NEGATIVE
NRBC BLD AUTO-RTO: 0 /100 WBC (ref 0–0.2)
OPIATES UR QL: NEGATIVE
OXYCODONE UR QL SCN: NEGATIVE
PH UR STRIP.AUTO: 7 [PH] (ref 5–8)
PHENOBARB SERPL-MCNC: 6.5 MCG/ML (ref 10–30)
PLATELET # BLD AUTO: 149 10*3/MM3 (ref 140–450)
PLATELET # BLD AUTO: 150 10*3/MM3 (ref 140–450)
PLATELET # BLD AUTO: 156 10*3/MM3 (ref 140–450)
PLATELET # BLD AUTO: 170 10*3/MM3 (ref 140–450)
PLATELET # BLD AUTO: 170 10*3/MM3 (ref 140–450)
PLATELET # BLD AUTO: 196 10*3/MM3 (ref 140–450)
PLATELET # BLD AUTO: 214 10*3/MM3 (ref 140–450)
PLATELET # BLD AUTO: 253 10*3/MM3 (ref 140–450)
PLATELET # BLD AUTO: 273 10*3/MM3 (ref 140–450)
PLATELET # BLD AUTO: 311 10*3/MM3 (ref 140–450)
PLATELET # BLD AUTO: 326 10*3/MM3 (ref 140–450)
PLATELET # BLD AUTO: 332 10*3/MM3 (ref 140–450)
PLATELET # BLD AUTO: 353 10*3/MM3 (ref 140–450)
PMV BLD AUTO: 10.1 FL (ref 6–12)
PMV BLD AUTO: 8.5 FL (ref 6–12)
PMV BLD AUTO: 8.6 FL (ref 6–12)
PMV BLD AUTO: 8.6 FL (ref 6–12)
PMV BLD AUTO: 8.8 FL (ref 6–12)
PMV BLD AUTO: 9 FL (ref 6–12)
PMV BLD AUTO: 9.2 FL (ref 6–12)
PMV BLD AUTO: 9.4 FL (ref 6–12)
PMV BLD AUTO: 9.5 FL (ref 6–12)
PMV BLD AUTO: 9.7 FL (ref 6–12)
PMV BLD AUTO: 9.7 FL (ref 6–12)
POTASSIUM SERPL-SCNC: 3.6 MMOL/L (ref 3.5–5.2)
POTASSIUM SERPL-SCNC: 3.8 MMOL/L (ref 3.5–5.2)
POTASSIUM SERPL-SCNC: 4 MMOL/L (ref 3.5–5.2)
POTASSIUM SERPL-SCNC: 4 MMOL/L (ref 3.5–5.2)
POTASSIUM SERPL-SCNC: 4.1 MMOL/L (ref 3.5–5.2)
POTASSIUM SERPL-SCNC: 4.1 MMOL/L (ref 3.5–5.2)
POTASSIUM SERPL-SCNC: 4.4 MMOL/L (ref 3.5–5.2)
POTASSIUM SERPL-SCNC: 4.4 MMOL/L (ref 3.5–5.2)
POTASSIUM SERPL-SCNC: 4.6 MMOL/L (ref 3.5–5.2)
POTASSIUM SERPL-SCNC: 6.2 MMOL/L (ref 3.5–5.2)
PROCALCITONIN SERPL-MCNC: 0.07 NG/ML (ref 0–0.25)
PROT SERPL-MCNC: 5.9 G/DL (ref 6–8.5)
PROT SERPL-MCNC: 6.1 G/DL (ref 6–8.5)
PROT SERPL-MCNC: 6.1 G/DL (ref 6–8.5)
PROT SERPL-MCNC: 6.3 G/DL (ref 6–8.5)
PROT SERPL-MCNC: 6.4 G/DL (ref 6–8.5)
PROT SERPL-MCNC: 6.5 G/DL (ref 6–8.5)
PROT UR QL STRIP: ABNORMAL
PROTHROMBIN TIME: 13.7 SECONDS (ref 11.7–14.2)
PSA SERPL-MCNC: 1.97 NG/ML (ref 0–4)
QT INTERVAL: 355 MS
QT INTERVAL: 449 MS
QT INTERVAL: 500 MS
RBC # BLD AUTO: 4.27 10*6/MM3 (ref 4.14–5.8)
RBC # BLD AUTO: 4.44 10*6/MM3 (ref 4.14–5.8)
RBC # BLD AUTO: 4.67 10*6/MM3 (ref 4.14–5.8)
RBC # BLD AUTO: 4.68 10*6/MM3 (ref 4.14–5.8)
RBC # BLD AUTO: 4.7 10*6/MM3 (ref 4.14–5.8)
RBC # BLD AUTO: 4.73 10*6/MM3 (ref 4.14–5.8)
RBC # BLD AUTO: 4.76 10*6/MM3 (ref 4.14–5.8)
RBC # BLD AUTO: 4.77 10*6/MM3 (ref 4.14–5.8)
RBC # BLD AUTO: 4.77 10*6/MM3 (ref 4.14–5.8)
RBC # BLD AUTO: 4.78 10*6/MM3 (ref 4.14–5.8)
RBC # BLD AUTO: 4.79 10*6/MM3 (ref 4.14–5.8)
RBC # BLD AUTO: 4.88 10*6/MM3 (ref 4.14–5.8)
RBC # BLD AUTO: 5.31 10*6/MM3 (ref 4.14–5.8)
RBC # UR STRIP: ABNORMAL /HPF
REF LAB TEST METHOD: ABNORMAL
SARS-COV-2 RNA PNL SPEC NAA+PROBE: NOT DETECTED
SARS-COV-2 RNA RESP QL NAA+PROBE: NOT DETECTED
SODIUM SERPL-SCNC: 133 MMOL/L (ref 136–145)
SODIUM SERPL-SCNC: 134 MMOL/L (ref 136–145)
SODIUM SERPL-SCNC: 135 MMOL/L (ref 136–145)
SODIUM SERPL-SCNC: 135 MMOL/L (ref 136–145)
SODIUM SERPL-SCNC: 136 MMOL/L (ref 136–145)
SODIUM SERPL-SCNC: 137 MMOL/L (ref 136–145)
SODIUM SERPL-SCNC: 138 MMOL/L (ref 136–145)
SODIUM SERPL-SCNC: 139 MMOL/L (ref 136–145)
SODIUM SERPL-SCNC: 139 MMOL/L (ref 136–145)
SODIUM SERPL-SCNC: 140 MMOL/L (ref 136–145)
SODIUM SERPL-SCNC: 140 MMOL/L (ref 136–145)
SODIUM SERPL-SCNC: 141 MMOL/L (ref 136–145)
SP GR UR STRIP: 1.01 (ref 1–1.03)
SQUAMOUS #/AREA URNS HPF: ABNORMAL /HPF
STRESS TARGET HR: 113 BPM
TROPONIN T SERPL-MCNC: 0.01 NG/ML (ref 0–0.03)
TROPONIN T SERPL-MCNC: <0.01 NG/ML (ref 0–0.03)
UROBILINOGEN UR QL STRIP: ABNORMAL
WBC # UR STRIP: ABNORMAL /HPF
WBC NRBC COR # BLD: 10.95 10*3/MM3 (ref 3.4–10.8)
WBC NRBC COR # BLD: 5.35 10*3/MM3 (ref 3.4–10.8)
WBC NRBC COR # BLD: 5.69 10*3/MM3 (ref 3.4–10.8)
WBC NRBC COR # BLD: 5.81 10*3/MM3 (ref 3.4–10.8)
WBC NRBC COR # BLD: 5.99 10*3/MM3 (ref 3.4–10.8)
WBC NRBC COR # BLD: 7.02 10*3/MM3 (ref 3.4–10.8)
WBC NRBC COR # BLD: 7.04 10*3/MM3 (ref 3.4–10.8)
WBC NRBC COR # BLD: 7.14 10*3/MM3 (ref 3.4–10.8)
WBC NRBC COR # BLD: 7.24 10*3/MM3 (ref 3.4–10.8)
WBC NRBC COR # BLD: 7.37 10*3/MM3 (ref 3.4–10.8)
WBC NRBC COR # BLD: 7.9 10*3/MM3 (ref 3.4–10.8)
WBC NRBC COR # BLD: 8.64 10*3/MM3 (ref 3.4–10.8)
WBC NRBC COR # BLD: 9.15 10*3/MM3 (ref 3.4–10.8)

## 2022-01-01 PROCEDURE — 97116 GAIT TRAINING THERAPY: CPT

## 2022-01-01 PROCEDURE — 97530 THERAPEUTIC ACTIVITIES: CPT

## 2022-01-01 PROCEDURE — 80048 BASIC METABOLIC PNL TOTAL CA: CPT | Performed by: INTERNAL MEDICINE

## 2022-01-01 PROCEDURE — 82550 ASSAY OF CK (CPK): CPT | Performed by: EMERGENCY MEDICINE

## 2022-01-01 PROCEDURE — 97129 THER IVNTJ 1ST 15 MIN: CPT

## 2022-01-01 PROCEDURE — 92526 ORAL FUNCTION THERAPY: CPT

## 2022-01-01 PROCEDURE — 0042T HC CT CEREBRAL PERFUSION W/WO CONTRAST: CPT

## 2022-01-01 PROCEDURE — 81001 URINALYSIS AUTO W/SCOPE: CPT | Performed by: EMERGENCY MEDICINE

## 2022-01-01 PROCEDURE — 92610 EVALUATE SWALLOWING FUNCTION: CPT

## 2022-01-01 PROCEDURE — 80053 COMPREHEN METABOLIC PANEL: CPT | Performed by: PHYSICAL MEDICINE & REHABILITATION

## 2022-01-01 PROCEDURE — 97130 THER IVNTJ EA ADDL 15 MIN: CPT

## 2022-01-01 PROCEDURE — 83735 ASSAY OF MAGNESIUM: CPT | Performed by: EMERGENCY MEDICINE

## 2022-01-01 PROCEDURE — 82962 GLUCOSE BLOOD TEST: CPT

## 2022-01-01 PROCEDURE — 87635 SARS-COV-2 COVID-19 AMP PRB: CPT | Performed by: EMERGENCY MEDICINE

## 2022-01-01 PROCEDURE — 94761 N-INVAS EAR/PLS OXIMETRY MLT: CPT

## 2022-01-01 PROCEDURE — 85027 COMPLETE CBC AUTOMATED: CPT | Performed by: INTERNAL MEDICINE

## 2022-01-01 PROCEDURE — 83605 ASSAY OF LACTIC ACID: CPT | Performed by: EMERGENCY MEDICINE

## 2022-01-01 PROCEDURE — 97110 THERAPEUTIC EXERCISES: CPT

## 2022-01-01 PROCEDURE — 87040 BLOOD CULTURE FOR BACTERIA: CPT | Performed by: EMERGENCY MEDICINE

## 2022-01-01 PROCEDURE — 85025 COMPLETE CBC W/AUTO DIFF WBC: CPT | Performed by: PHYSICAL MEDICINE & REHABILITATION

## 2022-01-01 PROCEDURE — G0299 HHS/HOSPICE OF RN EA 15 MIN: HCPCS

## 2022-01-01 PROCEDURE — 80307 DRUG TEST PRSMV CHEM ANLYZR: CPT | Performed by: EMERGENCY MEDICINE

## 2022-01-01 PROCEDURE — 25010000002 PHENOBARBITAL PER 120 MG: Performed by: EMERGENCY MEDICINE

## 2022-01-01 PROCEDURE — 25010000002 HYDRALAZINE PER 20 MG: Performed by: INTERNAL MEDICINE

## 2022-01-01 PROCEDURE — 93970 EXTREMITY STUDY: CPT

## 2022-01-01 PROCEDURE — 95816 EEG AWAKE AND DROWSY: CPT | Performed by: PSYCHIATRY & NEUROLOGY

## 2022-01-01 PROCEDURE — 97162 PT EVAL MOD COMPLEX 30 MIN: CPT

## 2022-01-01 PROCEDURE — 25010000002 LEVETRIRACETAM PER 10 MG: Performed by: STUDENT IN AN ORGANIZED HEALTH CARE EDUCATION/TRAINING PROGRAM

## 2022-01-01 PROCEDURE — 0051A COVID-19 (PFIZER) 12+ YRS: CPT | Performed by: NURSE PRACTITIONER

## 2022-01-01 PROCEDURE — 99285 EMERGENCY DEPT VISIT HI MDM: CPT

## 2022-01-01 PROCEDURE — 92523 SPEECH SOUND LANG COMPREHEN: CPT

## 2022-01-01 PROCEDURE — 97535 SELF CARE MNGMENT TRAINING: CPT

## 2022-01-01 PROCEDURE — 25010000002 HEPARIN (PORCINE) PER 1000 UNITS: Performed by: INTERNAL MEDICINE

## 2022-01-01 PROCEDURE — 99222 1ST HOSP IP/OBS MODERATE 55: CPT | Performed by: STUDENT IN AN ORGANIZED HEALTH CARE EDUCATION/TRAINING PROGRAM

## 2022-01-01 PROCEDURE — 84145 PROCALCITONIN (PCT): CPT | Performed by: INTERNAL MEDICINE

## 2022-01-01 PROCEDURE — G0180 MD CERTIFICATION HHA PATIENT: HCPCS | Performed by: INTERNAL MEDICINE

## 2022-01-01 PROCEDURE — 25010000002 HALOPERIDOL LACTATE PER 5 MG: Performed by: INTERNAL MEDICINE

## 2022-01-01 PROCEDURE — G0300 HHS/HOSPICE OF LPN EA 15 MIN: HCPCS

## 2022-01-01 PROCEDURE — G0179 MD RECERTIFICATION HHA PT: HCPCS | Performed by: INTERNAL MEDICINE

## 2022-01-01 PROCEDURE — 84484 ASSAY OF TROPONIN QUANT: CPT | Performed by: EMERGENCY MEDICINE

## 2022-01-01 PROCEDURE — 97110 THERAPEUTIC EXERCISES: CPT | Performed by: OCCUPATIONAL THERAPIST

## 2022-01-01 PROCEDURE — 99214 OFFICE O/P EST MOD 30 MIN: CPT | Performed by: NURSE PRACTITIONER

## 2022-01-01 PROCEDURE — U0003 INFECTIOUS AGENT DETECTION BY NUCLEIC ACID (DNA OR RNA); SEVERE ACUTE RESPIRATORY SYNDROME CORONAVIRUS 2 (SARS-COV-2) (CORONAVIRUS DISEASE [COVID-19]), AMPLIFIED PROBE TECHNIQUE, MAKING USE OF HIGH THROUGHPUT TECHNOLOGIES AS DESCRIBED BY CMS-2020-01-R: HCPCS | Performed by: INTERNAL MEDICINE

## 2022-01-01 PROCEDURE — 25010000002 LORAZEPAM PER 2 MG: Performed by: EMERGENCY MEDICINE

## 2022-01-01 PROCEDURE — 97166 OT EVAL MOD COMPLEX 45 MIN: CPT

## 2022-01-01 PROCEDURE — 99214 OFFICE O/P EST MOD 30 MIN: CPT | Performed by: INTERNAL MEDICINE

## 2022-01-01 PROCEDURE — 95816 EEG AWAKE AND DROWSY: CPT

## 2022-01-01 PROCEDURE — 71045 X-RAY EXAM CHEST 1 VIEW: CPT

## 2022-01-01 PROCEDURE — 82565 ASSAY OF CREATININE: CPT

## 2022-01-01 PROCEDURE — 80053 COMPREHEN METABOLIC PANEL: CPT | Performed by: EMERGENCY MEDICINE

## 2022-01-01 PROCEDURE — 93005 ELECTROCARDIOGRAM TRACING: CPT | Performed by: INTERNAL MEDICINE

## 2022-01-01 PROCEDURE — 85730 THROMBOPLASTIN TIME PARTIAL: CPT | Performed by: EMERGENCY MEDICINE

## 2022-01-01 PROCEDURE — 70496 CT ANGIOGRAPHY HEAD: CPT

## 2022-01-01 PROCEDURE — 99213 OFFICE O/P EST LOW 20 MIN: CPT | Performed by: NURSE PRACTITIONER

## 2022-01-01 PROCEDURE — 97112 NEUROMUSCULAR REEDUCATION: CPT | Performed by: OCCUPATIONAL THERAPIST

## 2022-01-01 PROCEDURE — 97112 NEUROMUSCULAR REEDUCATION: CPT

## 2022-01-01 PROCEDURE — 93005 ELECTROCARDIOGRAM TRACING: CPT | Performed by: EMERGENCY MEDICINE

## 2022-01-01 PROCEDURE — 85610 PROTHROMBIN TIME: CPT | Performed by: EMERGENCY MEDICINE

## 2022-01-01 PROCEDURE — 0 IOPAMIDOL PER 1 ML: Performed by: EMERGENCY MEDICINE

## 2022-01-01 PROCEDURE — P9612 CATHETERIZE FOR URINE SPEC: HCPCS

## 2022-01-01 PROCEDURE — 70498 CT ANGIOGRAPHY NECK: CPT

## 2022-01-01 PROCEDURE — 36415 COLL VENOUS BLD VENIPUNCTURE: CPT

## 2022-01-01 PROCEDURE — 90662 IIV NO PRSV INCREASED AG IM: CPT | Performed by: INTERNAL MEDICINE

## 2022-01-01 PROCEDURE — 82077 ASSAY SPEC XCP UR&BREATH IA: CPT | Performed by: EMERGENCY MEDICINE

## 2022-01-01 PROCEDURE — 80184 ASSAY OF PHENOBARBITAL: CPT | Performed by: EMERGENCY MEDICINE

## 2022-01-01 PROCEDURE — 93010 ELECTROCARDIOGRAM REPORT: CPT | Performed by: INTERNAL MEDICINE

## 2022-01-01 PROCEDURE — 94799 UNLISTED PULMONARY SVC/PX: CPT

## 2022-01-01 PROCEDURE — 84153 ASSAY OF PSA TOTAL: CPT | Performed by: INTERNAL MEDICINE

## 2022-01-01 PROCEDURE — 82550 ASSAY OF CK (CPK): CPT | Performed by: INTERNAL MEDICINE

## 2022-01-01 PROCEDURE — G0008 ADMIN INFLUENZA VIRUS VAC: HCPCS | Performed by: INTERNAL MEDICINE

## 2022-01-01 PROCEDURE — 91305 COVID-19 (PFIZER) 12+ YRS: CPT | Performed by: NURSE PRACTITIONER

## 2022-01-01 PROCEDURE — 85025 COMPLETE CBC W/AUTO DIFF WBC: CPT | Performed by: EMERGENCY MEDICINE

## 2022-01-01 PROCEDURE — 25010000002 CEFTRIAXONE PER 250 MG: Performed by: INTERNAL MEDICINE

## 2022-01-01 RX ORDER — OXYCODONE HYDROCHLORIDE 5 MG/1
5 TABLET ORAL EVERY 6 HOURS PRN
Status: DISPENSED | OUTPATIENT
Start: 2022-01-01 | End: 2022-01-01

## 2022-01-01 RX ORDER — OXYCODONE HYDROCHLORIDE 5 MG/1
TABLET ORAL
Status: ACTIVE
Start: 2022-01-01 | End: 2022-01-01

## 2022-01-01 RX ORDER — FUROSEMIDE 20 MG/1
TABLET ORAL
Qty: 60 TABLET | Refills: 0 | Status: SHIPPED | OUTPATIENT
Start: 2022-01-01 | End: 2022-01-01

## 2022-01-01 RX ORDER — SODIUM CHLORIDE 0.9 % (FLUSH) 0.9 %
10 SYRINGE (ML) INJECTION EVERY 12 HOURS SCHEDULED
Status: CANCELLED | OUTPATIENT
Start: 2022-01-01

## 2022-01-01 RX ORDER — CALCIUM CARBONATE 300MG(750)
1 TABLET,CHEWABLE ORAL NIGHTLY
Qty: 30 TABLET | Refills: 1 | Status: SHIPPED | OUTPATIENT
Start: 2022-01-01 | End: 2022-01-01

## 2022-01-01 RX ORDER — LISINOPRIL 20 MG/1
20 TABLET ORAL 2 TIMES DAILY
Status: CANCELLED | OUTPATIENT
Start: 2022-01-01

## 2022-01-01 RX ORDER — PHENOBARBITAL 64.8 MG/1
64.8 TABLET ORAL DAILY
Status: DISCONTINUED | OUTPATIENT
Start: 2022-01-01 | End: 2022-01-01 | Stop reason: HOSPADM

## 2022-01-01 RX ORDER — HEPARIN SODIUM 5000 [USP'U]/ML
5000 INJECTION, SOLUTION INTRAVENOUS; SUBCUTANEOUS EVERY 8 HOURS SCHEDULED
Status: DISCONTINUED | OUTPATIENT
Start: 2022-01-01 | End: 2022-01-01

## 2022-01-01 RX ORDER — LACOSAMIDE 100 MG/1
100 TABLET ORAL EVERY 12 HOURS SCHEDULED
Status: CANCELLED | OUTPATIENT
Start: 2022-01-01

## 2022-01-01 RX ORDER — HYDRALAZINE HYDROCHLORIDE 20 MG/ML
10 INJECTION INTRAMUSCULAR; INTRAVENOUS EVERY 4 HOURS PRN
Status: DISCONTINUED | OUTPATIENT
Start: 2022-01-01 | End: 2022-01-01 | Stop reason: HOSPADM

## 2022-01-01 RX ORDER — ACETAMINOPHEN 650 MG/1
650 SUPPOSITORY RECTAL EVERY 4 HOURS PRN
Status: DISCONTINUED | OUTPATIENT
Start: 2022-01-01 | End: 2022-01-01 | Stop reason: HOSPADM

## 2022-01-01 RX ORDER — HYDROCHLOROTHIAZIDE 12.5 MG/1
12.5 CAPSULE, GELATIN COATED ORAL DAILY
Status: DISCONTINUED | OUTPATIENT
Start: 2022-01-01 | End: 2022-01-01

## 2022-01-01 RX ORDER — LEVETIRACETAM 500 MG/5ML
500 INJECTION, SOLUTION, CONCENTRATE INTRAVENOUS EVERY 12 HOURS SCHEDULED
Status: DISCONTINUED | OUTPATIENT
Start: 2022-01-01 | End: 2022-01-01

## 2022-01-01 RX ORDER — POTASSIUM 75 MG
1 TABLET ORAL NIGHTLY
Qty: 30 EACH | Refills: 1 | Status: SHIPPED | OUTPATIENT
Start: 2022-01-01

## 2022-01-01 RX ORDER — DEXTROSE AND SODIUM CHLORIDE 5; .9 G/100ML; G/100ML
75 INJECTION, SOLUTION INTRAVENOUS CONTINUOUS
Status: DISCONTINUED | OUTPATIENT
Start: 2022-01-01 | End: 2022-01-01

## 2022-01-01 RX ORDER — SODIUM CHLORIDE 0.9 % (FLUSH) 0.9 %
10 SYRINGE (ML) INJECTION AS NEEDED
Status: CANCELLED | OUTPATIENT
Start: 2022-01-01

## 2022-01-01 RX ORDER — LISINOPRIL 20 MG/1
20 TABLET ORAL 2 TIMES DAILY
Qty: 180 TABLET | Refills: 1 | Status: SHIPPED | OUTPATIENT
Start: 2022-01-01

## 2022-01-01 RX ORDER — LACOSAMIDE 100 MG/1
100 TABLET ORAL EVERY 12 HOURS SCHEDULED
Status: DISCONTINUED | OUTPATIENT
Start: 2022-01-01 | End: 2022-01-01 | Stop reason: HOSPADM

## 2022-01-01 RX ORDER — ACETAMINOPHEN 325 MG/1
650 TABLET ORAL EVERY 6 HOURS PRN
Status: DISCONTINUED | OUTPATIENT
Start: 2022-01-01 | End: 2022-01-01 | Stop reason: HOSPADM

## 2022-01-01 RX ORDER — SODIUM CHLORIDE 0.9 % (FLUSH) 0.9 %
10 SYRINGE (ML) INJECTION AS NEEDED
Status: DISCONTINUED | OUTPATIENT
Start: 2022-01-01 | End: 2022-01-01 | Stop reason: HOSPADM

## 2022-01-01 RX ORDER — SODIUM CHLORIDE 0.9 % (FLUSH) 0.9 %
10 SYRINGE (ML) INJECTION EVERY 12 HOURS SCHEDULED
Status: DISCONTINUED | OUTPATIENT
Start: 2022-01-01 | End: 2022-01-01 | Stop reason: HOSPADM

## 2022-01-01 RX ORDER — LACOSAMIDE 100 MG/1
100 TABLET ORAL EVERY 12 HOURS
Qty: 60 TABLET | Refills: 2 | Status: CANCELLED | OUTPATIENT
Start: 2022-01-01

## 2022-01-01 RX ORDER — ACETAMINOPHEN 325 MG/1
650 TABLET ORAL
Status: ON HOLD | COMMUNITY
Start: 2022-01-01 | End: 2023-01-01

## 2022-01-01 RX ORDER — DONEPEZIL HYDROCHLORIDE 10 MG/1
10 TABLET, FILM COATED ORAL NIGHTLY
Status: DISCONTINUED | OUTPATIENT
Start: 2022-01-01 | End: 2022-01-01 | Stop reason: HOSPADM

## 2022-01-01 RX ORDER — PHENOBARBITAL 32.4 MG/1
64.8 TABLET ORAL DAILY
Status: DISCONTINUED | OUTPATIENT
Start: 2022-01-01 | End: 2022-01-01

## 2022-01-01 RX ORDER — DONEPEZIL HYDROCHLORIDE 10 MG/1
10 TABLET, FILM COATED ORAL NIGHTLY
Status: CANCELLED | OUTPATIENT
Start: 2022-01-01

## 2022-01-01 RX ORDER — GINSENG 100 MG
1 CAPSULE ORAL 2 TIMES DAILY
Qty: 28 G | Refills: 0 | Status: SHIPPED | OUTPATIENT
Start: 2022-01-01 | End: 2022-01-01 | Stop reason: SDUPTHER

## 2022-01-01 RX ORDER — FUROSEMIDE 20 MG/1
20 TABLET ORAL 2 TIMES DAILY
Qty: 60 TABLET | Refills: 0 | Status: SHIPPED | OUTPATIENT
Start: 2022-01-01 | End: 2022-01-01

## 2022-01-01 RX ORDER — PHENOBARBITAL 64.8 MG/1
64.8 TABLET ORAL DAILY
Status: CANCELLED | OUTPATIENT
Start: 2022-01-01 | End: 2022-01-01

## 2022-01-01 RX ORDER — SODIUM CHLORIDE 9 MG/ML
125 INJECTION, SOLUTION INTRAVENOUS CONTINUOUS
Status: DISCONTINUED | OUTPATIENT
Start: 2022-01-01 | End: 2022-01-01

## 2022-01-01 RX ORDER — SODIUM CHLORIDE 0.9 % (FLUSH) 0.9 %
10 SYRINGE (ML) INJECTION AS NEEDED
Status: DISCONTINUED | OUTPATIENT
Start: 2022-01-01 | End: 2022-01-01

## 2022-01-01 RX ORDER — LACOSAMIDE 10 MG/ML
100 INJECTION, SOLUTION INTRAVENOUS EVERY 12 HOURS
Status: DISCONTINUED | OUTPATIENT
Start: 2022-01-01 | End: 2022-01-01

## 2022-01-01 RX ORDER — PHENOBARBITAL 32.4 MG/1
64.8 TABLET ORAL DAILY
Status: DISCONTINUED | OUTPATIENT
Start: 2022-01-01 | End: 2022-01-01 | Stop reason: HOSPADM

## 2022-01-01 RX ORDER — FUROSEMIDE 20 MG/1
20 TABLET ORAL 2 TIMES DAILY
Qty: 60 TABLET | Refills: 1 | Status: SHIPPED | OUTPATIENT
Start: 2022-01-01

## 2022-01-01 RX ORDER — LACOSAMIDE 100 MG/1
100 TABLET ORAL EVERY 12 HOURS SCHEDULED
Qty: 60 TABLET | Refills: 1 | Status: SHIPPED | OUTPATIENT
Start: 2022-01-01 | End: 2022-01-01

## 2022-01-01 RX ORDER — HYDROCHLOROTHIAZIDE 25 MG/1
25 TABLET ORAL DAILY
Status: DISCONTINUED | OUTPATIENT
Start: 2022-01-01 | End: 2022-01-01 | Stop reason: HOSPADM

## 2022-01-01 RX ORDER — LACOSAMIDE 100 MG/1
100 TABLET ORAL EVERY 12 HOURS SCHEDULED
Status: DISCONTINUED | OUTPATIENT
Start: 2022-01-01 | End: 2022-01-01

## 2022-01-01 RX ORDER — DONEPEZIL HYDROCHLORIDE 10 MG/1
TABLET, FILM COATED ORAL
Qty: 90 TABLET | Refills: 3 | Status: SHIPPED | OUTPATIENT
Start: 2022-01-01

## 2022-01-01 RX ORDER — LORAZEPAM 2 MG/ML
1 INJECTION INTRAMUSCULAR ONCE
Status: COMPLETED | OUTPATIENT
Start: 2022-01-01 | End: 2022-01-01

## 2022-01-01 RX ORDER — HYDROCHLOROTHIAZIDE 25 MG/1
25 TABLET ORAL DAILY
Status: CANCELLED | OUTPATIENT
Start: 2022-01-01

## 2022-01-01 RX ORDER — LISINOPRIL 20 MG/1
20 TABLET ORAL 2 TIMES DAILY
Status: DISCONTINUED | OUTPATIENT
Start: 2022-01-01 | End: 2022-01-01 | Stop reason: HOSPADM

## 2022-01-01 RX ORDER — PHENOBARBITAL 64.8 MG/1
64.8 TABLET ORAL DAILY
Qty: 90 TABLET | Refills: 1 | Status: SHIPPED | OUTPATIENT
Start: 2022-01-01

## 2022-01-01 RX ORDER — HALOPERIDOL 5 MG/ML
5 INJECTION INTRAMUSCULAR EVERY 6 HOURS PRN
Status: DISCONTINUED | OUTPATIENT
Start: 2022-01-01 | End: 2022-01-01 | Stop reason: HOSPADM

## 2022-01-01 RX ORDER — GINSENG 100 MG
1 CAPSULE ORAL 2 TIMES DAILY
Qty: 28 G | Refills: 0 | Status: ON HOLD | OUTPATIENT
Start: 2022-01-01 | End: 2023-01-01

## 2022-01-01 RX ORDER — HYDROCHLOROTHIAZIDE 25 MG/1
25 TABLET ORAL DAILY
Qty: 30 TABLET | Refills: 1 | Status: SHIPPED | OUTPATIENT
Start: 2022-01-01 | End: 2022-01-01

## 2022-01-01 RX ORDER — HYDROCHLOROTHIAZIDE 25 MG/1
25 TABLET ORAL DAILY
Qty: 30 TABLET | Refills: 1 | Status: SHIPPED | OUTPATIENT
Start: 2022-01-01 | End: 2022-01-01 | Stop reason: SDUPTHER

## 2022-01-01 RX ORDER — MAGNESIUM OXIDE 400 MG/1
400 TABLET ORAL
COMMUNITY
Start: 2022-01-01 | End: 2022-01-01

## 2022-01-01 RX ORDER — SODIUM CHLORIDE 0.9 % (FLUSH) 0.9 %
10 SYRINGE (ML) INJECTION EVERY 12 HOURS SCHEDULED
Status: DISCONTINUED | OUTPATIENT
Start: 2022-01-01 | End: 2022-01-01

## 2022-01-01 RX ORDER — ALBUTEROL SULFATE 90 UG/1
180 AEROSOL, METERED RESPIRATORY (INHALATION)
COMMUNITY
Start: 2022-01-01

## 2022-01-01 RX ORDER — FUROSEMIDE 20 MG/1
TABLET ORAL
Qty: 60 TABLET | Refills: 0 | Status: SHIPPED | OUTPATIENT
Start: 2022-01-01 | End: 2022-01-01 | Stop reason: SDUPTHER

## 2022-01-01 RX ORDER — ACETAMINOPHEN 325 MG/1
650 TABLET ORAL EVERY 6 HOURS PRN
Status: CANCELLED | OUTPATIENT
Start: 2022-01-01

## 2022-01-01 RX ORDER — LACOSAMIDE 100 MG/1
100 TABLET ORAL EVERY 12 HOURS
Qty: 60 TABLET | Refills: 2 | Status: SHIPPED | OUTPATIENT
Start: 2022-01-01 | End: 2022-01-01

## 2022-01-01 RX ORDER — LORAZEPAM 2 MG/ML
1 INJECTION INTRAMUSCULAR EVERY 4 HOURS PRN
Status: DISCONTINUED | OUTPATIENT
Start: 2022-01-01 | End: 2022-01-01

## 2022-01-01 RX ADMIN — HEPARIN SODIUM 5000 UNITS: 5000 INJECTION INTRAVENOUS; SUBCUTANEOUS at 05:17

## 2022-01-01 RX ADMIN — RIVAROXABAN 15 MG: 15 TABLET, FILM COATED ORAL at 17:26

## 2022-01-01 RX ADMIN — LISINOPRIL 20 MG: 20 TABLET ORAL at 09:34

## 2022-01-01 RX ADMIN — HYDROCHLOROTHIAZIDE 25 MG: 25 TABLET ORAL at 08:53

## 2022-01-01 RX ADMIN — LISINOPRIL 20 MG: 20 TABLET ORAL at 21:56

## 2022-01-01 RX ADMIN — LISINOPRIL 20 MG: 20 TABLET ORAL at 07:51

## 2022-01-01 RX ADMIN — LISINOPRIL 20 MG: 20 TABLET ORAL at 09:35

## 2022-01-01 RX ADMIN — ACETAMINOPHEN 325MG 650 MG: 325 TABLET ORAL at 07:31

## 2022-01-01 RX ADMIN — OXYCODONE 5 MG: 5 TABLET ORAL at 07:43

## 2022-01-01 RX ADMIN — LACOSAMIDE 100 MG: 100 TABLET, FILM COATED ORAL at 19:57

## 2022-01-01 RX ADMIN — HYDROCHLOROTHIAZIDE 25 MG: 25 TABLET ORAL at 09:35

## 2022-01-01 RX ADMIN — ACETAMINOPHEN 650 MG: 325 TABLET, FILM COATED ORAL at 06:32

## 2022-01-01 RX ADMIN — DONEPEZIL HYDROCHLORIDE 10 MG: 10 TABLET, FILM COATED ORAL at 20:26

## 2022-01-01 RX ADMIN — LEVETIRACETAM 500 MG: 100 INJECTION, SOLUTION INTRAVENOUS at 00:47

## 2022-01-01 RX ADMIN — DONEPEZIL HYDROCHLORIDE 10 MG: 10 TABLET, FILM COATED ORAL at 20:37

## 2022-01-01 RX ADMIN — OXYCODONE 5 MG: 5 TABLET ORAL at 20:26

## 2022-01-01 RX ADMIN — LACOSAMIDE 100 MG: 10 INJECTION INTRAVENOUS at 15:19

## 2022-01-01 RX ADMIN — DONEPEZIL HYDROCHLORIDE 10 MG: 10 TABLET, FILM COATED ORAL at 20:30

## 2022-01-01 RX ADMIN — LISINOPRIL 20 MG: 20 TABLET ORAL at 20:58

## 2022-01-01 RX ADMIN — LORAZEPAM 1 MG: 2 INJECTION INTRAMUSCULAR; INTRAVENOUS at 19:13

## 2022-01-01 RX ADMIN — HYDROCHLOROTHIAZIDE 25 MG: 25 TABLET ORAL at 08:22

## 2022-01-01 RX ADMIN — LISINOPRIL 20 MG: 20 TABLET ORAL at 08:08

## 2022-01-01 RX ADMIN — LISINOPRIL 20 MG: 20 TABLET ORAL at 20:50

## 2022-01-01 RX ADMIN — PHENOBARBITAL 64.8 MG: 32.4 TABLET ORAL at 08:55

## 2022-01-01 RX ADMIN — ACETAMINOPHEN 325MG 650 MG: 325 TABLET ORAL at 01:41

## 2022-01-01 RX ADMIN — Medication 10 ML: at 21:24

## 2022-01-01 RX ADMIN — LACOSAMIDE 100 MG: 100 TABLET, FILM COATED ORAL at 07:51

## 2022-01-01 RX ADMIN — SODIUM CHLORIDE 500 ML: 9 INJECTION, SOLUTION INTRAVENOUS at 20:05

## 2022-01-01 RX ADMIN — LACOSAMIDE 100 MG: 100 TABLET, FILM COATED ORAL at 20:58

## 2022-01-01 RX ADMIN — DEXTROSE AND SODIUM CHLORIDE 75 ML/HR: 5; 900 INJECTION, SOLUTION INTRAVENOUS at 17:16

## 2022-01-01 RX ADMIN — PHENOBARBITAL 64.8 MG: 32.4 TABLET ORAL at 07:39

## 2022-01-01 RX ADMIN — PHENOBARBITAL 64.8 MG: 64.8 TABLET ORAL at 13:10

## 2022-01-01 RX ADMIN — ACETAMINOPHEN 325MG 650 MG: 325 TABLET ORAL at 22:19

## 2022-01-01 RX ADMIN — LISINOPRIL 20 MG: 20 TABLET ORAL at 20:21

## 2022-01-01 RX ADMIN — LACOSAMIDE 100 MG: 100 TABLET, FILM COATED ORAL at 08:18

## 2022-01-01 RX ADMIN — LACOSAMIDE 100 MG: 100 TABLET, FILM COATED ORAL at 20:24

## 2022-01-01 RX ADMIN — CEFTRIAXONE SODIUM 1 G: 1 INJECTION, POWDER, FOR SOLUTION INTRAMUSCULAR; INTRAVENOUS at 23:56

## 2022-01-01 RX ADMIN — PHENOBARBITAL 64.8 MG: 32.4 TABLET ORAL at 09:35

## 2022-01-01 RX ADMIN — HYDROCHLOROTHIAZIDE 25 MG: 25 TABLET ORAL at 07:39

## 2022-01-01 RX ADMIN — ACETAMINOPHEN 325MG 650 MG: 325 TABLET ORAL at 17:08

## 2022-01-01 RX ADMIN — LACOSAMIDE 100 MG: 100 TABLET, FILM COATED ORAL at 07:43

## 2022-01-01 RX ADMIN — LACOSAMIDE 100 MG: 100 TABLET, FILM COATED ORAL at 09:35

## 2022-01-01 RX ADMIN — LACOSAMIDE 100 MG: 100 TABLET, FILM COATED ORAL at 08:25

## 2022-01-01 RX ADMIN — LACOSAMIDE 100 MG: 100 TABLET, FILM COATED ORAL at 20:56

## 2022-01-01 RX ADMIN — LACOSAMIDE 100 MG: 100 TABLET, FILM COATED ORAL at 08:07

## 2022-01-01 RX ADMIN — DONEPEZIL HYDROCHLORIDE 10 MG: 10 TABLET, FILM COATED ORAL at 21:09

## 2022-01-01 RX ADMIN — LACOSAMIDE 100 MG: 100 TABLET, FILM COATED ORAL at 07:31

## 2022-01-01 RX ADMIN — PHENOBARBITAL 64.8 MG: 64.8 TABLET ORAL at 08:22

## 2022-01-01 RX ADMIN — LACOSAMIDE 100 MG: 100 TABLET, FILM COATED ORAL at 08:08

## 2022-01-01 RX ADMIN — LACOSAMIDE 100 MG: 100 TABLET, FILM COATED ORAL at 20:37

## 2022-01-01 RX ADMIN — HEPARIN SODIUM 5000 UNITS: 5000 INJECTION INTRAVENOUS; SUBCUTANEOUS at 21:01

## 2022-01-01 RX ADMIN — RIVAROXABAN 15 MG: 15 TABLET, FILM COATED ORAL at 17:10

## 2022-01-01 RX ADMIN — HYDROCHLOROTHIAZIDE 25 MG: 25 TABLET ORAL at 08:02

## 2022-01-01 RX ADMIN — HYDROCHLOROTHIAZIDE 25 MG: 25 TABLET ORAL at 07:31

## 2022-01-01 RX ADMIN — LACOSAMIDE 100 MG: 100 TABLET, FILM COATED ORAL at 08:31

## 2022-01-01 RX ADMIN — Medication 10 ML: at 20:45

## 2022-01-01 RX ADMIN — HYDROCHLOROTHIAZIDE 25 MG: 25 TABLET ORAL at 08:31

## 2022-01-01 RX ADMIN — HYDROCHLOROTHIAZIDE 25 MG: 25 TABLET ORAL at 08:18

## 2022-01-01 RX ADMIN — PHENOBARBITAL 64.8 MG: 64.8 TABLET ORAL at 08:02

## 2022-01-01 RX ADMIN — RIVAROXABAN 15 MG: 15 TABLET, FILM COATED ORAL at 08:02

## 2022-01-01 RX ADMIN — SODIUM CHLORIDE 1000 ML: 9 INJECTION, SOLUTION INTRAVENOUS at 20:48

## 2022-01-01 RX ADMIN — LEVETIRACETAM 500 MG: 100 INJECTION, SOLUTION INTRAVENOUS at 13:25

## 2022-01-01 RX ADMIN — LISINOPRIL 20 MG: 20 TABLET ORAL at 20:08

## 2022-01-01 RX ADMIN — LISINOPRIL 20 MG: 20 TABLET ORAL at 20:24

## 2022-01-01 RX ADMIN — LISINOPRIL 20 MG: 20 TABLET ORAL at 09:00

## 2022-01-01 RX ADMIN — LISINOPRIL 20 MG: 20 TABLET ORAL at 08:53

## 2022-01-01 RX ADMIN — PHENOBARBITAL 64.8 MG: 64.8 TABLET ORAL at 17:42

## 2022-01-01 RX ADMIN — DONEPEZIL HYDROCHLORIDE 10 MG: 10 TABLET, FILM COATED ORAL at 22:19

## 2022-01-01 RX ADMIN — HYDRALAZINE HYDROCHLORIDE 10 MG: 20 INJECTION INTRAMUSCULAR; INTRAVENOUS at 22:55

## 2022-01-01 RX ADMIN — LACOSAMIDE 100 MG: 10 INJECTION INTRAVENOUS at 17:10

## 2022-01-01 RX ADMIN — HYDROCHLOROTHIAZIDE 25 MG: 25 TABLET ORAL at 08:08

## 2022-01-01 RX ADMIN — DONEPEZIL HYDROCHLORIDE 10 MG: 10 TABLET, FILM COATED ORAL at 21:02

## 2022-01-01 RX ADMIN — LISINOPRIL 20 MG: 20 TABLET ORAL at 08:22

## 2022-01-01 RX ADMIN — DEXTROSE AND SODIUM CHLORIDE 75 ML/HR: 5; 900 INJECTION, SOLUTION INTRAVENOUS at 10:37

## 2022-01-01 RX ADMIN — LISINOPRIL 20 MG: 20 TABLET ORAL at 20:23

## 2022-01-01 RX ADMIN — RIVAROXABAN 15 MG: 15 TABLET, FILM COATED ORAL at 09:34

## 2022-01-01 RX ADMIN — LACOSAMIDE 100 MG: 100 TABLET, FILM COATED ORAL at 20:28

## 2022-01-01 RX ADMIN — LISINOPRIL 20 MG: 20 TABLET ORAL at 12:22

## 2022-01-01 RX ADMIN — HYDRALAZINE HYDROCHLORIDE 10 MG: 20 INJECTION INTRAMUSCULAR; INTRAVENOUS at 22:05

## 2022-01-01 RX ADMIN — LISINOPRIL 20 MG: 20 TABLET ORAL at 08:18

## 2022-01-01 RX ADMIN — Medication 10 ML: at 21:16

## 2022-01-01 RX ADMIN — RIVAROXABAN 15 MG: 15 TABLET, FILM COATED ORAL at 17:06

## 2022-01-01 RX ADMIN — LISINOPRIL 20 MG: 20 TABLET ORAL at 09:43

## 2022-01-01 RX ADMIN — RIVAROXABAN 15 MG: 15 TABLET, FILM COATED ORAL at 09:43

## 2022-01-01 RX ADMIN — PHENOBARBITAL 64.8 MG: 32.4 TABLET ORAL at 08:08

## 2022-01-01 RX ADMIN — ACETAMINOPHEN 325MG 650 MG: 325 TABLET ORAL at 09:37

## 2022-01-01 RX ADMIN — Medication 10 ML: at 21:10

## 2022-01-01 RX ADMIN — DONEPEZIL HYDROCHLORIDE 10 MG: 10 TABLET, FILM COATED ORAL at 20:24

## 2022-01-01 RX ADMIN — LACOSAMIDE 100 MG: 100 TABLET, FILM COATED ORAL at 21:56

## 2022-01-01 RX ADMIN — PHENOBARBITAL 64.8 MG: 32.4 TABLET ORAL at 14:01

## 2022-01-01 RX ADMIN — LACOSAMIDE 100 MG: 100 TABLET, FILM COATED ORAL at 22:20

## 2022-01-01 RX ADMIN — DEXTROSE AND SODIUM CHLORIDE 75 ML/HR: 5; 900 INJECTION, SOLUTION INTRAVENOUS at 10:35

## 2022-01-01 RX ADMIN — ACETAMINOPHEN 325MG 650 MG: 325 TABLET ORAL at 22:20

## 2022-01-01 RX ADMIN — RIVAROXABAN 15 MG: 15 TABLET, FILM COATED ORAL at 08:22

## 2022-01-01 RX ADMIN — ACETAMINOPHEN 325MG 650 MG: 325 TABLET ORAL at 21:41

## 2022-01-01 RX ADMIN — Medication 10 ML: at 21:45

## 2022-01-01 RX ADMIN — LISINOPRIL 20 MG: 20 TABLET ORAL at 09:16

## 2022-01-01 RX ADMIN — PHENOBARBITAL 64.8 MG: 64.8 TABLET ORAL at 08:56

## 2022-01-01 RX ADMIN — DONEPEZIL HYDROCHLORIDE 10 MG: 10 TABLET, FILM COATED ORAL at 20:58

## 2022-01-01 RX ADMIN — ACETAMINOPHEN 325MG 650 MG: 325 TABLET ORAL at 08:31

## 2022-01-01 RX ADMIN — LACOSAMIDE 100 MG: 100 TABLET, FILM COATED ORAL at 09:50

## 2022-01-01 RX ADMIN — LACOSAMIDE 100 MG: 100 TABLET, FILM COATED ORAL at 21:09

## 2022-01-01 RX ADMIN — PHENOBARBITAL 64.8 MG: 32.4 TABLET ORAL at 07:31

## 2022-01-01 RX ADMIN — HEPARIN SODIUM 5000 UNITS: 5000 INJECTION INTRAVENOUS; SUBCUTANEOUS at 06:31

## 2022-01-01 RX ADMIN — RIVAROXABAN 15 MG: 15 TABLET, FILM COATED ORAL at 17:08

## 2022-01-01 RX ADMIN — HYDROCHLOROTHIAZIDE 25 MG: 25 TABLET ORAL at 08:07

## 2022-01-01 RX ADMIN — HALOPERIDOL LACTATE 5 MG: 5 INJECTION, SOLUTION INTRAMUSCULAR at 23:57

## 2022-01-01 RX ADMIN — HYDROCHLOROTHIAZIDE 25 MG: 25 TABLET ORAL at 07:51

## 2022-01-01 RX ADMIN — PHENOBARBITAL 64.8 MG: 64.8 TABLET ORAL at 11:20

## 2022-01-01 RX ADMIN — Medication 10 ML: at 21:17

## 2022-01-01 RX ADMIN — LISINOPRIL 20 MG: 20 TABLET ORAL at 08:31

## 2022-01-01 RX ADMIN — RIVAROXABAN 15 MG: 15 TABLET, FILM COATED ORAL at 17:16

## 2022-01-01 RX ADMIN — PHENOBARBITAL SODIUM: 130 INJECTION INTRAMUSCULAR; INTRAVENOUS at 20:15

## 2022-01-01 RX ADMIN — HYDROCHLOROTHIAZIDE 25 MG: 25 TABLET ORAL at 09:33

## 2022-01-01 RX ADMIN — HYDRALAZINE HYDROCHLORIDE 10 MG: 20 INJECTION INTRAMUSCULAR; INTRAVENOUS at 00:24

## 2022-01-01 RX ADMIN — LACOSAMIDE 100 MG: 100 TABLET, FILM COATED ORAL at 08:55

## 2022-01-01 RX ADMIN — LACOSAMIDE 100 MG: 100 TABLET, FILM COATED ORAL at 08:02

## 2022-01-01 RX ADMIN — HYDRALAZINE HYDROCHLORIDE 10 MG: 20 INJECTION INTRAMUSCULAR; INTRAVENOUS at 10:51

## 2022-01-01 RX ADMIN — DONEPEZIL HYDROCHLORIDE 10 MG: 10 TABLET, FILM COATED ORAL at 21:24

## 2022-01-01 RX ADMIN — Medication 10 ML: at 20:24

## 2022-01-01 RX ADMIN — ACETAMINOPHEN 325MG 650 MG: 325 TABLET ORAL at 18:35

## 2022-01-01 RX ADMIN — LACOSAMIDE 100 MG: 100 TABLET, FILM COATED ORAL at 20:50

## 2022-01-01 RX ADMIN — HEPARIN SODIUM 5000 UNITS: 5000 INJECTION INTRAVENOUS; SUBCUTANEOUS at 06:17

## 2022-01-01 RX ADMIN — LACOSAMIDE 100 MG: 10 INJECTION INTRAVENOUS at 02:49

## 2022-01-01 RX ADMIN — HALOPERIDOL LACTATE 5 MG: 5 INJECTION, SOLUTION INTRAMUSCULAR at 06:21

## 2022-01-01 RX ADMIN — Medication 10 ML: at 08:52

## 2022-01-01 RX ADMIN — RIVAROXABAN 15 MG: 15 TABLET, FILM COATED ORAL at 17:31

## 2022-01-01 RX ADMIN — ACETAMINOPHEN 650 MG: 325 TABLET, FILM COATED ORAL at 05:40

## 2022-01-01 RX ADMIN — RIVAROXABAN 15 MG: 15 TABLET, FILM COATED ORAL at 17:09

## 2022-01-01 RX ADMIN — ACETAMINOPHEN 325MG 650 MG: 325 TABLET ORAL at 15:09

## 2022-01-01 RX ADMIN — PHENOBARBITAL 64.8 MG: 64.8 TABLET ORAL at 10:50

## 2022-01-01 RX ADMIN — LACOSAMIDE 100 MG: 100 TABLET, FILM COATED ORAL at 08:22

## 2022-01-01 RX ADMIN — HYDROCHLOROTHIAZIDE 25 MG: 25 TABLET ORAL at 08:25

## 2022-01-01 RX ADMIN — DEXTROSE AND SODIUM CHLORIDE 75 ML/HR: 5; 900 INJECTION, SOLUTION INTRAVENOUS at 21:17

## 2022-01-01 RX ADMIN — LACOSAMIDE 100 MG: 100 TABLET, FILM COATED ORAL at 08:36

## 2022-01-01 RX ADMIN — LISINOPRIL 20 MG: 20 TABLET ORAL at 08:25

## 2022-01-01 RX ADMIN — LACOSAMIDE 100 MG: 100 TABLET, FILM COATED ORAL at 09:33

## 2022-01-01 RX ADMIN — LACOSAMIDE 100 MG: 100 TABLET, FILM COATED ORAL at 20:21

## 2022-01-01 RX ADMIN — DONEPEZIL HYDROCHLORIDE 10 MG: 10 TABLET, FILM COATED ORAL at 20:22

## 2022-01-01 RX ADMIN — Medication 10 ML: at 23:08

## 2022-01-01 RX ADMIN — HEPARIN SODIUM 5000 UNITS: 5000 INJECTION INTRAVENOUS; SUBCUTANEOUS at 23:08

## 2022-01-01 RX ADMIN — Medication 10 ML: at 08:22

## 2022-01-01 RX ADMIN — Medication 10 ML: at 21:00

## 2022-01-01 RX ADMIN — RIVAROXABAN 15 MG: 15 TABLET, FILM COATED ORAL at 10:50

## 2022-01-01 RX ADMIN — LISINOPRIL 20 MG: 20 TABLET ORAL at 07:31

## 2022-01-01 RX ADMIN — HYDRALAZINE HYDROCHLORIDE 10 MG: 20 INJECTION INTRAMUSCULAR; INTRAVENOUS at 14:19

## 2022-01-01 RX ADMIN — RIVAROXABAN 15 MG: 15 TABLET, FILM COATED ORAL at 12:22

## 2022-01-01 RX ADMIN — DEXTROSE AND SODIUM CHLORIDE 75 ML/HR: 5; 900 INJECTION, SOLUTION INTRAVENOUS at 23:55

## 2022-01-01 RX ADMIN — RIVAROXABAN 15 MG: 15 TABLET, FILM COATED ORAL at 09:00

## 2022-01-01 RX ADMIN — LACOSAMIDE 100 MG: 100 TABLET, FILM COATED ORAL at 20:30

## 2022-01-01 RX ADMIN — DEXTROSE AND SODIUM CHLORIDE 75 ML/HR: 5; 900 INJECTION, SOLUTION INTRAVENOUS at 04:22

## 2022-01-01 RX ADMIN — PHENOBARBITAL 64.8 MG: 32.4 TABLET ORAL at 07:51

## 2022-01-01 RX ADMIN — ACETAMINOPHEN 650 MG: 325 TABLET, FILM COATED ORAL at 16:08

## 2022-01-01 RX ADMIN — IOPAMIDOL 150 ML: 755 INJECTION, SOLUTION INTRAVENOUS at 19:31

## 2022-01-01 RX ADMIN — HYDRALAZINE HYDROCHLORIDE 10 MG: 20 INJECTION INTRAMUSCULAR; INTRAVENOUS at 05:07

## 2022-01-01 RX ADMIN — RIVAROXABAN 15 MG: 15 TABLET, FILM COATED ORAL at 08:53

## 2022-01-01 RX ADMIN — ACETAMINOPHEN 325MG 650 MG: 325 TABLET ORAL at 22:22

## 2022-01-01 RX ADMIN — HYDROCHLOROTHIAZIDE 25 MG: 25 TABLET ORAL at 21:56

## 2022-01-01 RX ADMIN — ACETAMINOPHEN 650 MG: 325 TABLET, FILM COATED ORAL at 20:58

## 2022-01-01 RX ADMIN — LACOSAMIDE 100 MG: 100 TABLET, FILM COATED ORAL at 20:40

## 2022-01-01 RX ADMIN — LISINOPRIL 20 MG: 20 TABLET ORAL at 20:37

## 2022-01-01 RX ADMIN — DONEPEZIL HYDROCHLORIDE 10 MG: 10 TABLET, FILM COATED ORAL at 19:57

## 2022-01-01 RX ADMIN — LACOSAMIDE 100 MG: 100 TABLET, FILM COATED ORAL at 09:43

## 2022-01-01 RX ADMIN — ACETAMINOPHEN 325MG 650 MG: 325 TABLET ORAL at 20:50

## 2022-01-01 RX ADMIN — ACETAMINOPHEN 650 MG: 325 TABLET, FILM COATED ORAL at 09:23

## 2022-01-01 RX ADMIN — LISINOPRIL 20 MG: 20 TABLET ORAL at 20:40

## 2022-01-01 RX ADMIN — LISINOPRIL 20 MG: 20 TABLET ORAL at 21:09

## 2022-01-01 RX ADMIN — PHENOBARBITAL 64.8 MG: 32.4 TABLET ORAL at 08:07

## 2022-01-01 RX ADMIN — HYDROCHLOROTHIAZIDE 25 MG: 25 TABLET ORAL at 07:44

## 2022-01-01 RX ADMIN — DONEPEZIL HYDROCHLORIDE 10 MG: 10 TABLET, FILM COATED ORAL at 20:50

## 2022-01-01 RX ADMIN — ACETAMINOPHEN 325MG 650 MG: 325 TABLET ORAL at 00:10

## 2022-01-01 RX ADMIN — Medication 10 ML: at 09:00

## 2022-01-01 RX ADMIN — ACETAMINOPHEN 325MG 650 MG: 325 TABLET ORAL at 03:44

## 2022-01-01 RX ADMIN — DONEPEZIL HYDROCHLORIDE 10 MG: 10 TABLET, FILM COATED ORAL at 20:21

## 2022-01-01 RX ADMIN — ACETAMINOPHEN 325MG 650 MG: 325 TABLET ORAL at 07:53

## 2022-01-01 RX ADMIN — LISINOPRIL 20 MG: 20 TABLET ORAL at 08:37

## 2022-01-01 RX ADMIN — LACOSAMIDE 100 MG: 100 TABLET, FILM COATED ORAL at 21:02

## 2022-01-01 RX ADMIN — PHENOBARBITAL 64.8 MG: 32.4 TABLET ORAL at 07:43

## 2022-01-01 RX ADMIN — LISINOPRIL 20 MG: 20 TABLET ORAL at 07:44

## 2022-01-01 RX ADMIN — ACETAMINOPHEN 650 MG: 325 TABLET, FILM COATED ORAL at 21:56

## 2022-01-01 RX ADMIN — Medication 10 ML: at 13:10

## 2022-01-01 RX ADMIN — DONEPEZIL HYDROCHLORIDE 10 MG: 10 TABLET, FILM COATED ORAL at 20:56

## 2022-01-01 RX ADMIN — LACOSAMIDE 100 MG: 10 INJECTION INTRAVENOUS at 04:57

## 2022-01-01 RX ADMIN — LACOSAMIDE 100 MG: 100 TABLET, FILM COATED ORAL at 09:16

## 2022-01-01 RX ADMIN — LISINOPRIL 20 MG: 20 TABLET ORAL at 08:55

## 2022-01-01 RX ADMIN — HALOPERIDOL LACTATE 5 MG: 5 INJECTION, SOLUTION INTRAMUSCULAR at 00:37

## 2022-01-01 RX ADMIN — PHENOBARBITAL 64.8 MG: 64.8 TABLET ORAL at 09:33

## 2022-01-01 RX ADMIN — LACOSAMIDE 100 MG: 100 TABLET, FILM COATED ORAL at 20:26

## 2022-01-01 RX ADMIN — Medication 10 ML: at 09:34

## 2022-01-01 RX ADMIN — LACOSAMIDE 100 MG: 100 TABLET, FILM COATED ORAL at 20:08

## 2022-01-01 RX ADMIN — RIVAROXABAN 15 MG: 15 TABLET, FILM COATED ORAL at 18:00

## 2022-01-01 RX ADMIN — DONEPEZIL HYDROCHLORIDE 10 MG: 10 TABLET, FILM COATED ORAL at 20:25

## 2022-01-01 RX ADMIN — PHENOBARBITAL 64.8 MG: 32.4 TABLET ORAL at 08:25

## 2022-01-01 RX ADMIN — RIVAROXABAN 15 MG: 15 TABLET, FILM COATED ORAL at 17:41

## 2022-01-01 RX ADMIN — ACETAMINOPHEN 325MG 650 MG: 325 TABLET ORAL at 05:26

## 2022-01-01 RX ADMIN — PHENOBARBITAL 64.8 MG: 32.4 TABLET ORAL at 08:31

## 2022-01-01 RX ADMIN — HYDROCHLOROTHIAZIDE 25 MG: 25 TABLET ORAL at 08:37

## 2022-01-01 RX ADMIN — HEPARIN SODIUM 5000 UNITS: 5000 INJECTION INTRAVENOUS; SUBCUTANEOUS at 21:17

## 2022-01-01 RX ADMIN — ACETAMINOPHEN 325MG 650 MG: 325 TABLET ORAL at 18:02

## 2022-01-01 RX ADMIN — LACOSAMIDE 100 MG: 100 TABLET, FILM COATED ORAL at 07:39

## 2022-01-01 RX ADMIN — LISINOPRIL 20 MG: 20 TABLET ORAL at 22:19

## 2022-01-01 RX ADMIN — SODIUM CHLORIDE 125 ML/HR: 9 INJECTION, SOLUTION INTRAVENOUS at 01:57

## 2022-01-01 RX ADMIN — HYDROCHLOROTHIAZIDE 25 MG: 25 TABLET ORAL at 08:55

## 2022-01-01 RX ADMIN — HYDROCHLOROTHIAZIDE 25 MG: 25 TABLET ORAL at 09:50

## 2022-01-01 RX ADMIN — LISINOPRIL 20 MG: 20 TABLET ORAL at 21:24

## 2022-01-01 RX ADMIN — HEPARIN SODIUM 5000 UNITS: 5000 INJECTION INTRAVENOUS; SUBCUTANEOUS at 15:19

## 2022-01-01 RX ADMIN — LISINOPRIL 20 MG: 20 TABLET ORAL at 08:02

## 2022-01-01 RX ADMIN — LISINOPRIL 20 MG: 20 TABLET ORAL at 20:28

## 2022-01-01 RX ADMIN — PHENOBARBITAL 64.8 MG: 32.4 TABLET ORAL at 08:18

## 2022-01-01 RX ADMIN — SODIUM CHLORIDE 125 ML/HR: 9 INJECTION, SOLUTION INTRAVENOUS at 21:25

## 2022-01-01 RX ADMIN — LISINOPRIL 20 MG: 20 TABLET ORAL at 20:56

## 2022-01-01 RX ADMIN — HYDROCHLOROTHIAZIDE 25 MG: 25 TABLET ORAL at 09:43

## 2022-01-01 RX ADMIN — PHENOBARBITAL 64.8 MG: 32.4 TABLET ORAL at 08:36

## 2022-01-01 RX ADMIN — ACETAMINOPHEN 325MG 650 MG: 325 TABLET ORAL at 08:07

## 2022-01-01 RX ADMIN — LISINOPRIL 20 MG: 20 TABLET ORAL at 09:51

## 2022-01-01 RX ADMIN — PHENOBARBITAL 64.8 MG: 64.8 TABLET ORAL at 10:56

## 2022-01-01 RX ADMIN — OXYCODONE 5 MG: 5 TABLET ORAL at 23:27

## 2022-01-01 RX ADMIN — LACOSAMIDE 100 MG: 100 TABLET, FILM COATED ORAL at 09:00

## 2022-01-01 RX ADMIN — PHENOBARBITAL 64.8 MG: 64.8 TABLET ORAL at 09:00

## 2022-01-01 RX ADMIN — LISINOPRIL 20 MG: 20 TABLET ORAL at 10:50

## 2022-01-01 RX ADMIN — DONEPEZIL HYDROCHLORIDE 10 MG: 10 TABLET, FILM COATED ORAL at 20:08

## 2022-01-01 RX ADMIN — RIVAROXABAN 15 MG: 15 TABLET, FILM COATED ORAL at 16:30

## 2022-01-01 RX ADMIN — Medication 10 ML: at 20:37

## 2022-01-01 RX ADMIN — RIVAROXABAN 15 MG: 15 TABLET, FILM COATED ORAL at 17:33

## 2022-01-01 RX ADMIN — Medication 10 ML: at 08:51

## 2022-01-01 RX ADMIN — LACOSAMIDE 100 MG: 100 TABLET, FILM COATED ORAL at 21:24

## 2022-01-01 RX ADMIN — ACETAMINOPHEN 650 MG: 325 TABLET, FILM COATED ORAL at 21:09

## 2022-01-01 RX ADMIN — Medication 10 ML: at 09:16

## 2022-01-01 RX ADMIN — HEPARIN SODIUM 5000 UNITS: 5000 INJECTION INTRAVENOUS; SUBCUTANEOUS at 16:27

## 2022-01-01 RX ADMIN — ACETAMINOPHEN 325MG 650 MG: 325 TABLET ORAL at 18:07

## 2022-01-01 RX ADMIN — RIVAROXABAN 15 MG: 15 TABLET, FILM COATED ORAL at 16:56

## 2022-01-01 RX ADMIN — RIVAROXABAN 15 MG: 15 TABLET, FILM COATED ORAL at 09:16

## 2022-01-01 RX ADMIN — Medication 10 ML: at 20:08

## 2022-01-01 RX ADMIN — Medication 10 ML: at 09:44

## 2022-01-01 RX ADMIN — ACETAMINOPHEN 325MG 650 MG: 325 TABLET ORAL at 20:40

## 2022-01-01 RX ADMIN — DONEPEZIL HYDROCHLORIDE 10 MG: 10 TABLET, FILM COATED ORAL at 20:40

## 2022-01-01 RX ADMIN — DONEPEZIL HYDROCHLORIDE 10 MG: 10 TABLET, FILM COATED ORAL at 20:28

## 2022-01-01 RX ADMIN — ACETAMINOPHEN 650 MG: 325 TABLET, FILM COATED ORAL at 20:08

## 2022-01-01 RX ADMIN — RIVAROXABAN 15 MG: 15 TABLET, FILM COATED ORAL at 17:07

## 2022-01-01 RX ADMIN — PHENOBARBITAL 64.8 MG: 64.8 TABLET ORAL at 09:43

## 2022-01-01 RX ADMIN — DONEPEZIL HYDROCHLORIDE 10 MG: 10 TABLET, FILM COATED ORAL at 21:56

## 2022-01-01 RX ADMIN — LACOSAMIDE 100 MG: 100 TABLET, FILM COATED ORAL at 08:53

## 2022-01-01 RX ADMIN — LISINOPRIL 20 MG: 20 TABLET ORAL at 07:39

## 2022-01-27 ENCOUNTER — OFFICE VISIT (OUTPATIENT)
Dept: FAMILY MEDICINE CLINIC | Facility: CLINIC | Age: 87
End: 2022-01-27

## 2022-01-27 VITALS
HEIGHT: 65 IN | OXYGEN SATURATION: 95 % | WEIGHT: 150 LBS | BODY MASS INDEX: 24.99 KG/M2 | HEART RATE: 68 BPM | DIASTOLIC BLOOD PRESSURE: 84 MMHG | TEMPERATURE: 98 F | SYSTOLIC BLOOD PRESSURE: 126 MMHG

## 2022-01-27 DIAGNOSIS — I10 PRIMARY HYPERTENSION: ICD-10-CM

## 2022-01-27 DIAGNOSIS — Z00.00 MEDICARE ANNUAL WELLNESS VISIT, SUBSEQUENT: Primary | ICD-10-CM

## 2022-01-27 PROBLEM — H91.90 HEARING LOSS: Status: ACTIVE | Noted: 2022-01-27

## 2022-01-27 PROBLEM — R60.0 LOCALIZED EDEMA: Status: ACTIVE | Noted: 2022-01-13

## 2022-01-27 PROCEDURE — 1159F MED LIST DOCD IN RCRD: CPT | Performed by: INTERNAL MEDICINE

## 2022-01-27 PROCEDURE — 1170F FXNL STATUS ASSESSED: CPT | Performed by: INTERNAL MEDICINE

## 2022-01-27 PROCEDURE — G0439 PPPS, SUBSEQ VISIT: HCPCS | Performed by: INTERNAL MEDICINE

## 2022-01-27 NOTE — PATIENT INSTRUCTIONS
Medicare Wellness  Personal Prevention Plan of Service     Date of Office Visit:  2022  Encounter Provider:  Jose Bingham MD  Place of Service:  [facility]  Patient Name: Alireza Carreon  :  1935    As part of the Medicare Wellness portion of your visit today, we are providing you with this personalized preventive plan of services (PPPS). This plan is based upon recommendations of the [place] Preventive Services Task Force (USPSTF) and the Advisory Committee on Immunization Practices (ACIP).    This lists the preventive care services that should be considered, and provides dates of when you are due. Items listed as completed are up-to-date and do not require any further intervention.    Health Maintenance   Topic Date Due   • ZOSTER VACCINE (2 of 3) 07/15/2015   • LIPID PANEL  2022   • ANNUAL WELLNESS VISIT  2023   • TDAP/TD VACCINES (2 - Td or Tdap) 2029   • COVID-19 Vaccine  Completed   • INFLUENZA VACCINE  Completed   • Pneumococcal Vaccine 65+  Completed       No orders of the defined types were placed in this encounter.      Return in about 6 months (around 2022) for Next scheduled follow up.

## 2022-01-27 NOTE — PROGRESS NOTES
Subsequent Medicare Wellness Visit   The ABC's of the Annual Wellness Visit    Chief Complaint   Patient presents with   • Annual Exam       HPI:  Alireza Carreon, -1935, is a 86 y.o. male who presents for a Subsequent Medicare Wellness Visit.    Patient seeing podiatry Dr Sim for left ankle pain and right bunion.    Follow-up for hypertension and atrial fib.  Currently has been feeling well and asymptomatic without any headaches, vision changes, cough, chest pain, shortness of breath, swelling, focal neurologic deficit, memory loss or syncope.  Has been taking the medications regularly and adherent with the regimen of xarelto 15 mg daily, lisinopril 20 mg daily and torsemide 10 mg daily.  Denies medication side effects and no significant interval events.       Follow-up for cholesterol.  Currently, has been feeling well without any myalgias, muscle aches, weakness, numbness, chest pain, short of breath or other issues.  Currently, is adherent with medication regimen of Zetia 10 mg daily and denies medication side effects. Is due for lab follow-up.     Patient has had some history of memory problems which is mostly concerned by the wife.  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    Recent Hospitalizations:  No hospitalization(s) within the last year..    Current Medical Providers:  Patient Care Team:  Jose Bingham MD as PCP - General (Internal Medicine)  Yennifer Ruiz MD as Consulting Physician (Cardiology)  Cristian Taylor OD (Optometry)  PARKER Wheeler MD as Consulting Physician (Ophthalmology)  Obdulio Morelos MD as Consulting Physician (Hematology and Oncology)  Zahira Gee MD as Referring Physician (Neurology)  Sanya Sim Jr., DPM as Consulting Physician (Podiatry)    Health Habits and Functional and Cognitive Screening and Depression Screening:  Functional & Cognitive Status 2022    Do you have difficulty preparing food and eating? No   Do you have difficulty bathing yourself, getting dressed or grooming yourself? No   Do you have difficulty using the toilet? No   Do you have difficulty moving around from place to place? No   Do you have trouble with steps or getting out of a bed or a chair? No   Current Diet Well Balanced Diet   Dental Exam Up to date   Eye Exam Up to date   Exercise (times per week) 0 times per week   Current Exercises Include -   Current Exercise Activities Include -   Do you need help using the phone?  No   Are you deaf or do you have serious difficulty hearing?  Yes   Do you need help with transportation? No   Do you need help shopping? No   Do you need help preparing meals?  No   Do you need help with housework?  No   Do you need help with laundry? No   Do you need help taking your medications? No   Do you need help managing money? No   Do you ever drive or ride in a car without wearing a seat belt? No   Have you felt unusual stress, anger or loneliness in the last month? No   Who do you live with? Spouse   If you need help, do you have trouble finding someone available to you? No   Have you been bothered in the last four weeks by sexual problems? No   Do you have difficulty concentrating, remembering or making decisions? No   Patient with chronic hearing issues using hearing aids and stable.    Compared to one year ago, the patient feels his physical health is the same and his mental health is the same.    Depression Screen:  PHQ-2/PHQ-9 Depression Screening 1/27/2022   Little interest or pleasure in doing things 0   Feeling down, depressed, or hopeless 0   Trouble falling or staying asleep, or sleeping too much 0   Feeling tired or having little energy 0   Poor appetite or overeating 0   Feeling bad about yourself - or that you are a failure or have let yourself or your family down 0   Trouble concentrating on things, such as reading the newspaper or watching  television 0   Moving or speaking so slowly that other people could have noticed. Or the opposite - being so fidgety or restless that you have been moving around a lot more than usual 0   Thoughts that you would be better off dead, or of hurting yourself in some way 0   Total Score 0   If you checked off any problems, how difficult have these problems made it for you to do your work, take care of things at home, or get along with other people? -       Falls Risk Assessment:  MANDA Fall Risk Clinician Key Questions   Have you fallen in the past year?: No  Do you feel unsteady with walking?: Yes  Are you worried about falling?: No  Using cane for stability    Past Medical/Family/Social History:  The following portions of the patient's history were reviewed and updated as appropriate: allergies, current medications, past family history, past medical history, past social history, past surgical history and problem list.    Allergies   Allergen Reactions   • Codeine Itching   • Ezetimibe Rash   • Hydrocodone-Acetaminophen Other (See Comments)     Doesn't remember    • Levetiracetam Dizziness   • Lorazepam Unknown - Low Severity     Doesn't remember    • Phenytoin Itching   • Pseudoephedrine Dizziness   • Repatha [Evolocumab] Rash   • Statins Itching   • Zolpidem Unknown - Low Severity     Doesn't remember          Current Outpatient Medications:   •  donepezil (ARICEPT) 10 MG tablet, Take 1 tablet by mouth Every Night., Disp: 90 tablet, Rfl: 3  •  ezetimibe (ZETIA) 10 MG tablet, Take 10 mg by mouth Daily., Disp: , Rfl:   •  lisinopril (PRINIVIL,ZESTRIL) 20 MG tablet, TAKE ONE TABLET BY MOUTH TWICE A DAY, Disp: 180 tablet, Rfl: 1  •  Magnesium 400 MG tablet, Take 2 tablets by mouth Every Night., Disp: , Rfl:   •  PHENobarbital (LUMINAL) 64.8 MG tablet, TAKE ONE TABLET BY MOUTH DAILY, Disp: 90 tablet, Rfl: 0  •  Potassium (POTASSIMIN PO), Take 1 tablet by mouth Every Night., Disp: , Rfl:   •  rivaroxaban (Xarelto) 15 MG  tablet, Take 1 tablet by mouth Daily., Disp: 30 tablet, Rfl: 5  •  torsemide (DEMADEX) 10 MG tablet, Take 1 tablet by mouth Daily., Disp: 180 tablet, Rfl: 0    Aspirin use counseling: Does not need ASA (and currently is not on it)    Current medication list contains no high risk medications.  No harmful drug interactions have been identified.     Family History   Problem Relation Age of Onset   • Hyperlipidemia Mother    • Dementia Mother        Social History     Tobacco Use   • Smoking status: Former Smoker     Types: Cigarettes     Quit date: 10/30/1979     Years since quittin.2   • Smokeless tobacco: Never Used   Substance Use Topics   • Alcohol use: Yes       Past Surgical History:   Procedure Laterality Date   • ADENOIDECTOMY     • APPENDECTOMY     • CATARACT EXTRACTION, BILATERAL     • CORONARY ARTERY BYPASS GRAFT     • CORONARY ARTERY BYPASS GRAFT     • CYSTOSCOPY TRANSURETHRAL RESECTION OF PROSTATE     • HERNIA REPAIR     • OTHER SURGICAL HISTORY      carotid thromboendarterectomy   • SHOULDER SURGERY     • SKIN CANCER EXCISION         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HCC)   • Monoclonal gammopathy of unknown significance (MGUS)   • Chronic anticoagulation   • Arteriosclerosis of coronary artery   • Cerebrovascular accident (CVA) (HCC)   • Seizure disorder (HCC)   • Swelling of left lower extremity   • Hearing loss   • Localized edema       Review of Systems    Objective     Vitals:    22 1004   BP: 126/84   BP Location: Left arm   Patient Position: Sitting   Cuff Size: Adult   Pulse: 68   Temp: 98 °F (36.7 °C)   TempSrc: Temporal   SpO2: 95%   Weight: 68 kg (150 lb)  "  Height: 165.1 cm (65\")       Patient's Body mass index is 24.96 kg/m². indicating that he is overweight (BMI 25-29.9). Obesity-related health conditions include the following: hypertension, coronary heart disease, dyslipidemias and osteoarthritis. Obesity is improving with lifestyle modifications. BMI is is above average; BMI management plan is completed. We discussed portion control and increasing exercise..      No exam data present    Patient has history of dementia and is on medications    Physical Exam  Vitals and nursing note reviewed.   Constitutional:       General: He is not in acute distress.     Appearance: He is well-developed. He is not diaphoretic.   HENT:      Head: Normocephalic and atraumatic.      Right Ear: External ear normal.      Left Ear: External ear normal.      Ears:      Comments: Hearing aids in place bilaterally     Nose: Nose normal.   Eyes:      Conjunctiva/sclera: Conjunctivae normal.      Pupils: Pupils are equal, round, and reactive to light.   Neck:      Thyroid: No thyromegaly.      Trachea: No tracheal deviation.   Cardiovascular:      Rate and Rhythm: Normal rate and regular rhythm.      Heart sounds: Normal heart sounds. No murmur heard.  No friction rub. No gallop.    Pulmonary:      Effort: Pulmonary effort is normal. No respiratory distress.      Breath sounds: Normal breath sounds.   Abdominal:      General: Bowel sounds are normal.      Palpations: Abdomen is soft. There is no mass.      Tenderness: There is no abdominal tenderness. There is no guarding.   Musculoskeletal:         General: Normal range of motion.      Cervical back: Normal range of motion and neck supple.      Comments: Using one point cane.  Right foot with bout and small red nodule on the dorsal aspect of the base of the first toe.   Lymphadenopathy:      Cervical: No cervical adenopathy.   Skin:     General: Skin is warm and dry.      Capillary Refill: Capillary refill takes less than 2 seconds.      " Findings: No rash.   Neurological:      Mental Status: He is alert and oriented to person, place, and time.      Motor: No abnormal muscle tone.      Deep Tendon Reflexes: Reflexes normal.   Psychiatric:         Behavior: Behavior normal.         Thought Content: Thought content normal.         Judgment: Judgment normal.         Recent Lab Results:     Lab Results   Component Value Date    TRIG 87 08/31/2021    HDL 59 08/31/2021    VLDL 15 08/31/2021       Assessment/Plan   Age-appropriate Screening Schedule:  Refer to the list below for future screening recommendations based on patient's age, sex and/or medical conditions.      Health Maintenance   Topic Date Due   • ZOSTER VACCINE (2 of 3) 07/15/2015   • LIPID PANEL  08/31/2022   • TDAP/TD VACCINES (2 - Td or Tdap) 11/08/2029   • INFLUENZA VACCINE  Completed       Medicare Risks and Personalized Health Plan:  Advance Directive Discussion  Fall Risk  Glaucoma Risk  Immunizations Discussed/Encouraged (specific immunizations; Shingrix )      CMS-Preventive Services Quick Reference  Medicare Preventive Services Addressed:  Annual Wellness Visit (AWV)  Glaucoma screening (for individuals with diabetes mellitus, family history of glaucoma, -Americans (> or =) age 50, -Americans (> or =) age 65)    Advance Care Planning:  ACP discussion was held with the patient during this visit. Patient has an advance directive (not in EMR), copy requested.    Diagnoses and all orders for this visit:    1. Medicare annual wellness visit, subsequent (Primary)    2. Primary hypertension      Annual wellness visit reviewed with patient.  All past history, medications, social history, and problem list were reviewed.  Discussed advanced directives and living will.  Patient has living will: Living will: yes and patient will bring copy to office.  Discussed fall risk and precautions encourage removing throw rugs and using grab bars within the home and bathroom.  Will check the  labs as ordered above to evaluate the blood sugars, kidney, liver, cholesterol for screening.  Discussed flu shot recommended to get the high-dose influenza vaccine annually in the fall.  The patient has a COVID HM Topic on their chart, and they are fully vaccinated.  Covid-19, Tdap, Prevnar-13 and pneumovax-23 up to date and appropriate.  Shingrix vaccination series recommended.  Encourage follow-up with the eye doctor on annual basis for glaucoma evaluation.  Discussed weight and encouraged exercise as tolerated while following a healthy diet.     An After Visit Summary and PPPS with all of these plans were given to the patient.      Follow Up:  Return in about 6 months (around 7/27/2022) for Next scheduled follow up.           · COVID-19 Precautions - Patient was compliant in wearing a mask. When I saw the patient, I used appropriate personal protective equipment (PPE) including mask and eye shield (standard procedure).  Additionally, I used gown and gloves if indicated.  Hand hygiene was completed before and after seeing the patient.  · Dictated utilizing Dragon Dictation

## 2022-02-04 DIAGNOSIS — R56.9 GENERALIZED CONVULSIVE SEIZURE: ICD-10-CM

## 2022-02-04 NOTE — TELEPHONE ENCOUNTER
Rx Refill Note  Requested Prescriptions     Pending Prescriptions Disp Refills   • PHENobarbital (LUMINAL) 64.8 MG tablet [Pharmacy Med Name: PHENOBARBITAL 64.8 MG TABLET] 90 tablet      Sig: TAKE ONE TABLET BY MOUTH DAILY      Last office visit with prescribing clinician: 1/27/2022      Next office visit with prescribing clinician: Visit date not found            Brittanie Ferguson MA  02/04/22, 11:58 EST

## 2022-02-07 RX ORDER — PHENOBARBITAL 64.8 MG/1
TABLET ORAL
Qty: 90 TABLET | Refills: 1 | Status: SHIPPED | OUTPATIENT
Start: 2022-02-07 | End: 2022-01-01 | Stop reason: SDUPTHER

## 2022-02-07 NOTE — TELEPHONE ENCOUNTER
Rx Refill Note  Requested Prescriptions      No prescriptions requested or ordered in this encounter      Last office visit with prescribing clinician: 1/27/2022      Next office visit with prescribing clinician: due 5/2022          Last filled 8/2/2021           Sabra Garcia MA  02/07/22, 16:24 EST

## 2022-03-21 PROBLEM — T14.8XXA BLISTER: Status: ACTIVE | Noted: 2022-01-01

## 2022-03-21 NOTE — PATIENT INSTRUCTIONS
Elevate legs when sitting.  Take Torsemide daily.  Continue Neosporin to area of previous blister.  Follow up in 2 weeks, or sooner if symptoms persist or worsen.  Schedule a follow up appointment with podiatry.

## 2022-03-21 NOTE — PROGRESS NOTES
Subjective   Alireza Carreon is a 87 y.o. male.     Chief Complaint   Patient presents with   • Leg Swelling     Left lower leg swelling with blister        History of Present Illness   Patient presents with c/o left lower leg swelling with blister; noted blistering yesterday; has had swelling in legs off and on; blister popped when taking shower today; about 3 months ago, heard spouse fall and found her on floor, tried to lift spouse, but could not; daughters came over and helped up spouse; somehow pt hurt his ankle when trying to help spouse; has had pain in ankle since that time; has been seeing podiatry; had injection and did not help; has swelling in bilateral legs, left worse than right; no fever; no malaise; gets tired, no change; takes Torsemide daily, but sometimes forgets to take; did not take Torsemide yesterday or today due to things had to do in his garage yesterday and then appointment today; has had swelling in leg for long time; had negative venous doppler on left 11/2021; takes Xarelto daily.    Has sore on top of right toe; has had for 2-3 months; did not show area to podiatry.       Wife was present during the history-taking and subsequent discussion with this patient.  Patient agrees to the presence of the individual during this visit.        The following portions of the patient's history were reviewed and updated as appropriate: allergies, current medications, past family history, past medical history, past social history, past surgical history and problem list.    Current Outpatient Medications on File Prior to Visit   Medication Sig   • donepezil (ARICEPT) 10 MG tablet Take 1 tablet by mouth Every Night.   • ezetimibe (ZETIA) 10 MG tablet Take 10 mg by mouth Daily.   • lisinopril (PRINIVIL,ZESTRIL) 20 MG tablet TAKE ONE TABLET BY MOUTH TWICE A DAY   • Magnesium 400 MG tablet Take 2 tablets by mouth Every Night.   • PHENobarbital (LUMINAL) 64.8 MG tablet TAKE ONE TABLET BY MOUTH DAILY   •  Potassium (POTASSIMIN PO) Take 1 tablet by mouth Every Night.   • rivaroxaban (Xarelto) 15 MG tablet Take 1 tablet by mouth Daily.   • torsemide (DEMADEX) 10 MG tablet Take 1 tablet by mouth Daily.     No current facility-administered medications on file prior to visit.        Past Medical History:   Diagnosis Date   • 3-vessel CAD    • Anticoagulated on warfarin    • Atrial fibrillation (HCC)    • BMI 28.0-28.9,adult    • Bunion, right foot    • Complaints of memory disturbance    • Dementia (HCC)    • Edema    • Encounter for immunization    • Generalized convulsive seizure (HCC)    • Gout    • History of double vision    • HL (hearing loss)    • Hyperlipidemia    • Hypertension    • Left foot pain    • 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    • Movement disorder    • Nocturnal leg cramps    • OA (osteoarthritis)    • Prepatellar effusion    • Pulmonary embolism (HCC)    • Puncture wound of hand, right    • Screening for cardiovascular condition    • Sleep apnea    • Stasis dermatitis    • Statin intolerance    • Stroke (HCC)    • Taking drug for chronic disease    • Thoracic back pain    • Transient ischemic attack        Past Surgical History:   Procedure Laterality Date   • ADENOIDECTOMY     • APPENDECTOMY     • CATARACT EXTRACTION, BILATERAL     • CORONARY ARTERY BYPASS GRAFT     • CORONARY ARTERY BYPASS GRAFT     • CYSTOSCOPY TRANSURETHRAL RESECTION OF PROSTATE     • HERNIA REPAIR     • OTHER SURGICAL HISTORY      carotid thromboendarterectomy   • SHOULDER SURGERY     • SKIN CANCER EXCISION         Family History   Problem Relation Age of Onset   • Hyperlipidemia Mother    • Dementia Mother        Social History     Socioeconomic History   • Marital status:      Spouse name: Elizabeth   Tobacco Use   • Smoking status: Former Smoker     Types: Cigarettes     Quit date: 10/30/1979     Years since quittin.4   • Smokeless tobacco: Never Used   Substance and Sexual Activity   • Alcohol use:  "Yes   • Drug use: No   • Sexual activity: Defer       Review of Systems   Constitutional: Positive for fatigue. Negative for appetite change, chills, fever, unexpected weight gain and unexpected weight loss.   HENT: Negative for ear pain and sore throat.    Eyes: Negative for blurred vision.   Respiratory: Negative for cough, chest tightness and shortness of breath.    Cardiovascular: Negative for chest pain and palpitations. Leg swelling: see HPI.   Gastrointestinal: Negative for abdominal pain, blood in stool, constipation, diarrhea, GERD and indigestion.   Genitourinary: Negative for dysuria and hematuria.   Musculoskeletal: Negative for back pain.   Skin: Wound: see HPI.   Neurological: Negative for dizziness, syncope, light-headedness and headache.       Objective   Vitals:    03/21/22 1534   BP: 150/90   BP Location: Right arm   Patient Position: Sitting   Cuff Size: Adult   Pulse: 62   Temp: 98 °F (36.7 °C)   SpO2: 96%   Weight: 66.7 kg (147 lb)   Height: 165.1 cm (65\")     Body mass index is 24.46 kg/m².    Physical Exam  Vitals and nursing note reviewed.   Constitutional:       General: He is not in acute distress.     Appearance: He is well-developed and well-groomed. He is not diaphoretic.   HENT:      Head: Normocephalic.      Right Ear: External ear normal.      Left Ear: External ear normal.   Eyes:      Conjunctiva/sclera: Conjunctivae normal.   Neck:      Vascular: No carotid bruit.   Cardiovascular:      Rate and Rhythm: Normal rate and regular rhythm.      Pulses:           Dorsalis pedis pulses are 2+ on the right side and 2+ on the left side.        Posterior tibial pulses are 1+ on the right side and 1+ on the left side.      Heart sounds: Normal heart sounds. No murmur heard.  Pulmonary:      Effort: Pulmonary effort is normal. No respiratory distress.      Breath sounds: Normal breath sounds. No decreased breath sounds or rales.   Abdominal:      General: Bowel sounds are normal.      " Palpations: Abdomen is soft.      Tenderness: There is no abdominal tenderness. There is no guarding.   Musculoskeletal:      Cervical back: Normal range of motion and neck supple.      Right lower leg: Edema (1+ distal pretibial) present.      Left lower leg: Edema (3+ pitting distal pretibial, ankle, and foot) present.        Feet:    Skin:     General: Skin is warm and dry.          Neurological:      Mental Status: He is alert and oriented to person, place, and time.      Gait: Abnormal gait: guarded gait with cane.   Psychiatric:         Mood and Affect: Mood normal.         Behavior: Behavior normal.         Thought Content: Thought content normal.         Judgment: Judgment normal.         Lab Results   Component Value Date    WBC 7.2 11/30/2021    RBC 5.00 11/30/2021    HGB 14.7 11/30/2021    HCT 43.4 11/30/2021    MCV 87 11/30/2021    MCH 29.4 11/30/2021    MCHC 33.9 11/30/2021    RDW 13.1 11/30/2021    RDWSD 47.4 09/21/2020    MPV 9.1 09/21/2020     11/30/2021    NEUTRORELPCT 65 11/30/2021    LYMPHORELPCT 23 11/30/2021    MONORELPCT 9 11/30/2021    EOSRELPCT 2 11/30/2021    BASORELPCT 1 11/30/2021    AUTOIGPER 0.2 09/21/2020    NEUTROABS 4.6 11/30/2021    LYMPHSABS 1.6 11/30/2021    MONOSABS 0.7 11/30/2021    EOSABS 0.2 11/30/2021    BASOSABS 0.1 11/30/2021    AUTOIGNUM 0.02 09/21/2020    NRBC 0.0 09/21/2020     Lab Results   Component Value Date    GLUCOSE 89 12/13/2021    BUN 23 12/13/2021    CREATININE 1.19 12/13/2021    EGFRIFNONA 55 (L) 12/13/2021    EGFRIFAFRI 64 12/13/2021    BCR 19 12/13/2021    K 4.6 12/13/2021    CO2 24 12/13/2021    CALCIUM 9.5 12/13/2021    PROTENTOTREF 6.0 08/31/2021    ALBUMIN 4.00 08/31/2021    LABIL2 2.0 08/31/2021    AST 32 08/31/2021    ALT 28 08/31/2021      Lab Results   Component Value Date    CHLPL 216 (H) 08/31/2021    TRIG 87 08/31/2021    HDL 59 08/31/2021    VLDL 15 08/31/2021     (H) 08/31/2021     Lab Results   Component Value Date    TSH 1.770  08/25/2020     Lab Results   Component Value Date    HGBA1C 5.50 08/25/2020 11/30/21 venous doppler, left: normal    Assessment/Plan .  Problem List Items Addressed This Visit     Localized edema - Primary    Current Assessment & Plan     Elevate legs when sitting.  Take Torsemide daily.  Continue Neosporin to area of previous blister.           Blister    Current Assessment & Plan     Continue Neosporin to area of previous blister.           Skin nodule of toe of right foot    Current Assessment & Plan     Schedule a follow up appointment with podiatry.                 Return in about 2 weeks (around 4/4/2022) for Recheck.or sooner if symptoms persist or worsen.  No signs of cellulitis; instructed to monitor for signs or symptoms of infection; discussed blister is related to swelling in legs; patient has not been taking Torsemide consistently; discussed importance of taking Torsemide consistently and recommended setting a reminder to do so; also discussed need to elevate legs when sitting; recommended to monitor how long legs are dependent and make sure elevating legs more often; pt will consider lymphedema clinic, declines at this time.       I spent 30 minutes caring for Alireza on this date of service. This time includes time spent by me in the following activities:reviewing tests, obtaining and/or reviewing a separately obtained history, performing a medically appropriate examination and/or evaluation , counseling and educating the patient/family/caregiver, ordering medications, tests, or procedures and documenting information in the medical record    COVID-19 Precautions - Patient was compliant in wearing a mask. When I saw the patient, I used appropriate personal protective equipment (PPE) including mask, gloves, and eye shield (standard procedure).  Hand hygiene was completed before and after seeing the patient.

## 2022-03-22 PROBLEM — R22.41: Status: ACTIVE | Noted: 2022-01-01

## 2022-03-22 NOTE — ASSESSMENT & PLAN NOTE
Elevate legs when sitting.  Take Torsemide daily.  Continue Neosporin to area of previous blister.

## 2022-03-28 NOTE — TELEPHONE ENCOUNTER
PATIENT'S DAUGHTER CALLED WANTING TO GET HOME HEALTH NURSING TO VISIT PATIENT AND WRAPPING HIS FOOT/ SORE - WHICH IS DRAINING    SHE ALSO WANTING TO DISCUSS MEDS HE GETS ONLINE - FOR ERECTILE DYSFUNCTION     IF ONE DOES NOT WORK, THEN HE WILL BUY ANOTHER TO TRY     SHE WOULD LIKE TO DISCUSS A PACKAGING PHARMACY TO HELP ORGANIZE HIS MEDS         PLEASE ADVISE   Elizabeth Carreon (EC) [phone] (H)  OR [phone]

## 2022-03-28 NOTE — TELEPHONE ENCOUNTER
I discussed with patient's wife.  Home health has been ordered now.  I tried to describe with the packaging would be for the medication but we need to know which pharmacy to send this to as I do not believe [facility] does that.  As for the medication gets online the wife does not know what were talking about and I have not able to contact Adalgisa Saenz, patient's daughter who is on the HIPAA release form, on her phone but will try again tomorrow.

## 2022-03-29 NOTE — TELEPHONE ENCOUNTER
No answer again today on 3/29/2022.  Please try and contact Ms. Adalgisa Saenz earlier in the morning at [phone] concerning what the medication they are referring to about on line and where they want us to send for the packaging for his medications the refills but I do not believe [facility] is doing this.

## 2022-03-29 NOTE — TELEPHONE ENCOUNTER
Rx Refill Note  Requested Prescriptions     Pending Prescriptions Disp Refills   • rivaroxaban (Xarelto) 15 MG tablet 30 tablet 5     Sig: Take 1 tablet by mouth Daily.      Last office visit with prescribing clinician: 1/27/2022      Next office visit with prescribing clinician: 3/28/2022            Brittanie Ferguson MA  03/29/22, 09:43 EDT

## 2022-03-30 NOTE — TELEPHONE ENCOUNTER
[facility] TO [facility] will be out of the office 4/7/22 and we need to reschedule the patient's appointment. Please reschedule the appointment.

## 2022-03-30 NOTE — TELEPHONE ENCOUNTER
OK FOR HAI    tried to contact MsEzio Adalgisa Saenz earlier in the morning at [phone] concerning what the medication they are referring to about on line and where they want us to send for the packaging for his medications the refills but I do not believe [facility] is doing this.

## 2022-04-02 NOTE — CASE COMMUNICATION
87F lives at home with spouse.  Ambulates without assistance.  History of HTN.  Has LLE open blister.  No s/s of infection.

## 2022-04-02 NOTE — HOME HEALTH
87F lives at home with spouse.  Ambulates without assistance.  History of HTN.  Has LLE open blister.  No s/s of infection.  SN to monitor wound for healing and s/s of regression.

## 2022-04-07 NOTE — TELEPHONE ENCOUNTER
Caller: RADHA     Relationship: HOME HEALTH NURSE     Best call back number: [phone]    What orders are you requesting (i.e. lab or imaging): UNNA BOOT WITH DRESSING     In what timeframe would the patient need to come in: ASAP     Additional notes: RADHA CALLED AND NEEDED A ORDER FOR UNNA BOOT WITH A DRESSING CHANGE TWICE WEEKLY.     RADHA FEELS THE UNNA BOOT IS LEFT LOWER EXTREMITY DUE TO VENOUS STASIS ULCER AND PATIENTS LEG ALSO LOOKS LIKE HE COULD HAVE CELLULITIS.

## 2022-04-11 NOTE — HOME HEALTH
Upon arrival patient had LLE swollen to 3+ edema with weeping the was soaking through sock, shoe and leaving puddles on floor of serous drainage from wound. Ankle to foot was red and warm to touch. Called Dr. Klein with orders to start Unna Boot to LLE and change 2x weekly. Edu patinet to keep ext elevated and avoid increased sodium intake.     Patinet with a History of HTN. Has LLE open blister.     Paln: CP assess, med compiance and unna boot change 2x weekly to LLE.

## 2022-04-15 NOTE — HOME HEALTH
Unna Boot to LLE applied and to change 2x weekly. Edu patinet to keep ext elevated and avoid increased sodium intake.     Patinet with a History of HTN. Has LLE open blister.     Paln: CP assess, med compiance and unna boot change 2x weekly to LLE.

## 2022-05-05 NOTE — HOME HEALTH
WOund dressed to Rt FA 2x weekly. Edu patinet to keep ext elevated and avoid increased sodium intake.     Patinet with a History of HTN. Has RUE wound.     Paln: CP assess, med compiance and unna boot change 2x weekly to RUE.

## 2022-05-12 NOTE — HOME HEALTH
Wound dressed to Rt FA 2x weekly. Edu patinet to keep ext elevated and avoid increased sodium intake.     Patinet with a History of HTN. Has RUE wound.     Paln: CP assess, med compiance and wound drsg change 2x weekly to RUE.

## 2022-05-16 NOTE — TELEPHONE ENCOUNTER
RADHA TAYLOR, WITH HOME HEALTH, CALLED STATING THAT THEY HAVE BEEN TREATING THE PATIENT FOR A WOUND ON HIS LEG THAT HAS HEALED UP.    THE PATIENT HAS ANOTHER WOUND ON HIS RIGHT FOREARM THAT THEY HAVE ALSO BEEN TREATING, AND THEY NEED ORDERS TO CONTINUE TREATMENT:    2 TIMES PER WEEK UNTIL THE WOUND IS HEALED.    PLEASE ADVISE  [phone]

## 2022-05-18 NOTE — HOME HEALTH
WOund with yellow base and small amount of serous drainage. Wound dressed to Rt FA 2x weekly. Edu patinet to keep ext elevated and avoid increased sodium intake.     Patinet with a History of HTN. Has RUE wound.     Paln: CP assess, med compiance and wound drsg change 2x weekly to RUE.

## 2022-05-23 PROBLEM — G40.901 STATUS EPILEPTICUS (HCC): Status: ACTIVE | Noted: 2022-01-01

## 2022-05-23 NOTE — HOME HEALTH
Wound to Rt FA with yellow base smaller and small amount of serous drainage. Wound dressed to Rt FA 2x weekly. Edu patinet to keep ext elevated and avoid increased sodium intake.     Patinet with a History of HTN. Has RUE wound.     Paln: CP assess, med compiance and wound drsg change 2x weekly to RUE.

## 2022-06-01 NOTE — THERAPY TREATMENT NOTE
Patient Name: Alireza Carreon  : 1935    MRN: [id number]                              Today's Date: 2022       Admit Date: 2022    Visit Dx:     ICD-10-CM ICD-9-CM   1. Status epilepticus ([facility])  G40.901 345.3   2. Altered mental status, unspecified altered mental status type  R41.82 780.97   3. Acute hypoxemic respiratory failure ([facility])  J96.01 518.81   4. History of cardioembolic cerebrovascular accident (CVA)  Z86.73 V12.54   5. Chronic anticoagulation  Z79.01 V58.61   6. Lactic acidosis  E87.2 276.2   7. Hyperkalemia  E87.5 276.7   8. Acute renal failure superimposed on chronic kidney disease, unspecified CKD stage, unspecified acute renal failure type (HCC)  N17.9 584.9    N18.9 585.9   9. Hypotension, unspecified hypotension type  I95.9 458.9   10. Seizure disorder ([facility])  G40.909 345.90   11. Vascular dementia with behavior disturbance ([facility])  F01.51 290.40   12. Gait abnormality  R26.9 781.2   13. Localized edema  R60.0 782.3   14. Swelling of left lower extremity  M79.89 729.81     Patient Active Problem List   Diagnosis   • Hypertension   • Anal pruritus   • Atrial fibrillation ([facility])   • Edema   • Gout   • Hyperlipidemia   • Medicare annual wellness visit, subsequent   • 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   • Dementia ([facility])   • Swelling of right testicle   • Gait abnormality   • Vascular dementia with behavior disturbance ([facility])   • Monoclonal gammopathy of unknown significance ([facility])   • Chronic anticoagulation   • Arteriosclerosis of coronary artery   • Cerebrovascular accident (CVA) ([facility])   • Seizure disorder ([facility])   • Swelling of left lower extremity   • Hearing loss   • Localized edema   • Blister   • Skin nodule of toe of right foot   • Status epilepticus (HCC)     Past Medical History:   Diagnosis Date   • 3-vessel CAD    • Anticoagulated on warfarin    • Atrial fibrillation (HCC)    • BMI 28.0-28.9,adult    • Bunion, right foot    • Complaints of memory disturbance     • Dementia (HCC)    • Edema    • Encounter for immunization    • Generalized convulsive seizure (HCC)    • Gout    • History of double vision    • HL (hearing loss)    • Hyperlipidemia    • Hypertension    • Left foot pain    • 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    • Movement disorder    • Nocturnal leg cramps    • OA (osteoarthritis)    • Prepatellar effusion    • Pulmonary embolism (HCC)    • Puncture wound of hand, right    • Screening for cardiovascular condition    • Sleep apnea    • Stasis dermatitis    • Statin intolerance    • Stroke (HCC)    • Taking drug for chronic disease    • Thoracic back pain    • Transient ischemic attack      Past Surgical History:   Procedure Laterality Date   • ADENOIDECTOMY     • APPENDECTOMY     • CATARACT EXTRACTION, BILATERAL     • CORONARY ARTERY BYPASS GRAFT     • CORONARY ARTERY BYPASS GRAFT     • CYSTOSCOPY TRANSURETHRAL RESECTION OF PROSTATE     • HERNIA REPAIR     • OTHER SURGICAL HISTORY      carotid thromboendarterectomy   • SHOULDER SURGERY     • SKIN CANCER EXCISION        General Information     [facility] Name 06/01/22 1610          Physical Therapy Time and Intention    Document Type therapy note (daily note)  -     Mode of Treatment physical therapy  -[facility] Name 06/01/22 1610          General Information    Existing Precautions/Restrictions fall  -[facility] Name 06/01/22 1610          Cognition    Orientation Status (Cognition) oriented to;person;place  -           User Key  (r) = Recorded By, (t) = Taken By, (c) = Cosigned By    Initials Name Provider Type     Evelina Bermudez PTA Physical Therapist Assistant               Mobility     Row Name 06/01/22 1610          Bed Mobility    Bed Mobility supine-sit  -     Supine-Sit [place] (Bed Mobility) moderate assist (50% patient effort)  -     Assistive Device (Bed Mobility) bed rails;head of bed elevated  -     Row Name 06/01/22 1610          Sit-Stand Transfer    Sit-Stand  [place] (Transfers) minimum assist (75% patient effort);2 person assist  -     Assistive Device (Sit-Stand Transfers) walker, front-wheeled  -     Row Name 06/01/22 1610          Gait/Stairs (Locomotion)    [place] Level (Gait) minimum assist (75% patient effort);2 person assist  -SM     Assistive Device (Gait) walker, front-wheeled  -SM     Distance in Feet (Gait) 20ft, then 5ft  -     Deviations/Abnormal Patterns (Gait) marcel decreased;festinating/shuffling;stride length decreased  -     Bilateral Gait Deviations forward flexed posture  -     Comment, (Gait/Stairs) assist to steer walker, cues to increase step lengths  -           User Key  (r) = Recorded By, (t) = Taken By, (c) = Cosigned By    Initials Name Provider Type    Evelina Montes De Oca PTA Physical Therapist Assistant               Obj/Interventions     Row Name 06/01/22 1613          Motor Skills    Therapeutic Exercise --  seated AP, LAQ, marches x10 reps  -           User Key  (r) = Recorded By, (t) = Taken By, (c) = Cosigned By    Initials Name Provider Type    Evelina Montes De Oca PTA Physical Therapist Assistant               Goals/Plan    No documentation.                Clinical Impression     Row Name 06/01/22 1613          Pain    Pretreatment Pain Rating 0/10 - no pain  -     Posttreatment Pain Rating 0/10 - no pain  -     Row Name 06/01/22 1613          Positioning and Restraints    Pre-Treatment Position in bed  -     Post Treatment Position chair  -     In Chair reclined;call light within reach;encouraged to call for assist;exit alarm on  -           User Key  (r) = Recorded By, (t) = Taken By, (c) = Cosigned By    Initials Name Provider Type    Evelina Montes De Oca PTA Physical Therapist Assistant               Outcome Measures     Row Name 06/01/22 1614          How much help from another person do you currently need...    Turning from your back to your side while in flat bed without using  bedrails? 3  -SM     Moving from lying on back to sitting on the side of a flat bed without bedrails? 2  -SM     Moving to and from a bed to a chair (including a wheelchair)? 3  -SM     Standing up from a chair using your arms (e.g., wheelchair, bedside chair)? 3  -SM     Climbing 3-5 steps with a railing? 1  -SM     To walk in hospital room? 3  -SM     AM-PAC 6 Clicks Score (PT) 15  -     Highest level of mobility 4 --> Transferred to chair/commode  -     Row Name 06/01/22 1614 06/01/22 1503       Functional Assessment    Outcome Measure Options AM-PAC 6 Clicks Basic Mobility (PT)  - AM-PAC 6 Clicks Daily Activity (OT)  -          User Key  (r) = Recorded By, (t) = Taken By, (c) = Cosigned By    Initials Name Provider Type    Evelina Montes De Oca, PTA Physical Therapist Assistant     Sylvia Brewer, OT Occupational Therapist                             Physical Therapy Education                 Title: PT OT SLP Therapies (In Progress)     Topic: Physical Therapy (Done)     Point: Mobility training (Done)     Learning Progress Summary           Patient Acceptance, E,TB,D, VU,NR by  at 6/1/2022 1614    Acceptance, E,D, NR by  at 5/31/2022 1616    Acceptance, E,TB,D, VU,NR by  at 5/27/2022 1250    Acceptance, E, VU by  at 5/26/2022 1510    Acceptance, E, NR,VU by  at 5/25/2022 1430   Family Acceptance, E, NR,VU by  at 5/25/2022 1430                   Point: Home exercise program (Done)     Learning Progress Summary           Patient Acceptance, E,TB,D, VU,NR by  at 6/1/2022 1614    Acceptance, E,D, NR by  at 5/31/2022 1616    Acceptance, E,TB,D, VU,NR by  at 5/27/2022 1250                   Point: Body mechanics (Done)     Learning Progress Summary           Patient Acceptance, E,TB,D, VU,NR by  at 6/1/2022 1614    Acceptance, E,D, NR by  at 5/31/2022 1616    Acceptance, E,TB,D, VU,NR by  at 5/27/2022 1250    Acceptance, E, VU by  at 5/26/2022 1510                   Point:  Precautions (Done)     Learning Progress Summary           Patient Acceptance, E,TB,D, VU,NR by  at 6/1/2022 1614    Acceptance, E,D, NR by  at 5/31/2022 1616    Acceptance, E,TB,D, VU,NR by  at 5/27/2022 1250                               User Key     Initials Effective Dates Name Provider Type Discipline     03/07/18 -  Aidan Evelinakelby [place] PTA Physical Therapist Assistant PT     05/05/22 -  Hancock, Ryley, PT Student PT Student PT              PT Recommendation and Plan     Plan of Care Reviewed With: patient  Progress: improving  Outcome Evaluation: Pt tolerated treatment fair this date. Required mod A for bed mobility, then min A x2 to stand and ambulate 20ft, then 5ft w/ Rw. Cues still required to increase step lengths, and some assist required to steer walker.     Time Calculation:    PT Charges     Row Name 06/01/22 1615             Time Calculation    Start Time 1028  -      Stop Time 1100  -      Time Calculation (min) 32 min  -      PT Received On 06/01/22  -      PT - Next Appointment 06/02/22  -            User Key  (r) = Recorded By, (t) = Taken By, (c) = Cosigned By    Initials Name Provider Type     Evelina Bermudez PIPPA [place] Physical Therapist Assistant              Therapy Charges for Today     Code Description Service Date Service Provider Modifiers Qty    19299485686 HC GAIT TRAINING EA 15 MIN 5/31/2022 Evelina Bermudez, PTA GP 1    67161576180 HC PT THERAPEUTIC ACT EA 15 MIN 5/31/2022 Evelina Bermudez, PTA GP 1    99111190921 HC PT THER SUPP EA 15 MIN 5/31/2022 Evelina Bermudez, PTA GP 2    84022042823 HC PT THER PROC EA 15 MIN 6/1/2022 Evelina Bermudez, PTA GP 1    17997607902 HC PT THERAPEUTIC ACT EA 15 MIN 6/1/2022 Evelina Bermudez, PTA GP 1    29703343149 HC PT THER SUPP EA 15 MIN 6/1/2022 Evelina Bermudez, PTA GP 2          PT G-Codes  Outcome Measure Options: AM-PAC 6 Clicks Basic Mobility (PT)  AM-PAC 6 Clicks Score (PT): 15  AM-PAC 6 Clicks Score  (OT): 14  Modified Whittaker Scale: 4 - Moderately severe disability.  Unable to walk without assistance, and unable to attend to own bodily needs without assistance.    Evelina Bermudez, PTA  6/1/2022

## 2022-06-01 NOTE — PLAN OF CARE
Goal Outcome Evaluation:  Plan of Care Reviewed With: patient        Progress: improving  Outcome Evaluation: Pt tolerated treatment fair this date. Required mod A for bed mobility, then min A x2 to stand and ambulate 20ft, then 5ft w/ Rw. Cues still required to increase step lengths, and some assist required to steer walker.

## 2022-06-01 NOTE — PLAN OF CARE
Goal Outcome Evaluation:              Outcome Evaluation: Alert and oriented, [place]. No complaints. Up with assist x2 to chair and BSC. Family at bedside all shift.

## 2022-06-01 NOTE — PROGRESS NOTES
Continued Stay Note  [facility]     Patient Name: Alireza Carreon  MRN: [id number]  Today's Date: 6/1/2022    Admit Date: 5/23/2022     Discharge Plan     Row Name 06/01/22 1336       Plan    Plan Home with family assist, private caregivers, VNA , DME via [employer])    Patient/Family in Agreement with Plan yes    Plan Comments Per [facility] (nor any other [facility] facility) can accept pt at this time. [facility] updated pt and family (wife, Elizabeth, [phone]; son, Robby, [phone]) who now plan for pt to return home with wife, family to assist with pt's care, and private caregivers. VNA  is following for PT/OT/Nursing/bath aide. Wife/son request DME via [employer] (hospital bed, transport wheelchair, and bedside commode). Hospital bed is required due to immobility related to h/o CVA, seizure disorder, vascular dementia. The pt requires frequent positioning of the body in ways not feasible in an ordinary bed. Pt unable to safely use a cane or walker due to immobility related to h/o CVA, seizure disorder, vascular dementia. A transport wheelchair is needed to assist with daily living activities inside the home and pt has upper body strength and mental capacity to propel the wheelchair. Ben called to arrange. Pt has walker at home. Pt enrolled in [facility] Beds. Family plans to transport him home at d/c. Nuris Freitas LCSW               Discharge Codes    No documentation.               Expected Discharge Date and Time     Expected Discharge Date Expected Discharge Time    Jun 2, 2022             Liza Freitas LCSW

## 2022-06-01 NOTE — PROGRESS NOTES
[facility]  Spoke with MAURICE De Jesus. She states pt's son Robby Carreon is working with pt's long term policy to provide in home care and family working to make sure 24/7 is covered.   Son Robby stated they are interested in both Acute and subacute rehabs.   Will need to discuss with Dr. Marques.   If approved, pt would need a precert.   MAURICE informed.     Yael RoblesRN  Acute Rehab Admission Nurse    Of note, we received the son's name from MAURICE De Jesus and phone number as he was not on the contact list previously. We were not also aware of the insurance policy that would help with care at home before this afternoon as we anticipate this patient needing 24/7 asst after dc (wife is unable to provide assistance)

## 2022-06-01 NOTE — THERAPY TREATMENT NOTE
Patient Name: Alireza Carreon  : 1935    MRN: [id number]                              Today's Date: 2022       Admit Date: 2022    Visit Dx:     ICD-10-CM ICD-9-CM   1. Status epilepticus (HCC)  G40.901 345.3   2. Altered mental status, unspecified altered mental status type  R41.82 780.97   3. Acute hypoxemic respiratory failure (HCC)  J96.01 518.81   4. History of cardioembolic cerebrovascular accident (CVA)  Z86.73 V12.54   5. Chronic anticoagulation  Z79.01 V58.61   6. Lactic acidosis  E87.2 276.2   7. Hyperkalemia  E87.5 276.7   8. Acute renal failure superimposed on chronic kidney disease, unspecified CKD stage, unspecified acute renal failure type (HCC)  N17.9 584.9    N18.9 585.9   9. Hypotension, unspecified hypotension type  I95.9 458.9     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HCC)   • Monoclonal gammopathy of unknown significance (MGUS)   • Chronic anticoagulation   • Arteriosclerosis of coronary artery   • Cerebrovascular accident (CVA) (HCC)   • Seizure disorder (HCC)   • Swelling of left lower extremity   • Hearing loss   • Localized edema   • Blister   • Skin nodule of toe of right foot   • Status epilepticus (HCC)     Past Medical History:   Diagnosis Date   • 3-vessel CAD    • Anticoagulated on warfarin    • Atrial fibrillation (HCC)    • BMI 28.0-28.9,adult    • Bunion, right foot    • Complaints of memory disturbance    • Dementia (HCC)    • Edema    • Encounter for immunization    • Generalized convulsive seizure (HCC)    • Gout    • History of double vision    • HL (hearing loss)    • Hyperlipidemia    • Hypertension    • Left foot pain    • Mild memory  disturbances not 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    • Movement disorder    • Nocturnal leg cramps    • OA (osteoarthritis)    • Prepatellar effusion    • Pulmonary embolism (HCC)    • Puncture wound of hand, right    • Screening for cardiovascular condition    • Sleep apnea    • Stasis dermatitis    • Statin intolerance    • Stroke (HCC)    • Taking drug for chronic disease    • Thoracic back pain    • Transient ischemic attack      Past Surgical History:   Procedure Laterality Date   • ADENOIDECTOMY     • APPENDECTOMY     • CATARACT EXTRACTION, BILATERAL     • CORONARY ARTERY BYPASS GRAFT     • CORONARY ARTERY BYPASS GRAFT     • CYSTOSCOPY TRANSURETHRAL RESECTION OF PROSTATE     • HERNIA REPAIR     • OTHER SURGICAL HISTORY      carotid thromboendarterectomy   • SHOULDER SURGERY     • SKIN CANCER EXCISION        General Information     Row Name 06/01/22 1455          OT Time and Intention    Document Type therapy note (daily note)  -     Mode of Treatment occupational therapy;co-treatment  -     Row Name 06/01/22 1455          General Information    Patient Profile Reviewed yes  -MW     Existing Precautions/Restrictions fall;oxygen therapy device and L/min  -     Row Name 06/01/22 1455          Cognition    Orientation Status (Cognition) oriented to;person;place  -     Row Name 06/01/22 1455          Safety Issues, Functional Mobility    Impairments Affecting Function (Mobility) balance;coordination;strength;postural/trunk control;endurance/activity tolerance  -     Comment, Safety Issues/Impairments (Mobility) non skid socks and gait belt donned  -           User Key  (r) = Recorded By, (t) = Taken By, (c) = Cosigned By    Initials Name Provider Type    Sylvia Caro OT Occupational Therapist                 Mobility/ADL's     Row Name 06/01/22 1455          Bed Mobility    Bed Mobility supine-sit  -MW     Supine-Sit [place] (Bed Mobility) moderate assist (50% patient effort);1 person assist;verbal  cues;nonverbal cues (demo/gesture)  -     Sit-Supine [place] (Bed Mobility) not tested  -     Assistive Device (Bed Mobility) bed rails;head of bed elevated  -     Comment, (Bed Mobility) increased time and cues to complete, [facility] at end of session  -     Row Name 06/01/22 1455          Transfers    Transfers sit-stand transfer;stand-sit transfer;toilet transfer  -     Sit-Stand [place] (Transfers) minimum assist (75% patient effort);2 person assist  -     Stand-Sit [place] (Transfers) minimum assist (75% patient effort);verbal cues;2 person assist  -     [place] Level (Toilet Transfer) minimum assist (75% patient effort);2 person assist  -     Assistive Device (Toilet Transfer) commode, bedside without drop arms;grab bars/safety frame;walker, front-wheeled  -     Row Name 06/01/22 1455          Sit-Stand Transfer    Assistive Device (Sit-Stand Transfers) walker, front-wheeled  -     Row Name 06/01/22 1455          Stand-Sit Transfer    Assistive Device (Stand-Sit Transfers) walker, front-wheeled  -     Row Name 06/01/22 1455          Toilet Transfer    Type (Toilet Transfer) sit-stand;stand-sit  -     Row Name 06/01/22 1455          Functional Mobility    Functional Mobility- Ind. Level minimum assist (75% patient effort);2 person assist required  -     Functional Mobility- Device walker, front-wheeled  -     Functional Mobility- Comment pt demo func mob to doorway and to BR commode where pt transfered onto BSC, min A x2 and max cues/assist for Rwx advancement throughot all mobility  -     Row Name 06/01/22 1455          Activities of Daily Living    BADL Assessment/Intervention lower body dressing;toileting  -     Row Name 06/01/22 1455          Lower Body Dressing Assessment/Training    [place] Level (Lower Body Dressing) don;pants/bottoms;moderate assist (50% patient effort);maximum assist (25% patient effort)  -     Position (Lower Body Dressing)  supported sitting;supported standing  -     Comment, (Lower Body Dressing) don socks sitting on BSC due to being soiled with max A, mod-max A for LBD with x2 brief changes due to leaking of external purewick donned and pt requiring max cues with toileting tasks to ensure safety  -     Row Name 06/01/22 1455          Toileting Assessment/Training    [place] Level (Toileting) adjust/manage clothing;perform perineal hygiene;toileting skills;change pad/brief  -     Assistive Devices (Toileting) commode, bedside without drop arms;grab bar/safety frame  -     Position (Toileting) supported standing;supported sitting  -     Comment, (Toileting) mod-max A for LBD, x2 assist for one therapist to ensure balance maintained while OT managed clothing/donned new briefs  -           User Key  (r) = Recorded By, (t) = Taken By, (c) = Cosigned By    Initials Name Provider Type     Sylvia Brewer OT Occupational Therapist               Obj/Interventions     [facility] Name 06/01/22 1459          Shoulder (Therapeutic Exercise)    Shoulder (Therapeutic Exercise) AROM (active range of motion)  -     Shoulder AROM (Therapeutic Exercise) horizontal aBduction/aDduction;bilateral;15 repititions;2 sets;sitting  -[facility] Name 06/01/22 1459          Elbow/Forearm (Therapeutic Exercise)    Elbow/Forearm (Therapeutic Exercise) AROM (active range of motion)  -     Elbow/Forearm AROM (Therapeutic Exercise) bilateral;flexion;extension;15 repititions;2 sets;sitting  focused on elbow extension due to stiffness  -     Row Name 06/01/22 1459          Motor Skills    Therapeutic Exercise elbow/forearm;shoulder  -[facility] Name 06/01/22 1459          Balance    Balance Assessment sitting static balance;sit to stand dynamic balance;sitting dynamic balance;standing static balance;standing dynamic balance  -     Static Sitting Balance standby assist  -     Dynamic Sitting Balance contact guard;standby assist  -     Position,  Sitting Balance unsupported  -MW     Sit to Stand Dynamic Balance minimal assist;2-person assist  -MW     Static Standing Balance minimal assist;2-person assist  -MW     Dynamic Standing Balance minimal assist;2-person assist  -MW     Position/Device Used, Standing Balance supported;walker, front-wheeled  -MW     Balance Interventions sitting;standing;sit to stand;dynamic;static;supported;minimal challenge;occupation based/functional task  -MW     Comment, Balance max a with advancement of Rwx and cues for proper base of support with positoning of AD, no overt LOBs however unsteady throughout  -MW           User Key  (r) = Recorded By, (t) = Taken By, (c) = Cosigned By    Initials Name Provider Type    MW Sylvia Brewer OT Occupational Therapist               Goals/Plan    No documentation.                Clinical Impression     Row Name 06/01/22 1501          Pain Assessment    Pretreatment Pain Rating 0/10 - no pain  -MW     Posttreatment Pain Rating 0/10 - no pain  -MW     Row Name 06/01/22 1501          Plan of Care Review    Plan of Care Reviewed With patient  -MW     Progress improving  -MW     Outcome Evaluation Pt seen for OT tx session this AM, agreeable to therapy. Pt required mod A x1 for sup <> sit. While at EOB, pt engaged in AROM in BUE to imrpove UE strength/endurance required for support throughout functional transfers and ADL tasks. Pt completed STS and func mob within room/to BR with min A x2, transferred onto BSC. Increased time on commode due to external purewick leaking x2 requiring x2 brief changes with mod-max A for LBD/toileting. Max A for donning new socks. Pt [facility] at end of session with all needs met. Will continue to benefit from skilled OT to address goals and deficits.  -     Row Name 06/01/22 1501          Therapy Plan Review/Discharge Plan (OT)    Anticipated Discharge Disposition (OT) skilled nursing facility  -     Row Name 06/01/22 1501          Vital Signs    Pre SpO2 (%) 91   -MW     O2 Delivery Pre Treatment supplemental O2  -MW     Pre Patient Position Supine  -MW     Intra Patient Position Standing  -MW     Post Patient Position Sitting  -MW     Row Name 06/01/22 1501          Positioning and Restraints    Pre-Treatment Position in bed  -MW     Post Treatment Position chair  -MW     In Chair notified nsg;reclined;call light within reach;encouraged to call for assist;exit alarm on  -MW           User Key  (r) = Recorded By, (t) = Taken By, (c) = Cosigned By    Initials Name Provider Type    Sylvia Caro OT Occupational Therapist               Outcome Measures     Row Name 06/01/22 1503          How much help from another is currently needed...    Putting on and taking off regular lower body clothing? 2  -MW     Bathing (including washing, rinsing, and drying) 2  -MW     Toileting (which includes using toilet bed pan or urinal) 2  -MW     Putting on and taking off regular upper body clothing 2  -MW     Taking care of personal grooming (such as brushing teeth) 3  -MW     Eating meals 3  -MW     AM-PAC 6 Clicks Score (OT) 14  -MW     Row Name 06/01/22 1503          Functional Assessment    Outcome Measure Options AM-PAC 6 Clicks Daily Activity (OT)  -MW           User Key  (r) = Recorded By, (t) = Taken By, (c) = Cosigned By    Initials Name Provider Type    Sylvia Caro OT Occupational Therapist                Occupational Therapy Education                 Title: PT OT SLP Therapies (In Progress)     Topic: Occupational Therapy (In Progress)     Point: ADL training (Done)     Description:   Instruct learner(s) on proper safety adaptation and remediation techniques during self care or transfers.   Instruct in proper use of assistive devices.              Learning Progress Summary           Patient Acceptance, E, VU by BL at 5/30/2022 1250    Comment: the role of OT   Family Acceptance, E, VU by BL at 5/30/2022 1250    Comment: the role of OT                   Point:  Home exercise program (Not Started)     Description:   Instruct learner(s) on appropriate technique for monitoring, assisting and/or progressing therapeutic exercises/activities.              Learner Progress:  Not documented in this visit.          Point: Precautions (Not Started)     Description:   Instruct learner(s) on prescribed precautions during self-care and functional transfers.              Learner Progress:  Not documented in this visit.          Point: Body mechanics (Not Started)     Description:   Instruct learner(s) on proper positioning and spine alignment during self-care, functional mobility activities and/or exercises.              Learner Progress:  Not documented in this visit.                      User Key     Initials Effective Dates Name Provider Type Discipline     01/05/21 -  Genesis Ferguson OT Occupational Therapist OT              OT Recommendation and Plan     Plan of Care Review  Plan of Care Reviewed With: patient  Progress: improving  Outcome Evaluation: Pt seen for OT tx session this AM, agreeable to therapy. Pt required mod A x1 for sup <> sit. While at EOB, pt engaged in AROM in BUE to imrpove UE strength/endurance required for support throughout functional transfers and ADL tasks. Pt completed STS and func mob within room/to BR with min A x2, transferred onto BSC. Increased time on commode due to external purewick leaking x2 requiring x2 brief changes with mod-max A for LBD/toileting. Max A for donning new socks. Pt [facility] at end of session with all needs met. Will continue to benefit from skilled OT to address goals and deficits.     Time Calculation:    Time Calculation- OT     Row Name 06/01/22 1504             Time Calculation- OT    OT Start Time 1027  -MW      OT Stop Time 1059  -MW      OT Time Calculation (min) 32 min  -MW      Total Timed Code Minutes- OT 32 minute(s)  -MW      OT Received On 06/01/22  -MW      OT - Next Appointment 06/02/22  -MW              Timed  Charges    69065 - OT Therapeutic Activity Minutes 10  -MW      02938 - OT Self Care/Mgmt Minutes 22  -MW              Total Minutes    Timed Charges Total Minutes 32  -MW       Total Minutes 32  -MW            User Key  (r) = Recorded By, (t) = Taken By, (c) = Cosigned By    Initials Name Provider Type    Sylvia Caro OT Occupational Therapist              Therapy Charges for Today     Code Description Service Date Service Provider Modifiers Qty    59986901848 HC OT THERAPEUTIC ACT EA 15 MIN 6/1/2022 Sylvia Brewer OT GO 1    88431796554 HC OT SELF CARE/MGMT/TRAIN EA 15 MIN 6/1/2022 Sylvia Brewer OT GO 1               Sylvia Brewer OT  6/1/2022

## 2022-06-01 NOTE — PROGRESS NOTES
LOS: 9 days   Patient Care Team:  Jose Bingham MD as PCP - General (Internal Medicine)  Yennifer Ruiz MD as Consulting Physician (Cardiology)  Cristian Taylor OD (Optometry)  PARKER Wheeler MD as Consulting Physician (Ophthalmology)  Obdulio Morelos MD as Consulting Physician (Hematology and Oncology)  Zahira Gee MD as Referring Physician (Neurology)  Sanya Sim Jr., DPM as Consulting Physician (Podiatry)    Subjective     Patient remains confused he sitting up in a chair.  He wants to go home.  His daughter is at bedside.  They have been going over his medicines clearly he has been very confused about his medications.  The family is unsure what to do with him.  His wife also has memory problems.  There is no way they can go home by themselves the family works, he is not safe to get around by himself is wife is certainly not strong enough to help him get around.  His only complaint is he wants to go home.  Review of Systems:          Objective     Vital Signs  Vital Sign Min/Max for last 24 hours  Temp  Min: 97.9 °F (36.6 °C)  Max: 98.1 °F (36.7 °C)   BP  Min: 116/89  Max: 166/79   Pulse  Min: 58  Max: 78   Resp  Min: 16  Max: 18   SpO2  Min: 91 %  Max: 98 %   Flow (L/min)  Min: 2  Max: 2   No data recorded        Ventilator/Non-Invasive Ventilation Settings (From admission, onward)            None                       Body mass index is 26.3 kg/m².  I/O last 3 completed shifts:  In: 240 [P.O.:240]  Out: 900 [Urine:900]  No intake/output data recorded.        Physical Exam:  General Appearance: Well-developed white male he is resting in air.  He does  not appear in any acute distress  Eyes: Conjunctiva are clear and anicteric  ENT: Mucous membranes are moist no exudates  Neck: Trachea midline no visible jugular venous distention  Lungs: Clear nonlabored symmetric expansion  Cardiac: Regular rate rhythm no murmur  Abdomen: Soft nontender no palpable  hepatosplenomegaly or masses  : Not examined  Musculoskeletal: He is got a lot of arthritic changes in his hands and fingers  Skin: Warm and dry no jaundice no petechiae , thin skin  Neuro: He is a alert and oriented he is cooperative he is extremely hard of hearing.  He is following commands he is got pretty good strength.  He is a little weaker on the left than the right spite being oriented he gets confused very easily and is quite argumentative with his family when they try and  Extremities/P Vascular: No clubbing no cyanosis no edema palpable radial and dorsalis pedis pulses  MSE: Seems to be in a good mood        Labs:  Results from last 7 days   Lab Units 05/30/22  0738 05/29/22  0811 05/28/22  0626 05/27/22  0635 05/26/22  0249   GLUCOSE mg/dL 89 101* 91 125* 91   SODIUM mmol/L 136 135* 139 140 140   POTASSIUM mmol/L 4.1 3.8 3.8 4.0 3.6   CO2 mmol/L 27.2 25.7 24.8 17.2* 26.0   CHLORIDE mmol/L 101 102 106 109* 107   ANION GAP mmol/L 7.8 7.3 8.2 13.8 7.0   CREATININE mg/dL 0.77 0.72* 0.72* 0.77 0.68*   BUN mg/dL 13 10 10 9 7*   BUN / CREAT RATIO  16.9 13.9 13.9 11.7 10.3   CALCIUM mg/dL 9.0 8.7 8.1* 8.3* 7.9*     Estimated Creatinine Clearance: 70.6 mL/min (by C-G formula based on SCr of 0.77 mg/dL).      Results from last 7 days   Lab Units 05/30/22  0738 05/29/22  0811 05/28/22  0626 05/27/22  0635 05/26/22  0249   WBC 10*3/mm3 7.14 7.90 7.37 5.69 5.99   RBC 10*6/mm3 4.70 4.76 4.44 4.73 4.67   HEMOGLOBIN g/dL 13.8 14.1 13.1 13.8 13.7   HEMATOCRIT % 42.1 43.6 38.2 43.4 42.6   MCV fL 89.6 91.6 86.0 91.8 91.2   MCH pg 29.4 29.6 29.5 29.2 29.3   MCHC g/dL 32.8 32.3 34.3 31.8 32.2   RDW % 13.4 13.5 13.0 13.2 13.2   RDW-SD fl 44.4 46.7 40.6 45.6 44.4   MPV fL 9.5 9.7 9.7 9.2 9.5   PLATELETS 10*3/mm3 214 196 170 156 150                             Microbiology Results (last 10 days)     Procedure Component Value - Date/Time    Blood Culture - Blood, Arm, Left [[id number]]  (Normal) Collected: 05/23/22 2110    Lab  Status: Final result Specimen: Blood from Arm, Left Updated: 05/28/22 2117     Blood Culture No growth at 5 days    Blood Culture - Blood, Arm, Right [[id number]]  (Normal) Collected: 05/23/22 2110    Lab Status: Final result Specimen: Blood from Arm, Right Updated: 05/28/22 2117     Blood Culture No growth at 5 days    COVID PRE-OP / PRE-PROCEDURE SCREENING ORDER (NO ISOLATION) - Swab, Nasopharynx [[id number]]  (Normal) Collected: 05/23/22 1937    Lab Status: Final result Specimen: Swab from Nasopharynx Updated: 05/23/22 2034    Narrative:      The following orders were created for panel order COVID PRE-OP / PRE-PROCEDURE SCREENING ORDER (NO ISOLATION) - Swab, Nasopharynx.  Procedure                               Abnormality         Status                     ---------                               -----------         ------                     COVID-19,[facility]...[[id number]]  Normal              Final result                 Please view results for these tests on the individual orders.    COVID-19,[facility] CEPHEID/EMMANUELLE NP SWAB IN TRANSPORT MEDIA 8-12 HR TAT - Swab, Nasopharynx [[id number]]  (Normal) Collected: 05/23/22 1937    Lab Status: Final result Specimen: Swab from Nasopharynx Updated: 05/23/22 2034     COVID19 Not Detected    Narrative:      Fact sheet for providers: https://www.fda.gov/media/794365/download    Fact sheet for patients: https://www.fda.gov/media/391201/download    Test performed by PCR.              donepezil, 10 mg, Oral, Nightly  hydroCHLOROthiazide Oral, 25 mg, Oral, Daily  lacosamide, 100 mg, Oral, Q12H  lisinopril, 20 mg, Oral, BID  PHENobarbital, 64.8 mg, Nasogastric, Daily  rivaroxaban, 15 mg, Oral, Daily  sodium chloride, 10 mL, Intravenous, Q12H           Diagnostics:  EEG    Result Date: 5/24/2022  EEG Report          # Indication: Seizure History: 87-year-old with seizure.  The patient was found poorly responsive with poor left-sided movements followed by  seizures.  The study utilizes time locked video Medical diagnoses: Vascular dementia, stroke, seizure disorder with status epilepticus No current facility-administered medications for this visit. No current outpatient medications on file. Facility-Administered Medications Ordered in Other Visits Medication Dose Route Frequency Provider Last Rate Last Admin • dextrose 5 % and sodium chloride 0.9 % infusion  75 mL/hr Intravenous Continuous Jumana Qureshi MD 75 mL/hr at 05/24/22 1716 75 mL/hr at 05/24/22 1716 • haloperidol lactate (HALDOL) injection 5 mg  5 mg Intravenous Q6H PRN Jumana Qureshi MD     • heparin (porcine) 5000 UNIT/ML injection 5,000 Units  5,000 Units Subcutaneous Q8H Mimi Bowman MD   5,000 Units at 05/24/22 1627 • levETIRAcetam (KEPPRA) injection 500 mg  500 mg Intravenous Q12H Jackie Harris MD   500 mg at 05/24/22 1325 • LORazepam (ATIVAN) injection 1 mg  1 mg Intravenous Q4H PRN Mimi Bowman MD     • Pharmacy Consult - Pharmacy to dose   Does not apply Once Roni Saenz MD     • PHENobarbital (LUMINAL) tablet 64.8 mg  64.8 mg Nasogastric Daily Jackie Harris MD   64.8 mg at 05/24/22 1742 • sodium chloride 0.9 % flush 10 mL  10 mL Intravenous PRN Roni Saenz MD     • sodium chloride 0.9 % flush 10 mL  10 mL Intravenous Q12H Mimi Bowman MD   10 mL at 05/24/22 0851 • sodium chloride 0.9 % flush 10 mL  10 mL Intravenous PRN Mimi Bowman MD     Time of study: 27 minutes 30 seconds Technical summary:  Background: Early in the study the background rhythm demonstrates diffusely slow low amplitude activities with lowest activities posteriorly.  The patient however was noted to be drowsy by the technician and later in the study the patient was aroused and the background rhythm became 8 Hz low to moderate amplitude poorly regulated posteriorly dominant alpha rhythm.  Sleep: The patient was drowsy throughout most of the study as noted by diffusely slower activities.  No vertex  sharp transients or sleep spindles were seen.  Hyperventilation: Not obtained  Photic stimulation: Photic stimulation was performed at various flash frequencies showing no significant change in the background activity  EKG: Irregular rhythm in the 40s                       Video: The video feed demonstrates no unusual involuntary movements Impression: Essentially normal EEG during wakefulness and drowsiness.  No focal or epileptiform activities were seen. Dictated utilizing Dragon dictation.     CT Angiogram Neck    Result Date: 5/23/2022  CT ANGIOGRAM HEAD-, CT ANGIOGRAM NECK-, CT CEREBRAL PERFUSION W WO CONTRAST-  CLINICAL HISTORY: 87-year-old male with history of previous right-sided CVA. New left-sided weakness after fall. Possible seizure activity. Patient anticoagulated.  TECHNIQUE: Transverse 3 mm thick images were obtained from the base of the skull to the vertex without IV contrast. Subsequently, brain perfusion imaging was acquired. Spiral CT images were then obtained through the head and neck during rapid IV injection of contrast and were reconstructed in 1 mm thick slices. Multiple coronal and sagittal and 3-D reconstructions reconstructions were produced. Postcontrast imaging of the brain parenchyma was also acquired.  Computer analysis was utilized for detection of large vessel occlusion.  Radiation dose reduction techniques were utilized, including automated exposure control and exposure modulation based on body size.  COMPARISON: MRI of the brain dated 09/11/2020. CTA of the head and neck dated 09/05/2014.  FINDINGS: The precontrast images demonstrate diffuse cortical atrophy and ill-defined abnormal diminished attenuation throughout the white matter of both cerebral hemispheres consistent with sequela of extensive small vessel chronic ischemic change that shows slight worsening since the preceding CTA. A large chronic infarct in the right parieto-occipital region has also increased in size somewhat  since the preceding CTA in 2014, but is unchanged since the MRI dated 09/11/2020. There is no evidence of acute infarct or intracranial hemorrhage. There is no mass effect. Bone window images demonstrate no evidence of skull fracture. There is no mass effect.  Brain perfusion imaging demonstrates normal symmetric perfusion. There is no evidence of ischemia or infarction.  There is normal branching of the great vessels from the aortic arch that all appear widely patent without NASCET significant stenosis. No stenoses are identified in either common or external or internal carotid artery within the neck. There is mild calcification of both carotid bifurcations that results in no luminal narrowing. Both carotid bifurcations appear somewhat patulous consistent with previous endarterectomy. The vertebral arteries are essentially codominant and are both widely patent throughout their course in the neck. Calcified plaque within the intracranial portion of the left vertebral artery produces moderate focal stenosis. The stenosis appears more prominent than on the previous CTA. Both vertebral arteries supply the basilar artery which is widely patent. There is moderate calcified atherosclerotic plaque within the intracranial portions of both internal carotid arteries at the level of the carotid siphons that results in moderate focal stenosis involving the supraclinoid portions of both internal carotid arteries. The degree of stenosis has increased somewhat since the preceding CTA. The previous study demonstrated focal moderate high-grade stenosis involving the M1 segment of the right middle cerebral artery. This appears significantly improved. Only mild areas of stenosis are now present within both middle cerebral arteries. There are no focal intracranial occlusions. No aneurysms are identified.  The postcontrast images of the brain parenchyma show no discrete enhancing lesions. There are no masses. The paranasal sinuses are  well aerated.      Cortical atrophy and evidence of extensive small vessel chronic ischemic change. Moderately large chronic infarct in the right parietal occipital region. No acute infarct or hemorrhage is identified. There is normal symmetric brain perfusion. There are no NASCET significant stenoses within the arterial vasculature of the neck. There is focal moderate stenosis involving the distal left vertebral artery within the subarachnoid space. Focal areas of moderate stenosis are also present within the supraclinoid portions of both internal carotid arteries. No focal intracranial occlusions are identified.  Findings of the noncontrast CT head were communicated to the stroke neurologist on 05/23/2022 at 7:22 PM. Findings of the CTA and CTP imaging were communicated at 7:46 PM.  This report was finalized on 5/23/2022 8:06 PM by Dr. Robby Hernandez M.D.      XR Chest 1 View    Result Date: 5/23/2022  PORTABLE CHEST 05/23/2022 AT 8:15 PM  CLINICAL HISTORY: Possible acute stroke. Possible seizure activity. Rule out aspiration.  Compared to a previous chest dated 514.  The lungs are well-expanded and appear free of infiltrates. There are no pleural effusions. The heart is normal in size. The thoracic aorta is mildly ectatic and calcified.  IMPRESSIONS: No evidence of acute disease within the chest.  This report was finalized on 5/23/2022 8:45 PM by Dr. Robby Hernandez M.D.      Duplex Venous Lower Extremity - Bilateral CAR    Result Date: 5/24/2022  · Normal bilateral lower extremity venous duplex scan.      CT Angiogram Head    Result Date: 5/23/2022  CT ANGIOGRAM HEAD-, CT ANGIOGRAM NECK-, CT CEREBRAL PERFUSION W WO CONTRAST-  CLINICAL HISTORY: 87-year-old male with history of previous right-sided CVA. New left-sided weakness after fall. Possible seizure activity. Patient anticoagulated.  TECHNIQUE: Transverse 3 mm thick images were obtained from the base of the skull to the vertex without IV contrast.  Subsequently, brain perfusion imaging was acquired. Spiral CT images were then obtained through the head and neck during rapid IV injection of contrast and were reconstructed in 1 mm thick slices. Multiple coronal and sagittal and 3-D reconstructions reconstructions were produced. Postcontrast imaging of the brain parenchyma was also acquired.  Computer analysis was utilized for detection of large vessel occlusion.  Radiation dose reduction techniques were utilized, including automated exposure control and exposure modulation based on body size.  COMPARISON: MRI of the brain dated 09/11/2020. CTA of the head and neck dated 09/05/2014.  FINDINGS: The precontrast images demonstrate diffuse cortical atrophy and ill-defined abnormal diminished attenuation throughout the white matter of both cerebral hemispheres consistent with sequela of extensive small vessel chronic ischemic change that shows slight worsening since the preceding CTA. A large chronic infarct in the right parieto-occipital region has also increased in size somewhat since the preceding CTA in 2014, but is unchanged since the MRI dated 09/11/2020. There is no evidence of acute infarct or intracranial hemorrhage. There is no mass effect. Bone window images demonstrate no evidence of skull fracture. There is no mass effect.  Brain perfusion imaging demonstrates normal symmetric perfusion. There is no evidence of ischemia or infarction.  There is normal branching of the great vessels from the aortic arch that all appear widely patent without NASCET significant stenosis. No stenoses are identified in either common or external or internal carotid artery within the neck. There is mild calcification of both carotid bifurcations that results in no luminal narrowing. Both carotid bifurcations appear somewhat patulous consistent with previous endarterectomy. The vertebral arteries are essentially codominant and are both widely patent throughout their course in the  neck. Calcified plaque within the intracranial portion of the left vertebral artery produces moderate focal stenosis. The stenosis appears more prominent than on the previous CTA. Both vertebral arteries supply the basilar artery which is widely patent. There is moderate calcified atherosclerotic plaque within the intracranial portions of both internal carotid arteries at the level of the carotid siphons that results in moderate focal stenosis involving the supraclinoid portions of both internal carotid arteries. The degree of stenosis has increased somewhat since the preceding CTA. The previous study demonstrated focal moderate high-grade stenosis involving the M1 segment of the right middle cerebral artery. This appears significantly improved. Only mild areas of stenosis are now present within both middle cerebral arteries. There are no focal intracranial occlusions. No aneurysms are identified.  The postcontrast images of the brain parenchyma show no discrete enhancing lesions. There are no masses. The paranasal sinuses are well aerated.      Cortical atrophy and evidence of extensive small vessel chronic ischemic change. Moderately large chronic infarct in the right parietal occipital region. No acute infarct or hemorrhage is identified. There is normal symmetric brain perfusion. There are no NASCET significant stenoses within the arterial vasculature of the neck. There is focal moderate stenosis involving the distal left vertebral artery within the subarachnoid space. Focal areas of moderate stenosis are also present within the supraclinoid portions of both internal carotid arteries. No focal intracranial occlusions are identified.  Findings of the noncontrast CT head were communicated to the stroke neurologist on 05/23/2022 at 7:22 PM. Findings of the CTA and CTP imaging were communicated at 7:46 PM.  This report was finalized on 5/23/2022 8:06 PM by Dr. Robby Hernandez M.D.      CT CEREBRAL PERFUSION WITH &  WITHOUT CONTRAST    Result Date: 5/23/2022  CT ANGIOGRAM HEAD-, CT ANGIOGRAM NECK-, CT CEREBRAL PERFUSION W WO CONTRAST-  CLINICAL HISTORY: 87-year-old male with history of previous right-sided CVA. New left-sided weakness after fall. Possible seizure activity. Patient anticoagulated.  TECHNIQUE: Transverse 3 mm thick images were obtained from the base of the skull to the vertex without IV contrast. Subsequently, brain perfusion imaging was acquired. Spiral CT images were then obtained through the head and neck during rapid IV injection of contrast and were reconstructed in 1 mm thick slices. Multiple coronal and sagittal and 3-D reconstructions reconstructions were produced. Postcontrast imaging of the brain parenchyma was also acquired.  Computer analysis was utilized for detection of large vessel occlusion.  Radiation dose reduction techniques were utilized, including automated exposure control and exposure modulation based on body size.  COMPARISON: MRI of the brain dated 09/11/2020. CTA of the head and neck dated 09/05/2014.  FINDINGS: The precontrast images demonstrate diffuse cortical atrophy and ill-defined abnormal diminished attenuation throughout the white matter of both cerebral hemispheres consistent with sequela of extensive small vessel chronic ischemic change that shows slight worsening since the preceding CTA. A large chronic infarct in the right parieto-occipital region has also increased in size somewhat since the preceding CTA in 2014, but is unchanged since the MRI dated 09/11/2020. There is no evidence of acute infarct or intracranial hemorrhage. There is no mass effect. Bone window images demonstrate no evidence of skull fracture. There is no mass effect.  Brain perfusion imaging demonstrates normal symmetric perfusion. There is no evidence of ischemia or infarction.  There is normal branching of the great vessels from the aortic arch that all appear widely patent without NASCET significant  stenosis. No stenoses are identified in either common or external or internal carotid artery within the neck. There is mild calcification of both carotid bifurcations that results in no luminal narrowing. Both carotid bifurcations appear somewhat patulous consistent with previous endarterectomy. The vertebral arteries are essentially codominant and are both widely patent throughout their course in the neck. Calcified plaque within the intracranial portion of the left vertebral artery produces moderate focal stenosis. The stenosis appears more prominent than on the previous CTA. Both vertebral arteries supply the basilar artery which is widely patent. There is moderate calcified atherosclerotic plaque within the intracranial portions of both internal carotid arteries at the level of the carotid siphons that results in moderate focal stenosis involving the supraclinoid portions of both internal carotid arteries. The degree of stenosis has increased somewhat since the preceding CTA. The previous study demonstrated focal moderate high-grade stenosis involving the M1 segment of the right middle cerebral artery. This appears significantly improved. Only mild areas of stenosis are now present within both middle cerebral arteries. There are no focal intracranial occlusions. No aneurysms are identified.  The postcontrast images of the brain parenchyma show no discrete enhancing lesions. There are no masses. The paranasal sinuses are well aerated.      Cortical atrophy and evidence of extensive small vessel chronic ischemic change. Moderately large chronic infarct in the right parietal occipital region. No acute infarct or hemorrhage is identified. There is normal symmetric brain perfusion. There are no NASCET significant stenoses within the arterial vasculature of the neck. There is focal moderate stenosis involving the distal left vertebral artery within the subarachnoid space. Focal areas of moderate stenosis are also  present within the supraclinoid portions of both internal carotid arteries. No focal intracranial occlusions are identified.  Findings of the noncontrast CT head were communicated to the stroke neurologist on 2022 at 7:22 PM. Findings of the CTA and CTP imaging were communicated at 7:46 PM.  This report was finalized on 2022 8:06 PM by Dr. Robby Hernandez M.D.      Results for orders placed in visit on 14    CONVERTED (HISTORICAL) ECHO    Narrative  Patient:      JUANJO CHATMAN  [facility] Rec#:     [id number]               :          1935  Date:         2014            Age:          78y  Account#:     [id number]            Height:       180.34 cm / 71.0 in  Accession#:   [id number]               Weight:       82.1 kg / 180.9 lbs  Sex:          M                     BSA:          2.02  Room#:        540-1  Type:         Inpatient    Reading:      [facility] [place]  Reading:      Marcelo Blackman MD  Referring:    GIOVANA  Sonographer:  MATHIEU  ______________________________________________________________________    Transthoracic Echocardiogram    Indication:  CHEST PAIN  BP:           120/54  HR:           62    Findings  Technical Comments:  A complete two dimensional transthoracic echocardiogram with color flow  and Doppler was performed. The study quality is good.    Contrast:  A verbal consent was obtained from the patient to use saline contrast to  assess for cardiac shunting. Intravenous agitated saline contrast was  used to assess intracardiac shunting.    Left Ventricle:  The left ventricular chamber size is small. Moderate concentric left  ventricular hypertrophy is observed. Global left ventricular wall motion  and contractility are within normal limits. There is normal left  ventricular systolic function. The estimated ejection fraction is  50-55%.  Abnormal left ventricular diastolic filling is observed,  consistent with Grade 1 (impaired LV relaxation).    Left Atrium:  The left atrium is  mildly dilated. No atrial septal defect is  demonstrated by agitated saline contrast.  A patent foramen ovale is not  demonstrated by agitated saline contrast.    Right Ventricle:  The right ventricular chamber size and systolic function are within  normal limits.    Right Atrium:  The right atrium appears normal.    Aortic Valve:  The aortic valve is trileaflet. The leaflets are thin with normal  excursion. There is no aortic stenosis or regurgitation present.  Mitral Valve:  The mitral valve appears normal in structure and function. There is  posterior mitral annular calcification. There is a trace of mitral  regurgitation.    Tricuspid Valve:  The tricuspid valve leaflets are normal.  There is mild tricuspid  regurgitation. The right ventricular systolic pressure is calculated at  42 mmHg.    Pulmonic Valve:  The pulmonic valve appears normal in structure and function.    Pericardium:  The pericardium appears normal.    Aorta:  The aorta appears normal.    Summary:  There is no evidence of intracardiac mass or thrombus.    Conclusions  The chest pain and the dyspnea may be related to the diastolic  dysfunction.  Normal systolic and vavlular function.    Measurements  Chambers MM  Name                    Value         Normal Range  AV cusp separation (MM) 1.7 cm        -    Chambers 2D  Name                    Value         Normal Range  IVSd (2D)               1.3 cm        -  LVPWd (2D)              1.3 cm        -  IVS:LVPW ratio (2D)     1 ratio       -  LVIDd (2D)              3.3 cm        -  LVIDs (2D)              2.2 cm        -  LV FS (Teichholz) (2D)  33.3 %        -  EF Teichholz (2D)       63.3 %        -  Ao root diameter (2D)   3.2 cm        -  LA dimension (AP) 2D    4 cm          -  LA:Ao ratio (2D)        1.25 ratio    -    Volumes/Mass  Name                    Value         Normal Range  LA ESV SP 4CH (MOD)     52 ml         -  LA ESV SP 2CH (MOD)     49 ml         -  LA ESV BP (MOD)         52  ml         -  LA ESV BP (MOD) index   25.7 ml/m2    -  LV EDV SP 4CH (MOD)     62 ml         -  LV ESV SP 4CH (MOD)     24 ml         -  EF SP 4CH (MOD)         61 %          -  LV EDV SP 2CH (MOD)     73 ml         -  LV ESV SP 2CH (MOD)     28 ml         -  EF SP 2CH (MOD)         62 %          -  LV EDV BP               68 ml         -  LV ESV BP               28 ml         -  BP EF (MOD)             59 %          -    Diastolic/Systolic Function  Name                    Value         Normal Range  MV E-wave Vmax          0.84 m/sec    -  MV deceleration time    246 msec      -  MV A-wave Vmax          1.16 m/sec    -  MV A-wave duration      162 msec      -  MV E:A ratio            0.7 ratio     -  P. vein S-wave Vmax     0.72 m/sec    -  P. vein D-wave Vmax     0.56 m/sec    -  P. vein S:D Vmax ratio  1.3 ratio     -  P. vein A-wave duration 144 msec      -  LV septal e' Vmax       0.07 m/sec    -  LV lateral e' Vmax      0.09 m/sec    -  LV E:e' septal ratio    11.5 ratio    -  LV E:e' lateral ratio   9.1 ratio     -    Aortic Valve  Name                    Value         Normal Range  AV VTI                  34.6 cm       -  AV mean gradient        4 mmHg        -  LVOT diameter           2.1 cm        -  LVOT VTI                24.6 cm       -  LVOT mean gradient      2 mmHg        -  SV LVOT                 85 ml         -  JOAO (continuity VTI)    2.46 cm2      -    Mitral Valve  Name                    Value         Normal Range  MV VTI                  29.5 cm       -  MV mean gradient        2 mmHg        -  MV PHT                  75 msec       -  MR Vmax                 3.18 m/sec    -  MVA (PHT)               2.93 cm2      -  MVA (continuity VTI)    2.89 cm2      -    Tricuspid Valve  Name                    Value         Normal Range  TR Vmax                 3.11 m/sec    -  TR peak gradient        39 mmHg       -  RAP                     3 mmHg        -  RVSP                    42 mmHg        -    Pulmonic Valve/Qp:Qs  Name                    Value         Normal Range  PV Vmax                 0.76 m/sec    -  PV peak gradient        2 mmHg        -  RVOT diameter           2.4 cm        -  RVOT Vmax               0.59 m/sec    -  RVOT VTI                14.7 cm       -  RVOT peak gradient      1 mmHg        -  SV RVOT                 66 ml         -  PVA (continuity Vmax)   3.49 cm2      -  Qp:Qs                   0.8 ratio     -    Electronically signed by: Marcelo Blackman MD on 01/14/2014 15:10:17  Thank you for the courtesy of this referral.          Active Hospital Problems    Diagnosis  POA   • Status epilepticus (HCC) [G40.901]  Yes      Resolved Hospital Problems   No resolved problems to display.         Assessment & Plan     1. Status epilepticus  2. Encephalopathy probably just a prolonged postictal encephalopathy he may be having a little hospital psychosis/sundowning as well.  3. Lactic acidosis secondary to seizure  4. Hypotension transient, likely secondary to phenobarbital  5. Hypoxia secondary to hypoventilation and possibly obstructive sleep apnea  6. Possible obstructive sleep apnea consider outpatient sleep study  7. Bilateral lower extremity edema/venous stasis  8. Urinary retention resolved  9. Dysphagia  10. 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 Immobility syndrome patient is going need some therapy prior to returning home    Plan for disposition: Place for him to go is the only hold-up for discharge    Nilson Harmon Jr, MD  06/01/22  17:29 EDT    Time:

## 2022-06-01 NOTE — PLAN OF CARE
Goal Outcome Evaluation:  Plan of Care Reviewed With: patient        Progress: improving  Outcome Evaluation: Pt seen for OT tx session this AM, agreeable to therapy. Pt required mod A x1 for sup <> sit. While at EOB, pt engaged in AROM in BUE to imrpove UE strength/endurance required for support throughout functional transfers and ADL tasks. Pt completed STS and func mob within room/to BR with min A x2, transferred onto BSC. Increased time on commode due to external purewick leaking x2 requiring x2 brief changes with mod-max A for LBD/toileting. Max A for donning new socks. Pt UIC at end of session with all needs met. Will continue to benefit from skilled OT to address goals and deficits.    Therapist in mask, gloves and hand hygiene performed.

## 2022-06-01 NOTE — PROGRESS NOTES
Adult Nutrition  Assessment/PES    Patient Name:  Alireza Carreon  YOB: 1935  MRN: [id number]  Admit Date:  5/23/2022    Assessment Date:  6/1/2022    Comments:  Nutrition follow up.  Doing much better, strength improving.  Doing well with diet - 75% x 2 meals.  Plans for rehab at d/c, awaiting placement.    RD to continue to follow.     Reason for Assessment     Row Name 06/01/22 1603          Reason for Assessment    Reason For Assessment follow-up protocol                Nutrition/Diet History     Row Name 06/01/22 1603          Nutrition/Diet History    Typical Intake (Food/Fluid/EN/PN) 75% x 2 meals                Labs/Tests/Procedures/Meds     Row Name 06/01/22 1603          Labs/Procedures/Meds    Lab Results Reviewed reviewed, pertinent     Lab Results Comments WNL            Diagnostic Tests/Procedures    Diagnostic Test/Procedure Reviewed reviewed, pertinent            Medications    Pertinent Medications Reviewed reviewed, pertinent     Pertinent Medications Comments HCTZ                Physical Findings     Row Name 06/01/22 1604          Physical Findings    Overall Physical Appearance B=16, bruised; 2L O2; overweight                Nutrition Prescription Ordered     Row Name 06/01/22 1605          Nutrition Prescription PO    Current PO Diet Regular     Fluid Consistency Thin     Supplement Magic Cup;Mighty Shake     Supplement Frequency 2 times a day;Daily                Evaluation of Received Nutrient/Fluid Intake     Row Name 06/01/22 1605          PO Evaluation    Number of Meals 2     % PO Intake 75               Problem/Interventions:     Intervention Goal     Row Name 06/01/22 1605          Intervention Goal    General Maintain nutrition;Meet nutritional needs for age/condition;Disease management/therapy     PO Maintain intake     Weight No significant weight loss                Nutrition Intervention     Row Name 06/01/22 1605          Nutrition Intervention    RD/Tech  Action Follow Tx progress;Care plan reviewd     Recommended/Ordered Supplement                Education/Evaluation     Row Name 06/01/22 1606          Education    Education Will Instruct as appropriate            Monitor/Evaluation    Monitor Per protocol;PO intake;Supplement intake;Pertinent labs;Weight;Skin status;Symptoms                 Electronically signed by:  Seble Strauss RD  06/01/22 16:06 EDT

## 2022-06-02 NOTE — PLAN OF CARE
Goal Outcome Evaluation:  Plan of Care Reviewed With: patient        Progress: improving  Outcome Evaluation: Pt tolerated treatment well this date. Increased gait distance to 8ft, then 90ft w/ Rw and CGA-min A. Pt required some assist to steer walker on the L side, and cues to increase step lengths. Overall, much improved progress. Instructed pt on a few seated LE exercises, and encouraged pt to ambulate w/ nsg later.

## 2022-06-02 NOTE — PROGRESS NOTES
[facility]  Discussed with Dr. Ewing. Per son pt will have 24/7 assistance of additional family in addition to wife after dc. He has accepted pt for Acute Rehab. We will start precert today.   LM to inform CCP    Yael baron RN  Acute Rehab Admisison Nurse

## 2022-06-02 NOTE — PROGRESS NOTES
LOS: 10 days   Patient Care Team:  Jose Bingham MD as PCP - General (Internal Medicine)  Yennifer Ruiz MD as Consulting Physician (Cardiology)  Cristian Taylor OD (Optometry)  PARKER Wheeler MD as Consulting Physician (Ophthalmology)  Obdulio Morelos MD as Consulting Physician (Hematology and Oncology)  Zahira Gee MD as Referring Physician (Neurology)  Sanya Sim Jr., DPM as Consulting Physician (Podiatry)    Subjective     Patient remains confused he sitting up in bed.  He wants to go home.  His daughter is at bedside.   Review of Systems:          Objective     Vital Signs  Vital Sign Min/Max for last 24 hours  Temp  Min: 97.5 °F (36.4 °C)  Max: 98 °F (36.7 °C)   BP  Min: 115/67  Max: 159/64   Pulse  Min: 57  Max: 76   Resp  Min: 16  Max: 18   SpO2  Min: 90 %  Max: 96 %   Flow (L/min)  Min: 2  Max: 2   No data recorded        Ventilator/Non-Invasive Ventilation Settings (From admission, onward)            None                       Body mass index is 26.3 kg/m².  I/O last 3 completed shifts:  In: -   Out: 400 [Urine:400]  No intake/output data recorded.        Physical Exam:  General Appearance: Well-developed white male he is resting in bed.  He does  not appear in any acute distress  Eyes: Conjunctiva are clear and anicteric  ENT: Mucous membranes are moist no exudates  Neck: Trachea midline no visible jugular venous distention  Lungs: Clear nonlabored symmetric expansion  Cardiac: Regular rate rhythm no murmur  Abdomen: Soft nontender no palpable hepatosplenomegaly or masses  : Not examined  Musculoskeletal: He is got a lot of arthritic changes in his hands and fingers  Skin: Warm and dry no jaundice no petechiae , thin skin  Neuro: He is a alert and oriented he is cooperative he is extremely hard of hearing.  He is following commands he is got pretty good strength.  He is a little weaker on the left than the right spite being oriented he gets confused  very easily   Extremities/P Vascular: No clubbing no cyanosis no edema palpable radial and dorsalis pedis pulses  MSE: Seems to be in a good mood        Labs:  Results from last 7 days   Lab Units 05/30/22  0738 05/29/22  0811 05/28/22  0626 05/27/22  0635   GLUCOSE mg/dL 89 101* 91 125*   SODIUM mmol/L 136 135* 139 140   POTASSIUM mmol/L 4.1 3.8 3.8 4.0   CO2 mmol/L 27.2 25.7 24.8 17.2*   CHLORIDE mmol/L 101 102 106 109*   ANION GAP mmol/L 7.8 7.3 8.2 13.8   CREATININE mg/dL 0.77 0.72* 0.72* 0.77   BUN mg/dL 13 10 10 9   BUN / CREAT RATIO  16.9 13.9 13.9 11.7   CALCIUM mg/dL 9.0 8.7 8.1* 8.3*     Estimated Creatinine Clearance: 70.6 mL/min (by C-G formula based on SCr of 0.77 mg/dL).      Results from last 7 days   Lab Units 05/30/22  0738 05/29/22  0811 05/28/22  0626 05/27/22  0635   WBC 10*3/mm3 7.14 7.90 7.37 5.69   RBC 10*6/mm3 4.70 4.76 4.44 4.73   HEMOGLOBIN g/dL 13.8 14.1 13.1 13.8   HEMATOCRIT % 42.1 43.6 38.2 43.4   MCV fL 89.6 91.6 86.0 91.8   MCH pg 29.4 29.6 29.5 29.2   MCHC g/dL 32.8 32.3 34.3 31.8   RDW % 13.4 13.5 13.0 13.2   RDW-SD fl 44.4 46.7 40.6 45.6   MPV fL 9.5 9.7 9.7 9.2   PLATELETS 10*3/mm3 214 196 170 156                             Microbiology Results (last 10 days)     Procedure Component Value - Date/Time    Blood Culture - Blood, Arm, Left [[id number]]  (Normal) Collected: 05/23/22 2110    Lab Status: Final result Specimen: Blood from Arm, Left Updated: 05/28/22 2117     Blood Culture No growth at 5 days    Blood Culture - Blood, Arm, Right [[id number]]  (Normal) Collected: 05/23/22 2110    Lab Status: Final result Specimen: Blood from Arm, Right Updated: 05/28/22 2117     Blood Culture No growth at 5 days    COVID PRE-OP / PRE-PROCEDURE SCREENING ORDER (NO ISOLATION) - Swab, Nasopharynx [[id number]]  (Normal) Collected: 05/23/22 1937    Lab Status: Final result Specimen: Swab from Nasopharynx Updated: 05/23/22 2034    Narrative:      The following orders were created for panel order  COVID PRE-OP / PRE-PROCEDURE SCREENING ORDER (NO ISOLATION) - Swab, Nasopharynx.  Procedure                               Abnormality         Status                     ---------                               -----------         ------                     COVID-19,[facility]...[[id number]]  Normal              Final result                 Please view results for these tests on the individual orders.    COVID-19,[facility] CEPHEID/EMMANUELLE NP SWAB IN TRANSPORT MEDIA 8-12 HR TAT - Swab, Nasopharynx [[id number]]  (Normal) Collected: 05/23/22 1937    Lab Status: Final result Specimen: Swab from Nasopharynx Updated: 05/23/22 2034     COVID19 Not Detected    Narrative:      Fact sheet for providers: https://www.fda.gov/media/453950/download    Fact sheet for patients: https://www.fda.gov/media/927836/download    Test performed by PCR.              donepezil, 10 mg, Oral, Nightly  hydroCHLOROthiazide Oral, 25 mg, Oral, Daily  lacosamide, 100 mg, Oral, Q12H  lisinopril, 20 mg, Oral, BID  PHENobarbital, 64.8 mg, Nasogastric, Daily  rivaroxaban, 15 mg, Oral, Daily  sodium chloride, 10 mL, Intravenous, Q12H           Diagnostics:  EEG    Result Date: 5/24/2022  EEG Report          # Indication: Seizure History: 87-year-old with seizure.  The patient was found poorly responsive with poor left-sided movements followed by seizures.  The study utilizes time locked video Medical diagnoses: Vascular dementia, stroke, seizure disorder with status epilepticus No current facility-administered medications for this visit. No current outpatient medications on file. Facility-Administered Medications Ordered in Other Visits Medication Dose Route Frequency Provider Last Rate Last Admin • dextrose 5 % and sodium chloride 0.9 % infusion  75 mL/hr Intravenous Continuous Jumana Qureshi MD 75 mL/hr at 05/24/22 1716 75 mL/hr at 05/24/22 1716 • haloperidol lactate (HALDOL) injection 5 mg  5 mg Intravenous Q6H PRN Jumana Qureshi MD      • heparin (porcine) 5000 UNIT/ML injection 5,000 Units  5,000 Units Subcutaneous Q8H Mimi Bowman MD   5,000 Units at 05/24/22 1627 • levETIRAcetam (KEPPRA) injection 500 mg  500 mg Intravenous Q12H Jackie Harris MD   500 mg at 05/24/22 1325 • LORazepam (ATIVAN) injection 1 mg  1 mg Intravenous Q4H PRN Mimi Bowman MD     • Pharmacy Consult - Pharmacy to dose   Does not apply Once Roni Saenz MD     • PHENobarbital (LUMINAL) tablet 64.8 mg  64.8 mg Nasogastric Daily Jackie Harris MD   64.8 mg at 05/24/22 1742 • sodium chloride 0.9 % flush 10 mL  10 mL Intravenous PRN Roni Saenz MD     • sodium chloride 0.9 % flush 10 mL  10 mL Intravenous Q12H Mimi Bowman MD   10 mL at 05/24/22 0851 • sodium chloride 0.9 % flush 10 mL  10 mL Intravenous PRN Mimi Bowman MD     Time of study: 27 minutes 30 seconds Technical summary:  Background: Early in the study the background rhythm demonstrates diffusely slow low amplitude activities with lowest activities posteriorly.  The patient however was noted to be drowsy by the technician and later in the study the patient was aroused and the background rhythm became 8 Hz low to moderate amplitude poorly regulated posteriorly dominant alpha rhythm.  Sleep: The patient was drowsy throughout most of the study as noted by diffusely slower activities.  No vertex sharp transients or sleep spindles were seen.  Hyperventilation: Not obtained  Photic stimulation: Photic stimulation was performed at various flash frequencies showing no significant change in the background activity  EKG: Irregular rhythm in the 40s                       Video: The video feed demonstrates no unusual involuntary movements Impression: Essentially normal EEG during wakefulness and drowsiness.  No focal or epileptiform activities were seen. Dictated utilizing Dragon dictation.     CT Angiogram Neck    Result Date: 5/23/2022  CT ANGIOGRAM HEAD-, CT ANGIOGRAM NECK-, CT CEREBRAL PERFUSION W  WO CONTRAST-  CLINICAL HISTORY: 87-year-old male with history of previous right-sided CVA. New left-sided weakness after fall. Possible seizure activity. Patient anticoagulated.  TECHNIQUE: Transverse 3 mm thick images were obtained from the base of the skull to the vertex without IV contrast. Subsequently, brain perfusion imaging was acquired. Spiral CT images were then obtained through the head and neck during rapid IV injection of contrast and were reconstructed in 1 mm thick slices. Multiple coronal and sagittal and 3-D reconstructions reconstructions were produced. Postcontrast imaging of the brain parenchyma was also acquired.  Computer analysis was utilized for detection of large vessel occlusion.  Radiation dose reduction techniques were utilized, including automated exposure control and exposure modulation based on body size.  COMPARISON: MRI of the brain dated 09/11/2020. CTA of the head and neck dated 09/05/2014.  FINDINGS: The precontrast images demonstrate diffuse cortical atrophy and ill-defined abnormal diminished attenuation throughout the white matter of both cerebral hemispheres consistent with sequela of extensive small vessel chronic ischemic change that shows slight worsening since the preceding CTA. A large chronic infarct in the right parieto-occipital region has also increased in size somewhat since the preceding CTA in 2014, but is unchanged since the MRI dated 09/11/2020. There is no evidence of acute infarct or intracranial hemorrhage. There is no mass effect. Bone window images demonstrate no evidence of skull fracture. There is no mass effect.  Brain perfusion imaging demonstrates normal symmetric perfusion. There is no evidence of ischemia or infarction.  There is normal branching of the great vessels from the aortic arch that all appear widely patent without NASCET significant stenosis. No stenoses are identified in either common or external or internal carotid artery within the neck.  There is mild calcification of both carotid bifurcations that results in no luminal narrowing. Both carotid bifurcations appear somewhat patulous consistent with previous endarterectomy. The vertebral arteries are essentially codominant and are both widely patent throughout their course in the neck. Calcified plaque within the intracranial portion of the left vertebral artery produces moderate focal stenosis. The stenosis appears more prominent than on the previous CTA. Both vertebral arteries supply the basilar artery which is widely patent. There is moderate calcified atherosclerotic plaque within the intracranial portions of both internal carotid arteries at the level of the carotid siphons that results in moderate focal stenosis involving the supraclinoid portions of both internal carotid arteries. The degree of stenosis has increased somewhat since the preceding CTA. The previous study demonstrated focal moderate high-grade stenosis involving the M1 segment of the right middle cerebral artery. This appears significantly improved. Only mild areas of stenosis are now present within both middle cerebral arteries. There are no focal intracranial occlusions. No aneurysms are identified.  The postcontrast images of the brain parenchyma show no discrete enhancing lesions. There are no masses. The paranasal sinuses are well aerated.      Cortical atrophy and evidence of extensive small vessel chronic ischemic change. Moderately large chronic infarct in the right parietal occipital region. No acute infarct or hemorrhage is identified. There is normal symmetric brain perfusion. There are no NASCET significant stenoses within the arterial vasculature of the neck. There is focal moderate stenosis involving the distal left vertebral artery within the subarachnoid space. Focal areas of moderate stenosis are also present within the supraclinoid portions of both internal carotid arteries. No focal intracranial occlusions are  identified.  Findings of the noncontrast CT head were communicated to the stroke neurologist on 05/23/2022 at 7:22 PM. Findings of the CTA and CTP imaging were communicated at 7:46 PM.  This report was finalized on 5/23/2022 8:06 PM by Dr. Robby Hernandez M.D.      XR Chest 1 View    Result Date: 5/23/2022  PORTABLE CHEST 05/23/2022 AT 8:15 PM  CLINICAL HISTORY: Possible acute stroke. Possible seizure activity. Rule out aspiration.  Compared to a previous chest dated 514.  The lungs are well-expanded and appear free of infiltrates. There are no pleural effusions. The heart is normal in size. The thoracic aorta is mildly ectatic and calcified.  IMPRESSIONS: No evidence of acute disease within the chest.  This report was finalized on 5/23/2022 8:45 PM by Dr. Robby Hernandez M.D.      Duplex Venous Lower Extremity - Bilateral CAR    Result Date: 5/24/2022  · Normal bilateral lower extremity venous duplex scan.      CT Angiogram Head    Result Date: 5/23/2022  CT ANGIOGRAM HEAD-, CT ANGIOGRAM NECK-, CT CEREBRAL PERFUSION W WO CONTRAST-  CLINICAL HISTORY: 87-year-old male with history of previous right-sided CVA. New left-sided weakness after fall. Possible seizure activity. Patient anticoagulated.  TECHNIQUE: Transverse 3 mm thick images were obtained from the base of the skull to the vertex without IV contrast. Subsequently, brain perfusion imaging was acquired. Spiral CT images were then obtained through the head and neck during rapid IV injection of contrast and were reconstructed in 1 mm thick slices. Multiple coronal and sagittal and 3-D reconstructions reconstructions were produced. Postcontrast imaging of the brain parenchyma was also acquired.  Computer analysis was utilized for detection of large vessel occlusion.  Radiation dose reduction techniques were utilized, including automated exposure control and exposure modulation based on body size.  COMPARISON: MRI of the brain dated 09/11/2020. CTA of the head and  neck dated 09/05/2014.  FINDINGS: The precontrast images demonstrate diffuse cortical atrophy and ill-defined abnormal diminished attenuation throughout the white matter of both cerebral hemispheres consistent with sequela of extensive small vessel chronic ischemic change that shows slight worsening since the preceding CTA. A large chronic infarct in the right parieto-occipital region has also increased in size somewhat since the preceding CTA in 2014, but is unchanged since the MRI dated 09/11/2020. There is no evidence of acute infarct or intracranial hemorrhage. There is no mass effect. Bone window images demonstrate no evidence of skull fracture. There is no mass effect.  Brain perfusion imaging demonstrates normal symmetric perfusion. There is no evidence of ischemia or infarction.  There is normal branching of the great vessels from the aortic arch that all appear widely patent without NASCET significant stenosis. No stenoses are identified in either common or external or internal carotid artery within the neck. There is mild calcification of both carotid bifurcations that results in no luminal narrowing. Both carotid bifurcations appear somewhat patulous consistent with previous endarterectomy. The vertebral arteries are essentially codominant and are both widely patent throughout their course in the neck. Calcified plaque within the intracranial portion of the left vertebral artery produces moderate focal stenosis. The stenosis appears more prominent than on the previous CTA. Both vertebral arteries supply the basilar artery which is widely patent. There is moderate calcified atherosclerotic plaque within the intracranial portions of both internal carotid arteries at the level of the carotid siphons that results in moderate focal stenosis involving the supraclinoid portions of both internal carotid arteries. The degree of stenosis has increased somewhat since the preceding CTA. The previous study  demonstrated focal moderate high-grade stenosis involving the M1 segment of the right middle cerebral artery. This appears significantly improved. Only mild areas of stenosis are now present within both middle cerebral arteries. There are no focal intracranial occlusions. No aneurysms are identified.  The postcontrast images of the brain parenchyma show no discrete enhancing lesions. There are no masses. The paranasal sinuses are well aerated.      Cortical atrophy and evidence of extensive small vessel chronic ischemic change. Moderately large chronic infarct in the right parietal occipital region. No acute infarct or hemorrhage is identified. There is normal symmetric brain perfusion. There are no NASCET significant stenoses within the arterial vasculature of the neck. There is focal moderate stenosis involving the distal left vertebral artery within the subarachnoid space. Focal areas of moderate stenosis are also present within the supraclinoid portions of both internal carotid arteries. No focal intracranial occlusions are identified.  Findings of the noncontrast CT head were communicated to the stroke neurologist on 05/23/2022 at 7:22 PM. Findings of the CTA and CTP imaging were communicated at 7:46 PM.  This report was finalized on 5/23/2022 8:06 PM by Dr. Robby Hernandez M.D.      CT CEREBRAL PERFUSION WITH & WITHOUT CONTRAST    Result Date: 5/23/2022  CT ANGIOGRAM HEAD-, CT ANGIOGRAM NECK-, CT CEREBRAL PERFUSION W WO CONTRAST-  CLINICAL HISTORY: 87-year-old male with history of previous right-sided CVA. New left-sided weakness after fall. Possible seizure activity. Patient anticoagulated.  TECHNIQUE: Transverse 3 mm thick images were obtained from the base of the skull to the vertex without IV contrast. Subsequently, brain perfusion imaging was acquired. Spiral CT images were then obtained through the head and neck during rapid IV injection of contrast and were reconstructed in 1 mm thick slices. Multiple  coronal and sagittal and 3-D reconstructions reconstructions were produced. Postcontrast imaging of the brain parenchyma was also acquired.  Computer analysis was utilized for detection of large vessel occlusion.  Radiation dose reduction techniques were utilized, including automated exposure control and exposure modulation based on body size.  COMPARISON: MRI of the brain dated 09/11/2020. CTA of the head and neck dated 09/05/2014.  FINDINGS: The precontrast images demonstrate diffuse cortical atrophy and ill-defined abnormal diminished attenuation throughout the white matter of both cerebral hemispheres consistent with sequela of extensive small vessel chronic ischemic change that shows slight worsening since the preceding CTA. A large chronic infarct in the right parieto-occipital region has also increased in size somewhat since the preceding CTA in 2014, but is unchanged since the MRI dated 09/11/2020. There is no evidence of acute infarct or intracranial hemorrhage. There is no mass effect. Bone window images demonstrate no evidence of skull fracture. There is no mass effect.  Brain perfusion imaging demonstrates normal symmetric perfusion. There is no evidence of ischemia or infarction.  There is normal branching of the great vessels from the aortic arch that all appear widely patent without NASCET significant stenosis. No stenoses are identified in either common or external or internal carotid artery within the neck. There is mild calcification of both carotid bifurcations that results in no luminal narrowing. Both carotid bifurcations appear somewhat patulous consistent with previous endarterectomy. The vertebral arteries are essentially codominant and are both widely patent throughout their course in the neck. Calcified plaque within the intracranial portion of the left vertebral artery produces moderate focal stenosis. The stenosis appears more prominent than on the previous CTA. Both vertebral arteries  supply the basilar artery which is widely patent. There is moderate calcified atherosclerotic plaque within the intracranial portions of both internal carotid arteries at the level of the carotid siphons that results in moderate focal stenosis involving the supraclinoid portions of both internal carotid arteries. The degree of stenosis has increased somewhat since the preceding CTA. The previous study demonstrated focal moderate high-grade stenosis involving the M1 segment of the right middle cerebral artery. This appears significantly improved. Only mild areas of stenosis are now present within both middle cerebral arteries. There are no focal intracranial occlusions. No aneurysms are identified.  The postcontrast images of the brain parenchyma show no discrete enhancing lesions. There are no masses. The paranasal sinuses are well aerated.      Cortical atrophy and evidence of extensive small vessel chronic ischemic change. Moderately large chronic infarct in the right parietal occipital region. No acute infarct or hemorrhage is identified. There is normal symmetric brain perfusion. There are no NASCET significant stenoses within the arterial vasculature of the neck. There is focal moderate stenosis involving the distal left vertebral artery within the subarachnoid space. Focal areas of moderate stenosis are also present within the supraclinoid portions of both internal carotid arteries. No focal intracranial occlusions are identified.  Findings of the noncontrast CT head were communicated to the stroke neurologist on 2022 at 7:22 PM. Findings of the CTA and CTP imaging were communicated at 7:46 PM.  This report was finalized on 2022 8:06 PM by Dr. Robby Hernandez M.D.      Results for orders placed in visit on 14    CONVERTED (HISTORICAL) ECHO    Narrative  Patient:      JUANJO CHATMAN  Med Rec#:     [id number]               :          1935  Date:         2014            Age:           78y  Account#:     [id number]            Height:       180.34 cm / 71.0 in  Accession#:   [id number]               Weight:       82.1 kg / 180.9 lbs  Sex:          M                     BSA:          2.02  Room#:        540-1  Type:         Inpatient    Reading:      [facility] [place]  Reading:      Marcelo Blackman MD  Referring:    GI[facility]NA  Sonographer:  [facility] [place]  ______________________________________________________________________    Transthoracic Echocardiogram    Indication:  CHEST PAIN  BP:           120/54  HR:           62    Findings  Technical Comments:  A complete two dimensional transthoracic echocardiogram with color flow  and Doppler was performed. The study quality is good.    Contrast:  A verbal consent was obtained from the patient to use saline contrast to  assess for cardiac shunting. Intravenous agitated saline contrast was  used to assess intracardiac shunting.    Left Ventricle:  The left ventricular chamber size is small. Moderate concentric left  ventricular hypertrophy is observed. Global left ventricular wall motion  and contractility are within normal limits. There is normal left  ventricular systolic function. The estimated ejection fraction is  50-55%.  Abnormal left ventricular diastolic filling is observed,  consistent with Grade 1 (impaired LV relaxation).    Left Atrium:  The left atrium is mildly dilated. No atrial septal defect is  demonstrated by agitated saline contrast.  A patent foramen ovale is not  demonstrated by agitated saline contrast.    Right Ventricle:  The right ventricular chamber size and systolic function are within  normal limits.    Right Atrium:  The right atrium appears normal.    Aortic Valve:  The aortic valve is trileaflet. The leaflets are thin with normal  excursion. There is no aortic stenosis or regurgitation present.  Mitral Valve:  The mitral valve appears normal in structure and function. There is  posterior mitral annular calcification. There is a trace of  mitral  regurgitation.    Tricuspid Valve:  The tricuspid valve leaflets are normal.  There is mild tricuspid  regurgitation. The right ventricular systolic pressure is calculated at  42 mmHg.    Pulmonic Valve:  The pulmonic valve appears normal in structure and function.    Pericardium:  The pericardium appears normal.    Aorta:  The aorta appears normal.    Summary:  There is no evidence of intracardiac mass or thrombus.    Conclusions  The chest pain and the dyspnea may be related to the diastolic  dysfunction.  Normal systolic and vavlular function.    Measurements  Chambers MM  Name                    Value         Normal Range  AV cusp separation (MM) 1.7 cm        -    [place]ambers 2D  Name                    Value         Normal Range  IVSd (2D)               1.3 cm        -  LVPWd (2D)              1.3 cm        -  IVS:LVPW ratio (2D)     1 ratio       -  LVIDd (2D)              3.3 cm        -  LVIDs (2D)              2.2 cm        -  LV FS (Teichholz) (2D)  33.3 %        -  EF Teichholz (2D)       63.3 %        -  Ao root diameter (2D)   3.2 cm        -  LA dimension (AP) 2D    4 cm          -  LA:Ao ratio (2D)        1.25 ratio    -    Volumes/Mass  Name                    Value         Normal Range  LA ESV SP 4CH (MOD)     52 ml         -  LA ESV SP 2CH (MOD)     49 ml         -  LA ESV BP (MOD)         52 ml         -  LA ESV BP (MOD) index   25.7 ml/m2    -  LV EDV SP 4CH (MOD)     62 ml         -  LV ESV SP 4CH (MOD)     24 ml         -  EF SP 4CH (MOD)         61 %          -  LV EDV SP 2CH (MOD)     73 ml         -  LV ESV SP 2CH (MOD)     28 ml         -  EF SP 2CH (MOD)         62 %          -  LV EDV BP               68 ml         -  LV ESV BP               28 ml         -  BP EF (MOD)             59 %          -    Diastolic/Systolic Function  Name                    Value         Normal Range  MV E-wave Vmax          0.84 m/sec    -  MV deceleration time    246 msec      -  MV A-wave Vmax           1.16 m/sec    -  MV A-wave duration      162 msec      -  MV E:A ratio            0.7 ratio     -  P. vein S-wave Vmax     0.72 m/sec    -  P. vein D-wave Vmax     0.56 m/sec    -  P. vein S:D Vmax ratio  1.3 ratio     -  P. vein A-wave duration 144 msec      -  LV septal e' Vmax       0.07 m/sec    -  LV lateral e' Vmax      0.09 m/sec    -  LV E:e' septal ratio    11.5 ratio    -  LV E:e' lateral ratio   9.1 ratio     -    Aortic Valve  Name                    Value         Normal Range  AV VTI                  34.6 cm       -  AV mean gradient        4 mmHg        -  LVOT diameter           2.1 cm        -  LVOT VTI                24.6 cm       -  LVOT mean gradient      2 mmHg        -  SV LVOT                 85 ml         -  JOAO (continuity VTI)    2.46 cm2      -    Mitral Valve  Name                    Value         Normal Range  MV VTI                  29.5 cm       -  MV mean gradient        2 mmHg        -  MV PHT                  75 msec       -  MR Vmax                 3.18 m/sec    -  MVA (PHT)               2.93 cm2      -  MVA (continuity VTI)    2.89 cm2      -    Tricuspid Valve  Name                    Value         Normal Range  TR Vmax                 3.11 m/sec    -  TR peak gradient        39 mmHg       -  RAP                     3 mmHg        -  RVSP                    42 mmHg       -    Pulmonic Valve/Qp:Qs  Name                    Value         Normal Range  PV Vmax                 0.76 m/sec    -  PV peak gradient        2 mmHg        -  RVOT diameter           2.4 cm        -  RVOT Vmax               0.59 m/sec    -  RVOT VTI                14.7 cm       -  RVOT peak gradient      1 mmHg        -  SV RVOT                 66 ml         -  PVA (continuity Vmax)   3.49 cm2      -  Qp:Qs                   0.8 ratio     -    Electronically signed by: Marcelo Blackman MD on 01/14/2014 15:10:17  Thank you for the courtesy of this referral.          Active Hospital Problems    Diagnosis  POA   •  Status epilepticus (HCC) [G40.901]  Yes      Resolved Hospital Problems   No resolved problems to display.         Assessment & Plan     1. Status epilepticus  2. Encephalopathy probably just a prolonged postictal encephalopathy he may be having a little hospital psychosis/sundowning as well.  3. Lactic acidosis secondary to seizure  4. Hypotension transient, likely secondary to phenobarbital  5. Hypoxia secondary to hypoventilation and possibly obstructive sleep apnea.  I am not sure he is can to be able to do a Pap machine and how much benefit it would yield dated his age is not really beneficial  6. Possible obstructive sleep apnea consider outpatient sleep study  7. Bilateral lower extremity edema/venous stasis  8. Urinary retention resolved  9. Dysphagia  10. 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 Immobility syndrome patient is going need some therapy prior to returning home    Plan for disposition: Place for him to go is the only hold-up for discharge now awaiting insurance again for acute rehab    Nilson Harmon Jr, MD  06/02/22  16:14 EDT    Time:

## 2022-06-02 NOTE — PROGRESS NOTES
Continued Stay Note  [facility]     Patient Name: Alireza Carreon  MRN: [id number]  Today's Date: 6/2/2022    Admit Date: 5/23/2022     Discharge Plan     Row Name 06/02/22 1019       Plan    Plan [facility] acute rehab pending [place] Medicare pre-cert    Patient/Family in Agreement with Plan yes    Plan Comments Per HELADIO [facility] acute rehab will initiate [employer] Medicare pre-cert today. CCP updated Daniel's (Ben) who will follow through acute rehab to arrange DME upon d/c. Nuris Freitas LCSW               Discharge Codes    No documentation.               Expected Discharge Date and Time     Expected Discharge Date Expected Discharge Time    Jun 2, 2022             Liza Freitas LCSW

## 2022-06-02 NOTE — THERAPY TREATMENT NOTE
Patient Name: Alireza Carreon  : 1935    MRN: [id number]                              Today's Date: 2022       Admit Date: 2022    Visit Dx:     ICD-10-CM ICD-9-CM   1. Status epilepticus ([facility])  G40.901 345.3   2. Altered mental status, unspecified altered mental status type  R41.82 780.97   3. Acute hypoxemic respiratory failure ([facility])  J96.01 518.81   4. History of cardioembolic cerebrovascular accident (CVA)  Z86.73 V12.54   5. Chronic anticoagulation  Z79.01 V58.61   6. Lactic acidosis  E87.2 276.2   7. Hyperkalemia  E87.5 276.7   8. Acute renal failure superimposed on chronic kidney disease, unspecified CKD stage, unspecified acute renal failure type (HCC)  N17.9 584.9    N18.9 585.9   9. Hypotension, unspecified hypotension type  I95.9 458.9   10. Seizure disorder ([facility])  G40.909 345.90   11. Vascular dementia with behavior disturbance ([facility])  F01.51 290.40   12. Gait abnormality  R26.9 781.2   13. Localized edema  R60.0 782.3   14. Swelling of left lower extremity  M79.89 729.81     Patient Active Problem List   Diagnosis   • Hypertension   • Anal pruritus   • Atrial fibrillation ([facility])   • Edema   • Gout   • Hyperlipidemia   • Medicare annual wellness visit, subsequent   • 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   • Dementia ([facility])   • Swelling of right testicle   • Gait abnormality   • Vascular dementia with behavior disturbance ([facility])   • Monoclonal gammopathy of unknown significance ([facility])   • Chronic anticoagulation   • Arteriosclerosis of coronary artery   • Cerebrovascular accident (CVA) ([facility])   • Seizure disorder ([facility])   • Swelling of left lower extremity   • Hearing loss   • Localized edema   • Blister   • Skin nodule of toe of right foot   • Status epilepticus (HCC)     Past Medical History:   Diagnosis Date   • 3-vessel CAD    • Anticoagulated on warfarin    • Atrial fibrillation (HCC)    • BMI 28.0-28.9,adult    • Bunion, right foot    • Complaints of memory disturbance     • Dementia (HCC)    • Edema    • Encounter for immunization    • Generalized convulsive seizure (HCC)    • Gout    • History of double vision    • HL (hearing loss)    • Hyperlipidemia    • Hypertension    • Left foot pain    • 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    • Movement disorder    • Nocturnal leg cramps    • OA (osteoarthritis)    • Prepatellar effusion    • Pulmonary embolism (HCC)    • Puncture wound of hand, right    • Screening for cardiovascular condition    • Sleep apnea    • Stasis dermatitis    • Statin intolerance    • Stroke (HCC)    • Taking drug for chronic disease    • Thoracic back pain    • Transient ischemic attack      Past Surgical History:   Procedure Laterality Date   • ADENOIDECTOMY     • APPENDECTOMY     • CATARACT EXTRACTION, BILATERAL     • CORONARY ARTERY BYPASS GRAFT     • CORONARY ARTERY BYPASS GRAFT     • CYSTOSCOPY TRANSURETHRAL RESECTION OF PROSTATE     • HERNIA REPAIR     • OTHER SURGICAL HISTORY      carotid thromboendarterectomy   • SHOULDER SURGERY     • SKIN CANCER EXCISION        General Information     [facility] Name 06/02/22 1318          Physical Therapy Time and Intention    Document Type therapy note (daily note)  -     Mode of Treatment physical therapy  -[facility] Name 06/02/22 1318          General Information    Existing Precautions/Restrictions fall  -[facility] Name 06/02/22 1318          Cognition    Orientation Status (Cognition) oriented x 3  -           User Key  (r) = Recorded By, (t) = Taken By, (c) = Cosigned By    Initials Name Provider Type     Evelina Bermudez PTA Physical Therapist Assistant               Mobility     Row Name 06/02/22 1319          Bed Mobility    Bed Mobility supine-sit  -     Supine-Sit [place] (Bed Mobility) minimum assist (75% patient effort);moderate assist (50% patient effort)  -     Assistive Device (Bed Mobility) bed rails;head of bed elevated  -[facility] Name 06/02/22 1319          Sit-Stand  Transfer    Sit-Stand [place] (Transfers) minimum assist (75% patient effort)  -     Assistive Device (Sit-Stand Transfers) walker, front-wheeled  -     Row Name 06/02/22 1319          Gait/Stairs (Locomotion)    [place] Level (Gait) contact guard;minimum assist (75% patient effort)  -     Assistive Device (Gait) walker, front-wheeled  -     Distance in Feet (Gait) 8ft, then 90ft  -     Deviations/Abnormal Patterns (Gait) marcel decreased;festinating/shuffling;stride length decreased  -     Bilateral Gait Deviations forward flexed posture  -     Comment, (Gait/Stairs) cues to increase step lengths; assist to steer walker on L side  -           User Key  (r) = Recorded By, (t) = Taken By, (c) = Cosigned By    Initials Name Provider Type    Evelina Montes De Oca PTA Physical Therapist Assistant               Obj/Interventions     Row Name 06/02/22 1320          Motor Skills    Therapeutic Exercise --  seated AP, LAQ, marches x10 reps  -           User Key  (r) = Recorded By, (t) = Taken By, (c) = Cosigned By    Initials Name Provider Type    Evelina Montes De Oca PTA Physical Therapist Assistant               Goals/Plan    No documentation.                Clinical Impression     Row Name 06/02/22 1320          Pain    Pretreatment Pain Rating 0/10 - no pain  -     Posttreatment Pain Rating 0/10 - no pain  -     Row Name 06/02/22 1320          Positioning and Restraints    Pre-Treatment Position in bed  -     Post Treatment Position chair  -     In Chair reclined;call light within reach;encouraged to call for assist;exit alarm on;with family/caregiver  -           User Key  (r) = Recorded By, (t) = Taken By, (c) = Cosigned By    Initials Name Provider Type    Evelina Montes De Oca PTA Physical Therapist Assistant               Outcome Measures     Row Name 06/02/22 1321          How much help from another person do you currently need...    Turning from your back to your side  while in flat bed without using bedrails? 3  -SM     Moving from lying on back to sitting on the side of a flat bed without bedrails? 2  -SM     Moving to and from a bed to a chair (including a wheelchair)? 3  -SM     Standing up from a chair using your arms (e.g., wheelchair, bedside chair)? 3  -SM     Climbing 3-5 steps with a railing? 1  -SM     To walk in hospital room? 3  -SM     AM-PAC 6 Clicks Score (PT) 15  -SM     Highest level of mobility 4 --> Transferred to chair/commode  -     Row Name 06/02/22 1321          Functional Assessment    Outcome Measure Options AM-PAC 6 Clicks Basic Mobility (PT)  -           User Key  (r) = Recorded By, (t) = Taken By, (c) = Cosigned By    Initials Name Provider Type    Evelina Montes De Oca, PIPPA Physical Therapist Assistant                             Physical Therapy Education                 Title: PT OT SLP Therapies (In Progress)     Topic: Physical Therapy (Done)     Point: Mobility training (Done)     Learning Progress Summary           Patient Acceptance, E,TB,D, VU,NR by  at 6/2/2022 1321    Acceptance, E,TB,D, VU,NR by  at 6/1/2022 1614    Acceptance, E,D, NR by  at 5/31/2022 1616    Acceptance, E,TB,D, VU,NR by  at 5/27/2022 1250    Acceptance, E, VU by  at 5/26/2022 1510    Acceptance, E, NR,VU by  at 5/25/2022 1430   Family Acceptance, E, NR,VU by  at 5/25/2022 1430                   Point: Home exercise program (Done)     Learning Progress Summary           Patient Acceptance, E,TB,D, VU,NR by  at 6/2/2022 1321    Acceptance, E,TB,D, VU,NR by  at 6/1/2022 1614    Acceptance, E,D, NR by  at 5/31/2022 1616    Acceptance, E,TB,D, VU,NR by  at 5/27/2022 1250                   Point: Body mechanics (Done)     Learning Progress Summary           Patient Acceptance, E,TB,D, VU,NR by  at 6/2/2022 1321    Acceptance, E,TB,D, VU,NR by  at 6/1/2022 1614    Acceptance, E,D, NR by SM at 5/31/2022 1616    Acceptance, E,TB,D, VU,NR by SM at  5/27/2022 1250    Acceptance, E, VU by  at 5/26/2022 1510                   Point: Precautions (Done)     Learning Progress Summary           Patient Acceptance, E,TB,D, VU,NR by  at 6/2/2022 1321    Acceptance, E,TB,D, VU,NR by  at 6/1/2022 1614    Acceptance, E,D, NR by  at 5/31/2022 1616    Acceptance, E,TB,D, VU,NR by  at 5/27/2022 1250                               User Key     Initials Effective Dates Name Provider Type [facility] 03/07/18 -  Evelina Bermudez PTA Physical Therapist Assistant PT     05/05/22 -  Hancock, Ryley, PT Student PT Student PT              PT Recommendation and Plan     Plan of Care Reviewed With: patient  Progress: improving  Outcome Evaluation: Pt tolerated treatment well this date. Increased gait distance to 8ft, then 90ft w/ Rw and CGA-min A. Pt required some assist to steer walker on the L side, and cues to increase step lengths. Overall, much improved progress. Instructed pt on a few seated LE exercises, and encouraged pt to ambulate w/ nsg later.     Time Calculation:    PT Charges     Row Name 06/02/22 1323             Time Calculation    Start Time 1050  -      Stop Time 1124  -      Time Calculation (min) 34 min  -      PT Received On 06/02/22  -      PT - Next Appointment 06/03/22  -            User Key  (r) = Recorded By, (t) = Taken By, (c) = Cosigned By    Initials Name Provider Type     Altaf Bermudezkelby Hampton PTA Physical Therapist Assistant              Therapy Charges for Today     Code Description Service Date Service Provider Modifiers Qty    95058908874 HC PT THER PROC EA 15 MIN 6/1/2022 Evelina Bermudez Berta, PTA GP 1    45346647035 HC PT THERAPEUTIC ACT EA 15 MIN 6/1/2022 Bermudez Evelinakelby Hampton, PTA GP 1    80848711412 HC PT THER SUPP EA 15 MIN 6/1/2022 Evelina Bermudez Berta, PTA GP 2    45436298086 HC PT THER PROC EA 15 MIN 6/2/2022 Bermudez Evelina Berta, PTA GP 1    33326346951 HC GAIT TRAINING EA 15 MIN 6/2/2022 Evelina Bermudez, PIPPA GP  1          PT G-Codes  Outcome Measure Options: AM-PAC 6 Clicks Basic Mobility (PT)  AM-PAC 6 Clicks Score (PT): 15  AM-PAC 6 Clicks Score (OT): 14  Modified Aditya Scale: 4 - Moderately severe disability.  Unable to walk without assistance, and unable to attend to own bodily needs without assistance.    Evelina Bermudez, PTA  6/2/2022

## 2022-06-03 PROBLEM — M62.3 IMMOBILITY SYNDROME: Status: ACTIVE | Noted: 2022-01-01

## 2022-06-03 NOTE — PROGRESS NOTES
Continue to await precert. If we receive precert after hours or on weekend, patient can admit on the weekend pending medical stability and negative covid. Will update as soon as response from insurance obtained.    Monica Isabel RN  Rehab Admissions Nurse  [phone]

## 2022-06-03 NOTE — PROGRESS NOTES
Continued Stay Note  [facility]     Patient Name: Alireza Carreon  MRN: [id number]  Today's Date: 6/3/2022    Admit Date: 5/23/2022     Discharge Plan     Row Name 06/03/22 0936       Plan    Plan [facility] acute rehab pending [place] Medicare pre-cert    Patient/Family in Agreement with Plan yes    Plan Comments Per HELADIO Anderson acute rehab continues to await [employer] Medicare pre-cert. Nuris Freitas LCSW               Discharge Codes    No documentation.               Expected Discharge Date and Time     Expected Discharge Date Expected Discharge Time    Jun 2, 2022             Liza Freitas LCSW

## 2022-06-03 NOTE — PROGRESS NOTES
LOS: 11 days   Patient Care Team:  Jose Bingham MD as PCP - General (Internal Medicine)  Yennifer Ruiz MD as Consulting Physician (Cardiology)  Cristian Taylor OD (Optometry)  PARKER Wheeler MD as Consulting Physician (Ophthalmology)  Obdulio Morelos MD as Consulting Physician (Hematology and Oncology)  Zahira Gee MD as Referring Physician (Neurology)  Sanya Sim Jr., DPM as Consulting Physician (Podiatry)    Subjective     Patient remains confused he sitting up in chair.  He is not in any distress.  Review of Systems:          Objective     Vital Signs  Vital Sign Min/Max for last 24 hours  Temp  Min: 97.5 °F (36.4 °C)  Max: 97.9 °F (36.6 °C)   BP  Min: 121/56  Max: 199/81   Pulse  Min: 58  Max: 76   Resp  Min: 16  Max: 18   SpO2  Min: 85 %  Max: 94 %   No data recorded   No data recorded        Ventilator/Non-Invasive Ventilation Settings (From admission, onward)            None                       Body mass index is 26.3 kg/m².  No intake/output data recorded.  I/O this shift:  In: -   Out: 300 [Urine:300]        Physical Exam:  General Appearance: Well-developed white male he is resting in chair.  He does  not appear in any acute distress  Eyes: Conjunctiva are clear and anicteric  ENT: Mucous membranes are moist no exudates  Neck: Trachea midline no visible jugular venous distention  Lungs: Clear nonlabored symmetric expansion  Cardiac: Regular rate rhythm no murmur  Abdomen: Soft nontender no palpable hepatosplenomegaly or masses  : Not examined  Musculoskeletal: He is got a lot of arthritic changes in his hands and fingers  Skin: Warm and dry no jaundice no petechiae , thin skin  Neuro: He is a alert and oriented he is cooperative he is extremely hard of hearing.  He is following commands he is got pretty good strength.  He is a little weaker on the left than the right inspite of being oriented he gets confused very easily and short-term memory is  extremely poor  Extremities/P Vascular: No clubbing no cyanosis no edema palpable radial and dorsalis pedis pulses  MSE: Seems to be in a good mood        Labs:  Results from last 7 days   Lab Units 05/30/22  0738 05/29/22  0811 05/28/22  0626   GLUCOSE mg/dL 89 101* 91   SODIUM mmol/L 136 135* 139   POTASSIUM mmol/L 4.1 3.8 3.8   CO2 mmol/L 27.2 25.7 24.8   CHLORIDE mmol/L 101 102 106   ANION GAP mmol/L 7.8 7.3 8.2   CREATININE mg/dL 0.77 0.72* 0.72*   BUN mg/dL 13 10 10   BUN / CREAT RATIO  16.9 13.9 13.9   CALCIUM mg/dL 9.0 8.7 8.1*     Estimated Creatinine Clearance: 70.6 mL/min (by C-G formula based on SCr of 0.77 mg/dL).      Results from last 7 days   Lab Units 05/30/22  0738 05/29/22  0811 05/28/22  0626   WBC 10*3/mm3 7.14 7.90 7.37   RBC 10*6/mm3 4.70 4.76 4.44   HEMOGLOBIN g/dL 13.8 14.1 13.1   HEMATOCRIT % 42.1 43.6 38.2   MCV fL 89.6 91.6 86.0   MCH pg 29.4 29.6 29.5   MCHC g/dL 32.8 32.3 34.3   RDW % 13.4 13.5 13.0   RDW-SD fl 44.4 46.7 40.6   MPV fL 9.5 9.7 9.7   PLATELETS 10*3/mm3 214 196 170                             Microbiology Results (last 10 days)     ** No results found for the last 240 hours. **              donepezil, 10 mg, Oral, Nightly  hydroCHLOROthiazide Oral, 25 mg, Oral, Daily  lacosamide, 100 mg, Oral, Q12H  lisinopril, 20 mg, Oral, BID  PHENobarbital, 64.8 mg, Nasogastric, Daily  rivaroxaban, 15 mg, Oral, Daily  sodium chloride, 10 mL, Intravenous, Q12H           Diagnostics:  EEG    Result Date: 5/24/2022  EEG Report          # Indication: Seizure History: 87-year-old with seizure.  The patient was found poorly responsive with poor left-sided movements followed by seizures.  The study utilizes time locked video Medical diagnoses: Vascular dementia, stroke, seizure disorder with status epilepticus No current facility-administered medications for this visit. No current outpatient medications on file. Facility-Administered Medications Ordered in Other Visits Medication Dose  Route Frequency Provider Last Rate Last Admin • dextrose 5 % and sodium chloride 0.9 % infusion  75 mL/hr Intravenous Continuous Jumana Qureshi MD 75 mL/hr at 05/24/22 1716 75 mL/hr at 05/24/22 1716 • haloperidol lactate (HALDOL) injection 5 mg  5 mg Intravenous Q6H PRN Jumana Qureshi MD     • heparin (porcine) 5000 UNIT/ML injection 5,000 Units  5,000 Units Subcutaneous Q8H Mimi Bowman MD   5,000 Units at 05/24/22 1627 • levETIRAcetam (KEPPRA) injection 500 mg  500 mg Intravenous Q12H Jackie Harris MD   500 mg at 05/24/22 1325 • LORazepam (ATIVAN) injection 1 mg  1 mg Intravenous Q4H PRN Mimi Bowman MD     • Pharmacy Consult - Pharmacy to dose   Does not apply Once Roni Saenz MD     • PHENobarbital (LUMINAL) tablet 64.8 mg  64.8 mg Nasogastric Daily Jackie Harris MD   64.8 mg at 05/24/22 1742 • sodium chloride 0.9 % flush 10 mL  10 mL Intravenous PRN Roni Saenz MD     • sodium chloride 0.9 % flush 10 mL  10 mL Intravenous Q12H Mimi Bowman MD   10 mL at 05/24/22 0851 • sodium chloride 0.9 % flush 10 mL  10 mL Intravenous PRN Mimi Bowman MD     Time of study: 27 minutes 30 seconds Technical summary:  Background: Early in the study the background rhythm demonstrates diffusely slow low amplitude activities with lowest activities posteriorly.  The patient however was noted to be drowsy by the technician and later in the study the patient was aroused and the background rhythm became 8 Hz low to moderate amplitude poorly regulated posteriorly dominant alpha rhythm.  Sleep: The patient was drowsy throughout most of the study as noted by diffusely slower activities.  No vertex sharp transients or sleep spindles were seen.  Hyperventilation: Not obtained  Photic stimulation: Photic stimulation was performed at various flash frequencies showing no significant change in the background activity  EKG: Irregular rhythm in the 40s                       Video: The video feed demonstrates no  unusual involuntary movements Impression: Essentially normal EEG during wakefulness and drowsiness.  No focal or epileptiform activities were seen. Dictated utilizing Dragon dictation.     CT Angiogram Neck    Result Date: 5/23/2022  CT ANGIOGRAM HEAD-, CT ANGIOGRAM NECK-, CT CEREBRAL PERFUSION W WO CONTRAST-  CLINICAL HISTORY: 87-year-old male with history of previous right-sided CVA. New left-sided weakness after fall. Possible seizure activity. Patient anticoagulated.  TECHNIQUE: Transverse 3 mm thick images were obtained from the base of the skull to the vertex without IV contrast. Subsequently, brain perfusion imaging was acquired. Spiral CT images were then obtained through the head and neck during rapid IV injection of contrast and were reconstructed in 1 mm thick slices. Multiple coronal and sagittal and 3-D reconstructions reconstructions were produced. Postcontrast imaging of the brain parenchyma was also acquired.  Computer analysis was utilized for detection of large vessel occlusion.  Radiation dose reduction techniques were utilized, including automated exposure control and exposure modulation based on body size.  COMPARISON: MRI of the brain dated 09/11/2020. CTA of the head and neck dated 09/05/2014.  FINDINGS: The precontrast images demonstrate diffuse cortical atrophy and ill-defined abnormal diminished attenuation throughout the white matter of both cerebral hemispheres consistent with sequela of extensive small vessel chronic ischemic change that shows slight worsening since the preceding CTA. A large chronic infarct in the right parieto-occipital region has also increased in size somewhat since the preceding CTA in 2014, but is unchanged since the MRI dated 09/11/2020. There is no evidence of acute infarct or intracranial hemorrhage. There is no mass effect. Bone window images demonstrate no evidence of skull fracture. There is no mass effect.  Brain perfusion imaging demonstrates normal  symmetric perfusion. There is no evidence of ischemia or infarction.  There is normal branching of the great vessels from the aortic arch that all appear widely patent without NASCET significant stenosis. No stenoses are identified in either common or external or internal carotid artery within the neck. There is mild calcification of both carotid bifurcations that results in no luminal narrowing. Both carotid bifurcations appear somewhat patulous consistent with previous endarterectomy. The vertebral arteries are essentially codominant and are both widely patent throughout their course in the neck. Calcified plaque within the intracranial portion of the left vertebral artery produces moderate focal stenosis. The stenosis appears more prominent than on the previous CTA. Both vertebral arteries supply the basilar artery which is widely patent. There is moderate calcified atherosclerotic plaque within the intracranial portions of both internal carotid arteries at the level of the carotid siphons that results in moderate focal stenosis involving the supraclinoid portions of both internal carotid arteries. The degree of stenosis has increased somewhat since the preceding CTA. The previous study demonstrated focal moderate high-grade stenosis involving the M1 segment of the right middle cerebral artery. This appears significantly improved. Only mild areas of stenosis are now present within both middle cerebral arteries. There are no focal intracranial occlusions. No aneurysms are identified.  The postcontrast images of the brain parenchyma show no discrete enhancing lesions. There are no masses. The paranasal sinuses are well aerated.      Cortical atrophy and evidence of extensive small vessel chronic ischemic change. Moderately large chronic infarct in the right parietal occipital region. No acute infarct or hemorrhage is identified. There is normal symmetric brain perfusion. There are no NASCET significant stenoses  within the arterial vasculature of the neck. There is focal moderate stenosis involving the distal left vertebral artery within the subarachnoid space. Focal areas of moderate stenosis are also present within the supraclinoid portions of both internal carotid arteries. No focal intracranial occlusions are identified.  Findings of the noncontrast CT head were communicated to the stroke neurologist on 05/23/2022 at 7:22 PM. Findings of the CTA and CTP imaging were communicated at 7:46 PM.  This report was finalized on 5/23/2022 8:06 PM by Dr. Robby Hernandez M.D.      XR Chest 1 View    Result Date: 5/23/2022  PORTABLE CHEST 05/23/2022 AT 8:15 PM  CLINICAL HISTORY: Possible acute stroke. Possible seizure activity. Rule out aspiration.  Compared to a previous chest dated 514.  The lungs are well-expanded and appear free of infiltrates. There are no pleural effusions. The heart is normal in size. The thoracic aorta is mildly ectatic and calcified.  IMPRESSIONS: No evidence of acute disease within the chest.  This report was finalized on 5/23/2022 8:45 PM by Dr. Robby Hernandez M.D.      Duplex Venous Lower Extremity - Bilateral CAR    Result Date: 5/24/2022  · Normal bilateral lower extremity venous duplex scan.      CT Angiogram Head    Result Date: 5/23/2022  CT ANGIOGRAM HEAD-, CT ANGIOGRAM NECK-, CT CEREBRAL PERFUSION W WO CONTRAST-  CLINICAL HISTORY: 87-year-old male with history of previous right-sided CVA. New left-sided weakness after fall. Possible seizure activity. Patient anticoagulated.  TECHNIQUE: Transverse 3 mm thick images were obtained from the base of the skull to the vertex without IV contrast. Subsequently, brain perfusion imaging was acquired. Spiral CT images were then obtained through the head and neck during rapid IV injection of contrast and were reconstructed in 1 mm thick slices. Multiple coronal and sagittal and 3-D reconstructions reconstructions were produced. Postcontrast imaging of the  brain parenchyma was also acquired.  Computer analysis was utilized for detection of large vessel occlusion.  Radiation dose reduction techniques were utilized, including automated exposure control and exposure modulation based on body size.  COMPARISON: MRI of the brain dated 09/11/2020. CTA of the head and neck dated 09/05/2014.  FINDINGS: The precontrast images demonstrate diffuse cortical atrophy and ill-defined abnormal diminished attenuation throughout the white matter of both cerebral hemispheres consistent with sequela of extensive small vessel chronic ischemic change that shows slight worsening since the preceding CTA. A large chronic infarct in the right parieto-occipital region has also increased in size somewhat since the preceding CTA in 2014, but is unchanged since the MRI dated 09/11/2020. There is no evidence of acute infarct or intracranial hemorrhage. There is no mass effect. Bone window images demonstrate no evidence of skull fracture. There is no mass effect.  Brain perfusion imaging demonstrates normal symmetric perfusion. There is no evidence of ischemia or infarction.  There is normal branching of the great vessels from the aortic arch that all appear widely patent without NASCET significant stenosis. No stenoses are identified in either common or external or internal carotid artery within the neck. There is mild calcification of both carotid bifurcations that results in no luminal narrowing. Both carotid bifurcations appear somewhat patulous consistent with previous endarterectomy. The vertebral arteries are essentially codominant and are both widely patent throughout their course in the neck. Calcified plaque within the intracranial portion of the left vertebral artery produces moderate focal stenosis. The stenosis appears more prominent than on the previous CTA. Both vertebral arteries supply the basilar artery which is widely patent. There is moderate calcified atherosclerotic plaque  within the intracranial portions of both internal carotid arteries at the level of the carotid siphons that results in moderate focal stenosis involving the supraclinoid portions of both internal carotid arteries. The degree of stenosis has increased somewhat since the preceding CTA. The previous study demonstrated focal moderate high-grade stenosis involving the M1 segment of the right middle cerebral artery. This appears significantly improved. Only mild areas of stenosis are now present within both middle cerebral arteries. There are no focal intracranial occlusions. No aneurysms are identified.  The postcontrast images of the brain parenchyma show no discrete enhancing lesions. There are no masses. The paranasal sinuses are well aerated.      Cortical atrophy and evidence of extensive small vessel chronic ischemic change. Moderately large chronic infarct in the right parietal occipital region. No acute infarct or hemorrhage is identified. There is normal symmetric brain perfusion. There are no NASCET significant stenoses within the arterial vasculature of the neck. There is focal moderate stenosis involving the distal left vertebral artery within the subarachnoid space. Focal areas of moderate stenosis are also present within the supraclinoid portions of both internal carotid arteries. No focal intracranial occlusions are identified.  Findings of the noncontrast CT head were communicated to the stroke neurologist on 05/23/2022 at 7:22 PM. Findings of the CTA and CTP imaging were communicated at 7:46 PM.  This report was finalized on 5/23/2022 8:06 PM by Dr. Robby Hernandez M.D.      CT CEREBRAL PERFUSION WITH & WITHOUT CONTRAST    Result Date: 5/23/2022  CT ANGIOGRAM HEAD-, CT ANGIOGRAM NECK-, CT CEREBRAL PERFUSION W WO CONTRAST-  CLINICAL HISTORY: 87-year-old male with history of previous right-sided CVA. New left-sided weakness after fall. Possible seizure activity. Patient anticoagulated.  TECHNIQUE:  Transverse 3 mm thick images were obtained from the base of the skull to the vertex without IV contrast. Subsequently, brain perfusion imaging was acquired. Spiral CT images were then obtained through the head and neck during rapid IV injection of contrast and were reconstructed in 1 mm thick slices. Multiple coronal and sagittal and 3-D reconstructions reconstructions were produced. Postcontrast imaging of the brain parenchyma was also acquired.  Computer analysis was utilized for detection of large vessel occlusion.  Radiation dose reduction techniques were utilized, including automated exposure control and exposure modulation based on body size.  COMPARISON: MRI of the brain dated 09/11/2020. CTA of the head and neck dated 09/05/2014.  FINDINGS: The precontrast images demonstrate diffuse cortical atrophy and ill-defined abnormal diminished attenuation throughout the white matter of both cerebral hemispheres consistent with sequela of extensive small vessel chronic ischemic change that shows slight worsening since the preceding CTA. A large chronic infarct in the right parieto-occipital region has also increased in size somewhat since the preceding CTA in 2014, but is unchanged since the MRI dated 09/11/2020. There is no evidence of acute infarct or intracranial hemorrhage. There is no mass effect. Bone window images demonstrate no evidence of skull fracture. There is no mass effect.  Brain perfusion imaging demonstrates normal symmetric perfusion. There is no evidence of ischemia or infarction.  There is normal branching of the great vessels from the aortic arch that all appear widely patent without NASCET significant stenosis. No stenoses are identified in either common or external or internal carotid artery within the neck. There is mild calcification of both carotid bifurcations that results in no luminal narrowing. Both carotid bifurcations appear somewhat patulous consistent with previous endarterectomy. The  vertebral arteries are essentially codominant and are both widely patent throughout their course in the neck. Calcified plaque within the intracranial portion of the left vertebral artery produces moderate focal stenosis. The stenosis appears more prominent than on the previous CTA. Both vertebral arteries supply the basilar artery which is widely patent. There is moderate calcified atherosclerotic plaque within the intracranial portions of both internal carotid arteries at the level of the carotid siphons that results in moderate focal stenosis involving the supraclinoid portions of both internal carotid arteries. The degree of stenosis has increased somewhat since the preceding CTA. The previous study demonstrated focal moderate high-grade stenosis involving the M1 segment of the right middle cerebral artery. This appears significantly improved. Only mild areas of stenosis are now present within both middle cerebral arteries. There are no focal intracranial occlusions. No aneurysms are identified.  The postcontrast images of the brain parenchyma show no discrete enhancing lesions. There are no masses. The paranasal sinuses are well aerated.      Cortical atrophy and evidence of extensive small vessel chronic ischemic change. Moderately large chronic infarct in the right parietal occipital region. No acute infarct or hemorrhage is identified. There is normal symmetric brain perfusion. There are no NASCET significant stenoses within the arterial vasculature of the neck. There is focal moderate stenosis involving the distal left vertebral artery within the subarachnoid space. Focal areas of moderate stenosis are also present within the supraclinoid portions of both internal carotid arteries. No focal intracranial occlusions are identified.  Findings of the noncontrast CT head were communicated to the stroke neurologist on 05/23/2022 at 7:22 PM. Findings of the CTA and CTP imaging were communicated at 7:46 PM.  This  report was finalized on 2022 8:06 PM by Dr. Robby Hernandez M.D.      Results for orders placed in visit on 14    CONVERTED (HISTORICAL) ECHO    Narrative  Patient:      JUANJO CHATMAN  [facility] Rec#:     [id number]               :          1935  Date:         2014            Age:          78y  Account#:     [id number]            Height:       180.34 cm / 71.0 in  Accession#:   [id number]               Weight:       82.1 kg / 180.9 lbs  Sex:          M                     BSA:          2.02  Room#:        [facility]  [place]:         [place]    [place]      [facility] [place]      Marcelo Blackman MD  Referring:    GIOVANA  Sonographer:  AMY  ______________________________________________________________________    Transthoracic Echocardiogram    Indication:  CHEST PAIN  BP:           120/54  HR:           62    Findings  Technical Comments:  A complete two dimensional transthoracic echocardiogram with color flow  and Doppler was performed. The study quality is good.    Contrast:  A verbal consent was obtained from the patient to use saline contrast to  assess for cardiac shunting. Intravenous agitated saline contrast was  used to assess intracardiac shunting.    Left Ventricle:  The left ventricular chamber size is small. Moderate concentric left  ventricular hypertrophy is observed. Global left ventricular wall motion  and contractility are within normal limits. There is normal left  ventricular systolic function. The estimated ejection fraction is  50-55%.  Abnormal left ventricular diastolic filling is observed,  consistent with Grade 1 (impaired LV relaxation).    Left Atrium:  The left atrium is mildly dilated. No atrial septal defect is  demonstrated by agitated saline contrast.  A patent foramen ovale is not  demonstrated by agitated saline contrast.    Right Ventricle:  The right ventricular chamber size and systolic function are within  normal limits.    Right Atrium:  The right atrium appears  normal.    Aortic Valve:  The aortic valve is trileaflet. The leaflets are thin with normal  excursion. There is no aortic stenosis or regurgitation present.  Mitral Valve:  The mitral valve appears normal in structure and function. There is  posterior mitral annular calcification. There is a trace of mitral  regurgitation.    Tricuspid Valve:  The tricuspid valve leaflets are normal.  There is mild tricuspid  regurgitation. The right ventricular systolic pressure is calculated at  42 mmHg.    Pulmonic Valve:  The pulmonic valve appears normal in structure and function.    Pericardium:  The pericardium appears normal.    Aorta:  The aorta appears normal.    Summary:  There is no evidence of intracardiac mass or thrombus.    Conclusions  The chest pain and the dyspnea may be related to the diastolic  dysfunction.  Normal systolic and vavlular function.    Measurements  Chambers MM  Name                    Value         Normal Range  AV cusp separation (MM) 1.7 cm        -    Chambers 2D  Name                    Value         Normal Range  IVSd (2D)               1.3 cm        -  LVPWd (2D)              1.3 cm        -  IVS:LVPW ratio (2D)     1 ratio       -  LVIDd (2D)              3.3 cm        -  LVIDs (2D)              2.2 cm        -  LV FS (Teichholz) (2D)  33.3 %        -  EF Teichholz (2D)       63.3 %        -  Ao root diameter (2D)   3.2 cm        -  LA dimension (AP) 2D    4 cm          -  LA:Ao ratio (2D)        1.25 ratio    -    Volumes/Mass  Name                    Value         Normal Range  LA ESV SP 4CH (MOD)     52 ml         -  LA ESV SP 2CH (MOD)     49 ml         -  LA ESV BP (MOD)         52 ml         -  LA ESV BP (MOD) index   25.7 ml/m2    -  LV EDV SP 4CH (MOD)     62 ml         -  LV ESV SP 4CH (MOD)     24 ml         -  EF SP 4CH (MOD)         61 %          -  LV EDV SP 2CH (MOD)     73 ml         -  LV ESV SP 2CH (MOD)     28 ml         -  EF SP 2CH (MOD)         62 %          -  LV EDV  BP               68 ml         -  LV ESV BP               28 ml         -  BP EF (MOD)             59 %          -    Diastolic/Systolic Function  Name                    Value         Normal Range  MV E-wave Vmax          0.84 m/sec    -  MV deceleration time    246 msec      -  MV A-wave Vmax          1.16 m/sec    -  MV A-wave duration      162 msec      -  MV E:A ratio            0.7 ratio     -  P. vein S-wave Vmax     0.72 m/sec    -  P. vein D-wave Vmax     0.56 m/sec    -  P. vein S:D Vmax ratio  1.3 ratio     -  P. vein A-wave duration 144 msec      -  LV septal e' Vmax       0.07 m/sec    -  LV lateral e' Vmax      0.09 m/sec    -  LV E:e' septal ratio    11.5 ratio    -  LV E:e' lateral ratio   9.1 ratio     -    Aortic Valve  Name                    Value         Normal Range  AV VTI                  34.6 cm       -  AV mean gradient        4 mmHg        -  LVOT diameter           2.1 cm        -  LVOT VTI                24.6 cm       -  LVOT mean gradient      2 mmHg        -  SV LVOT                 85 ml         -  JOAO (continuity VTI)    2.46 cm2      -    Mitral Valve  Name                    Value         Normal Range  MV VTI                  29.5 cm       -  MV mean gradient        2 mmHg        -  MV PHT                  75 msec       -  MR Vmax                 3.18 m/sec    -  MVA (PHT)               2.93 cm2      -  MVA (continuity VTI)    2.89 cm2      -    Tricuspid Valve  Name                    Value         Normal Range  TR Vmax                 3.11 m/sec    -  TR peak gradient        39 mmHg       -  RAP                     3 mmHg        -  RVSP                    42 mmHg       -    Pulmonic Valve/Qp:Qs  Name                    Value         Normal Range  PV Vmax                 0.76 m/sec    -  PV peak gradient        2 mmHg        -  RVOT diameter           2.4 cm        -  RVOT Vmax               0.59 m/sec    -  RVOT VTI                14.7 cm       -  RVOT peak gradient      1 mmHg         -  SV RVOT                 66 ml         -  PVA (continuity Vmax)   3.49 cm2      -  Qp:Qs                   0.8 ratio     -    Electronically signed by: Marcelo Blackman MD on 01/14/2014 15:10:17  Thank you for the courtesy of this referral.          Active Hospital Problems    Diagnosis  POA   • Status epilepticus (HCC) [G40.901]  Yes      Resolved Hospital Problems   No resolved problems to display.         Assessment & Plan     1. Status epilepticus  2. Encephalopathy probably just a prolonged postictal encephalopathy he may be having a little hospital psychosis/sundowning as well.  3. Lactic acidosis secondary to seizure  4. Hypotension transient, likely secondary to phenobarbital  5. Hypoxia secondary to hypoventilation and possibly obstructive sleep apnea.  I am not sure he would be able to do a Pap machine and how much benefit it would yield, at his age is not really beneficial  6. Possible obstructive sleep apnea consider outpatient sleep study  7. Bilateral lower extremity edema/venous stasis  8. Urinary retention resolved  9. Dysphagia  10. 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 Immobility syndrome patient is going need some therapy prior to returning home    Plan for disposition: As usual another insurance company is delaying patient's care making them wait for days to get to a rehab facility where they can start therapy    Nilosn Harmon Jr, MD  06/03/22  15:13 EDT    Time:

## 2022-06-03 NOTE — PLAN OF CARE
Goal Outcome Evaluation:  Plan of Care Reviewed With: patient        Progress: improving  Outcome Evaluation: Pt tolerated treatment well this date. Ambulated 90ft w/ Rw and CGA. Some assist required to steer walker, though not as much as previous visit. Cues also given to increase step lengths. Instructed pt on several LE and UE exercises.

## 2022-06-03 NOTE — PROGRESS NOTES
Case Management Discharge Note      Final Note: Per April, [facility] obtained [employer] Medicare pre-cert and can accept pt today pending negative covid swab (in process). Notified MD. Nuris Freitas LCSW         Selected Continued Care - Admitted Since 5/23/2022     Destination Coordination complete.    Service Provider Selected Services Address Phone Fax Patient Preferred    [facility]  Inpatient Rehabilitation [facility] [place] [address] [phone] -- --          Durable Medical Equipment    No services have been selected for the patient.              Dialysis/Infusion    No services have been selected for the patient.              Home Medical Care    No services have been selected for the patient.              Therapy    No services have been selected for the patient.              Community Resources    No services have been selected for the patient.              Community & DME    No services have been selected for the patient.                       Final Discharge Disposition Code: 62 - inpatient rehab facility

## 2022-06-03 NOTE — THERAPY TREATMENT NOTE
Patient Name: Alireza Carreon  : 1935    MRN: [id number]                              Today's Date: 6/3/2022       Admit Date: 2022    Visit Dx:     ICD-10-CM ICD-9-CM   1. Status epilepticus ([facility])  G40.901 345.3   2. Altered mental status, unspecified altered mental status type  R41.82 780.97   3. Acute hypoxemic respiratory failure ([facility])  J96.01 518.81   4. History of cardioembolic cerebrovascular accident (CVA)  Z86.73 V12.54   5. Chronic anticoagulation  Z79.01 V58.61   6. Lactic acidosis  E87.2 276.2   7. Hyperkalemia  E87.5 276.7   8. Acute renal failure superimposed on chronic kidney disease, unspecified CKD stage, unspecified acute renal failure type (HCC)  N17.9 584.9    N18.9 585.9   9. Hypotension, unspecified hypotension type  I95.9 458.9   10. Seizure disorder ([facility])  G40.909 345.90   11. 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 Gait abnormality  R26.9 781.2   13. Localized edema  R60.0 782.3   14. Swelling of left lower extremity  M79.89 729.81     Patient Active Problem List   Diagnosis   • Hypertension   • Anal pruritus   • Atrial fibrillation ([facility])   • Edema   • Gout   • Hyperlipidemia   • Medicare annual wellness visit, subsequent   • 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   • Dementia ([facility])   • Swelling of right testicle   • Gait abnormality   • Vascular dementia with behavior disturbance ([facility])   • Monoclonal gammopathy of unknown significance (MGUS)   • Chronic anticoagulation   • Arteriosclerosis of coronary artery   • Cerebrovascular accident (CVA) ([facility])   • Seizure disorder ([facility])   • Swelling of left lower extremity   • Hearing loss   • Localized edema   • Blister   • Skin nodule of toe of right foot   • Status epilepticus (HCC)     Past Medical History:   Diagnosis Date   • 3-vessel CAD    • Anticoagulated on warfarin    • Atrial fibrillation (HCC)    • BMI 28.0-28.9,adult    • Bunion, right foot    • Complaints of memory disturbance     • Dementia (HCC)    • Edema    • Encounter for immunization    • Generalized convulsive seizure (HCC)    • Gout    • History of double vision    • HL (hearing loss)    • Hyperlipidemia    • Hypertension    • Left foot pain    • 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    • Movement disorder    • Nocturnal leg cramps    • OA (osteoarthritis)    • Prepatellar effusion    • Pulmonary embolism (HCC)    • Puncture wound of hand, right    • Screening for cardiovascular condition    • Sleep apnea    • Stasis dermatitis    • Statin intolerance    • Stroke (HCC)    • Taking drug for chronic disease    • Thoracic back pain    • Transient ischemic attack      Past Surgical History:   Procedure Laterality Date   • ADENOIDECTOMY     • APPENDECTOMY     • CATARACT EXTRACTION, BILATERAL     • CORONARY ARTERY BYPASS GRAFT     • CORONARY ARTERY BYPASS GRAFT     • CYSTOSCOPY TRANSURETHRAL RESECTION OF PROSTATE     • HERNIA REPAIR     • OTHER SURGICAL HISTORY      carotid thromboendarterectomy   • SHOULDER SURGERY     • SKIN CANCER EXCISION        General Information     [facility] Name 06/03/22 1309          Physical Therapy Time and Intention    Document Type therapy note (daily note)  -     Mode of Treatment physical therapy  -[facility] Name 06/03/22 1309          General Information    Existing Precautions/Restrictions fall  -[facility] Name 06/03/22 1309          Cognition    Orientation Status (Cognition) oriented x 3  -           User Key  (r) = Recorded By, (t) = Taken By, (c) = Cosigned By    Initials Name Provider Type     Evelina Bermudez PTA Physical Therapist Assistant               Mobility     Row Name 06/03/22 1309          Bed Mobility    Comment, (Bed Mobility) up in chair  -[facility] Name 06/03/22 1309          Sit-Stand Transfer    Sit-Stand [place] (Transfers) minimum assist (75% patient effort)  -     Assistive Device (Sit-Stand Transfers) walker, front-wheeled  -     Row Name 06/03/22  1309          Gait/Stairs (Locomotion)    [place] Level (Gait) contact guard  -     Assistive Device (Gait) walker, front-wheeled  -     Distance in Feet (Gait) 90  -     Deviations/Abnormal Patterns (Gait) marcel decreased;festinating/shuffling;stride length decreased  -     Bilateral Gait Deviations forward flexed posture  -     Comment, (Gait/Stairs) cues to increase step lengths; some assist to steer walker, though not as much as previous visit  -           User Key  (r) = Recorded By, (t) = Taken By, (c) = Cosigned By    Initials Name Provider Type    Evelina Montes De Oca PTA Physical Therapist Assistant               Obj/Interventions     Row Name 06/03/22 1311          Motor Skills    Therapeutic Exercise --  seated AP, LAQ, marches, hip abd/add, elbow flex/ext, shoulder flex, punches, shoulder rolls x10 reps  -           User Key  (r) = Recorded By, (t) = Taken By, (c) = Cosigned By    Initials Name Provider Type    Evelina Montes De Oca PTA Physical Therapist Assistant               Goals/Plan    No documentation.                Clinical Impression     Row Name 06/03/22 1312          Pain    Pretreatment Pain Rating 0/10 - no pain  -     Posttreatment Pain Rating 0/10 - no pain  -     Row Name 06/03/22 1312          Positioning and Restraints    Pre-Treatment Position sitting in chair/recliner  -     Post Treatment Position chair  -     In Chair reclined;call light within reach;encouraged to call for assist;exit alarm on  -           User Key  (r) = Recorded By, (t) = Taken By, (c) = Cosigned By    Initials Name Provider Type    Evelina Montes De Oca PTA Physical Therapist Assistant               Outcome Measures     Row Name 06/03/22 1313          How much help from another person do you currently need...    Turning from your back to your side while in flat bed without using bedrails? 3  -     Moving from lying on back to sitting on the side of a flat bed without  bedrails? 3  -SM     Moving to and from a bed to a chair (including a wheelchair)? 3  -SM     Standing up from a chair using your arms (e.g., wheelchair, bedside chair)? 3  -SM     Climbing 3-5 steps with a railing? 1  -SM     To walk in hospital room? 3  -SM     AM-PAC 6 Clicks Score (PT) 16  -     Highest level of mobility 5 --> Static standing  -     Row Name 06/03/22 1313          Functional Assessment    Outcome Measure Options AM-PAC 6 Clicks Basic Mobility (PT)  -           User Key  (r) = Recorded By, (t) = Taken By, (c) = Cosigned By    Initials Name Provider Type    Evelina Montes De Oca, PTA Physical Therapist Assistant                             Physical Therapy Education                 Title: PT OT SLP Therapies (In Progress)     Topic: Physical Therapy (Done)     Point: Mobility training (Done)     Learning Progress Summary           Patient Acceptance, E,TB,D, VU,NR by  at 6/3/2022 1314    Acceptance, E,TB,D, VU,NR by  at 6/2/2022 1321    Acceptance, E,TB,D, VU,NR by  at 6/1/2022 1614    Acceptance, E,D, NR by  at 5/31/2022 1616    Acceptance, E,TB,D, VU,NR by  at 5/27/2022 1250    Acceptance, E, VU by  at 5/26/2022 1510    Acceptance, E, NR,VU by  at 5/25/2022 1430   Family Acceptance, E, NR,VU by  at 5/25/2022 1430                   Point: Home exercise program (Done)     Learning Progress Summary           Patient Acceptance, E,TB,D, VU,NR by  at 6/3/2022 1314    Acceptance, E,TB,D, VU,NR by  at 6/2/2022 1321    Acceptance, E,TB,D, VU,NR by  at 6/1/2022 1614    Acceptance, E,D, NR by  at 5/31/2022 1616    Acceptance, E,TB,D, VU,NR by  at 5/27/2022 1250                   Point: Body mechanics (Done)     Learning Progress Summary           Patient Acceptance, E,TB,D, VU,NR by  at 6/3/2022 1314    Acceptance, E,TB,D, VU,NR by SM at 6/2/2022 1321    Acceptance, E,TB,D, VU,NR by SM at 6/1/2022 1614    Acceptance, E,D, NR by SM at 5/31/2022 1616    Acceptance, E,TB,D,  VU,NR by  at 5/27/2022 1250    Acceptance, E, VU by  at 5/26/2022 1510                   Point: Precautions (Done)     Learning Progress Summary           Patient Acceptance, E,TB,D, VU,NR by  at 6/3/2022 1314    Acceptance, E,TB,D, VU,NR by  at 6/2/2022 1321    Acceptance, E,TB,D, VU,NR by  at 6/1/2022 1614    Acceptance, E,D, NR by  at 5/31/2022 1616    Acceptance, E,TB,D, VU,NR by  at 5/27/2022 1250                               User Key     Initials Effective Dates Name Provider Type Discipline     03/07/18 -  Evelina Bermudez PTA Physical Therapist Assistant PT     05/05/22 -  Hancock, Ryley, PT Student PT Student PT              PT Recommendation and Plan     Plan of Care Reviewed With: patient  Progress: improving  Outcome Evaluation: Pt tolerated treatment well this date. Ambulated 90ft w/ Rw and CGA. Some assist required to steer walker, though not as much as previous visit. Cues also given to increase step lengths. Instructed pt on several LE and UE exercises.     Time Calculation:    PT Charges     Row Name 06/03/22 1316             Time Calculation    Start Time 1100  -      Stop Time 1140  -      Time Calculation (min) 40 min  -      PT Received On 06/03/22  -      PT - Next Appointment 06/04/22  -            User Key  (r) = Recorded By, (t) = Taken By, (c) = Cosigned By    Initials Name Provider Type     Evelina Bermudez PTA Physical Therapist Assistant              Therapy Charges for Today     Code Description Service Date Service Provider Modifiers Qty    54768334654 HC PT THER PROC EA 15 MIN 6/2/2022 Evelina Bermudez PTA GP 1    50426954766 HC GAIT TRAINING EA 15 MIN 6/2/2022 Evelina Bermudez, PTA GP 1    81770562318 HC GAIT TRAINING EA 15 MIN 6/3/2022 Evelina Bermudez, PTA GP 1    65239933844 HC PT THER PROC EA 15 MIN 6/3/2022 Evelina Bermudez, PTA GP 2          PT G-Codes  Outcome Measure Options: AM-PAC 6 Clicks Basic Mobility (PT)  AM-PAC 6  Clicks Score (PT): 16  AM-PAC 6 Clicks Score (OT): 14  Modified [place] Scale: 4 - Moderately severe disability.  Unable to walk without assistance, and unable to attend to own bodily needs without assistance.    Evelina Bermudez, PTA  6/3/2022

## 2022-06-03 NOTE — DISCHARGE SUMMARY
Date of Discharge:  6/3/2022    Discharge Diagnoses:  1. Status epilepticus  2. Encephalopathy at least in part prolonged postictal encephalopathy probably an element of hospital psychosis and sundowning as well.  3. Lactic acidosis secondary to seizure  4. Hypotension transient, likely secondary to phenobarbital  5. Hypoxemia secondary to hypoventilation, possible sleep apnea  6. Bilateral lower extremity edema secondary to venous stasis  7. Urinary retention  8. Dysphagia  9. 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 Immobility syndrome  11. Severe hearing deficit  12. Short-term memory deficit      Hospital Course  Patient is a 87 y.o. male presented with who have presented with status epilepticus history of seizures sounds like was not being compliant with medications.  He is apparently having a lot of memory problems at home and so does his wife.  He has had a prior stroke left-sided weakness.  He is now having a lot of trouble getting around.  Post procedure he is not safe to get up and move by himself.  I had hoped that he can get with therapy and get to where he can return home.  There is some concern for possible sleep apnea.  I do not think there is any way he is going to wear PAP machine and at his age benefit in the studies are really not well demonstrated so I do not think I probably would pursue at this time.  He had some urinary retention that resolved.  He is going to go to acute rehab for therapy..      Procedures Performed         Consults:   Consults     Date and Time Order Name Status Description    5/23/2022  8:46 PM Pulmonology (on-call MD unless specified)      5/23/2022  8:46 PM Inpatient Neurology Consult General Completed     5/23/2022  7:19 PM Inpatient Neurology Consult Stroke Completed           Pertinent Test Results:   Labs:  Results from last 7 days   Lab Units 05/30/22  0738 05/29/22  0811 05/28/22  0626   GLUCOSE mg/dL 89 101* 91   SODIUM mmol/L 136 135* 139    POTASSIUM mmol/L 4.1 3.8 3.8   CO2 mmol/L 27.2 25.7 24.8   CHLORIDE mmol/L 101 102 106   ANION GAP mmol/L 7.8 7.3 8.2   CREATININE mg/dL 0.77 0.72* 0.72*   BUN mg/dL 13 10 10   BUN / CREAT RATIO  16.9 13.9 13.9   CALCIUM mg/dL 9.0 8.7 8.1*     Estimated Creatinine Clearance: 70.6 mL/min (by C-G formula based on SCr of 0.77 mg/dL).      Results from last 7 days   Lab Units 22  0738 22  0811 22  0626   WBC 10*3/mm3 7.14 7.90 7.37   RBC 10*6/mm3 4.70 4.76 4.44   HEMOGLOBIN g/dL 13.8 14.1 13.1   HEMATOCRIT % 42.1 43.6 38.2   MCV fL 89.6 91.6 86.0   MCH pg 29.4 29.6 29.5   MCHC g/dL 32.8 32.3 34.3   RDW % 13.4 13.5 13.0   RDW-SD fl 44.4 46.7 40.6   MPV fL 9.5 9.7 9.7   PLATELETS 10*3/mm3 214 196 170                               Imaging Results (Last 72 Hours)     ** No results found for the last 72 hours. **        Results for orders placed in visit on 14    CONVERTED (HISTORICAL) ECHO    Narrative  Patient:      JUANJO CHATMAN  Med Rec#:     [id number]               :          1935  Date:         2014            Age:          78y  Account#:     [id number]            Height:       180.34 cm / 71.0 in  Accession#:   [id number]               Weight:       82.1 kg / 180.9 lbs  Sex:          M                     BSA:          2.02  Room#:        [facility]  Type:         Inpatient    [place]      [facility] [place]  Reading:      Marcelo Blackman MD  Referring:    GIOVANA  Sonographer:  AMY  ______________________________________________________________________    Transthoracic Echocardiogram    Indication:  CHEST PAIN  BP:           120/54  HR:           62    Findings  Technical Comments:  A complete two dimensional transthoracic echocardiogram with color flow  and Doppler was performed. The study quality is good.    Contrast:  A verbal consent was obtained from the patient to use saline contrast to  assess for cardiac shunting. Intravenous agitated saline contrast was  used to assess intracardiac  shunting.    Left Ventricle:  The left ventricular chamber size is small. Moderate concentric left  ventricular hypertrophy is observed. Global left ventricular wall motion  and contractility are within normal limits. There is normal left  ventricular systolic function. The estimated ejection fraction is  50-55%.  Abnormal left ventricular diastolic filling is observed,  consistent with Grade 1 (impaired LV relaxation).    Left Atrium:  The left atrium is mildly dilated. No atrial septal defect is  demonstrated by agitated saline contrast.  A patent foramen ovale is not  demonstrated by agitated saline contrast.    Right Ventricle:  The right ventricular chamber size and systolic function are within  normal limits.    Right Atrium:  The right atrium appears normal.    Aortic Valve:  The aortic valve is trileaflet. The leaflets are thin with normal  excursion. There is no aortic stenosis or regurgitation present.  Mitral Valve:  The mitral valve appears normal in structure and function. There is  posterior mitral annular calcification. There is a trace of mitral  regurgitation.    Tricuspid Valve:  The tricuspid valve leaflets are normal.  There is mild tricuspid  regurgitation. The right ventricular systolic pressure is calculated at  42 mmHg.    Pulmonic Valve:  The pulmonic valve appears normal in structure and function.    Pericardium:  The pericardium appears normal.    Aorta:  The aorta appears normal.    Summary:  There is no evidence of intracardiac mass or thrombus.    Conclusions  The chest pain and the dyspnea may be related to the diastolic  dysfunction.  Normal systolic and vavlular function.    Measurements  Chambers MM  Name                    Value         Normal Range  AV cusp separation (MM) 1.7 cm        -    Chambers 2D  Name                    Value         Normal Range  IVSd (2D)               1.3 cm        -  LVPWd (2D)              1.3 cm        -  IVS:LVPW ratio (2D)     1 ratio        -  LVIDd (2D)              3.3 cm        -  LVIDs (2D)              2.2 cm        -  LV FS (Teichholz) (2D)  33.3 %        -  EF Teichholz (2D)       63.3 %        -  Ao root diameter (2D)   3.2 cm        -  LA dimension (AP) 2D    4 cm          -  LA:Ao ratio (2D)        1.25 ratio    -    Volumes/Mass  Name                    Value         Normal Range  LA ESV SP 4CH (MOD)     52 ml         -  LA ESV SP 2CH (MOD)     49 ml         -  LA ESV BP (MOD)         52 ml         -  LA ESV BP (MOD) index   25.7 ml/m2    -  LV EDV SP 4CH (MOD)     62 ml         -  LV ESV SP 4CH (MOD)     24 ml         -  EF SP 4CH (MOD)         61 %          -  LV EDV SP 2CH (MOD)     73 ml         -  LV ESV SP 2CH (MOD)     28 ml         -  EF SP 2CH (MOD)         62 %          -  LV EDV BP               68 ml         -  LV ESV BP               28 ml         -  BP EF (MOD)             59 %          -    Diastolic/Systolic Function  Name                    Value         Normal Range  MV E-wave Vmax          0.84 m/sec    -  MV deceleration time    246 msec      -  MV A-wave Vmax          1.16 m/sec    -  MV A-wave duration      162 msec      -  MV E:A ratio            0.7 ratio     -  P. vein S-wave Vmax     0.72 m/sec    -  P. vein D-wave Vmax     0.56 m/sec    -  P. vein S:D Vmax ratio  1.3 ratio     -  P. vein A-wave duration 144 msec      -  LV septal e' Vmax       0.07 m/sec    -  LV lateral e' Vmax      0.09 m/sec    -  LV E:e' septal ratio    11.5 ratio    -  LV E:e' lateral ratio   9.1 ratio     -    Aortic Valve  Name                    Value         Normal Range  AV VTI                  34.6 cm       -  AV mean gradient        4 mmHg        -  LVOT diameter           2.1 cm        -  LVOT VTI                24.6 cm       -  LVOT mean gradient      2 mmHg        -  SV LVOT                 85 ml         -  JOAO (continuity VTI)    2.46 cm2      -    Mitral Valve  Name                    Value         Normal Range  MV VTI                   29.5 cm       -  MV mean gradient        2 mmHg        -  MV PHT                  75 msec       -  MR Vmax                 3.18 m/sec    -  MVA (PHT)               2.93 cm2      -  MVA (continuity VTI)    2.89 cm2      -    Tricuspid Valve  Name                    Value         Normal Range  TR Vmax                 3.11 m/sec    -  TR peak gradient        39 mmHg       -  RAP                     3 mmHg        -  RVSP                    42 mmHg       -    Pulmonic Valve/Qp:Qs  Name                    Value         Normal Range  PV Vmax                 0.76 m/sec    -  PV peak gradient        2 mmHg        -  RVOT diameter           2.4 cm        -  RVOT Vmax               0.59 m/sec    -  RVOT VTI                14.7 cm       -  RVOT peak gradient      1 mmHg        -  SV RVOT                 66 ml         -  PVA (continuity Vmax)   3.49 cm2      -  Qp:Qs                   0.8 ratio     -    Electronically signed by: Marcelo Blackman MD on 01/14/2014 15:10:17  Thank you for the courtesy of this referral.          Condition on Discharge: Improved    Vital Signs  Temp:  [97.5 °F (36.4 °C)-97.9 °F (36.6 °C)] 97.7 °F (36.5 °C)  Heart Rate:  [58-76] 61  Resp:  [16-18] 18  BP: (121-199)/(56-81) 121/56    Physical Exam:    General Appearance: Well-developed white male he is resting in chair.  He does  not appear in any acute distress  Eyes: Conjunctiva are clear and anicteric  ENT: Mucous membranes are moist no exudates  Neck: Trachea midline no visible jugular venous distention  Lungs: Clear nonlabored symmetric expansion  Cardiac: Regular rate rhythm no murmur  Abdomen: Soft nontender no palpable hepatosplenomegaly or masses  : Not examined  Musculoskeletal: He is got a lot of arthritic changes in his hands and fingers  Skin: Warm and dry no jaundice no petechiae , thin skin  Neuro: He is a alert and oriented he is cooperative he is extremely hard of hearing.  He is following commands he is got pretty good strength.   He is a little weaker on the left than the right inspite of being oriented he gets confused very easily and short-term memory is extremely poor  Extremities/P Vascular: No clubbing no cyanosis no edema palpable radial and dorsalis pedis pulses  MSE: Seems to be in a good mood     Discharge Disposition  Rehab Facility or Unit ([facility])    Discharge Medications     Discharge Medications      ASK your doctor about these medications      Instructions Start Date   donepezil 10 MG tablet  Commonly known as: ARICEPT   10 mg, Oral, Nightly      ezetimibe 10 MG tablet  Commonly known as: ZETIA   10 mg, Oral, Daily      lisinopril 20 MG tablet  Commonly known as: PRINIVIL,ZESTRIL   TAKE ONE TABLET BY MOUTH TWICE A DAY      Magnesium 400 MG tablet   2 tablets, Oral, Nightly      miconazole 2 % cream  Commonly known as: MICOTIN   1 application, Topical, 2 Times Daily      PHENobarbital 64.8 MG tablet  Commonly known as: LUMINAL   TAKE ONE TABLET BY MOUTH DAILY      POTASSIMIN PO   1 tablet, Oral, Nightly      rivaroxaban 15 MG tablet  Commonly known as: Xarelto   15 mg, Oral, Daily      torsemide 10 MG tablet  Commonly known as: DEMADEX   10 mg, Oral, Daily             Discharge Diet:     Activity at Discharge:     Follow-up Appointments  Future Appointments   Date Time Provider Department Center   8/5/2022 11:00 AM Ta Pisano MD [facility]     Additional Instructions for the Follow-ups that You Need to Schedule     Ambulatory Referral to Home Health (Hospital)   As directed      Face to Face Visit Date: 6/1/2022    Follow-up provider for Plan of Care?: I treated the patient in an acute care facility and will not continue treatment after discharge.    Follow-up provider: SERGEY LINO [[id number]]    Reason/Clinical Findings: Encephalopathy, prolonged postictal encephalopathy, dysphagia, RUCHI on CKD, acute resp failure    Describe mobility limitations that make leaving home difficult: h/o cva, weakness,  immobility    Nursing/Therapeutic Services Requested: Skilled Nursing Physical Therapy Occupational Therapy Speech Therapy    Skilled nursing orders: Medication education    PT orders: Home safety assessment Strengthening    Occupational orders: Activities of daily living    SLP orders: Dysphagia therapy    Frequency: 1 Week 1               Test Results Pending at Discharge  Pending Labs     Order Current Status    COVID PRE-OP / PRE-PROCEDURE SCREENING ORDER (NO ISOLATION) - Swab, Nasopharynx Collected (06/03/22 1544)    COVID-19,[facility] IN-HOUSE CEPHEID/EMMANUELLE NP SWAB IN TRANSPORT MEDIA 8-12 HR TAT - Swab, Nasopharynx Collected (06/03/22 1544)           Nilson Harmon Jr, MD  06/03/22  15:47 EDT    Time: Went over 40 minutes on patient's care today

## 2022-06-04 NOTE — PROGRESS NOTES
Inpatient Rehabilitation Plan of Care Note    Plan of Care  Care Plan Reviewed - No updates at this time.    Psychosocial    Performed Intervention(s)  Verbalizes needs & concerns      Safety    Performed Intervention(s)  Falls precautions, safety rounds, Items within reach, Bed/chair alarms, &  Non-skid socks.      Sphincter Control    Performed Intervention(s)  Monitor intake, output, & BM's each shift.  Use incontinence products as needed.    Signed by: Dayanna Thomas RN

## 2022-06-04 NOTE — THERAPY EVALUATION
Inpatient Rehabilitation - Speech Language Pathology Initial Evaluation    [facility]     Patient Name: Alireza Carreon  : 1935  MRN: [id number]    Today's Date: 2022                   Admit Date: 6/3/2022       Visit Dx:    No diagnosis found.    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[facility])   • Monoclonal gammopathy of unknown significance (MGUS)   • Chronic anticoagulation   • Arteriosclerosis of coronary artery   • Cerebrovascular accident (CVA) (HCC)   • Seizure disorder (HCC)   • Swelling of left lower extremity   • Hearing loss   • Localized edema   • Blister   • Skin nodule of toe of right foot   • Status epilepticus (HCC)   • Immobility syndrome       Past Medical History:   Diagnosis Date   • 3-vessel CAD    • Anticoagulated on warfarin    • Atrial fibrillation (HCC)    • BMI 28.0-28.9,adult    • Bunion, right foot    • Complaints of memory disturbance    • Dementia (HCC)    • Edema    • Encounter for immunization    • Generalized convulsive seizure (HCC)    • Gout    • History of double vision    • HL (hearing loss)    • Hyperlipidemia    • Hypertension    • Left foot pain    • 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    • Movement disorder    • Nocturnal leg cramps    • OA (osteoarthritis)    • Prepatellar effusion    • Pulmonary embolism (HCC)    • Puncture wound of hand, right    • Screening for cardiovascular condition    • Sleep apnea    • Stasis dermatitis    • Statin intolerance    • Stroke (HCC)    • Taking drug for chronic disease    • Thoracic back pain    • Transient ischemic attack        Past Surgical History:   Procedure Laterality Date   •  ADENOIDECTOMY     • APPENDECTOMY     • CATARACT EXTRACTION, BILATERAL     • CORONARY ARTERY BYPASS GRAFT     • CORONARY ARTERY BYPASS GRAFT     • CYSTOSCOPY TRANSURETHRAL RESECTION OF PROSTATE     • HERNIA REPAIR     • OTHER SURGICAL HISTORY      carotid thromboendarterectomy   • SHOULDER SURGERY     • SKIN CANCER EXCISION         SLP Recommendation and Plan    SLP Diagnosis: Moderate cognitive dysfunction (06/04/22 1030)     Rehab Potential/Prognosis: fair (06/04/22 1030)     Anticipated Discharge Disposition (SLP): unknown (06/04/22 1030)        Predicted Duration Therapy Intervention (Days): until discharge (06/04/22 1030)                         SLP EVALUATION (last 72 hours)     SLP SLC Evaluation     Row Name 06/04/22 1030                   Communication Assessment/Intervention    Document Type evaluation  -CB        Subjective Information no complaints  -CB        Patient Observations alert;cooperative;agree to therapy  -CB        Patient Effort fair  -CB        Comment Pt reports he hopes to go home today.  -CB                  General Information    Patient Profile Reviewed yes  -CB                  Cognitive Assessment Intervention- SLP    Cognitive Function (Cognition) moderate impairment  -CB        Orientation Status (Cognition) moderate impairment  -CB        Memory (Cognitive) mild impairment  -CB        Attention (Cognitive) mild impairment  -CB        Thought Organization (Cognitive) mental manipulation;moderate impairment  -CB        Reasoning (Cognitive) mental flexibility;moderate impairment  -CB        Problem Solving (Cognitive) moderate impairment  -CB        Executive Function (Cognition) severe impairment  -CB        Right Hemisphere Function moderate impairment  -CB        Cognition, Comment Patient's hearing status may have impacted overall scoring, however, patient presents with moderate cognitive deficits characterized by deficits in attention, memory and executive functioning impacting  safety awareness and ability to return home independently.  -CB                  Standardized Tests    Cognitive/Memory Tests CLQT: Cognitive Linguistic Quick Test  -CB                  CLQT (The Cognitive Linguistic Quick Test)    Attention Domain Score 106  -CB        Attention Severity Rating 3: Mild  -CB        Memory Domain Score 124  -CB        Memory Severity Rating 3: Mild  -CB        Executive Function Domain Score 5  -CB        Executive Function Severity Rating 1: Severe  -CB        Language Domain Score 30  -CB        Language Severity Rating 4: WNL  -CB        Visuospatial Domain Score 20  -CB        Visuospatial Severity Rating 1: Severe  -CB        Clock Drawing Total Score 10  -CB        Clock Drawing Severity Rating Mild  -CB        Composite Severity Rating 2.4  -CB        Composite Severity Rating Range 2.4 - 1.5: Moderate  -CB                  SLP Evaluation Clinical Impressions    SLP Diagnosis Moderate cognitive dysfunction  -CB        Rehab Potential/Prognosis fair  -CB        SLC Criteria for Skilled Therapy Interventions Met yes  -CB        Functional Impact functional impact in social situations;functional impact in ADLs;Poor Judgement  -CB                  Recommendations    Therapy Frequency (SLP SLC) PRN  -CB        Predicted Duration Therapy Intervention (Days) until discharge  -CB        Anticipated Discharge Disposition (SLP) unknown  -CB                  Cognitive Linguistic Treatment Objectives    Attention Selection attention, SLP goal 1  -CB        Memory Skills Selection memory skills, SLP goal 1  -CB        Executive Function Skills Selection executive function skills, SLP goal 1  -CB                  Attention Goal 1 (SLP)    Improve Attention by Goal 1 (SLP) complete selective attention task;70%;with minimal cues (75-90%)  -CB        Time Frame (Attention Goal 1, SLP) by discharge  -CB        Progress/Outcomes (Attention Goal 1, SLP) continuing progress toward goal  -CB                   Memory Skills Goal 1 (SLP)    Improve Memory Skills Through Goal 1 (SLP) listen to a paragraph and answer questions;recalling related word lists immediately;70%;with minimal cues (75-90%)  -CB        Time Frame (Memory Skills Goal 1, SLP) by discharge  -CB        Progress/Outcomes (Memory Skills Goal 1, SLP) good progress toward goal  -CB                  Executive Functional Skills Goal 1 (SLP)    Improve Executive Function Skills Goal 1 (SLP) demonstrate awareness of deficit;time management activity;home management activity;70%;with minimal cues (75-90%)  -CB        Time Frame (Executive Function Skills Goal 1, SLP) by discharge  -CB        Progress/Outcomes (Executive Function Skills Goal 1, SLP) good progress toward goal  -CB              User Key  (r) = Recorded By, (t) = Taken By, (c) = Cosigned By    Initials Name Effective Dates    CB Marina Funk, SLP 11/16/21 -                    EDUCATION    The patient has been educated in the following areas:       Cognitive Impairment.             SLP GOALS     Row Name 06/04/22 1030             Attention Goal 1 (SLP)    Improve Attention by Goal 1 (SLP) complete selective attention task;70%;with minimal cues (75-90%)  -CB      Time Frame (Attention Goal 1, SLP) by discharge  -CB      Progress/Outcomes (Attention Goal 1, SLP) continuing progress toward goal  -CB              Memory Skills Goal 1 (SLP)    Improve Memory Skills Through Goal 1 (SLP) listen to a paragraph and answer questions;recalling related word lists immediately;70%;with minimal cues (75-90%)  -CB      Time Frame (Memory Skills Goal 1, SLP) by discharge  -CB      Progress/Outcomes (Memory Skills Goal 1, SLP) good progress toward goal  -CB              Executive Functional Skills Goal 1 (SLP)    Improve Executive Function Skills Goal 1 (SLP) demonstrate awareness of deficit;time management activity;home management activity;70%;with minimal cues (75-90%)  -CB      Time Frame (Executive  Function Skills Goal 1, SLP) by discharge  -CB      Progress/Outcomes (Executive Function Skills Goal 1, SLP) good progress toward goal  -CB            User Key  (r) = Recorded By, (t) = Taken By, (c) = Cosigned By    Initials Name Provider Type    Darvin Weiss SLP Speech and Language Pathologist                SLP Outcome Measures (last 72 hours)     SLP Outcome Measures     Row Name 06/04/22 1030             SLP Outcome Measures    Outcome Measure Used? Adult NOMS  -CB              Adult FCM Scores    FCM Chosen Problem Solving  -CB      Swallowing FCM Score 4  -CB            User Key  (r) = Recorded By, (t) = Taken By, (c) = Cosigned By    Initials Name Effective Dates    Darvin Weiss SLP 11/16/21 -                         Time Calculation:        Time Calculation- SLP     Row Name 06/04/22 1311             Time Calculation- SLP    SLP Start Time 1030  -CB      SLP Stop Time 1130  -CB      SLP Time Calculation (min) 60 min  -CB      Total Timed Code Minutes- SLP 50 minute(s)  -CB      SLP Received On 06/04/22  -CB              Timed Charges    96125-Standardized cognitive performance testing 60  -CB              Total Minutes    Timed Charges Total Minutes 60  -CB       Total Minutes 60  -CB            User Key  (r) = Recorded By, (t) = Taken By, (c) = Cosigned By    Initials Name Provider Type    Darvin Weiss SLP Speech and Language Pathologist                  Therapy Charges for Today     Code Description Service Date Service Provider Modifiers Qty    [id number]  ST EVAL SPEECH AND PROD W LANG  4 6/4/2022 Darvin Mondragon SLP GN 1            ADULT NOMS (last 72 hours)     Adult NOMS     Row Name 06/04/22 1030                   Adult FCM Scores    FCM Chosen Problem Solving  -CB        Swallowing FCM Score 4  -CB              User Key  (r) = Recorded By, (t) = Taken By, (c) = Cosigned By    Initials Name Effective Dates    Darvin Weiss SLP 11/16/21 -                            DARVIN MONDRAGON  SLP  6/4/2022

## 2022-06-04 NOTE — PROGRESS NOTES
Inpatient Rehabilitation Functional Measures Assessment and Plan of Care    Plan of Care  Updated Problems/Interventions  Field    Functional Measures  DARRYN Eating:  Branch  DARRYN Grooming: Branch  [facility] Bathing:  Branch  [facility] Upper Body Dressing:  Branch  [facility] Lower Body Dressing:  Branch  [facility] Toileting:  Branch    [facility] Bladder Management  Level of Assistance:  Branch  Frequency/Number of Accidents this Shift:  Star    DARRYN Bowel Management  Level of Assistance: Star  Frequency/Number of Accidents this Shift: Branch    [facility] Bed/Chair/Wheelchair Transfer:  [facility] Toilet Transfer:  [facility] Tub/Shower Transfer:  Branch    Previously Documented Mode of Locomotion at Discharge: Field  [facility] Expected Mode of Locomotion at Discharge: [facility] Walk/Wheelchair:  [facility] Stairs:  [facility] Comprehension:  Branch  DARRYN Expression:  [facility] Social Interaction:  [facility] Problem Solving:  [facility] Memory:  Star    Therapy Mode Minutes  Occupational Therapy: Branch  Physical Therapy: Branch  Speech Language Pathology:  Individual: 60 minutes.    Signed by: Marina Funk SLP

## 2022-06-04 NOTE — PROGRESS NOTES
Inpatient Rehabilitation Functional Measures Assessment and Plan of Care    Plan of Care  Updated Problems/Interventions  Mobility    [OT] Toilet Transfers(Active)  Current Status(01/01/1753):  Weekly Goal(01/01/1753):  Discharge Goal:    [OT] Tub/Shower Transfers(Active)  Current Status(06/04/2022): min A  Weekly Goal(06/11/2022): CGA  Discharge Goal: SBA    [OT] Toilet Transfers(Active)  Current Status(06/04/2022): min A  Weekly Goal(06/11/2022): CGA  Discharge Goal: SBA        Self Care    [OT] Bathing(Active)  Current Status(06/04/2022): mod A  Weekly Goal(06/11/2022): CGA  Discharge Goal: SBA    [OT] Dressing (Lower)(Active)  Current Status(06/04/2022): mod A  Weekly Goal(06/11/2022): CGA  Discharge Goal: SBA    [OT] Dressing (Upper)(Active)  Current Status(06/04/2022): min A  Weekly Goal(06/11/2022): SBA  Discharge Goal: s/up    [OT] Grooming(Active)  Current Status(06/04/2022): s/up  Weekly Goal(06/11/2022): s/up  Discharge Goal: mod (I)    [OT] Toileting(Active)  Current Status(06/04/2022): mod A  Weekly Goal(06/11/2022): min A  Discharge Goal: SBA    Functional Measures  DARRYN Eating:  Branch  DARRYN Grooming: Branch  DARRYN Bathing:  Branch  DARRYN Upper Body Dressing:  Branch  DARRYN Lower Body Dressing:  Branch  DARRYN Toileting:  Branch    DARR[facility] Bladder Management  Level of Assistance:  Branch  Frequency/Number of Accidents this Shift:  Branch    DARR[facility] Bowel Management  Level of Assistance: Branch  Frequency/Number of Accidents this Shift: Branch    DARRYN Bed/Chair/Wheelchair Transfer:  Branch  DARRYN Toilet Transfer:  Branch  DARRYN Tub/Shower Transfer:  Branch    Previously Documented Mode of Locomotion at Discharge: Field  DARRYN Expected Mode of Locomotion at Discharge: Branch  DARRYN Walk/Wheelchair:  Branch  DARRYN Stairs:  Branch    DARRYN Comprehension:  Branch  DARRYN Expression:  Branch  DARRYN Social Interaction:  Branch  DARRYN Problem Solving:  Branch  DARRYN Memory:  Branch    Therapy Mode Minutes  Occupational Therapy: Branch  Physical  Therapy: Branch  Speech Language Pathology:  Branch    Signed by: Sylvia Brewer OT

## 2022-06-04 NOTE — PROGRESS NOTES
"Section B. Hearing, Speech, Vision: Expression of Ideas and Wants: Exhibits some  difficulty with expressing needs and ideas (e.g., some words or finishing  thoughts) or speech is not clear.  Understanding Verbal and Non-Verbal Content: Sometimes Understands: Understands  only basic conversations or simple, direct phrases. Frequently requires cues to  understand.    Section C. Cognitive Patterns: Brief Interview for Mental Status (BIMS) was  conducted.  Repetition of Three Words: Three words  Able to report correct year: Correct  Able to report correct month: Missed by 6 days to 1 month  Able to report correct day of the week: Incorrect or no answer  Able to recall \"sock\": Yes, no cue required  Able to recall \"blue\": Yes, no cue required  Able to recall \"bed\": Yes, after cuing    BIMS SUMMARY SCORE: 12 Moderately impaired Patient was able to complete the  Brief Interview for Mental Status    Signed by: Marina Funk, SLP    "

## 2022-06-04 NOTE — H&P
Patient Care Team:  Jose Bingham MD as PCP - General (Internal Medicine)  Yennifer Ruiz MD as Consulting Physician (Cardiology)  Cristian Taylor OD (Optometry)  PARKER Wheeler MD as Consulting Physician (Ophthalmology)  Obdulio Morelos MD as Consulting Physician (Hematology and Oncology)  Zahira Gee MD as Referring Physician (Neurology)  Sanya Sim Jr., DPM as Consulting Physician (Podiatry)    Chief complaint:  Functional deficits related to encephalopathy and prolonged immobilty    History of present illness:  Subjective   This is an 87-year-old male patient with history of vascular dementia, seizure and stroke 18 years ago with residual mild left hemiparesis.  At baseline he has a shuffling walk with left foot weakness and he keeps his left hand on his stomach according to his wife.  He does not use a walker or a wheelchair.  His seizure has been controlled with the use of phenobarbital.    Patient has been in his usual state of health up until 5/22/22 when he was noted to feel tired and sleepy.  On 5/23 he got up and fix breakfast to his wife and he was acting normal.  However later around 1720, he fell when he was walking in the kitchen.  His wife could not get him up so she called her kids who were unable to do so either and therefore they called EMS.    Per reports, patient was confused and not answering questions to them postpartum after the fall and his eyes were up rolled. EMS found the patient confused, weakly moving his left side as compared to his right and some staring off to the left.  He was only partially following commands.      On arrival to the ED, he was initially hypertensive and tachycardic.  He had a tonicoclonic seizure in the ER.  He received a bolus of phenobarbital which resulted in hypotension and oversedation and hypoxemia.  He required 6 L oxygen..    Work-up for acute stroke was negative:  CTP: Moderately large chronic infarct in  the right parietal occipital region.  CT brain: Atrophy.  Chronic right parietal and occipital infarcts.    Hospital course marked by 5/24/22 EEG that showed no epileptiform activity. CTA showed cortical atrophy and evidence of extensive small vessel chronic ischemic change. Moderately large chronic infarct in the right parietal occipital region. No acute infarct or hemorrhage is identified. There is normal symmetric brain perfusion. Venous dopplers BLEs negative. ECHO w/ EJ 50-55% w/out thrombus. Pt was felt to have prolonged post-ictal encephalopathy d/t status epilepticus. Function has declined and now is a good candidate for acute inpt rehab.    Review of Systems:  Cannot obtain due to altered mental status    History  Past Medical History:   Diagnosis Date   • 3-vessel CAD    • Anticoagulated on warfarin    • Atrial fibrillation (HCC)    • BMI 28.0-28.9,adult    • Bunion, right foot    • Complaints of memory disturbance    • Dementia (HCC)    • Edema    • Encounter for immunization    • Generalized convulsive seizure (HCC)    • Gout    • History of double vision    • HL (hearing loss)    • Hyperlipidemia    • Hypertension    • Left foot pain    • 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    • Movement disorder    • Nocturnal leg cramps    • OA (osteoarthritis)    • Prepatellar effusion    • Pulmonary embolism (HCC)    • Puncture wound of hand, right    • Screening for cardiovascular condition    • Sleep apnea    • Stasis dermatitis    • Statin intolerance    • Stroke (HCC)    • Taking drug for chronic disease    • Thoracic back pain    • Transient ischemic attack      Past Surgical History:   Procedure Laterality Date   • ADENOIDECTOMY     • APPENDECTOMY     • CATARACT EXTRACTION, BILATERAL     • CORONARY ARTERY BYPASS GRAFT     • CORONARY ARTERY BYPASS GRAFT     • CYSTOSCOPY TRANSURETHRAL RESECTION OF PROSTATE     • HERNIA REPAIR     • OTHER SURGICAL HISTORY      carotid thromboendarterectomy   •  "SHOULDER SURGERY     • SKIN CANCER EXCISION       Family History   Problem Relation Age of Onset   • Hyperlipidemia Mother    • Dementia Mother      Social History     Tobacco Use   • Smoking status: Former Smoker     Types: Cigarettes     Quit date: 10/30/1979     Years since quittin.6   • Smokeless tobacco: Never Used   Substance Use Topics   • Alcohol use: Yes   • Drug use: No     E-cigarette/Vaping     Allergies:  Codeine, Ezetimibe, Hydrocodone-acetaminophen, Levetiracetam, Lorazepam, Phenytoin, Pseudoephedrine, Repatha [evolocumab], Statins, and Zolpidem    Lives in a 1 story home w/ 1 entry step w/ wife in [place]. I PTA and drove. Smoked till . No alcohol or drug use. Retired .     Objective   Vital Signs  Temp:  [97.5 °F (36.4 °C)-99.3 °F (37.4 °C)] 97.6 °F (36.4 °C)  Heart Rate:  [55-67] 67  Resp:  [18-20] 20  BP: (112-121)/(62-65) 121/63    PPE used per hospital policy    Physical Exam:  Constitutional: Not in acute distress.  [place]  Eyes: Mildly injected conjunctivae, EOMI. Pupils equal and reactive to light.   ENMT: Moist MM.  External ears normal. [place], EOMI w/out nystagmus  Neck: No thyromegaly.  Trachea midline  Heart: RRR, no murmur.  Trace pitting edema in legs, L >R  Lungs: Good and equal air entry bilaterally.  Nonlabored breathing  Abdomen:Soft. No tenderness or dullness.  Positive bowel sounds  Extremities: No cyanosis, clubbing.  Warm extremities and well-perfused. DJD changes bilat hands.  Neuro: Slow processing but appropriate.  Does not follow commands.  Withdraw to pain. Provides resistance in BUE/BLEs  Psych: O x person and hospital but not \"Pentecostal\" without VC and not to time except . No delusions. Affect flat.  Integumentary: No rash or ecchymosis  Lymphatic: No palpable cervical or supraclavicular lymph nodes.      Assessment /Plan:    1. Status epilepticus w/ Postictal encephalopathy - Admit to Acute inpt rehab with PT, OT, SLP, psychology and rehab medical " and nursing care..  2. Seizure d/o - Continue Antiepileptics and monitor levels and side effects. Ativan PRN for seizure.  3. Cellulitis left leg - Completed antibx. Continue care  4. A-Fib - Continue Xarelto  5. HTN - Cont meds and monitor  6. CAD s/p CABG - Monitor response to therapy and inc activity  7. Hypoxia: Likely secondary to hypoventilation and probably obstructive sleep apnea - Check Nocturnal oxygen  8. Bilateral legs edema/venous stasis  9. 9. Vascular dementia - Continue Aricept      Benjy Armstrong MD  06/04/22  19:35 EDT    Time: 45 minutes

## 2022-06-04 NOTE — PROGRESS NOTES
SECTION GG    Eating Performance: Horton sets up or cleans up; patient completes activity.  Horton assists only prior to or following the activity.    Eating Discharge Goals: Patient completed the activities by him/herself with no  assistance from a helper.    Signed by: Razia Peck RN

## 2022-06-04 NOTE — PROGRESS NOTES
Inpatient Rehabilitation Plan of Care Note    Plan of Care  Care Plan Reviewed - Updates as Follows    Field    Signed by: Razia Peck RN

## 2022-06-04 NOTE — THERAPY EVALUATION
Inpatient Rehabilitation - Physical Therapy Initial Evaluation and Treatment Note       [facility]     Patient Name: Alireza Carreon  : 1935  MRN: [id number]    Today's Date: 2022                    Admit Date: 6/3/2022      Visit Dx:   No diagnosis found.    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[facility])   • Monoclonal gammopathy of unknown significance (MGUS)   • Chronic anticoagulation   • Arteriosclerosis of coronary artery   • Cerebrovascular accident (CVA) (HCC)   • Seizure disorder (HCC)   • Swelling of left lower extremity   • Hearing loss   • Localized edema   • Blister   • Skin nodule of toe of right foot   • Status epilepticus (HCC)   • Immobility syndrome       Past Medical History:   Diagnosis Date   • 3-vessel CAD    • Anticoagulated on warfarin    • Atrial fibrillation (HCC)    • BMI 28.0-28.9,adult    • Bunion, right foot    • Complaints of memory disturbance    • Dementia (HCC)    • Edema    • Encounter for immunization    • Generalized convulsive seizure (HCC)    • Gout    • History of double vision    • HL (hearing loss)    • Hyperlipidemia    • Hypertension    • Left foot pain    • 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    • Movement disorder    • Nocturnal leg cramps    • OA (osteoarthritis)    • Prepatellar effusion    • Pulmonary embolism (HCC)    • Puncture wound of hand, right    • Screening for cardiovascular condition    • Sleep apnea    • Stasis dermatitis    • Statin intolerance    • Stroke (HCC)    • Taking drug for chronic disease    • Thoracic back pain    • Transient ischemic attack        Past Surgical History:   Procedure Laterality  Date   • ADENOIDECTOMY     • APPENDECTOMY     • CATARACT EXTRACTION, BILATERAL     • CORONARY ARTERY BYPASS GRAFT     • CORONARY ARTERY BYPASS GRAFT     • CYSTOSCOPY TRANSURETHRAL RESECTION OF PROSTATE     • HERNIA REPAIR     • OTHER SURGICAL HISTORY      carotid thromboendarterectomy   • SHOULDER SURGERY     • SKIN CANCER EXCISION         PT ASSESSMENT (last 12 hours)     IRF PT Evaluation and Treatment     Row Name 06/04/22 0800          PT Time and Intention    Document Type initial evaluation  -AE     Mode of Treatment individual therapy;physical therapy  -AE     Patient/Family/Caregiver Comments/Observations pt up in chair, pleasantly confused, eager for therapy  -AE     Row Name 06/04/22 0800          General Information    Patient Profile Reviewed yes  -AE     General Observations of Patient pt prefers to be called Sergei or Arky  -AE     Existing Precautions/Restrictions fall  -AE     Row Name 06/04/22 0800          Pain Assessment    Pretreatment Pain Rating 0/10 - no pain  -AE     Posttreatment Pain Rating 0/10 - no pain  -AE     Row Name 06/04/22 0800          Cognition/Psychosocial    Orientation Status (Cognition) oriented x 3  -AE     Row Name 06/04/22 0800          Bed Mobility    Scooting/Bridging [place] (Bed Mobility) minimum assist (75% patient effort)  -AE     Supine-Sit [place] (Bed Mobility) minimum assist (75% patient effort)  -AE     Assistive Device (Bed Mobility) bed rails;head of bed elevated  -AE     Row Name 06/04/22 0800          Transfer Assessment/Treatment    Transfers stand pivot/stand step transfer  -AE     Row Name 06/04/22 0800          Transfers    Sit-Stand [place] (Transfers) minimum assist (75% patient effort)  -AE     Stand-Sit [place] (Transfers) minimum assist (75% patient effort);verbal cues;2 person assist  -AE     Row Name 06/04/22 0800          Sit-Stand Transfer    Assistive Device (Sit-Stand Transfers) walker, front-wheeled  -AE     Row Name  06/04/22 0800          Stand-Sit Transfer    Assistive Device (Stand-Sit Transfers) walker, front-wheeled  -AE     Row Name 06/04/22 0800          Stand Pivot/Stand Step Transfer    Stand Pivot/Stand Step [place] (Transfers) minimum assist (75% patient effort);1 person assist  -AE     Assistive Device (Stand Pivot Stand Step Transfer) walker, front-wheeled  -AE     Row Name 06/04/22 0800          Gait/Stairs (Locomotion)    [place] Level (Gait) contact guard;minimum assist (75% patient effort)  -AE     Assistive Device (Gait) walker, front-wheeled  -AE     Distance in Feet (Gait) 20ft, 50ft  -AE     Deviations/Abnormal Patterns (Gait) marcel decreased;festinating/shuffling;stride length decreased  -AE     Bilateral Gait Deviations forward flexed posture  -AE     Row Name 06/04/22 0800          Safety Issues, Functional Mobility    Safety Issues Affecting Function (Mobility) awareness of need for assistance;impulsivity;insight into deficits/self-awareness;judgment;positioning of assistive device;sequencing abilities;safety precautions follow-through/compliance  -AE     Impairments Affecting Function (Mobility) balance;coordination;strength;postural/trunk control;endurance/activity tolerance  -AE     Cognitive Impairments, Mobility Safety/Performance awareness, need for assistance;impulsivity;insight into deficits/self-awareness;judgment;problem-solving/reasoning;sequencing abilities;safety precaution follow-through  -AE     Row Name 06/04/22 0800          Balance    Static Standing Balance contact guard;minimal assist;1-person assist  -AE     Comment, Balance no UE support x 1 min while adjusting belt loop. also performed functional reach during toileting hygiene and donning/doffing loewr body dressing  -AE     Row Name 06/04/22 0800          Positioning and Restraints    Pre-Treatment Position sitting in chair/recliner  -AE     Post Treatment Position bathroom  -AE     In Chair sitting;call light within  reach;encouraged to call for assist;exit alarm on  -AE     Row Name 06/04/22 0800          Therapy Assessment/Plan (PT)    Rehab Potential/Prognosis (PT) good, to achieve stated therapy goals  -AE     Frequency of Treatment (PT) 5 times per week;60 minutes per session  -AE     Estimated Duration of Therapy (PT) 2 weeks  -AE     Problem List (PT) problems related to;balance;cognition;coordination;strength;pain;postural control  -AE     Activity Limitations Related to Problem List (PT) unable to ambulate safely;unable to transfer safely;BADLs not performed adequately or safely;IADLs not performed adequately or safely;community activities not performed adequately or safely;home management activity not performed adequately or safely  -AE     Comment, Therapy Assessment/Plan (PT) Patient is a 87 y.o. male presented with who have presented with status epilepticus history of seizures sounds like was not being compliant with medications.  He is apparently having a lot of memory problems at home and so does his wife.  He has had a prior stroke left-sided weakness.  Pt is pleasantly confused with worsening dementia, however is oriented x 4. He reports PLOF as independent with intermittent use of a cane at times. Patient is very hard of hearing. Pt lives in [facility] with 5 steps to enter with rails. Can reside on main level upon discharge. Wife can provide some supervision as well as friends who can offer intermittent assist as needed. Today patient was caitlin for majority of mobility including gait up to 50ft and ascending/descending 6 steps with B rails. His gait is extremely slow, shuffled and forward flexed with generalized weakness and balance deficits. Skilled PT needed to address above impairments. DC recs home with assist and HHPT vs memory care ECHO pending functional progression.  -AE     Row Name 06/04/22 0800          IRF PT Goals    Bed Mobility Goal Selection (PT-IRF) bed mobility, PT goal 1;bed mobility, PT goal 2  -AE      Transfer Goal Selection (PT-IRF) transfers, PT goal 1;transfers, PT goal 2  -AE     Gait (Walking Locomotion) Goal Selection (PT-IRF) gait, PT goal 1;gait, PT goal 2  -AE     Row Name 06/04/22 0800          Bed Mobility Goal 1 (PT-IRF)    Activity/Assistive Device (Bed Mobility Goal 1, PT-IRF) bed mobility activities, all  -AE     [place] Level (Bed Mobility Goal 1, PT-IRF) contact guard required  -AE     Time Frame (Bed Mobility Goal 1, PT-IRF) 1 week  -AE     Progress/Outcomes (Bed Mobility Goal 1, PT-IRF) continuing progress toward goal  -AE     Row Name 06/04/22 0800          Bed Mobility Goal 2 (PT-IRF)    Activity/Assistive Device (Bed Mobility Goal 2, PT-IRF) bed mobility activities, all  -AE     [place] Level (Bed Mobility Goal 2, PT-IRF) standby assist  -AE     Time Frame (Bed Mobility Goal 2, PT-IRF) 2 weeks  -AE     Progress/Outcomes (Bed Mobility Goal 2, PT-IRF) continuing progress toward goal  -AE     Row Name 06/04/22 0800          Transfer Goal 1 (PT-IRF)    Activity/Assistive Device (Transfer Goal 1, PT-IRF) sit-to-stand/stand-to-sit  -AE     [place] Level (Transfer Goal 1, PT-IRF) contact guard required  -AE     Time Frame (Transfer Goal 1, PT-IRF) 1 week  -AE     Progress/Outcomes (Transfer Goal 1, PT-IRF) continuing progress toward goal  -AE     Row Name 06/04/22 0800          Transfer Goal 2 (PT-IRF)    Activity/Assistive Device (Transfer Goal 2, PT-IRF) bed-to-chair/chair-to-bed  -AE     [place] Level (Transfer Goal 2, PT-IRF) standby assist  -AE     Time Frame (Transfer Goal 2, PT-IRF) 2 weeks  -AE     Progress/Outcomes (Transfer Goal 2, PT-IRF) continuing progress toward goal  -AE     Row Name 06/04/22 0800          Gait/Walking Locomotion Goal 1 (PT-IRF)    Activity/Assistive Device (Gait/Walking Locomotion Goal 1, PT-IRF) gait (walking locomotion);assistive device use  -AE     Gait/Walking Locomotion Distance Goal 1 (PT-IRF) 100ft  -AE     [place] Level  (Gait/Walking Locomotion Goal 1, PT-IRF) contact guard required  -AE     Time Frame (Gait/Walking Locomotion Goal 1, PT-IRF) 1 week  -AE     Progress/Outcomes (Gait/Walking Locomotion Goal 1, PT-IRF) continuing progress toward goal  -AE     Row Name 06/04/22 0800          Gait/Walking Locomotion Goal 2 (PT-IRF)    Activity/Assistive Device (Gait/Walking Locomotion Goal 2, PT-IRF) gait (walking locomotion);assistive device use  -AE     Gait/Walking Locomotion Distance Goal 2 (PT-IRF) 20ft  -AE     [place] Level (Gait/Walking Locomotion Goal 2, PT-IRF) verbal cues required;nonverbal cues (demo/gesture) required;standby assist  -AE     Time Frame (Gait/Walking Locomotion Goal 2, PT-IRF) 2 weeks  -AE     Progress/Outcomes (Gait/Walking Locomotion Goal 2, PT-IRF) continuing progress toward goal  -AE           User Key  (r) = Recorded By, (t) = Taken By, (c) = Cosigned By    Initials Name Provider Type    Tara Sellers, PT Physical Therapist                 Physical Therapy Education                 Title: PT OT SLP Therapies (In Progress)     Topic: Physical Therapy (Not Started)     Point: Mobility training (Not Started)     Learner Progress:  Not documented in this visit.          Point: Home exercise program (Not Started)     Learner Progress:  Not documented in this visit.          Point: Body mechanics (Not Started)     Learner Progress:  Not documented in this visit.          Point: Precautions (Not Started)     Learner Progress:  Not documented in this visit.                            PT Recommendation and Plan    Planned Therapy Interventions (PT): balance training, bed mobility training, gait training, home exercise program, motor coordination training, neuromuscular re-education, patient/family education, postural re-education, ROM (range of motion), stair training, strengthening, stretching, transfer training, wheelchair management/propulsion training  Frequency of Treatment (PT): 5 times per  week, 60 minutes per session                     Time Calculation:      PT Charges     Row Name 06/04/22 1524 06/04/22 1520          Time Calculation    Start Time 1300  -AE 0830  -AE     Stop Time 1330  -AE 0900  -AE     Time Calculation (min) 30 min  -AE 30 min  -AE     PT Received On 06/04/22  -AE 06/04/22  -AE     PT - Next Appointment 06/06/22  -AE 06/06/22  -AE     PT Goal Re-Cert Due Date -- 06/10/22  -AE            Time Calculation- PT    Total Timed Code Minutes- PT 30 minute(s)  -AE 15 minute(s)  -AE           User Key  (r) = Recorded By, (t) = Taken By, (c) = Cosigned By    Initials Name Provider Type    AE Tara Jones, PT Physical Therapist                Therapy Charges for Today     Code Description Service Date Service Provider Modifiers Qty    11231614011 HC PT EVAL MOD COMPLEXITY 2 6/4/2022 Tara Jones, PT GP 1    95288604328 HC PT THERAPEUTIC ACT EA 15 MIN 6/4/2022 Tara Jones, PT GP 1    25979437112 HC PT THERAPEUTIC ACT EA 15 MIN 6/4/2022 Tara Jones, PT GP 1    52672886841 HC PT NEUROMUSC RE EDUCATION EA 15 MIN 6/4/2022 Tara Jones, PT GP 1    11913597229 HC PT THER SUPP EA 15 MIN 6/4/2022 Tara Jones, PT GP 1            PT G-Codes  AM-PAC 6 Clicks Score (PT): 16      Tara Jones PT  6/4/2022

## 2022-06-04 NOTE — PLAN OF CARE
Goal Outcome Evaluation:  Plan of Care Reviewed With: patient        Progress: improving  Outcome Evaluation: Pt has been up in the WC for several hours today and at NS for close swallowing supervison. No pain voiced.

## 2022-06-04 NOTE — PROGRESS NOTES
SECTION GG    Mobility Performance:     Roll Left and Right: [place] does less than half the effort. [place] lifts, holds  or supports trunk or limbs but provides less than half the effort.   Sit to Lying: [place] does less than half the effort. [place] lifts, holds or  supports trunk or limbs but provides less than half the effort.   Lying to Sitting on Side of Bed: [place] does less than half the effort. [place]  lifts, holds or supports trunk or limbs but provides less than half the effort.   Sit to Stand: [place] does less than half the effort. [place] lifts, holds or  supports trunk or limbs but provides less than half the effort.   Chair/Bed to Chair Transfer: [place] does less than half the effort. [place]  lifts, holds or supports trunk or limbs but provides less than half the effort.   Car Transfer: [place] does less than half the effort. [place] lifts, holds or  supports trunk or limbs but provides less than half the effort.   Walk 10 Feet:   [place] does less than half the effort. [place] lifts, holds or  supports trunk or limbs but provides less than half the effort.  Walk 50 Feet with 2 Turns:   [place] does less than half the effort. [place]  lifts, holds or supports trunk or limbs but provides less than half the effort.  Walk 150 Feet:   Not attempted due to medical or safety concerns.  Walking 10 Feet on Uneven Surfaces:   Not attempted due to medical or safety  concerns.  1 Step Over Curb or Up/Down Stair:   Not attempted due to medical or safety  concerns.  Picking up an Object:   Not attempted due to medical or safety concerns.  Uses Wheelchair/Scooter: No    Mobility Discharge Goals:   Walk Discharge Goals:   12 Steps Up and Down, With/Without Rail: [place] does less than half the effort.  [place] lifts, holds or supports trunk or limbs but provides less than half the  effort.   Walk 150 Feet: [place] provides verbal cues or touching/steadying assistance as  patient completes activity.    Signed by: Tara  Karen, GUILLERMO

## 2022-06-04 NOTE — PLAN OF CARE
Goal Outcome Evaluation:           Cognitive Evaluation completed with patient demonstrated Moderate Cognitive Dysfunction with deficits noted in attention, memory and executive functioning.  Swallow screening completed with patient tolerated regular diet with thin liquids without difficulty.  ST to follow for cognitive retraining.

## 2022-06-04 NOTE — PLAN OF CARE
Goal Outcome Evaluation:      New admit. Slept fair. Meds with water. Red arm band, Impulsive at times. Alert & oriented, but forgetful. Hx of dementia. Incontinent & continent at times. Seizure precautions, side rails padded. No seizures tonight. SCD's ordered.

## 2022-06-04 NOTE — PROGRESS NOTES
Inpatient Rehabilitation Functional Measures Assessment and Plan of Care    Plan of Care  Updated Problems/Interventions  Field    Functional Measures  DARRYN Eating:  Branch  DARRYN Grooming: Branch  [facility] Bathing:  [facility] Upper Body Dressing:  [place]  [facility] Lower Body Dressing:  [facility] Toileting:  [facility] Bladder Management  Level of Assistance:  [place]  Frequency/Number of Accidents this Shift:  [facility] Bowel Management  Level of Assistance: [place]  Frequency/Number of Accidents this Shift: [facility] Bed/Chair/Wheelchair Transfer:  [facility] Toilet Transfer:  [facility] Tub/Shower Transfer:  [place]    Previously Documented Mode of Locomotion at Discharge: Field  [facility] Expected Mode of Locomotion at Discharge: [facility] Walk/Wheelchair:  [facility] Stairs:  [facility] Comprehension:  [place]  DARRYN Expression:  [facility] Social Interaction:  [facility] Problem Solving:  [facility] Memory:  [place]    Therapy Mode Minutes  Occupational Therapy: Branch  Physical Therapy: [place]  Speech Language Pathology:  [place]    Signed by: Marina Funk, SLP

## 2022-06-04 NOTE — THERAPY EVALUATION
Inpatient Rehabilitation - Occupational Therapy Initial Evaluation    [facility]     Patient Name: Alireza Carreon  : 1935  MRN: [id number]    Today's Date: 2022                 Admit Date: 6/3/2022       No diagnosis found.    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[facility])   • Monoclonal gammopathy of unknown significance (MGUS)   • Chronic anticoagulation   • Arteriosclerosis of coronary artery   • Cerebrovascular accident (CVA) (HCC)   • Seizure disorder (HCC)   • Swelling of left lower extremity   • Hearing loss   • Localized edema   • Blister   • Skin nodule of toe of right foot   • Status epilepticus (HCC)   • Immobility syndrome       Past Medical History:   Diagnosis Date   • 3-vessel CAD    • Anticoagulated on warfarin    • Atrial fibrillation (HCC)    • BMI 28.0-28.9,adult    • Bunion, right foot    • Complaints of memory disturbance    • Dementia (HCC)    • Edema    • Encounter for immunization    • Generalized convulsive seizure (HCC)    • Gout    • History of double vision    • HL (hearing loss)    • Hyperlipidemia    • Hypertension    • Left foot pain    • 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    • Movement disorder    • Nocturnal leg cramps    • OA (osteoarthritis)    • Prepatellar effusion    • Pulmonary embolism (HCC)    • Puncture wound of hand, right    • Screening for cardiovascular condition    • Sleep apnea    • Stasis dermatitis    • Statin intolerance    • Stroke (HCC)    • Taking drug for chronic disease    • Thoracic back pain    • Transient ischemic attack        Past Surgical History:   Procedure Laterality Date   • ADENOIDECTOMY     •  APPENDECTOMY     • CATARACT EXTRACTION, BILATERAL     • CORONARY ARTERY BYPASS GRAFT     • CORONARY ARTERY BYPASS GRAFT     • CYSTOSCOPY TRANSURETHRAL RESECTION OF PROSTATE     • HERNIA REPAIR     • OTHER SURGICAL HISTORY      carotid thromboendarterectomy   • SHOULDER SURGERY     • SKIN CANCER EXCISION               IRF OT ASSESSMENT FLOWSHEET (last 12 hours)     IRF OT Evaluation and Treatment     Row Name 06/04/22 1407          OT Time and Intention    Document Type initial evaluation  -MW     Mode of Treatment occupational therapy  -MW     Patient Effort fair  -MW     Comment, Evaluation/Treatment Not Performed pt agreeable to therapy, NAD  -MW     Row Name 06/04/22 1407          General Information    Patient Profile Reviewed yes  -MW     Patient/Family/Caregiver Comments/Observations up in chair, with PT in gym, eager to participate  -MW     Existing Precautions/Restrictions fall  -MW     Row Name 06/04/22 1407          Living Environment    Current Living Arrangements home  -MW     Primary Care Provided by spouse/significant other  -MW     Row Name 06/04/22 1407          Pain Assessment    Pretreatment Pain Rating 0/10 - no pain  -MW     Posttreatment Pain Rating 0/10 - no pain  -MW     Row Name 06/04/22 1407          Cognition/Psychosocial    Affect/Mental Status (Cognition) confused  pleasantly confused  -MW     Orientation Status (Cognition) oriented x 3  -MW     Follows Commands (Cognition) follows one-step commands;verbal cues/prompting required;repetition of directions required;visual cue  [place]  -MW     Personal Safety Interventions fall prevention program maintained;gait belt;nonskid shoes/slippers when out of bed;supervised activity  -MW     Row Name 06/04/22 1407          Range of Motion (ROM)    Range of Motion ROM is WFL  -     Row Name 06/04/22 1407          Range of Motion Comprehensive    General Range of Motion bilateral upper extremity ROM WNL  -     Row Name 06/04/22 1407          Strength  (Manual Muscle Testing)    Strength (Manual Muscle Testing) left upper extremity;right upper extremity  LUE 3+/5 and RUE 4-/5 in shoulder and elbow planes  -MW     Left Hand, Setting 2 (Dynamometer Testing) 40  -MW     Right Hand, Setting 2 (Dynamometer Testing) 39  -MW     Left Hand: Tip (Pincer) Pinch Strength (Pinch Dynamometer Testing) 7  -MW     Left Hand: Lateral (Key) Pinch Strength (Pinch Dynamometer Testing) 10  -MW     Left Hand: [place] ([place]) Pinch Strength (Pinch Dynamometer Testing) 8  -MW     Right Hand: Tip (Pincer) Pinch Strength (Pinch Dynamometer Testing) 9  -MW     Right Hand: Lateral (Key) Pinch Strength (Pinch Dynamometer Testing) 13  -MW     Right Hand: [place] ([place]) Pinch Strength (Pinch Dynamometer Testing) 10  -MW     Additional Documentation Left Hand, Setting 2 (Dynamometer Testing) (Row);Right Hand, Setting 2 (Dynamometer Testing) (Row);Right Hand: Lateral (Key) Pinch Strength (Pinch Dynamometer Testing) (Row);Right Hand: [place] ([place]) Pinch Strength (Pinch Dynamometer Testing) (Row);Right Hand: Tip (Pincer) Pinch Strength (Pinch Dynamometer Testing) (Row);Left Hand: Lateral (Key) Pinch Strength (Pinch Dynamometer Testing) (Row);Left Hand: [place] ([place]) Pinch Strength (Pinch Dynamometer Testing) (Row);Left Hand: Tip (Pincer) Pinch Strength (Pinch Dynamometer Testing) (Row)  -     Row Name 06/04/22 1407          Vision Assessment/Intervention    Visual Impairment/Limitations WFL;corrective lenses full-time  -     Row Name 06/04/22 1407          Basic Activities of Daily Living (BADLs)    Basic Activities of Daily Living bathing;upper body dressing;lower body dressing;grooming;toileting;self-feeding  -     Row Name 06/04/22 1407          Bathing    [place] Level (Bathing) bathing skills;lower body;upper body;chest/trunk;distal lower extremities/feet;perineal area;proximal lower extremities  pt able to complete ciaran care frontal with min A for static  standing balance, able to wash bilat feet with figure 4 with CGSBA for sitting balance, significant time to complete; max A required for back/buttock region in static stand, grab bar support  -MW     Assistive Device (Bathing) grab bar/tub rail;hand held shower spray hose;shower chair  -MW     Position (Bathing) supported sitting;supported standing  -MW     Set-up Assistance (Bathing) obtain supplies;adjust water temperature;open containers  -     Row Name 06/04/22 1407          Upper Body Dressing    [place] Level (Upper Body Dressing) doff;don;front opening garment;pull over garment;minimum assist (75% or more patient effort)  -MW     Position (Upper Body Dressing) supported sitting  -MW     Set-up Assistance (Upper Body Dressing) obtain clothing  -MW     Row Name 06/04/22 1407          Lower Body Dressing    [place] Level (Lower Body Dressing) doff;don;pants/bottoms;socks;underwear;moderate assist (50% patient effort)  able to initiate threading of pants with min A, don completely over hips with CGA-min A in static stand, donned belt/buttons CGA. Socks doffed with SBA, donned with max A seated. New brief donned with mod A  -MW     Position (Lower Body Dressing) supported sitting;supported standing  -MW     Set-up Assistance (Lower Body Dressing) obtain clothing  -MW     Comment (Lower Body Dressing) multiple sit <> stands with LBD, use of grab bar to assist, min A  -MW     Row Name 06/04/22 1407          Grooming    [place] Level (Grooming) grooming skills;deodorant application;hair care, combing/brushing;oral care regimen;wash face, hands;set up  -MW     Position (Grooming) supported sitting  -MW     Set-up Assistance (Grooming) obtain supplies  -MW     Comment (Grooming) w/c at sink side  -     Row Name 06/04/22 1407          Toileting    Comment (Toileting) not fully assessed this date due to pt voiding in AM and declining need during time of eval. Clothing mgment min-mod A for brief  -MW      [facility] Name 06/04/22 1407          Self-Feeding    [place] Level (Self-Feeding) set up  -[facility] Name 06/04/22 1407          Bed Mobility    Comment, (Bed Mobility) [facility] at beginning/end of session  -[facility] Name 06/04/22 1407          Transfer Assessment/Treatment    Transfers shower transfer  -[facility] Name 06/04/22 1407          Transfers    Sit-Stand [place] (Transfers) minimum assist (75% patient effort)  -     Stand-Sit [place] (Transfers) minimum assist (75% patient effort);verbal cues  -     Assistive Device (Toilet Transfer) --  NT this date, however anticipate min A (shower chair transfer completed)  -     [place] Level (Shower Transfer) minimum assist (75% patient effort);verbal cues  -     Assistive Device (Shower Transfer) shower chair;grab bar, tub/shower  -[facility] Name 06/04/22 1407          Sit-Stand Transfer    Assistive Device (Sit-Stand Transfers) walker, front-wheeled  -[facility] Name 06/04/22 1407          Stand Pivot/Stand Step Transfer    Stand Pivot/Stand Step [place] (Transfers) minimum assist (75% patient effort);1 person assist  -     Assistive Device (Stand Pivot Stand Step Transfer) walker, front-wheeled  -[facility] Name 06/04/22 1407          Shower Transfer    Type (Shower Transfer) stand-sit;sit-stand  -     Comment, (Shower Transfer) verbal and visual cues for carryover and safety with transfer  -[facility] Name 06/04/22 1407          Motor Skills    Coordination WFL  able to button pants with no assist and don belt buckle in static stand, min A for standing balance  -     Therapeutic Exercise shoulder;hand;elbow/forearm;wrist  -[facility] Name 06/04/22 1407          Balance    Balance Assessment sitting static balance;sitting dynamic balance;sit to stand dynamic balance;standing static balance;standing dynamic balance  -     Static Sitting Balance standby assist  -     Dynamic Sitting Balance contact guard  -     Position,  Sitting Balance unsupported;supported  -     Sit to Stand Dynamic Balance minimal assist;verbal cues  -     Static Standing Balance minimal assist;verbal cues  -MW     Dynamic Standing Balance minimal assist;verbal cues  -MW     Position/Device Used, Standing Balance supported;walker, front-wheeled  grab bars  -[facility] Name 06/04/22 1407          Positioning and Restraints    Pre-Treatment Position sitting in chair/recliner  -MW     Post Treatment Position wheelchair  -MW     In Wheelchair --  nurses station  -[facility] Name 06/04/22 1407          Therapy Assessment/Plan (OT)    Patient's Goals For Discharge return home  -[facility] Name 06/04/22 1407          Therapy Assessment/Plan (OT)    Functional Level at Time of Evaluation (OT) Pt presents below ADL baseline with impaired STM deficits, safety awareness, impulsivity, impaired act tolerance, strength, transfers and balance impacting overall (I) with daily self care routine and desired tasks. Pt motivated with therapy.  -MW     Rehab Potential/Prognosis (OT) good, to achieve stated therapy goals  -     Frequency of Treatment (OT) 5 times per week  -MW     Estimated Duration of Therapy (OT) 3 weeks  -     Problem List (OT) problems related to;balance;cognition;mobility;strength;postural control  -     Activity Limitations Related to Problem List (OT) BADLs not performed adequately or safely;IADLs not performed adequately or safely;home management activity not performed adequately or safely  -     Planned Therapy Interventions (OT) activity tolerance training;BADL retraining;functional balance retraining;neuromuscular control/coordination retraining;occupation/activity based interventions;patient/caregiver education/training;ROM/therapeutic exercise;strengthening exercise;transfer/mobility retraining  -[facility] Name 06/04/22 1407          IRF OT Goals    Transfer Goal Selection (OT-IRF) transfers, OT goal 1;transfers, OT goal 2  -     Bathing  Goal Selection (OT-IRF) bathing, OT goal 1  -MW     UB Dressing Goal Selection (OT-IRF) UB dressing, OT goal 1  -MW     LB Dressing Goal Selection (OT-IRF) LB dressing, OT goal 1;LB dressing, OT goal 2  -MW     Grooming Goal Selection (OT-IRF) grooming, OT goal 1  -MW     Toileting Goal Selection (OT-IRF) toileting, OT goal 1;toileting, OT goal 2  -MW     Strength Goal Selection (OT-IRF) strength, OT goal 1 (free text)  -MW     Balance Goal Selection (OT) balance, OT goal 1  -MW     Activity Tolerance Goal Selection (OT) activity tolerance, OT goal 1  -MW     Caregiver Training Goal Selection (OT-IRF) caregiver training, OT goal 1  -MW     Safety Awareness Goal Selection (OT-IRF) safety awareness, OT goal 1  -MW     Row Name 06/04/22 1407          Transfer Goal 1 (OT-IRF)    Activity/Assistive Device (Transfer Goal 1, OT-IRF) shower chair;walk-in shower  -MW     [place] Level (Transfer Goal 1, OT-IRF) standby assist  -MW     Time Frame (Transfer Goal 1, OT-IRF) long-term goal (LTG)  -MW     Progress/Outcomes (Transfer Goal 1, OT-IRF) goal ongoing  -MW     Row Name 06/04/22 1407          Transfer Goal 2 (OT-IRF)    Activity/Assistive Device (Transfer Goal 2, OT-IRF) toilet  -MW     [place] Level (Transfer Goal 2, OT-IRF) supervision required  -MW     Time Frame (Transfer Goal 2, OT-IRF) long-term goal (LTG)  -MW     Progress/Outcomes (Transfer Goal 2, OT-IRF) goal ongoing  -MW     Row Name 06/04/22 1407          Bathing Goal 1 (OT-IRF)    Activity/Device (Bathing Goal 1, OT-IRF) bathing skills, all  -MW     [place] Level (Bathing Goal 1, OT-IRF) standby assist  -MW     Time Frame (Bathing Goal 1, OT-IRF) long-term goal (LTG)  -MW     Progress/Outcomes (Bathing Goal 1, OT-IRF) goal ongoing  -MW     Row Name 06/04/22 1407          UB Dressing Goal 1 (OT-IRF)    Activity/Device (UB Dressing Goal 1, OT-IRF) upper body dressing  -MW     [place] (UB Dress Goal 1, OT-IRF) modified independence  -MW      Time Frame (UB Dressing Goal 1, OT-IRF) long-term goal (LTG)  -MW     Progress/Outcomes (UB Dressing Goal 1, OT-IRF) goal ongoing  -MW     Row Name 06/04/22 1407          LB Dressing Goal 1 (OT-IRF)    Activity/Device (LB Dressing Goal 1, OT-IRF) lower body dressing  -MW     [place] (LB Dressing Goal 1, OT-IRF) contact guard required  -MW     Time Frame (LB Dressing Goal 1, OT-IRF) short-term goal (STG)  -MW     Progress/Outcomes (LB Dressing Goal 1, OT-IRF) goal ongoing  -MW     Row Name 06/04/22 1407          LB Dressing Goal 2 (OT-IRF)    Activity/Device (LB Dressing Goal 2, OT-IRF) lower body dressing  -MW     [place] (LB Dressing Goal 2, OT-IRF) standby assist  -MW     Time Frame (LB Dressing Goal 2, OT-IRF) long-term goal (LTG)  -MW     Progress/Outcomes (LB Dressing Goal 2, OT-IRF) goal ongoing  -MW     Row Name 06/04/22 1407          Grooming Goal 1 (OT-IRF)    Activity/Device (Grooming Goal 1, OT-IRF) grooming skills, all  -MW     [place] (Grooming Goal 1, OT-IRF) modified independence  -MW     Time Frame (Grooming Goal 1, OT-IRF) long-term goal (LTG)  -MW     Progress/Outcomes (Grooming Goal 1, OT-IRF) goal ongoing  -MW     Row Name 06/04/22 1407          Toileting Goal 1 (OT-IRF)    Activity/Device (Toileting Goal 1, OT-IRF) toileting skills, all  -MW     [place] Level (Toileting Goal 1, OT-IRF) contact guard required  -MW     Progress/Outcomes (Toileting Goal 1, OT-IRF) goal ongoing  -MW     Time Frame (Toileting Goal 1, OT-IRF) short-term goal (STG)  -MW     Row Name 06/04/22 1407          Toileting Goal 2 (OT-IRF)    Activity/Device (Toileting Goal 2, OT-IRF) toileting skills, all  -MW     [place] Level (Toileting Goal 2, OT-IRF) standby assist  -MW     Progress/Outcomes (Toileting Goal 2, OT-IRF) goal ongoing  -MW     Time Frame (Toileting Goal 2, OT-IRF) long-term goal (LTG)  -MW     Row Name 06/04/22 1407          Strength Goal 1 (OT-IRF)    Strength Goal 1 (OT-IRF) Pt will  improve BUE to 4/5 in all functional planes in order to maximize BUE strength to safely support self during transfers and ADL routine tasks in d/c environment.  -MW     Time Frame (Strength Goal 1, OT-IRF) long-term goal (LTG)  -MW     Progress/Outcomes (Strength Goal 1, OT-IRF) goal ongoing  -MW     Row Name 06/04/22 1407          Balance Goal 1 (OT)    Activity/Assistive Device (Balance Goal 1, OT) standing, dynamic  -MW     [place] Level/Cues Needed (Balance Goal 1, OT) supervision required  in order to reduce risk of falls  -MW     Time Frame (Balance Goal 1, OT) long term goal (LTG)  -MW     Progress/Outcomes (Balance Goal 1, OT) goal ongoing  -     Row Name 06/04/22 1407           Activity Tolerance Goal 1 (OT)    Activity Tolerance Goal 1 (OT) pt will improve act tolerance with functional activity in order to increase (I) and safety with ADL routine.  -MW     [place] Level (Endurance Goal 1, OT) 10 min activity  -MW     Time Frame (Activity Tolerance Goal 1, OT) long term goal (LTG)  -MW     Progress/Outcome (Activity Tolerance Goal 1, OT) goal ongoing  -     Row Name 06/04/22 1407          Caregiver Training Goal 1 (OT-IRF)    Caregiver Training Goal 1 (OT-IRF) Pt with assistance of caregiver will demonstrate mod (I) with  ADLs, transfers, and HEP.  -MW     Time Frame (Caregiver Training Goal 1, OT-IRF) long-term goal (LTG)  -MW     Progress/Outcomes (Caregiver Training Goal 1, OT-IRF) goal ongoing  -     Row Name 06/04/22 1407          Safety Awareness Goal 1 (OT-IRF)    Activity (Safety Awareness Goal 1, OT-IRF) insight into deficits/self-awareness;demonstration of safe behaviors  -MW     [place]/Cues/Accuracy (Safety Awareness Goal 1, OT-IRF) with 90% accuracy  -MW     Time Frame (Safety Awareness Goal 1, OT-IRF) long-term goal (LTG)  -MW     Progress/Outcomes (Safety Awareness Goal 1, OT-IRF) goal ongoing  -           User Key  (r) = Recorded By, (t) = Taken By, (c) = Cosigned By     Initials Name Effective Dates     Sylvia Brewer OT 08/20/21 -                  Occupational Therapy Education                 Title: PT OT SLP Therapies (In Progress)     Topic: Occupational Therapy (Done)     Point: ADL training (Done)     Description:   Instruct learner(s) on proper safety adaptation and remediation techniques during self care or transfers.   Instruct in proper use of assistive devices.              Learning Progress Summary           Patient Acceptance, E, VU,NR by  at 6/4/2022 1431    Comment: benefit of IRF, role of OT, d/c rec, safety awareness, transfer training, therapy goals                   Point: Home exercise program (Done)     Description:   Instruct learner(s) on appropriate technique for monitoring, assisting and/or progressing therapeutic exercises/activities.              Learning Progress Summary           Patient Acceptance, E, VU,NR by  at 6/4/2022 1431    Comment: benefit of IRF, role of OT, d/c rec, safety awareness, transfer training, therapy goals                   Point: Precautions (Done)     Description:   Instruct learner(s) on prescribed precautions during self-care and functional transfers.              Learning Progress Summary           Patient Acceptance, E, VU,NR by  at 6/4/2022 1431    Comment: benefit of IRF, role of OT, d/c rec, safety awareness, transfer training, therapy goals                   Point: Body mechanics (Done)     Description:   Instruct learner(s) on proper positioning and spine alignment during self-care, functional mobility activities and/or exercises.              Learning Progress Summary           Patient Acceptance, E, VU,NR by  at 6/4/2022 1431    Comment: benefit of IRF, role of OT, d/c rec, safety awareness, transfer training, therapy goals                               User Key     Initials Effective Dates Name Provider Type Discipline     08/20/21 -  Sylvia Brewer OT Occupational Therapist OT                    OT  Recommendation and Plan    Planned Therapy Interventions (OT): activity tolerance training, BADL retraining, functional balance retraining, neuromuscular control/coordination retraining, occupation/activity based interventions, patient/caregiver education/training, ROM/therapeutic exercise, strengthening exercise, transfer/mobility retraining                    Time Calculation:      Time Calculation- OT     Row Name 06/04/22 0900             Time Calculation- OT    OT Start Time 0900  -MW      OT Stop Time 1000  -MW      OT Time Calculation (min) 60 min  -MW            User Key  (r) = Recorded By, (t) = Taken By, (c) = Cosigned By    Initials Name Provider Type     Sylvia Brewer OT Occupational Therapist              Therapy Charges for Today     Code Description Service Date Service Provider Modifiers Qty    [id number]  OT EVAL MOD COMPLEXITY 3 6/4/2022 Sylvia Brewer OT GO 1    [id number]  OT SELF CARE/MGMT/TRAIN EA 15 MIN 6/4/2022 Sylvia Brewer OT GO 2                   Sylvia Brewer OT  6/4/2022

## 2022-06-04 NOTE — PROGRESS NOTES
SECTION GG    Self Care Performance:   Oral Hygiene: [place] sets up or cleans up; patient completes activity. [place]  assists only prior to or following the activity.   Toileting Hygiene: [place] does less than half the effort. [place] lifts, holds  or supports trunk or limbs but provides less than half the effort.   Shower/Bathe Self: [place] does less than half the effort. [place] lifts, holds  or supports trunk or limbs but provides less than half the effort.   Upper Body Dressing: [place] provides verbal cues and/or touching/steadying  and/or contact guard assistance as patient completes activity.   Lower Body Dressing: [place] does less than half the effort. [place] lifts, holds  or supports trunk or limbs but provides less than half the effort.   Putting On/Taking Off Footwear: [place] does less than half the effort. [place]  lifts, holds or supports trunk or limbs but provides less than half the effort.    Self Care Discharge Goals:   Oral Hygiene: Patient completed the activities by him/herself with no  assistance from a helper.   Toileting Hygiene: [place] provides verbal cues or touching/steadying assistance  as patient completes activity.   Upper Body Dressing: [place] sets up or cleans up; patient completes activity.  [place] assists only prior to or following the activity.   Lower Body Dressing: [place] provides verbal cues or touching/steadying  assistance as patient completes activity.   Putting On/Taking Off Footwear: [place] sets up or cleans up; patient completes  activity. [place] assists only prior to or following the activity.    Mobility Toilet Transfer Performance: [place] does less than half the effort.  [place] lifts, holds or supports trunk or limbs but provides less than half the  effort.    Mobility Toilet Transfer Discharge Goal: [place] provides verbal cues or  touching/steadying assistance as patient completes activity.    Signed by: Sylvia Brewer OT

## 2022-06-05 NOTE — CONSULTS
"Adult Nutrition  Assessment/PES    Patient Name:  Alireza Carreon  YOB: 1935  MRN: [id number]  Admit Date:  6/3/2022    Assessment Date:  6/5/2022    Comments:  Inpatient Nutrition Consult - Per Admission Screen. Pt admit with immobility syndrome, hx: prolonged seizure, cellulitis left leg, afib, HTN, CAD s/p CABG, hypoxia, bilateral legs edema/venous stasis, vascular dementia. BMI: 24.2 (normal).    Pt on regular; thin diet with Magic Cup BID and Mighty Shake daily. % intake documented. Pt reported he is eating well. Pt likes fruit and crispy crenshaw. Pt enjoys cooking and baking at home. Encourage intake. Continue to follow per protocol.     Reason for Assessment     Row Name 06/05/22 1046          Reason for Assessment    Reason For Assessment nurse/nurse practitioner consult     Diagnosis other (see comments)  pt admit with immobility syndrome, hx: prolonged seizure, cellulitis left leg, A-Fib, HTN, CAD s/p CABG, Hypoxia, Bilateral legs edema/venous stasis, Vascular dementia                Nutrition/Diet History     Row Name 06/05/22 1050          Nutrition/Diet History    Typical Intake (Food/Fluid/EN/PN) % intake. Pt reported eating well.     Food Preferences Pt likes fruit and crispy crenshaw.                Anthropometrics     Row Name 06/05/22 1052          Anthropometrics    Height for Calculation 1.676 m (5' 5.98\")     Weight for Calculation 68.1 kg (150 lb 2.1 oz)     Additional Documentation --  BMI: 24.2                Labs/Tests/Procedures/Meds     Row Name 06/05/22 1051          Labs/Procedures/Meds    Lab Results Reviewed reviewed     Lab Results Comments Na (low), Cl (low), glucose (high), alb (low)            Diagnostic Tests/Procedures    Diagnostic Test/Procedure Reviewed reviewed            Medications    Pertinent Medications Reviewed reviewed     Pertinent Medications Comments aricept, hydrodiuril, vimpat, lisinopril, luminal, xarelto, sodium chloride           " "       Estimated/Assessed Needs - Anthropometrics     Row Name 06/05/22 1052          Anthropometrics    Height for Calculation 1.676 m (5' 5.98\")     Weight for Calculation 68.1 kg (150 lb 2.1 oz)     Additional Documentation --  BMI: 24.2                Nutrition Prescription Ordered     Row Name 06/05/22 1052          Nutrition Prescription PO    Current PO Diet Regular     Fluid Consistency Thin     Supplement Magic Cup;Mighty Shake     Supplement Frequency Other (comment)  Magic Cup BID, Mighty Shake daily                Evaluation of Received Nutrient/Fluid Intake     Row Name 06/05/22 1053          PO Evaluation    % PO Intake %                     Problem/Interventions:   Problem 1     Row Name 06/05/22 1055          Nutrition Diagnoses Problem 1    Problem 1 No Nutrition Diagnosis at this Time                      Intervention Goal     Row Name 06/05/22 1054          Intervention Goal    General Maintain nutrition;Improved nutrition related lab(s);Meet nutritional needs for age/condition;Disease management/therapy     PO Tolerate PO;Meet estimated needs;Continue positive trend     Weight Maintain weight                Nutrition Intervention     Row Name 06/05/22 1055          Nutrition Intervention    RD/Tech Action Follow Tx progress;Care plan reviewd;Encourage intake                  Education/Evaluation     Row Name 06/05/22 1055          Education    Education Will Instruct as appropriate            Monitor/Evaluation    Monitor Per protocol;I&O;PO intake;Supplement intake;Pertinent labs;Weight;Skin status                 Electronically signed by:  Rachel Ruvalcaba RD  06/05/22 10:55 EDT  "

## 2022-06-05 NOTE — PROGRESS NOTES
Inpatient Rehabilitation Plan of Care Note    Plan of Care  Care Plan Reviewed - No updates at this time.    Psychosocial    Performed Intervention(s)  Verbalizes needs & concerns      Safety    Performed Intervention(s)  Falls precautions, safety rounds, Items within reach, Bed/chair alarms, &  Non-skid socks.      Sphincter Control    Performed Intervention(s)  Monitor intake, output, & BM's each shift.  Use incontinence products as needed.    Signed by: Bettie Flores RN

## 2022-06-05 NOTE — PROGRESS NOTES
Inpatient Rehabilitation Plan of Care Note    Plan of Care  Care Plan Reviewed - No updates at this time.    Psychosocial    Performed Intervention(s)  Verbalizes needs & concerns      Safety    Performed Intervention(s)  Falls precautions, safety rounds, Items within reach, Bed/chair alarms, &  Non-skid socks.      Sphincter Control    Performed Intervention(s)  Monitor intake, output, & BM's each shift.  Use incontinence products as needed.    Signed by: Razia Peck RN

## 2022-06-05 NOTE — PLAN OF CARE
Goal Outcome Evaluation:  Plan of Care Reviewed With: patient           Outcome Evaluation: Alireza has been cooperative, pleasant and hard of hearing. He only has right hearing aid, other one missing from previous floor- daughter to check with them to see if they have it. He is assist x1 with the wheelchair- unsteady gait, takes medication with water, and continent and incontinent. He has been to the bathroom twice and did have a BM on toilet -today. VS stable, RED ARM BAND- at nurses station for meals, and did ccomplain of pain to his neck, but did not want any medicaiton. Family at bedside on and off all day, no other concerns at this time.

## 2022-06-05 NOTE — PROGRESS NOTES
"Patient Care Team:  Jose Bingham MD as PCP - General (Internal Medicine)  Yennifer Ruiz MD as Consulting Physician (Cardiology)  Cristian Taylor OD (Optometry)  PARKER Wheeler MD as Consulting Physician (Ophthalmology)  Obdulio Morelos MD as Consulting Physician (Hematology and Oncology)  Zahira Gee MD as Referring Physician (Neurology)  Sanya Sim Jr., DPM as Consulting Physician (Podiatry)     Chief complaint:  Functional deficits related to encephalopathy and prolonged immobilty    Subjective:  Pt alert and c/o mild fatigue and brain fog. Denies CP, SOB, N/V, F/C    Physical Exam:  Constitutional: Not in acute distress.  [place] IRA  Eyes: Mildly injected conjunctivae, EOMI. Pupils equal and reactive to light.   ENMT: Moist MM.  External ears normal. [place] IRA, EOMI w/out nystagmus  Neck: No thyromegaly.  Trachea midline  Heart: RRR, no murmur.  Trace pitting edema in legs, L >R  Lungs: Good and equal air entry bilaterally.  Nonlabored breathing  Abdomen:Soft. No tenderness or dullness.  Positive bowel sounds  Extremities: No cyanosis, clubbing.  Warm extremities and well-perfused. DJD changes bilat hands.  Neuro: Slow processing but appropriate.  Does not follow commands.  Withdraw to pain. Provides resistance in BUE/BLEs  Psych: O x person and hospital but not \"Christianity\" without VC and not to time except 2022. No delusions. Affect flat.  Integumentary: No rash or ecchymosis  Lymphatic: No palpable cervical or supraclavicular lymph nodes.    Assessment / Plan:     1. Status epilepticus w/ Postictal encephalopathy - Admit to Acute inpt rehab with PT, OT, SLP, psychology and rehab medical and nursing care..  2. Seizure d/o - Continue Antiepileptics and monitor levels and side effects. Ativan PRN for seizure.  3. Cellulitis left leg - Completed antibx. Continue care  4. A-Fib - Continue Xarelto  5. HTN - Cont meds and monitor  6. CAD s/p CABG - Monitor response to therapy and inc " activity  7. Hypoxia: Likely secondary to hypoventilation and probably obstructive sleep apnea - Check Nocturnal oxygen  8. Bilateral legs edema/venous stasis  9. 9. Vascular dementia - Continue Aricept

## 2022-06-05 NOTE — PLAN OF CARE
Goal Outcome Evaluation:      Slept fair. Meds with water. Red arm band, Impulsive at times. Alert & oriented, but forgetful. Can be confused at times. Hard of hearing. Hx of dementia. Incontinent & continent at times. Seizure precautions, side rails padded. No seizures tonight. Wore SCD's as ordered.

## 2022-06-06 NOTE — PROGRESS NOTES
Inpatient Rehabilitation Functional Measures Assessment and Plan of Care    Plan of Care  Updated Problems/Interventions  Mobility    [OT] Tub/Shower Transfers(Active)  Current Status(06/06/2022): min A  Weekly Goal(06/13/2022): CGA  Discharge Goal: SBA    [OT] Toilet Transfers(Active)  Current Status(06/06/2022): min A  Weekly Goal(06/13/2022): CGA  Discharge Goal: SBA        Self Care    [OT] Bathing(Active)  Current Status(06/06/2022): min A  Weekly Goal(06/13/2022): CGA  Discharge Goal: SBA    [OT] Dressing (Lower)(Active)  Current Status(06/06/2022): mod A  Weekly Goal(06/13/2022): min A  Discharge Goal: SBA    [OT] Dressing (Upper)(Active)  Current Status(06/06/2022): min A  Weekly Goal(06/13/2022): SBA  Discharge Goal: s/up    [OT] Grooming(Active)  Current Status(06/06/2022): s/up  Weekly Goal(06/13/2022): s/up  Discharge Goal: mod (I)    [OT] Toileting(Active)  Current Status(06/06/2022): mod A  Weekly Goal(06/13/2022): min A  Discharge Goal: SBA    Functional Measures  DARRYN Eating:  Branch  DARRYN Grooming: Branch  DARRYN Bathing:  Branch  DARRYN Upper Body Dressing:  Branch  DARRYN Lower Body Dressing:  Branch  DARRYN Toileting:  Branch    DARRYN Bladder Management  Level of Assistance:  Branch  Frequency/Number of Accidents this Shift:  Branch    DARRYN Bowel Management  Level of Assistance: Branch  Frequency/Number of Accidents this Shift: Branch    DARRYN Bed/Chair/Wheelchair Transfer:  Branch  DARRYN Toilet Transfer:  Branch  DARRYN Tub/Shower Transfer:  Branch    Previously Documented Mode of Locomotion at Discharge: Field  DARRYN Expected Mode of Locomotion at Discharge: Branch  DARRYN Walk/Wheelchair:  Branch  DARRYN Stairs:  Branch    DARRYN Comprehension:  Branch  DARRYN Expression:  Branch  DARRYN Social Interaction:  Branch  DARRYN Problem Solving:  Branch  DARRYN Memory:  Branch    Therapy Mode Minutes  Occupational Therapy: Branch  Physical Therapy: Branch  Speech Language Pathology:  Branch    Signed by: Socorro Hollis OT

## 2022-06-06 NOTE — PROGRESS NOTES
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  At baseline he has a shuffling walk with left foot weakness and he keeps his  left hand on his stomach according to his wife.  He does not use a walker or a  wheelchair.  His seizure has been controlled with the use of phenobarbital.  ?  Patient has been in his usual state of health up until 5/22/22 when he was noted  to feel tired and sleepy.  On 5/23 he got up and fix breakfast to his wife and  he was acting normal.  However later around 1720, he fell when he was walking in  the kitchen.  His wife could not get him up so she called her kids who were  unable to do so either and therefore they called EMS.  ?  Per reports, patient was confused and not answering questions to them postpartum  after the fall and his eyes were up rolled.?EMS found the patient confused,  weakly moving his left side as compared to his right and some staring off to the  left.  He was only partially following commands.  ?  On arrival to the ED, he was initially hypertensive and tachycardic.  He had a  tonicoclonic seizure in the ER.  He received a bolus of phenobarbital which  resulted in hypotension and oversedation and hypoxemia.  He required 6 L  oxygen..  ?  Work-up for acute stroke was negative:  CTP: Moderately large chronic infarct in the right parietal occipital region.  CT brain: Atrophy.  Chronic right parietal and occipital infarcts.  ?  Hospital course marked by 5/24/22 EEG that showed no epileptiform activity. CTA  showed cortical atrophy and evidence of extensive small vessel chronic ischemic  change. Moderately large chronic infarct in the right parietal occipital region.  No acute infarct or hemorrhage is identified. There  is normal symmetric brain  perfusion. Venous dopplers BLEs negative. ECHO w/ EJ 50-55% w/out thrombus. Pt  was felt to have prolonged post-ictal encephalopathy d/t status epilepticus.  Function has declined and now is a good candidate for acute inpt rehab.  ?  Past Medical History: Medical History  Past Medical History:  DiagnosisDate  ?3-vessel CAD?  ?Anticoagulated on warfarin?  ?Atrial fibrillation (HCC)?  ?BMI 28.0-28.9,adult?  ?Bunion, right foot?  ?Complaints of memory disturbance?  ?Dementia (HCC)?  ?Edema?  ?Encounter for immunization?  ?Generalized convulsive seizure (HCC)?  ?Gout?  ?History of double vision?  ?HL (hearing loss)?  ?Hyperlipidemia?  ?Hypertension?  ?Left foot pain?  ?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?  ?Movement disorder?  ?Nocturnal leg cramps?  ?OA (osteoarthritis)?  ?Prepatellar effusion?  ?Pulmonary embolism (HCC)?  ?Puncture wound of hand, right?  ?Screening for cardiovascular condition?  ?Sleep apnea?  ?Stasis dermatitis?  ?Statin intolerance?  ?Stroke (HCC)?  ?Taking drug for chronic disease?  ?Thoracic back pain?  ?Transient ischemic attack?    ?  Surgical History  Past Surgical History:  ProcedureLateralityDate  ?ADENOIDECTOMY??  ?APPENDECTOMY??  ?CATARACT EXTRACTION, BILATERAL??  ?CORONARY ARTERY BYPASS GRAFT??  ?CORONARY ARTERY BYPASS GRAFT??  ?CYSTOSCOPY TRANSURETHRAL RESECTION OF PROSTATE??  ?HERNIA REPAIR??  ?OTHER SURGICAL HISTORY??  ?carotid thromboendarterectomy  ?SHOULDER SURGERY??  ?SKIN CANCER EXCISION??    ?    Plan of Care  Updated Problems/Interventions  Field    Expected Intensity:  Average of 3 hours of therapy 5 days/week.  Interdisciplinary Team:  Interdisciplinary Team: Medical Supervision and 24 Hour Rehabilitation Nursing.,  Physical Therapy:, Occupational Therapy:, Speech and Language Therapy:, Social  Work, Therapeutic Recreation., Psychology., Registered Dietitian.  Physical Therapy Intensity/Duration: 1 hour/day, 5 days/week, for  approximately  2 weeks.  Occupational Therapy Intensity/Duration: 1 hour/day, 5 days/week, for  approximately 2 weeks.  Speech Language Pathology  Intensity/Duration: 1 hour/day, 5 days/week, for  approximately 2 weeks.  Estimated Length of Stay/Anticipated Discharge Date: 2 Weeks  Anticipated Discharge Destination:  Anticipated discharge destination from inpatient rehabilitation is community  discharge with assistance. Home with 24/7 assist of spouse along with other  family members.      Based on the patient's medical and functional status, their prognosis and  expected level of functional improvement is:  Goals are to achieve a level of  SBA with mobility and ADL's.  Rehab prognosis fair.  Medical prognosis fair.    Signed by: Cristina Bermudez RN

## 2022-06-06 NOTE — PROGRESS NOTES
Inpatient Rehabilitation Plan of Care Note    Plan of Care  Care Plan Reviewed - Updates as Follows    Psychosocial    [RN] Coping/Adjustment(Active)  Current Status(06/06/2022): Expresses appropriate coping  Weekly Goal(06/11/2022): Allow opportunity to express concerns regarding current  status.  Discharge Goal: Adequate coping regarding life changes with ongoing support.    Performed Intervention(s)  Verbalizes needs & concerns      Safety    [RN] Potential for Injury(Active)  Current Status(06/06/2022): Hx falls; impulsive  Weekly Goal(06/11/2022): Instruct patient regarding safety precautions & need  for close supervision.  Discharge Goal: Patient /family will be aware of risk of fall & safety in the  home setting    Performed Intervention(s)  Falls precautions, safety rounds, Items within reach, Bed/chair alarms, &  Non-skid socks.  red armband      Sphincter Control    [RN] Bladder & Bowel management(Active)  Current Status(06/06/2022): Incontinent of B&B at times.  Weekly Goal(06/11/2022): Continent 75%  Discharge Goal: Continent 100%    Performed Intervention(s)  Monitor intake, output, & BM's each shift.  Use incontinence products as needed.    Signed by: Bettie Flores RN

## 2022-06-06 NOTE — THERAPY TREATMENT NOTE
Inpatient Rehabilitation - Speech Language Pathology Treatment Note    [facility]     Patient Name: Alireza Carreon  : 1935  MRN: [id number]    Today's Date: 2022                   Admit Date: 6/3/2022       Visit Dx:    No diagnosis found.    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Former[facility])   • Monoclonal gammopathy of unknown significance (MGUS)   • Chronic anticoagulation   • Arteriosclerosis of coronary artery   • Cerebrovascular accident (CVA) (HCC)   • Seizure disorder (HCC)   • Swelling of left lower extremity   • Hearing loss   • Localized edema   • Blister   • Skin nodule of toe of right foot   • Status epilepticus (HCC)   • Immobility syndrome       Past Medical History:   Diagnosis Date   • 3-vessel CAD    • Anticoagulated on warfarin    • Atrial fibrillation (HCC)    • BMI 28.0-28.9,adult    • Bunion, right foot    • Complaints of memory disturbance    • Dementia (HCC)    • Edema    • Encounter for immunization    • Generalized convulsive seizure (HCC)    • Gout    • History of double vision    • HL (hearing loss)    • Hyperlipidemia    • Hypertension    • Left foot pain    • 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    • Movement disorder    • Nocturnal leg cramps    • OA (osteoarthritis)    • Prepatellar effusion    • Pulmonary embolism (HCC)    • Puncture wound of hand, right    • Screening for cardiovascular condition    • Sleep apnea    • Stasis dermatitis    • Statin intolerance    • Stroke (HCC)    • Taking drug for chronic disease    • Thoracic back pain    • Transient ischemic attack        Past Surgical History:   Procedure Laterality Date   •  ADENOIDECTOMY     • APPENDECTOMY     • CATARACT EXTRACTION, BILATERAL     • CORONARY ARTERY BYPASS GRAFT     • CORONARY ARTERY BYPASS GRAFT     • CYSTOSCOPY TRANSURETHRAL RESECTION OF PROSTATE     • HERNIA REPAIR     • OTHER SURGICAL HISTORY      carotid thromboendarterectomy   • SHOULDER SURGERY     • SKIN CANCER EXCISION         SLP Recommendation and Plan                                                   SLP EVALUATION (last 72 hours)     SLP SLC Evaluation     Row Name 06/06/22 1300 06/06/22 1000 06/04/22 1030             Communication Assessment/Intervention    Document Type therapy note (daily note)  -SR therapy note (daily note)  -SR evaluation  -CB      Subjective Information no complaints  -SR no complaints  -SR no complaints  -CB      Patient Observations alert;cooperative;agree to therapy  -SR alert;cooperative;agree to therapy  -SR alert;cooperative;agree to therapy  -CB      Patient/Family/Caregiver Comments/Observations -- Pt said that he lost his hearing aids over the weekend.  -SR --      Patient Effort good  -SR good  -SR fair  -CB      Comment -- -- Pt reports he hopes to go home today.  -CB      Symptoms Noted During/After Treatment none  -SR -- --              General Information    Patient Profile Reviewed -- -- yes  -CB              Pain Scale: Numbers Pre/Post-Treatment    Pretreatment Pain Rating 0/10 - no pain  -SR 0/10 - no pain  -SR --      Posttreatment Pain Rating 0/10 - no pain  -SR 0/10 - no pain  -SR --              Cognitive Assessment Intervention- SLP    Cognitive Function (Cognition) -- -- moderate impairment  -CB      Orientation Status (Cognition) -- -- moderate impairment  -CB      Memory (Cognitive) -- -- mild impairment  -CB      Attention (Cognitive) -- -- mild impairment  -CB      Thought Organization (Cognitive) -- -- mental manipulation;moderate impairment  -CB      Reasoning (Cognitive) -- -- mental flexibility;moderate impairment  -CB      Problem Solving (Cognitive) --  -- moderate impairment  -CB      Executive Function (Cognition) -- -- severe impairment  -CB      Right Hemisphere Function -- -- moderate impairment  -CB      Cognition, Comment -- -- Patient's hearing status may have impacted overall scoring, however, patient presents with moderate cognitive deficits characterized by deficits in attention, memory and executive functioning impacting safety awareness and ability to return home independently.  -CB              Standardized Tests    Cognitive/Memory Tests -- -- CLQT: Cognitive Linguistic Quick Test  -CB              CLQT (The Cognitive Linguistic Quick Test)    Attention Domain Score -- -- 106  -CB      Attention Severity Rating -- -- 3: Mild  -CB      Memory Domain Score -- -- 124  -CB      Memory Severity Rating -- -- 3: Mild  -CB      Executive Function Domain Score -- -- 5  -CB      Executive Function Severity Rating -- -- 1: Severe  -CB      Language Domain Score -- -- 30  -CB      Language Severity Rating -- -- 4: WNL  -CB      Visuospatial Domain Score -- -- 20  -CB      Visuospatial Severity Rating -- -- 1: Severe  -CB      Clock Drawing Total Score -- -- 10  -CB      Clock Drawing Severity Rating -- -- Mild  -CB      Composite Severity Rating -- -- 2.4  -CB      Composite Severity Rating Range -- -- 2.4 - 1.5: Moderate  -CB              SLP Evaluation Clinical Impressions    SLP Diagnosis -- -- Moderate cognitive dysfunction  -CB      Rehab Potential/Prognosis -- -- fair  -CB      SLC Criteria for Skilled Therapy Interventions Met -- -- yes  -CB      Functional Impact -- -- functional impact in social situations;functional impact in ADLs;Poor Judgement  -CB              Recommendations    Therapy Frequency (SLP SLC) -- -- PRN  -CB      Predicted Duration Therapy Intervention (Days) -- -- until discharge  -CB      Anticipated Discharge Disposition (SLP) -- -- unknown  -CB              Cognitive Linguistic Treatment Objectives    Attention Selection -- --  attention, SLP goal 1  -CB      Memory Skills Selection -- -- memory skills, SLP goal 1  -CB      Executive Function Skills Selection -- -- executive function skills, SLP goal 1  -CB              Attention Goal 1 (SLP)    Improve Attention by Goal 1 (SLP) -- -- complete selective attention task;70%;with minimal cues (75-90%)  -CB      Time Frame (Attention Goal 1, SLP) -- -- by discharge  -CB      Progress/Outcomes (Attention Goal 1, SLP) -- -- continuing progress toward goal  -CB              Memory Skills Goal 1 (SLP)    Improve Memory Skills Through Goal 1 (SLP) -- -- listen to a paragraph and answer questions;recalling related word lists immediately;70%;with minimal cues (75-90%)  -CB      Time Frame (Memory Skills Goal 1, SLP) -- -- by discharge  -CB      Progress/Outcomes (Memory Skills Goal 1, SLP) -- -- good progress toward goal  -CB              Executive Functional Skills Goal 1 (SLP)    Improve Executive Function Skills Goal 1 (SLP) -- -- demonstrate awareness of deficit;time management activity;home management activity;70%;with minimal cues (75-90%)  -CB      Time Frame (Executive Function Skills Goal 1, SLP) -- -- by discharge  -CB      Progress/Outcomes (Executive Function Skills Goal 1, SLP) -- -- good progress toward goal  -CB            User Key  (r) = Recorded By, (t) = Taken By, (c) = Cosigned By    Initials Name Effective Dates    CB Marina Funk, SLP 11/16/21 -     SR Evelina Gibson, LANEY 01/12/22 -                    EDUCATION    The patient has been educated in the following areas:       Cognitive Impairment.             SLP GOALS     Row Name 06/06/22 1300 06/06/22 1000 06/04/22 1030       Attention Goal 1 (SLP)    Improve Attention by Goal 1 (SLP) complete selective attention task;70%;with minimal cues (75-90%)  -SR complete selective attention task;70%;with minimal cues (75-90%)  -SR complete selective attention task;70%;with minimal cues (75-90%)  -CB    Time Frame (Attention Goal 1, SLP)  by discharge  -SR by discharge  -SR by discharge  -CB    Progress/Outcomes (Attention Goal 1, SLP) continuing progress toward goal  -SR continuing progress toward goal  -SR continuing progress toward goal  -CB    Comment (Attention Goal 1, SLP) Required min cues for redirection to task during PM therapy session.  -SR -- --       Memory Skills Goal 1 (SLP)    Improve Memory Skills Through Goal 1 (SLP) recalling related word lists immediately;recalling related word lists with an imposed delay;recalling unrelated word lists immediately;recalling unrelated word lists with an imposed delay;visual memory task;listen to a paragraph and answer questions;70%;with minimal cues (75-90%)  -SR recalling related word lists immediately;recalling related word lists with an imposed delay;recalling unrelated word lists immediately;recalling unrelated word lists with an imposed delay;visual memory task;listen to a paragraph and answer questions;70%;with minimal cues (75-90%)  -SR listen to a paragraph and answer questions;recalling related word lists immediately;70%;with minimal cues (75-90%)  -CB    Time Frame (Memory Skills Goal 1, SLP) by discharge  -SR by discharge  -SR by discharge  -CB    Progress/Outcomes (Memory Skills Goal 1, SLP) good progress toward goal  -SR good progress toward goal  -SR good progress toward goal  -CB    Comment (Memory Skills Goal 1, SLP) Paragraph retention; 70% acc given NO cues.  -SR Visual memory; Answered questions about a visual picture scene with 30% acc given NO cues; 60% acc given min cues  -SR --       Functional Problem Solving Skills Goal 1 (SLP)    Improve Problem Solving Through Goal 1 (SLP) -- tell similarity between items;complete organization/home management task;sequence steps in a task;70%;independently (over 90% accuracy)  -SR --    Time Frame (Problem Solving Goal 1, SLP) -- by discharge  -SR --    Progress/Outcomes (Problem Solving Goal 1, SLP) -- good progress toward goal  -SR --     Comment (Problem Solving Goal 1, SLP) -- Sequenced steps in a task with 80% acc given NO cues. Provided verbal sequence for creating wood frames with 100% acc given NO cues  -SR --       Executive Functional Skills Goal 1 (SLP)    Improve Executive Function Skills Goal 1 (SLP) demonstrate awareness of deficit;time management activity;home management activity;70%;with minimal cues (75-90%)  -SR -- demonstrate awareness of deficit;time management activity;home management activity;70%;with minimal cues (75-90%)  -CB    Time Frame (Executive Function Skills Goal 1, SLP) by discharge  -SR -- by discharge  -CB    Progress/Outcomes (Executive Function Skills Goal 1, SLP) good progress toward goal  -SR -- good progress toward goal  -CB    Comment (Executive Function Skills Goal 1, SLP) Determined time between arrival and appointment time with 80% acc given NO cues.  -SR -- --          User Key  (r) = Recorded By, (t) = Taken By, (c) = Cosigned By    Initials Name Provider Type    Marina Weiss SLP Speech and Language Pathologist    Evelina Flanagan SLP Speech and Language Pathologist                SLP Outcome Measures (last 72 hours)     SLP Outcome Measures     Row Name 06/04/22 1030             SLP Outcome Measures    Outcome Measure Used? Adult NOMS  -CB              Adult FCM Scores    FCM Chosen Problem Solving  -CB      Swallowing FCM Score 4  -CB            User Key  (r) = Recorded By, (t) = Taken By, (c) = Cosigned By    Initials Name Effective Dates    Marina Weiss SLP 11/16/21 -                         Time Calculation:        Time Calculation- SLP     Row Name 06/06/22 1340 06/06/22 1204          Time Calculation- SLP    SLP Start Time 1300  -SR 1030  -SR     SLP Stop Time 1330  -SR 1100  -SR     SLP Time Calculation (min) 30 min  -SR 30 min  -SR           User Key  (r) = Recorded By, (t) = Taken By, (c) = Cosigned By    Initials Name Provider Type    Evelina Flanagan SLP Speech and Language  Pathologist                  Therapy Charges for Today     Code Description Service Date Service Provider Modifiers Qty    85037098407  ST DEV OF COGN SKILLS INITIAL 15 MIN 6/6/2022 Evelina Gibson SLP  1    [id number]  ST DEV OF COGN SKILLS EACH ADDT'L 15 MIN 6/6/2022 Evelina Gibson SLP  3            ADULT NOMS (last 72 hours)     Adult NOMS     Row Name 06/04/22 1030                   Adult FCM Scores    FCM Chosen Problem Solving  -CB        Swallowing FCM Score 4  -CB              User Key  (r) = Recorded By, (t) = Taken By, (c) = Cosigned By    Initials Name Effective Dates    CB Marina Funk SLP 11/16/21 -                            LANEY Solis  6/6/2022

## 2022-06-06 NOTE — PROGRESS NOTES
Physical Medicine and Rehabilitation  Inpatient Rehabilitation Interdisciplinary Plan of Care    Demographics            Age: 87Y            Gender: Male    Admission Date: 6/3/2022 6:28:00 PM  Rehabilitation Diagnosis:  Debility  Status epilepticus w/ Postictal encephalopathy - Admit to Acute inpt rehab with  PT, OT, SLP, psychology and rehab medical and nursing care..  Seizure d/o - Continue Antiepileptics and monitor levels and side effects.  Ativan PRN for seizure.  Cellulitis left leg - Completed antibx. Continue care  A-Fib - Continue Xarelto  HTN - Cont meds and monitor  CAD s/p CABG - Monitor response to therapy and inc activity  Hypoxia: Likely secondary to hypoventilation and probably obstructive sleep  apnea - Check Nocturnal oxygen  Bilateral legs edema/venous stasis  Vascular dementia - Continue Aricept  DVT prophylaxis - anticoagulation / SCDs    Plan of Care  Anticipated Discharge Date/Estimated Length of Stay: 2 Weeks  Anticipated Discharge Destination: Community discharge with assistance  Discharge Plan : Patient lives with wife in one story home with 3 steps to enter  with rails.  D/C plan is home with wife. Step-dgt to stay after d/c.  Medical Necessity Expected Level Rationale: Goals are to achieve a level of SBA  with mobility and ADL's.  Rehab prognosis fair.  Medical prognosis fair.  Intensity and Duration: an average of 3 hours/5 days per week  Medical Supervision and 24 Hour Rehab Nursing: x  Physical Therapy: x  PT Intensity/Duration: 1 hour/day, 5 days/week, for approximately 2 weeks.  Occupational Therapy: x  OT Intensity/Duration: 1 hour/day, 5 days/week, for approximately 2 weeks.  Speech and Language Therapy: x  SLP Intensity/Duration: 1 hour/day, 5 days/week, for approximately 2 weeks.  Social Work: x  Therapeutic Recreation: x  Psychology: x  Registered Dietician: x  Updated (if changes indicated)  No changes to plan.    Based on the patient's medical and functional status, their  prognosis and  expected level of functional improvement is: Goals are to achieve a level of SBA  with mobility and ADL's.  Rehab prognosis fair.  Medical prognosis fair.    Interdisciplinary Problem/Goals/Status  Cognition    [ST] Executive Functions(Active)  Current Status(06/06/2022): Executive Functioning at moderate level  Weekly Goal(06/13/2022): Improve Executive Functioning to min  Discharge Goal: Executive Functioning WFL        Mobility    [OT] Tub/Shower Transfers(Active)  Current Status(06/06/2022): min A  Weekly Goal(06/13/2022): CGA  Discharge Goal: SBA    [OT] Toilet Transfers(Active)  Current Status(06/06/2022): min A  Weekly Goal(06/13/2022): CGA  Discharge Goal: SBA    [PT] Walk(Active)  Current Status(06/06/2022): 80' Min A, RW, shuffling gait  Weekly Goal(06/14/2022): amb to BR w nsg, Min A RW  Discharge Goal: 160' SV, RW    [PT] Bed/Chair/Wheelchair(Active)  Current Status(06/06/2022): Min-Mod A, RW  Weekly Goal(06/14/2022): Min A, RW  Discharge Goal: SV, RW    [PT] Bed Mobility(Active)  Current Status(06/06/2022): Lynette  Weekly Goal(06/14/2022): CGA  Discharge Goal: SV        Psychosocial    [RN] Coping/Adjustment(Active)  Current Status(06/06/2022): Expresses appropriate coping  Weekly Goal(06/11/2022): Allow opportunity to express concerns regarding current  status.  Discharge Goal: Adequate coping regarding life changes with ongoing support.        Safety    [RN] Potential for Injury(Active)  Current Status(06/06/2022): Hx falls; impulsive  Weekly Goal(06/11/2022): Instruct patient regarding safety precautions & need  for close supervision.  Discharge Goal: Patient /family will be aware of risk of fall & safety in the  home setting        Self Care    [OT] Bathing(Active)  Current Status(06/06/2022): min A  Weekly Goal(06/13/2022): CGA  Discharge Goal: SBA    [OT] Dressing (Lower)(Active)  Current Status(06/06/2022): mod A  Weekly Goal(06/13/2022): min A  Discharge Goal: SBA    [OT] Dressing  (Upper)(Active)  Current Status(06/06/2022): min A  Weekly Goal(06/13/2022): SBA  Discharge Goal: s/up    [OT] Grooming(Active)  Current Status(06/06/2022): s/up  Weekly Goal(06/13/2022): s/up  Discharge Goal: mod (I)    [OT] Toileting(Active)  Current Status(06/06/2022): mod A  Weekly Goal(06/13/2022): min A  Discharge Goal: SBA        Sphincter Control    [RN] Bladder & Bowel management(Active)  Current Status(06/06/2022): Incontinent of B&B at times.  Weekly Goal(06/11/2022): Continent 75%  Discharge Goal: Continent 100%      Comments:    Signed by: Montana Ewing MD

## 2022-06-06 NOTE — PROGRESS NOTES
Patient Care Team:  Jose Bingham MD as PCP - General (Internal Medicine)  Yennifer Ruiz MD as Consulting Physician (Cardiology)  Cristian Taylor OD (Optometry)  PARKER Wheeler MD as Consulting Physician (Ophthalmology)  Obdulio Morelos MD as Consulting Physician (Hematology and Oncology)  Zahira Gee MD as Referring Physician (Neurology)  Sanya Sim Jr., DPM as Consulting Physician (Podiatry)     Chief complaint:  Functional deficits related to encephalopathy and prolonged immobilty    Subjective:  Pt alert . Reports feels better.  Denies CP, SOB, N/V, F/C    Physical Exam:  Constitutional: Not in acute distress.  [place]. Awake and alert and conversant.      Heart: RRR, no murmur.  Trace pitting edema in legs, L >R  Lungs: Good and equal air entry bilaterally.  Nonlabored breathing  Abdomen:Soft. No tenderness or dullness.  Positive bowel sounds  Extremities: No cyanosis, clubbing.  Warm extremities and well-perfused. DJD changes bilat hands.  Neuro: Slow processing but appropriate.  Does follow commands.  Provides resistance in BUE/BLEs  Psych: O x person and hospital   No delusions. Affect flat.         Results from last 7 days   Lab Units 06/04/22  0801   WBC 10*3/mm3 5.35   HEMOGLOBIN g/dL 14.4   HEMATOCRIT % 44.0   PLATELETS 10*3/mm3 273     Results from last 7 days   Lab Units 06/04/22  0801   SODIUM mmol/L 135*   POTASSIUM mmol/L 4.6   CHLORIDE mmol/L 95*   CO2 mmol/L 29.7*   BUN mg/dL 21   CREATININE mg/dL 1.00   CALCIUM mg/dL 9.3   BILIRUBIN mg/dL 0.4   ALK PHOS U/L 76   ALT (SGPT) U/L 29   AST (SGOT) U/L 29   GLUCOSE mg/dL 130*     No results found for: POCGLU     Scheduled Meds:donepezil, 10 mg, Oral, Nightly  hydroCHLOROthiazide Oral, 25 mg, Oral, Daily  lacosamide, 100 mg, Oral, Q12H  lisinopril, 20 mg, Oral, BID  PHENobarbital, 64.8 mg, Nasogastric, Daily  rivaroxaban, 15 mg, Oral, Daily With Dinner  sodium chloride, 10 mL, Intravenous, Q12H      Continuous  Infusions:   PRN Meds:.•  acetaminophen  •  sodium chloride      Assessment / Plan:     1. Status epilepticus w/ Postictal encephalopathy - Admit to Acute inpt rehab with PT, OT, SLP, psychology and rehab medical and nursing care..  2. Seizure d/o - Continue Antiepileptics and monitor levels and side effects. Ativan PRN for seizure.  3. Cellulitis left leg - Completed antibx. Continue care  4. A-Fib - Continue Xarelto  5. HTN - Cont meds and monitor  6. CAD s/p CABG - Monitor response to therapy and inc activity  7. Hypoxia: Likely secondary to hypoventilation and probably obstructive sleep apnea - Check Nocturnal oxygen  8. Bilateral legs edema/venous stasis  9. Vascular dementia - Continue Aricept  10. DVT prophylaxis - anticoagulation / SCDs    Now admit for comprehensive acute inpatient rehabilitation .  This would be an interdisciplinary program with physical therapy 1 hour,  occupational therapy 1 hour, and speech therapy 1 hour, 5 days a week.  Rehabilitation nursing for carryover, monitoring of  neurologic   status, bowel and bladder, and skin  Ongoing physician follow-up.  Weekly team conferences.       The patient's functional status and clinical status is unchanged from preadmission assessment and the patient continues appropriate for acute inpatient rehabilitation.  Goal is for home with  Home health   therapies.  Barrier to discharge:  Impaired mobility and self care  - work on  Strength, balance, transfers, gait, ADLS, cognition  to overcome.     Montana Ewing MD    During rounds, used appropriate personal protective equipment including mask and gloves.  Additional gown if indicated.  Mask used was standard procedure mask. Appropriate PPE was worn during the entire visit.  Hand hygiene was completed before and after.

## 2022-06-06 NOTE — PROGRESS NOTES
Case Management  Inpatient Rehabilitation Plan of Care and Discharge Plan Note    Rehabilitation Diagnosis:  Branch  Date of Onset:  Branch    Medical Summary:  Branch  Past Medical History: Branch    Plan of Care  Updated Problems/Interventions  Field    Expected Intensity:  Branch  Interdisciplinary Team:  Jaime  Estimated Length of Stay/Anticipated Discharge Date: Branch  Anticipated Discharge Destination:  Anticipated discharge destination from inpatient rehabilitation is community  discharge with assistance. Patient lives with wife in one story home with 3  steps to enter with rails.  D/C plan is home with wife. Step-dgt to stay after d/c.      Based on the patient's medical and functional status, their prognosis and  expected level of functional improvement is:  Jaime    Signed by: JOHN Rojas

## 2022-06-06 NOTE — PROGRESS NOTES
Discharge Planning Assessment  [facility]     Patient Name: Alireza Carreon  MRN: [id number]  Today's Date: 6/6/2022    Admit Date: 6/3/2022     Discharge Needs Assessment    No documentation.                Discharge Plan     Row Name 06/06/22 1658       Plan    Plan Patient will return home with wife and family assistance. Will arrange further therapy after d/c as recommended.    Patient/Family in Agreement with Plan yes    Plan Comments Completed assessment with patient, wife, and step-dgt, Sabine. Pt. lives with wife in one story home with 3 step entry. Patient was independent at home using cane at times when outside. Patient drove some and likes to do cooking. 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 Patient stated he manages his medications. Patient denies any trouble with memory prior to hospitalization or now. Step-dgt reported both patient and wife have some short term memory trouble. Wife uses cane when outside as well, but not in home. She still teaches art classes in her home on Tuesdays. Patient has 4 adult children in local area. Wife has 5 adult children but per daughter, she and her brother only ones that are involved. D/C plan is home with wife and assist from family. Step-dgt here from [place] and plans to stay after patient d/c. Patient okay with calling son, Robby, as he has information about possible long term care policy. Discussed SW role, team and family conference. Will update family tomorrow after team conference and assist with plans.              Continued Care and Services - Admitted Since 6/3/2022    Coordination has not been started for this encounter.     Selected Continued Care - Prior Encounters Includes selections from prior encounters from 3/5/2022 to 6/6/2022    Discharged on 6/3/2022 Admission date: 5/23/2022 - Discharge disposition: Rehab Facility or Unit ([facility]  Facility)    Destination     Service Provider Selected Services Address Phone Fax Patient Preferred    [facility] ACUTE REHAB PROGRAM  Inpatient Rehabilitation [address] [place] [address] [phone] -- --                       Demographic Summary    No documentation.                Functional Status     Row Name 06/06/22 1645       Functional Status    Usual Activity Tolerance good    Current Activity Tolerance moderate    Functional Status Comments Patient was independent at home with mobility and self care tasks prior to hospitalization. Did use cane outside and would hold onto furniture in house.       Functional Status, IADL    Medications independent    Meal Preparation independent    Housekeeping assistive equipment    Laundry assistive equipment    Shopping independent;assistive person    IADL Comments Patient was still driving some. Daughters take patient and wife to grocery but patient would also drive to grocery some. Patient likes to cook.       Mental Status    General Appearance WDL WDL       Mental Status Summary    Recent Changes in Mental Status/Cognitive Functioning memory (recent)    Mental Status Comments Patient [place]. Has only one hearing aid, as one was lost in acute care hospital. Decreased memory.       Employment/    Employment Status retired    Current or Previous Occupation traveling/driving    Employment/ Comments Patient retired from [employer] as  for 30 years.               Psychosocial     Row Name 06/06/22 5411       Values/Beliefs    Spiritual, Cultural Beliefs, Jewish Practices, Values that Affect Care no       Behavior WDL    Behavior WDL interactions    Interactions cooperative;eye contact appropriate;appropriate to situation       Emotion Mood WDL    Emotion/Mood/Affect WDL emotion mood    Emotion/Mood appropriate to situation       Speech WDL    Speech WDL WDL       Perceptual State WDL    Perceptual State WDL WDL        Thought Process WDL    Thought Process WDL thought process    Thought Process other (see comments)  slower to process, especially due to hearing loss.       Intellectual Performance WDL    Intellectual Performance WDL intellectual performance    Intellectual Performance forgetfulness;impaired concentration    Level of Consciousness Alert       Coping/Stress    Major Change/Loss/Stressor medical condition/diagnosis;loss of independence    Patient Personal Strengths expressive of emotions;expressive of needs;humor;motivated;positive attitude;strong support system;successful coping history    Sources of Support adult child(maria del carmen);spouse;other family members    Techniques to [place] with Loss/Stress/Change diversional activities    Reaction to Health Status adjusting;hopeful;motivated    Understanding of Condition and Treatment adequate understanding of medical condition;adequate understanding of treatment    Coping/Stress Comments Patient has good sense of humor and stated he feels great. Denies any stress or difficulty coping.       Developmental Stage (Eriksson's)    Developmental Stage Stage 8 (65 years-death/Late Adulthood) Integrity vs. Despair               Abuse/Neglect    No documentation.                Legal     Row Name 06/06/22 8698       Financial/Legal    Source of Income pension/FPC;social security    Who Manages Finances if Patient Unable Wife; Patient's oldest daughter, Lolly, also helps.    Finance Comments Patient and wife do not think he has POA.               Substance Abuse    No documentation.                Patient Forms    No documentation.                   ISABELLE Hermosillo

## 2022-06-06 NOTE — PROGRESS NOTES
Nutrition Services    Patient Name:  Alireza Carreon  YOB: 1935  MRN: [id number]  Admit Date:  6/3/2022    PROGRESS NOTE        Encounter Information:   Eating well. Likes fruit. Supplements in place.     PO Nutrition      PO Diet: Diet Regular; Thin     PO Supplements: Magic Cup, Mighty Shake, Daily     PO Intake:  100% most meals since admitted 6/3     Factors Affecting Intake:          Weight      Daily Weight Weight: 68.1 kg (150 lb 3.2 oz) (06/03/22 1832)     Weight Trend (no wt hx)              Labs, Tests, Meds      Labs   (Reviewed below)     Diagnostic Tests/  Procedures No new   Pertinent Medications Other: Xarelto, HCTZ         Physical Exam    Physical Appearance alert, edematous, room air   Edema/Fluid lower extremity , 1+ (trace) and 2+ (mild)   Tubes/Drains    Gastrointestinal  last bowel movement: 6/5   Oral/Mouth     Skin skin intact   NFPE not applicable         Nutrition Intervention    Goals Maintain nutrition status, Maintain intake, Maintain weight and PO intake goal %: 75+     RD Intervention Interview for preferences, Menu provided, Encourage intake, Supplement provided, Follow Tx Progress and Care plan reviewed     Prescription            Monitor/Evaluation    Monitor Per protocol, I&O, PO intake, Supplement intake, Pertinent labs, Weight, Skin status, GI status, Symptoms, POC/GOC       Results from last 7 days   Lab Units 06/04/22  0801   SODIUM mmol/L 135*   POTASSIUM mmol/L 4.6   CHLORIDE mmol/L 95*   CO2 mmol/L 29.7*   BUN mg/dL 21   CREATININE mg/dL 1.00   CALCIUM mg/dL 9.3   BILIRUBIN mg/dL 0.4   ALK PHOS U/L 76   ALT (SGPT) U/L 29   AST (SGOT) U/L 29   GLUCOSE mg/dL 130*     Results from last 7 days   Lab Units 06/04/22  0801   HEMOGLOBIN g/dL 14.4   HEMATOCRIT % 44.0     COVID19   Date Value Ref Range Status   06/03/2022 Not Detected Not Detected - Ref. Range Final     Lab Results   Component Value Date    HGBA1C 5.50 08/25/2020         RD to follow up per  protocol.    Electronically signed by:  Yun Bar RD  06/06/22 15:20 EDT

## 2022-06-06 NOTE — THERAPY TREATMENT NOTE
Inpatient Rehabilitation - Physical Therapy Treatment Note       [facility]     Patient Name: Alireza Carreon  : 1935  MRN: [id number]    Today's Date: 2022                    Admit Date: 6/3/2022      Visit Dx:   No diagnosis found.    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[facility])   • Monoclonal gammopathy of unknown significance (MGUS)   • Chronic anticoagulation   • Arteriosclerosis of coronary artery   • Cerebrovascular accident (CVA) (HCC)   • Seizure disorder (HCC)   • Swelling of left lower extremity   • Hearing loss   • Localized edema   • Blister   • Skin nodule of toe of right foot   • Status epilepticus (HCC)   • Immobility syndrome       Past Medical History:   Diagnosis Date   • 3-vessel CAD    • Anticoagulated on warfarin    • Atrial fibrillation (HCC)    • BMI 28.0-28.9,adult    • Bunion, right foot    • Complaints of memory disturbance    • Dementia (HCC)    • Edema    • Encounter for immunization    • Generalized convulsive seizure ([facility])    • Gout    • History of double vision    • HL (hearing loss)    • Hyperlipidemia    • Hypertension    • Left foot pain    • 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    • Movement disorder    • Nocturnal leg cramps    • OA (osteoarthritis)    • Prepatellar effusion    • Pulmonary embolism (HCC)    • Puncture wound of hand, right    • Screening for cardiovascular condition    • Sleep apnea    • Stasis dermatitis    • Statin intolerance    • Stroke (HCC)    • Taking drug for chronic disease    • Thoracic back pain    • Transient ischemic attack        Past Surgical History:   Procedure Laterality Date   • ADENOIDECTOMY      • APPENDECTOMY     • CATARACT EXTRACTION, BILATERAL     • CORONARY ARTERY BYPASS GRAFT     • CORONARY ARTERY BYPASS GRAFT     • CYSTOSCOPY TRANSURETHRAL RESECTION OF PROSTATE     • HERNIA REPAIR     • OTHER SURGICAL HISTORY      carotid thromboendarterectomy   • SHOULDER SURGERY     • SKIN CANCER EXCISION         PT ASSESSMENT (last 12 hours)     IRF PT Evaluation and Treatment     Row Name 06/06/22 1035          PT Time and Intention    Document Type daily treatment  -MS     Mode of Treatment physical therapy  -MS     Patient/Family/Caregiver Comments/Observations AM: seated up in wc with RN present, pleasant and agreeable to PT, exit alarm on; PM: sitting up in wc with family, pleasant and agreeable  -MS     Total Minutes, Physical Therapy 60  -MS     Row Name 06/06/22 1035          General Information    Existing Precautions/Restrictions fall  [place], red arm band  -MS     Limitations/Impairments safety/cognitive  -MS     Row Name 06/06/22 1035          Pain Assessment    Pretreatment Pain Rating 0/10 - no pain  -MS     Posttreatment Pain Rating 0/10 - no pain  -MS     Row Name 06/06/22 1035          Cognition/Psychosocial    Orientation Status (Cognition) oriented x 4  -MS     Follows Commands (Cognition) follows two-step commands;physical/tactile prompts required;repetition of directions required;verbal cues/prompting required;increased processing time needed  -MS     Personal Safety Interventions fall prevention program maintained;gait belt;nonskid shoes/slippers when out of bed  -MS     Cognitive Function memory deficit;safety deficit;executive function deficit;attention deficit  -MS     Attention Deficit (Cognition) concentration;distractible in noisy environment  -MS     Memory Deficit (Cognition) minimal deficit;moderate deficit  -MS     Safety Deficit (Cognition) insight into deficits/self-awareness;judgment;awareness of need for assistance  -MS     Row Name 06/06/22 1035          Bed Mobility    Comment,  (Bed Mobility) NT- sitting up in wheelchair  -MS     Row Name 06/06/22 1035          Transfers    Sit-Stand Elliott (Transfers) minimum assist (75% patient effort);verbal cues;contact guard  -MS     Stand-Sit [place] (Transfers) minimum assist (75% patient effort);verbal cues;nonverbal cues (demo/gesture)  -MS     [place] Level (Toilet Transfer) minimum assist (75% patient effort);verbal cues;nonverbal cues (demo/gesture)  -MS     Assistive Device (Toilet Transfer) wheelchair;grab bars/safety frame  -MS     Row Name 06/06/22 1035          Sit-Stand Transfer    Assistive Device (Sit-Stand Transfers) walker, front-wheeled  -MS     Row Name 06/06/22 1035          Stand-Sit Transfer    Assistive Device (Stand-Sit Transfers) walker, front-wheeled  -MS     Comment, (Stand-Sit Transfer) Max cues to reach back for chair prior to sitting, noted some carryover in PM session  -MS     Row Name 06/06/22 1035          Toilet Transfer    Type (Toilet Transfer) sit-stand;stand-sit  -MS     Row Name 06/06/22 1035          Gait/Stairs (Locomotion)    [place] Level (Gait) minimum assist (75% patient effort);verbal cues;nonverbal cues (demo/gesture)  wc following behind for safety  -MS     Assistive Device (Gait) walker, front-wheeled  -MS     Distance in Feet (Gait) 70', 50', 80' x 2 trials  -MS     Pattern (Gait) step-through  -MS     Deviations/Abnormal Patterns (Gait) marcel decreased;festinating/shuffling;stride length decreased  -MS     Bilateral Gait Deviations forward flexed posture  -MS     Comment, (Gait/Stairs) Up to Isabella for guidance and walker placement. No overt LOB but minimally unsteady throughout.  -MS     Row Name 06/06/22 1035          Balance    Static Sitting Balance standby assist;contact guard  -MS     Dynamic Sitting Balance standby assist;contact guard  -MS     Position, Sitting Balance unsupported;supported;sitting in chair  -MS     Sit to Stand Dynamic Balance minimal assist  -MS      Static Standing Balance minimal assist  -MS     Dynamic Standing Balance minimal assist  -MS     Position/Device Used, Standing Balance supported;unsupported  -MS     Balance Interventions standing;UE activity with balance activity  -MS     Comment, Balance no UE support while standing performing toileting activities, CGA for assist  -MS     Row Name 06/06/22 1035          Positioning and Restraints    Pre-Treatment Position sitting in chair/recliner  -MS     Post Treatment Position wheelchair  -MS     In Wheelchair sitting;call light within reach;exit alarm on;encouraged to call for assist  AM session and PM session  -MS           User Key  (r) = Recorded By, (t) = Taken By, (c) = Cosigned By    Initials Name Provider Type    MS Tova Bennett, PT Physical Therapist                 Physical Therapy Education                 Title: PT OT SLP Therapies (In Progress)     Topic: Physical Therapy (Done)     Point: Mobility training (Done)     Learning Progress Summary           Patient Acceptance, E,TB, VU,NR by MS at 6/6/2022 1045                   Point: Home exercise program (Done)     Learning Progress Summary           Patient Acceptance, E,TB, VU,NR by MS at 6/6/2022 1045                   Point: Body mechanics (Done)     Learning Progress Summary           Patient Acceptance, E,TB, VU,NR by MS at 6/6/2022 1045                   Point: Precautions (Done)     Learning Progress Summary           Patient Acceptance, E,TB, VU,NR by MS at 6/6/2022 1045                               User Key     Initials Effective Dates Name Provider Type Discipline    MS 06/16/21 -  Tova Bennett, PT Physical Therapist PT                PT Recommendation and Plan                          Time Calculation:      PT Charges     Row Name 06/06/22 1439 06/06/22 1035          Time Calculation    Start Time 1400  -MS 0830  -MS     Stop Time 1430  -MS 0900  -MS     Time Calculation (min) 30 min  -MS 30 min  -MS     PT Received On  06/06/22  -MS --     PT - Next Appointment 06/07/22  -MS --           User Key  (r) = Recorded By, (t) = Taken By, (c) = Cosigned By    Initials Name Provider Type    MS BennettTova, PT Physical Therapist                Therapy Charges for Today     Code Description Service Date Service Provider Modifiers Qty    42127361501  PT THERAPEUTIC ACT EA 15 MIN 6/6/2022 Tova Bennett, PT GP 1    [id number]  PT THER PROC EA 15 MIN 6/6/2022 Tova Bennett, PT GP 3    [id number]  PT THER SUPP EA 15 MIN 6/6/2022 Tova Bennett, PT GP 1            PT G-Codes  AM-PAC 6 Clicks Score (PT): 16      Tova Bennett, PT  6/6/2022

## 2022-06-06 NOTE — THERAPY TREATMENT NOTE
Inpatient Rehabilitation - Occupational Therapy Treatment Note    [facility]     Patient Name: Alireza Carreon  : 1935  MRN: [id number]    Today's Date: 2022                 Admit Date: 6/3/2022       No diagnosis found.    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[facility])   • Monoclonal gammopathy of unknown significance (MGUS)   • Chronic anticoagulation   • Arteriosclerosis of coronary artery   • Cerebrovascular accident (CVA) (HCC)   • Seizure disorder (HCC)   • Swelling of left lower extremity   • Hearing loss   • Localized edema   • Blister   • Skin nodule of toe of right foot   • Status epilepticus (HCC)   • Immobility syndrome       Past Medical History:   Diagnosis Date   • 3-vessel CAD    • Anticoagulated on warfarin    • Atrial fibrillation (HCC)    • BMI 28.0-28.9,adult    • Bunion, right foot    • Complaints of memory disturbance    • Dementia (HCC)    • Edema    • Encounter for immunization    • Generalized convulsive seizure (HCC)    • Gout    • History of double vision    • HL (hearing loss)    • Hyperlipidemia    • Hypertension    • Left foot pain    • 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    • Movement disorder    • Nocturnal leg cramps    • OA (osteoarthritis)    • Prepatellar effusion    • Pulmonary embolism (HCC)    • Puncture wound of hand, right    • Screening for cardiovascular condition    • Sleep apnea    • Stasis dermatitis    • Statin intolerance    • Stroke (HCC)    • Taking drug for chronic disease    • Thoracic back pain    • Transient ischemic attack        Past Surgical History:   Procedure Laterality Date   • ADENOIDECTOMY     •  APPENDECTOMY     • CATARACT EXTRACTION, BILATERAL     • CORONARY ARTERY BYPASS GRAFT     • CORONARY ARTERY BYPASS GRAFT     • CYSTOSCOPY TRANSURETHRAL RESECTION OF PROSTATE     • HERNIA REPAIR     • OTHER SURGICAL HISTORY      carotid thromboendarterectomy   • SHOULDER SURGERY     • SKIN CANCER EXCISION               IRF OT ASSESSMENT FLOWSHEET (last 12 hours)     IRF OT Evaluation and Treatment     Row Name 06/06/22 1230 06/06/22 0930       OT Time and Intention    Document Type daily treatment  -RD daily treatment  -RD    Mode of Treatment occupational therapy  -RD occupational therapy  -RD    Patient Effort good  -RD good  -RD    Row Name 06/06/22 1230 06/06/22 0930       General Information    Patient Profile Reviewed yes  -RD yes  -RD    Patient/Family/Caregiver Comments/Observations pt seated in w/c w/ no signs of acute distress  -RD pt seated in w/c w/ no signs of acute distress  -RD    Existing Precautions/Restrictions fall  -RD fall  [place], red arm band  -RD    Row Name 06/06/22 1230 06/06/22 0930       Pain Assessment    Pretreatment Pain Rating 0/10 - no pain  -RD 0/10 - no pain  -RD    Posttreatment Pain Rating 0/10 - no pain  -RD 0/10 - no pain  -RD    Row Name 06/06/22 1230          Cognition/Psychosocial    Orientation Status (Cognition) oriented x 4  -RD     Follows Commands (Cognition) follows one-step commands;verbal cues/prompting required;repetition of directions required;increased processing time needed;physical/tactile prompts required  -RD     Personal Safety Interventions fall prevention program maintained;gait belt;muscle strengthening facilitated;nonskid shoes/slippers when out of bed  -RD     Cognitive Function memory deficit  -RD     Row Name 06/06/22 0930          Basic Activities of Daily Living (BADLs)    Basic Activities of Daily Living bathing;upper body dressing;lower body dressing;grooming  -RD     Row Name 06/06/22 0930          Bathing    [place] Level (Bathing) bathing  skills;upper body;lower body;minimum assist (75% patient effort);verbal cues;nonverbal cues (demo/gesture)  -RD     Assistive Device (Bathing) grab bar/tub rail;hand held shower spray hose;shower chair  -RD     Position (Bathing) supported sitting;supported standing  -RD     Set-up Assistance (Bathing) obtain supplies  -RD     Comment (Bathing) pt attempts to stand on one leg with other leg propped up on shower chair multiple times- unsafe and does not listen well to directions to sit for safety; extra time required for all bathing tasks  -RD     Row Name 06/06/22 0930          Upper Body Dressing    [place] Level (Upper Body Dressing) upper body dressing skills;doff;don;pull over garment;minimum assist (75% or more patient effort);verbal cues  -RD     Position (Upper Body Dressing) supported sitting  -RD     Set-up Assistance (Upper Body Dressing) obtain clothing  -RD     Row Name 06/06/22 0930          Lower Body Dressing    [place] Level (Lower Body Dressing) doff;don;underwear;pants/bottoms;socks;moderate assist (50% patient effort);verbal cues  -RD     Position (Lower Body Dressing) supported sitting;supported standing  -RD     Set-up Assistance (Lower Body Dressing) obtain clothing  -RD     Row Name 06/06/22 0930          Grooming    [place] Level (Grooming) grooming skills;deodorant application;hair care, combing/brushing;oral care regimen;wash face, hands;set up  -RD     Position (Grooming) supported sitting  -RD     Set-up Assistance (Grooming) obtain supplies  -RD     Row Name 06/06/22 0930          Transfer Assessment/Treatment    Transfers shower transfer  -RD     Row Name 06/06/22 0930          Transfers    [place] Level (Shower Transfer) minimum assist (75% patient effort);verbal cues  -RD     Assistive Device (Shower Transfer) shower chair;grab bar, tub/shower  -RD     Row Name 06/06/22 0930          Shower Transfer    Type (Shower Transfer) stand pivot/stand  step;sit-stand;stand-sit  -RD     Row Name 06/06/22 1230          Motor Skills    Therapeutic Exercise shoulder;elbow/forearm;wrist  VCs to remain on task as pt easily distracted  -RD     Row Name 06/06/22 1230          Shoulder (Therapeutic Exercise)    Shoulder (Therapeutic Exercise) strengthening exercise  -RD     Shoulder Strengthening (Therapeutic Exercise) bilateral;scapular stabilization;2 lb free weight;10 repetitions;2 sets;sitting  -RD     Row Name 06/06/22 1230          Elbow/Forearm (Therapeutic Exercise)    Elbow/Forearm (Therapeutic Exercise) strengthening exercise  -RD     Elbow/Forearm Strengthening (Therapeutic Exercise) bilateral;flexion;extension;supination;pronation;2 lb free weight;10 repetitions;2 sets;sitting  -RD     Row Name 06/06/22 1230          Wrist (Therapeutic Exercise)    Wrist (Therapeutic Exercise) strengthening exercise  -RD     Wrist Strengthening (Therapeutic Exercise) bilateral;flexion;extension;2 lb free weight;10 repetitions;2 sets  -RD     Row Name 06/06/22 1230 06/06/22 0930       Positioning and Restraints    Pre-Treatment Position sitting in chair/recliner  -RD sitting in chair/recliner  -RD    Post Treatment Position wheelchair  -RD wheelchair  -RD    In Wheelchair sitting;exit alarm on;with SLP  -RD sitting;exit alarm on;with nsg  at RN station  -RD          User Key  (r) = Recorded By, (t) = Taken By, (c) = Cosigned By    Initials Name Effective Dates    RD Socorro Hollis, DEVIKA 06/16/21 -                  Occupational Therapy Education                 Title: PT OT SLP Therapies (In Progress)     Topic: Occupational Therapy (Done)     Point: ADL training (Done)     Description:   Instruct learner(s) on proper safety adaptation and remediation techniques during self care or transfers.   Instruct in proper use of assistive devices.              Learning Progress Summary           Patient Acceptance, E, VU,NR by MW at 6/4/2022 7066    Comment: benefit of IRF, role of OT,  d/c rec, safety awareness, transfer training, therapy goals                   Point: Home exercise program (Done)     Description:   Instruct learner(s) on appropriate technique for monitoring, assisting and/or progressing therapeutic exercises/activities.              Learning Progress Summary           Patient Acceptance, E, VU,NR by  at 6/4/2022 1431    Comment: benefit of IRF, role of OT, d/c rec, safety awareness, transfer training, therapy goals                   Point: Precautions (Done)     Description:   Instruct learner(s) on prescribed precautions during self-care and functional transfers.              Learning Progress Summary           Patient Acceptance, E, VU,NR by  at 6/4/2022 1431    Comment: benefit of IRF, role of OT, d/c rec, safety awareness, transfer training, therapy goals                   Point: Body mechanics (Done)     Description:   Instruct learner(s) on proper positioning and spine alignment during self-care, functional mobility activities and/or exercises.              Learning Progress Summary           Patient Acceptance, E, VU,NR by  at 6/4/2022 1431    Comment: benefit of IRF, role of OT, d/c rec, safety awareness, transfer training, therapy goals                               User Key     Initials Effective Dates Name Provider Type [facility] 08/20/21 -  Sylvia Brewer OT Occupational Therapist OT                    OT Recommendation and Plan                         Time Calculation:      Time Calculation- OT     Row Name 06/06/22 1329 06/06/22 1225          Time Calculation- OT    OT Start Time 1230  -RD 0930  -RD     OT Stop Time 1300  -RD 1030  -RD     OT Time Calculation (min) 30 min  -RD 60 min  -RD     Total Timed Code Minutes- OT 30 minute(s)  -RD 60 minute(s)  -RD     OT Received On 06/06/22  -RD 06/06/22  -RD           User Key  (r) = Recorded By, (t) = Taken By, (c) = Cosigned By    Initials Name Provider Type    Socorro Carty, OT Occupational  Therapist              Therapy Charges for Today     Code Description Service Date Service Provider Modifiers Qty    54059625205 HC OT SELF CARE/MGMT/TRAIN EA 15 MIN 6/6/2022 Socorro Hollis, OT GO 4    28742578570 HC OT THER PROC EA 15 MIN 6/6/2022 Socorro Hollis, OT GO 2                   Socorro Hollis, OT  6/6/2022

## 2022-06-06 NOTE — PLAN OF CARE
Goal Outcome Evaluation:  Plan of Care Reviewed With: patient           Outcome Evaluation: Alireza has been cooperative, pleasant and hard of hearing. He only has right hearing aid, other one missing from previous floor- daughter to check with them to see if they have it. He is assist x1 with the wheelchair- unsteady gait, takes medication with water, and continent and incontinent. He has taken tylenol once after dinner, family here on and off throughout the day.

## 2022-06-06 NOTE — PLAN OF CARE
Goal Outcome Evaluation:  Plan of Care Reviewed With: patient        Progress: improving  Outcome Evaluation: Pt is impulsive, forgets to use call light. Tylenol for neck pain. Using urinal, can be incont. bladder. Assist 1 standing to void with urinal. Meds with thin liquids.

## 2022-06-06 NOTE — PROGRESS NOTES
PPS CMG Coordinator  Inpatient Rehabilitation Admission    Ethnic Group: White.  Marital Status:  Marital Status: .    IRF Admission Date:  06/03/2022  Admission Class: Initial Rehab.  Admit From:  [facility]    Pre-Hospital Living: Home. Pre-Hospital Living  With: (2) Family/Relatives.    Payment Sources: Primary: Medicare - Medicare Advantage  Secondary: Not Listed.  Impairment Group: 16 Debility (non-cardiac, non-pulmonary)  Date of Onset of Impairment: 05/23/2022    Etiologic Diagnosis Code(s):  Rank Code      Description  1    M62.3     Immobility syndrome (paraplegic)    Comorbidities:  ICD    Are there any arthritis conditions recorded for Impairment Group, Etiologic  Diagnosis, or Comorbid Conditions that meet all of the regulatory requirements  for IRF classification (in 42 .29(b)(2)(x), (xi), and xii))? No    Presence of Pressure Ulcer:  No observed/documented pressure ulcers.    MEDICAL NEEDS  Height on Admission:  66 inches.  Weight on Admission:  150 pounds.    QUALITY INDICATORS  Prior Functioning:  Self Care: Patient completed the activities by him/herself, with or without an  assistive device, with no assistance from a helper.  Indoor Mobility: Patient completed the activities by him/herself, with or  without an assistive device, with no assistance from a helper.  Stairs: Patient completed the activities by him/herself, with or without an  assistive device, with no assistance from a helper.  Functional Cognition: Patient needed partial assistance from another person to  complete activities.  Prior Device Use: Patient does not use manual or motorized wheelchair or  scooter, mechanical lift, walker, or an orthotic/prosthesis.    Bladder and Bowel: Bladder Continence: Incontinent less than daily (e.g., once  or twice during the 3-day assessment period).  Bowel Continence: Always incontinent (no episodes of continent bowel movements).    Swallowing/Nutritional Status:  Regular food (solids and liquids swallowed safely  without supervision or modified food or liquid consistency).  Special Conditions: Patient did not receive total parenteral nutrition treatment  at the time of admission.    Section I. Active Diagnosis: Comorbidities and Co-existing Conditions:   Patient  does not have PAD, PVD, or Diabetes Mellitus  Section J. Health Conditions: Patient has had two or more falls, or a fall with  injury, in the past year. Patient has not had major surgery during the 100 days  prior to admission.  Section M. Skin Conditions  Unhealed Pressure Ulcer/Injuries at Stage 1 or  Higher on Admission:  No.  Section N. Medication:  Potential Clinically Significant Medication Issues: No issues found during  review    Signed by: Cristina Bermudez RN

## 2022-06-06 NOTE — PROGRESS NOTES
Inpatient Rehabilitation Plan of Care Note    Plan of Care  Updated Problems/Interventions  Mobility    [PT] Walk(Active)  Current Status(06/06/2022): 80' Min A, RW, shuffling gait  Weekly Goal(06/14/2022): amb to BR w nsg, Min A RW  Discharge Goal: 160' SV, RW    [PT] Bed/Chair/Wheelchair(Active)  Current Status(06/06/2022): Min-Mod A, RW  Weekly Goal(06/14/2022): Min A, RW  Discharge Goal: SV, RW    [PT] Bed Mobility(Active)  Current Status(06/06/2022): [place]  Weekly Goal(06/14/2022): CGA  Discharge Goal: SV    Signed by: Tova Bennett, PT

## 2022-06-06 NOTE — THERAPY TREATMENT NOTE
Inpatient Rehabilitation - Occupational Therapy Treatment Note    [facility]     Patient Name: Alireza Carreon  : 1935  MRN: [id number]    Today's Date: 2022                 Admit Date: 6/3/2022       No diagnosis found.    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[facility])   • Monoclonal gammopathy of unknown significance (MGUS)   • Chronic anticoagulation   • Arteriosclerosis of coronary artery   • Cerebrovascular accident (CVA) (HCC)   • Seizure disorder (HCC)   • Swelling of left lower extremity   • Hearing loss   • Localized edema   • Blister   • Skin nodule of toe of right foot   • Status epilepticus (HCC)   • Immobility syndrome       Past Medical History:   Diagnosis Date   • 3-vessel CAD    • Anticoagulated on warfarin    • Atrial fibrillation (HCC)    • BMI 28.0-28.9,adult    • Bunion, right foot    • Complaints of memory disturbance    • Dementia (HCC)    • Edema    • Encounter for immunization    • Generalized convulsive seizure (HCC)    • Gout    • History of double vision    • HL (hearing loss)    • Hyperlipidemia    • Hypertension    • Left foot pain    • 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    • Movement disorder    • Nocturnal leg cramps    • OA (osteoarthritis)    • Prepatellar effusion    • Pulmonary embolism (HCC)    • Puncture wound of hand, right    • Screening for cardiovascular condition    • Sleep apnea    • Stasis dermatitis    • Statin intolerance    • Stroke (HCC)    • Taking drug for chronic disease    • Thoracic back pain    • Transient ischemic attack        Past Surgical History:   Procedure Laterality Date   • ADENOIDECTOMY     •  APPENDECTOMY     • CATARACT EXTRACTION, BILATERAL     • CORONARY ARTERY BYPASS GRAFT     • CORONARY ARTERY BYPASS GRAFT     • CYSTOSCOPY TRANSURETHRAL RESECTION OF PROSTATE     • HERNIA REPAIR     • OTHER SURGICAL HISTORY      carotid thromboendarterectomy   • SHOULDER SURGERY     • SKIN CANCER EXCISION               IRF OT ASSESSMENT FLOWSHEET (last 12 hours)     IRF OT Evaluation and Treatment     Row Name 06/06/22 0930          OT Time and Intention    Document Type daily treatment  -RD     Mode of Treatment occupational therapy  -RD     Patient Effort good  -RD     Row Name 06/06/22 0930          General Information    Patient Profile Reviewed yes  -RD     Patient/Family/Caregiver Comments/Observations pt seated in w/c w/ no signs of acute distress  -RD     Existing Precautions/Restrictions fall  [place], red arm band  -RD     Row Name 06/06/22 0930          Pain Assessment    Pretreatment Pain Rating 0/10 - no pain  -RD     Posttreatment Pain Rating 0/10 - no pain  -RD     Row Name 06/06/22 0930          Basic Activities of Daily Living (BADLs)    Basic Activities of Daily Living bathing;upper body dressing;lower body dressing;grooming  -RD     Row Name 06/06/22 0930          Bathing    [place] Level (Bathing) bathing skills;upper body;lower body;minimum assist (75% patient effort);verbal cues;nonverbal cues (demo/gesture)  -RD     Assistive Device (Bathing) grab bar/tub rail;hand held shower spray hose;shower chair  -RD     Position (Bathing) supported sitting;supported standing  -RD     Set-up Assistance (Bathing) obtain supplies  -RD     Comment (Bathing) pt attempts to stand on one leg with other leg propped up on shower chair multiple times- unsafe and does not listen well to directions to sit for safety; extra time required for all bathing tasks  -RD     Row Name 06/06/22 0930          Upper Body Dressing    [place] Level (Upper Body Dressing) upper body dressing skills;doff;don;pull over  garment;minimum assist (75% or more patient effort);verbal cues  -RD     Position (Upper Body Dressing) supported sitting  -RD     Set-up Assistance (Upper Body Dressing) obtain clothing  -RD     Row Name 06/06/22 0930          Lower Body Dressing    [place] Level (Lower Body Dressing) doff;don;underwear;pants/bottoms;socks;moderate assist (50% patient effort);verbal cues  -RD     Position (Lower Body Dressing) supported sitting;supported standing  -RD     Set-up Assistance (Lower Body Dressing) obtain clothing  -RD     Row Name 06/06/22 0930          Grooming    [place] Level (Grooming) grooming skills;deodorant application;hair care, combing/brushing;oral care regimen;wash face, hands;set up  -RD     Position (Grooming) supported sitting  -RD     Set-up Assistance (Grooming) obtain supplies  -RD     Row Name 06/06/22 0930          Transfer Assessment/Treatment    Transfers shower transfer  -RD     Row Name 06/06/22 0930          Transfers    [place] Level (Shower Transfer) minimum assist (75% patient effort);verbal cues  -RD     Assistive Device (Shower Transfer) shower chair;grab bar, tub/shower  -RD     Row Name 06/06/22 0930          Shower Transfer    Type (Shower Transfer) stand pivot/stand step;sit-stand;stand-sit  -RD     Row Name 06/06/22 0930          Positioning and Restraints    Pre-Treatment Position sitting in chair/recliner  -RD     Post Treatment Position wheelchair  -RD     In Wheelchair sitting;exit alarm on;with nsg  at RN station  -RD           User Key  (r) = Recorded By, (t) = Taken By, (c) = Cosigned By    Initials Name Effective Dates    RD Socorro Hollis, OT 06/16/21 -                  Occupational Therapy Education                 Title: PT OT SLP Therapies (In Progress)     Topic: Occupational Therapy (Done)     Point: ADL training (Done)     Description:   Instruct learner(s) on proper safety adaptation and remediation techniques during self care or transfers.    Instruct in proper use of assistive devices.              Learning Progress Summary           Patient Acceptance, E, VU,NR by  at 6/4/2022 1431    Comment: benefit of IRF, role of OT, d/c rec, safety awareness, transfer training, therapy goals                   Point: Home exercise program (Done)     Description:   Instruct learner(s) on appropriate technique for monitoring, assisting and/or progressing therapeutic exercises/activities.              Learning Progress Summary           Patient Acceptance, E, VU,NR by  at 6/4/2022 1431    Comment: benefit of IRF, role of OT, d/c rec, safety awareness, transfer training, therapy goals                   Point: Precautions (Done)     Description:   Instruct learner(s) on prescribed precautions during self-care and functional transfers.              Learning Progress Summary           Patient Acceptance, E, VU,NR by  at 6/4/2022 1431    Comment: benefit of IRF, role of OT, d/c rec, safety awareness, transfer training, therapy goals                   Point: Body mechanics (Done)     Description:   Instruct learner(s) on proper positioning and spine alignment during self-care, functional mobility activities and/or exercises.              Learning Progress Summary           Patient Acceptance, E, VU,NR by  at 6/4/2022 1431    Comment: benefit of IRF, role of OT, d/c rec, safety awareness, transfer training, therapy goals                               User Key     Initials Effective Dates Name Provider Type Discipline     08/20/21 -  Sylvia Brewer OT Occupational Therapist OT                    OT Recommendation and Plan                         Time Calculation:      Time Calculation- OT     Row Name 06/06/22 1225             Time Calculation- OT    OT Start Time 0930  -RD      OT Stop Time 1030  -RD      OT Time Calculation (min) 60 min  -RD      Total Timed Code Minutes- OT 60 minute(s)  -RD      OT Received On 06/06/22  -RD            User Key  (r) =  Recorded By, (t) = Taken By, (c) = Cosigned By    Initials Name Provider Type    RD Socorro Hollis, DEVIKA Occupational Therapist              Therapy Charges for Today     Code Description Service Date Service Provider Modifiers Qty    57055418598 HC OT SELF CARE/MGMT/TRAIN EA 15 MIN 6/6/2022 Socorro Hollis OT GO 4                   Socorro Hollis OT  6/6/2022

## 2022-06-06 NOTE — PROGRESS NOTES
Inpatient Rehabilitation Plan of Care Note    Plan of Care  Updated Problems/Interventions  Cognition    [ST] Executive Functions(Active)  Current Status(06/06/2022): Executive Functioning at moderate level  Weekly Goal(06/13/2022): Improve Executive Functioning to min  Discharge Goal: Executive Functioning WFL    Signed by: Evelina Gibson, SLP

## 2022-06-06 NOTE — PROGRESS NOTES
Inpatient Rehabilitation Plan of Care Note    Plan of Care  Care Plan Reviewed - Updates as Follows    Psychosocial    Performed Intervention(s)  Verbalizes needs & concerns      Safety    Performed Intervention(s)  Falls precautions, safety rounds, Items within reach, Bed/chair alarms, &  Non-skid socks.  red armband      Sphincter Control    Performed Intervention(s)  Monitor intake, output, & BM's each shift.  Use incontinence products as needed.    Signed by: Ama Cazares RN

## 2022-06-07 NOTE — PROGRESS NOTES
TEAM CONF - June 7 - BED MIN. TRANSFERS MIN MAD RW. GAIT 80 FEET MIN ASSIST RW, SHUFFLING GAIT. TOILET AND SHOWER TRANSFERS MIN ASSIST. IMPULSIVE IMPACTED BY HEARING. BATH MIN. LBD MOD. UBD MIN. TOILETING MOD ASSIST. MODERATE COGNITIVE IMPAIRMENT WITH MEMORY, ATTENTION, AND EXECUTIVE FUNCTION. TOLERATES REGULAR DIET. CONTINENT FOR THE MOST PART. SKIN INTACT. TYLENOL FOR PAIN.   Memory - Donepezil. Seizure - Lacosamide (Vimpat) and Phenobarbital. Chronic anticoagulation - atrial fibrillation- rivaroxaban (Xarelto).   ELOS - TWO WEEKS.

## 2022-06-07 NOTE — PROGRESS NOTES
Discussed team conference report regarding ELOS, current status, weekly and d/c goals with patient's wife and step-daughter. Patient was in therapy so will review with him tomorrow. Discussed scheduling family conference next week. Will contact son to review team conference and see what date/time would work for family conference.

## 2022-06-07 NOTE — THERAPY TREATMENT NOTE
Inpatient Rehabilitation - Occupational Therapy Treatment Note    [facility]     Patient Name: Alireza Carreon  : 1935  MRN: [id number]    Today's Date: 2022                 Admit Date: 6/3/2022       No diagnosis found.    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[facility])   • Monoclonal gammopathy of unknown significance (MGUS)   • Chronic anticoagulation   • Arteriosclerosis of coronary artery   • Cerebrovascular accident (CVA) (HCC)   • Seizure disorder (HCC)   • Swelling of left lower extremity   • Hearing loss   • Localized edema   • Blister   • Skin nodule of toe of right foot   • Status epilepticus (HCC)   • Immobility syndrome       Past Medical History:   Diagnosis Date   • 3-vessel CAD    • Anticoagulated on warfarin    • Atrial fibrillation (HCC)    • BMI 28.0-28.9,adult    • Bunion, right foot    • Complaints of memory disturbance    • Dementia (HCC)    • Edema    • Encounter for immunization    • Generalized convulsive seizure (HCC)    • Gout    • History of double vision    • HL (hearing loss)    • Hyperlipidemia    • Hypertension    • Left foot pain    • 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    • Movement disorder    • Nocturnal leg cramps    • OA (osteoarthritis)    • Prepatellar effusion    • Pulmonary embolism (HCC)    • Puncture wound of hand, right    • Screening for cardiovascular condition    • Sleep apnea    • Stasis dermatitis    • Statin intolerance    • Stroke (HCC)    • Taking drug for chronic disease    • Thoracic back pain    • Transient ischemic attack        Past Surgical History:   Procedure Laterality Date   • ADENOIDECTOMY     •  APPENDECTOMY     • CATARACT EXTRACTION, BILATERAL     • CORONARY ARTERY BYPASS GRAFT     • CORONARY ARTERY BYPASS GRAFT     • CYSTOSCOPY TRANSURETHRAL RESECTION OF PROSTATE     • HERNIA REPAIR     • OTHER SURGICAL HISTORY      carotid thromboendarterectomy   • SHOULDER SURGERY     • SKIN CANCER EXCISION               IRF OT ASSESSMENT FLOWSHEET (last 12 hours)     IRF OT Evaluation and Treatment     Row Name 06/07/22 1559          OT Time and Intention    Document Type daily treatment  -CC     Mode of Treatment occupational therapy  -CC     Patient Effort good  -CC     Row Name 06/07/22 1559          Pain Assessment    Pretreatment Pain Rating 0/10 - no pain  -CC     Posttreatment Pain Rating 0/10 - no pain  -CC     Row Name 06/07/22 1559          Cognition/Psychosocial    Orientation Status (Cognition) oriented x 4  -CC     Follows Commands (Cognition) follows two-step commands;physical/tactile prompts required;repetition of directions required;verbal cues/prompting required;increased processing time needed  -     Personal Safety Interventions fall prevention program maintained;gait belt;nonskid shoes/slippers when out of bed  -     Cognitive Function memory deficit  -CC     Row Name 06/07/22 1559          Bathing    [place] Level (Bathing) bathing skills;lower body;upper body;contact guard assist;minimum assist (75% patient effort)  -     Assistive Device (Bathing) grab bar/tub rail;hand held shower spray hose;shower chair  -     Position (Bathing) supported sitting;supported standing  -     Set-up Assistance (Bathing) adjust water temperature;obtain supplies  -     Row Name 06/07/22 1559          Upper Body Dressing    [place] Level (Upper Body Dressing) upper body dressing skills;doff;don;pull over garment;supervision;set up assistance  -     Position (Upper Body Dressing) supported sitting  -     Set-up Assistance (Upper Body Dressing) obtain clothing  -     Row Name 06/07/22 1559           Lower Body Dressing    [place] Level (Lower Body Dressing) doff;don;pants/bottoms;shoes/slippers;underwear;minimum assist (75% patient effort)  -     Position (Lower Body Dressing) supported sitting;supported standing  -     Set-up Assistance (Lower Body Dressing) obtain clothing  -     Row Name 06/07/22 1559          Grooming    [place] Level (Grooming) grooming skills;deodorant application;hair care, combing/brushing;oral care regimen;wash face, hands;set up;standby assist  -     Position (Grooming) supported sitting  -     Set-up Assistance (Grooming) obtain supplies  -     Row Name 06/07/22 1559          Shoulder (Therapeutic Exercise)    Shoulder (Therapeutic Exercise) strengthening exercise  -     Shoulder Strengthening (Therapeutic Exercise) bilateral;flexion;extension;sitting;2 lb free weight;15 repititions;3 sets  -     Row Name 06/07/22 1559          Elbow/Forearm (Therapeutic Exercise)    Elbow/Forearm (Therapeutic Exercise) strengthening exercise  -     Elbow/Forearm Strengthening (Therapeutic Exercise) bilateral;flexion;extension;supination;pronation;sitting;2 lb free weight;10 repetitions;3 sets  -     Row Name 06/07/22 1559          Wrist (Therapeutic Exercise)    Wrist (Therapeutic Exercise) strengthening exercise  -     Wrist Strengthening (Therapeutic Exercise) bilateral;flexion;extension;2 lb free weight;10 repetitions;3 sets  [facility]     Row Name 06/07/22 1559          Hand (Therapeutic Exercise)    Hand (Therapeutic Exercise) strengthening exercise  -     Hand Strengthening (Therapeutic Exercise) bilateral; strengthening;hand gripper  -     Row Name 06/07/22 1559          Aerobic Exercise    Type (Aerobic Exercise) arm bike;for 2 minutes  -     Time Performed (Aerobic Exercise) x 2 seated  -     Row Name 06/07/22 1559          Balance    Static Sitting Balance standby assist  -     Static Standing Balance contact guard;minimal assist  -     Row  Name 06/07/22 1559          Positioning and Restraints    Pre-Treatment Position sitting in chair/recliner  -CC     Post Treatment Position wheelchair  -CC     In Wheelchair sitting;call light within reach;encouraged to call for assist;exit alarm on  -CC           User Key  (r) = Recorded By, (t) = Taken By, (c) = Cosigned By    Initials Name Effective Dates    CC Jenny Jones, OTR 06/16/21 -                  Occupational Therapy Education                 Title: PT OT SLP Therapies (In Progress)     Topic: Occupational Therapy (Done)     Point: ADL training (Done)     Description:   Instruct learner(s) on proper safety adaptation and remediation techniques during self care or transfers.   Instruct in proper use of assistive devices.              Learning Progress Summary           Patient Acceptance, E, VU,NR by  at 6/4/2022 1431    Comment: benefit of IRF, role of OT, d/c rec, safety awareness, transfer training, therapy goals                   Point: Home exercise program (Done)     Description:   Instruct learner(s) on appropriate technique for monitoring, assisting and/or progressing therapeutic exercises/activities.              Learning Progress Summary           Patient Acceptance, E, VU,NR by  at 6/4/2022 1431    Comment: benefit of IRF, role of OT, d/c rec, safety awareness, transfer training, therapy goals                   Point: Precautions (Done)     Description:   Instruct learner(s) on prescribed precautions during self-care and functional transfers.              Learning Progress Summary           Patient Acceptance, E, VU,NR by  at 6/4/2022 1431    Comment: benefit of IRF, role of OT, d/c rec, safety awareness, transfer training, therapy goals                   Point: Body mechanics (Done)     Description:   Instruct learner(s) on proper positioning and spine alignment during self-care, functional mobility activities and/or exercises.              Learning Progress Summary            Patient Acceptance, E, VU,NR by  at 6/4/2022 1431    Comment: benefit of IRF, role of OT, d/c rec, safety awareness, transfer training, therapy goals                               User Key     Initials Effective Dates Name Provider Type Discipline     08/20/21 -  Sylvia Brewer OT Occupational Therapist OT                    OT Recommendation and Plan                         Time Calculation:      Time Calculation- OT     Row Name 06/07/22 1500 06/07/22 1100          Time Calculation- OT    OT Start Time 1500  -CC 1100  -CC     OT Stop Time 1530  -CC 1145  -CC     OT Time Calculation (min) 30 min  -CC 45 min  -CC     OT Goal Re-Cert Due Date -- 06/10/22  -CC           User Key  (r) = Recorded By, (t) = Taken By, (c) = Cosigned By    Initials Name Provider Type    CC Jenny Jones OTR Occupational Therapist              Therapy Charges for Today     Code Description Service Date Service Provider Modifiers Qty    87368549865 HC OT SELF CARE/MGMT/TRAIN EA 15 MIN 6/7/2022 Jenny Jones OTR GO 3    19483713063 HC OT THER PROC EA 15 MIN 6/7/2022 Jenny Jones OTR GO 2                   GEOFFREY Ge  6/7/2022

## 2022-06-07 NOTE — PLAN OF CARE
Goal Outcome Evaluation:  Plan of Care Reviewed With: patient        Progress: improving  Outcome Evaluation: Impulsive, attempts to get up OOB without using call light as instructed. Tylenol for neck pain. General weakness. Assist 1 to stand and use urinal at bedside. Meds with thin liquids.

## 2022-06-07 NOTE — PLAN OF CARE
Goal Outcome Evaluation:  Plan of Care Reviewed With: patient           Outcome Evaluation: Alireza has been cooperative, pleasant and hard of hearing. He wears a RED ARM BAND and can be impulsive at night, but too busy through the day. He only has right hearing aid, other one missing from previous floor. He is assist x1 with the wheelchair- unsteady gait, takes medication with water, and continent and incontinent. He has taken tylenol once, weight today with standing scale, and family this afternoon.

## 2022-06-07 NOTE — PROGRESS NOTES
Inpatient Rehabilitation Plan of Care Note    Plan of Care  Care Plan Reviewed - Updates as Follows    Safety    [RN] Potential for Injury(Active)  Current Status(06/06/2022): Hx falls; impulsive. Attempts to get up without  using call light.  Weekly Goal(06/11/2022): Instruct patient regarding safety precautions & need  for close supervision.  Discharge Goal: Patient /family will be aware of risk of fall & safety in the  home setting    Performed Intervention(s)  Falls precautions, safety rounds, Items within reach, Bed/chair alarms, &  Non-skid socks.  red armband      Psychosocial    Performed Intervention(s)  Verbalizes needs & concerns      Sphincter Control    Performed Intervention(s)  Monitor intake, output, & BM's each shift.  Use incontinence products as needed.    Signed by: Ama Cazares RN

## 2022-06-07 NOTE — PROGRESS NOTES
Inpatient Rehabilitation Plan of Care Note    Plan of Care  Care Plan Reviewed - No updates at this time.    Psychosocial    Performed Intervention(s)  Verbalizes needs & concerns      Safety    Performed Intervention(s)  Falls precautions, safety rounds, Items within reach, Bed/chair alarms, &  Non-skid socks.  red armband      Sphincter Control    Performed Intervention(s)  Monitor intake, output, & BM's each shift.  Use incontinence products as needed.    Signed by: Bettie Flores RN

## 2022-06-07 NOTE — THERAPY TREATMENT NOTE
Inpatient Rehabilitation - Physical Therapy Treatment Note       [facility]     Patient Name: Alireza Carreon  : 1935  MRN: [id number]    Today's Date: 2022                    Admit Date: 6/3/2022      Visit Dx:   No diagnosis found.    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[facility])   • Monoclonal gammopathy of unknown significance (MGUS)   • Chronic anticoagulation   • Arteriosclerosis of coronary artery   • Cerebrovascular accident (CVA) (HCC)   • Seizure disorder (HCC)   • Swelling of left lower extremity   • Hearing loss   • Localized edema   • Blister   • Skin nodule of toe of right foot   • Status epilepticus (HCC)   • Immobility syndrome       Past Medical History:   Diagnosis Date   • 3-vessel CAD    • Anticoagulated on warfarin    • Atrial fibrillation (HCC)    • BMI 28.0-28.9,adult    • Bunion, right foot    • Complaints of memory disturbance    • Dementia (HCC)    • Edema    • Encounter for immunization    • Generalized convulsive seizure ([facility])    • Gout    • History of double vision    • HL (hearing loss)    • Hyperlipidemia    • Hypertension    • Left foot pain    • 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    • Movement disorder    • Nocturnal leg cramps    • OA (osteoarthritis)    • Prepatellar effusion    • Pulmonary embolism (HCC)    • Puncture wound of hand, right    • Screening for cardiovascular condition    • Sleep apnea    • Stasis dermatitis    • Statin intolerance    • Stroke (HCC)    • Taking drug for chronic disease    • Thoracic back pain    • Transient ischemic attack        Past Surgical History:   Procedure Laterality Date   • ADENOIDECTOMY      • APPENDECTOMY     • CATARACT EXTRACTION, BILATERAL     • CORONARY ARTERY BYPASS GRAFT     • CORONARY ARTERY BYPASS GRAFT     • CYSTOSCOPY TRANSURETHRAL RESECTION OF PROSTATE     • HERNIA REPAIR     • OTHER SURGICAL HISTORY      carotid thromboendarterectomy   • SHOULDER SURGERY     • SKIN CANCER EXCISION         PT ASSESSMENT (last 12 hours)     IRF PT Evaluation and Treatment     Row Name 06/07/22 0800          PT Time and Intention    Document Type daily treatment  -MS     Mode of Treatment physical therapy  -MS     Patient/Family/Caregiver Comments/Observations Seated in front of nsg station, exit alarm on, pleasant and agreeable  -MS     Total Minutes, Physical Therapy 60  -MS     Row Name 06/07/22 0800          General Information    Existing Precautions/Restrictions fall  [place], red arm band  -MS     Limitations/Impairments safety/cognitive  -MS     Row Name 06/07/22 0800          Pain Assessment    Pretreatment Pain Rating 0/10 - no pain  -MS     Posttreatment Pain Rating 0/10 - no pain  -MS     Row Name 06/07/22 0800          Cognition/Psychosocial    Affect/Mental Status (Cognition) --  pleasantly confused  -MS     Orientation Status (Cognition) oriented x 4  -MS     Follows Commands (Cognition) follows two-step commands;physical/tactile prompts required;repetition of directions required;verbal cues/prompting required;increased processing time needed  -MS     Personal Safety Interventions fall prevention program maintained;gait belt;nonskid shoes/slippers when out of bed  -MS     Cognitive Function memory deficit;safety deficit;executive function deficit;attention deficit  -MS     Attention Deficit (Cognition) concentration;distractible in noisy environment  -MS     Memory Deficit (Cognition) minimal deficit;moderate deficit  -MS     Safety Deficit (Cognition) insight into deficits/self-awareness;judgment;impulsivity;awareness of need for assistance  -MS     Row Name 06/07/22 0800          Bed Mobility     Comment, (Bed Mobility) NT- sitting up in chair  -MS     Row Name 06/07/22 0800          Transfer Assessment/Treatment    Comment, (Transfers) max cues for reaching back for chair- carryover towards end of therapy session.  -MS     Row Name 06/07/22 0800          Transfers    Sit-Stand [place] (Transfers) minimum assist (75% patient effort);verbal cues;contact guard  -MS     Stand-Sit [place] (Transfers) minimum assist (75% patient effort);verbal cues;nonverbal cues (demo/gesture)  -MS     [place] Level (Toilet Transfer) minimum assist (75% patient effort);verbal cues;nonverbal cues (demo/gesture)  -MS     Assistive Device (Toilet Transfer) wheelchair;grab bars/safety frame  -MS     Row Name 06/07/22 0800          Sit-Stand Transfer    Assistive Device (Sit-Stand Transfers) walker, front-wheeled  -MS     Row Name 06/07/22 0800          Stand-Sit Transfer    Assistive Device (Stand-Sit Transfers) walker, front-wheeled  -MS     Row Name 06/07/22 0800          Toilet Transfer    Type (Toilet Transfer) sit-stand;stand-sit  -MS     Row Name 06/07/22 0800          Gait/Stairs (Locomotion)    [place] Level (Gait) contact guard;minimum assist (75% patient effort);verbal cues;nonverbal cues (demo/gesture)  -MS     Assistive Device (Gait) walker, front-wheeled  -MS     Distance in Feet (Gait) 80', 160' x 2 trials  -MS     Pattern (Gait) step-through  -MS     Deviations/Abnormal Patterns (Gait) marcel decreased;festinating/shuffling;stride length decreased  -MS     Bilateral Gait Deviations forward flexed posture  -MS     Comment, (Gait/Stairs) Cues for BIG steps and walker placement, improvement with safety awareness  -MS     Row Name 06/07/22 0800          Balance    Static Sitting Balance standby assist;contact guard  -MS     Dynamic Sitting Balance standby assist;contact guard  -MS     Position, Sitting Balance unsupported;sitting in chair  -MS     Sit to Stand [place] Balance contact guard;minimal  assist  -MS     Static Standing Balance contact guard;minimal assist  -MS     Dynamic Standing Balance minimal assist  -MS     Row Name 06/07/22 0800          Motor Skills    Therapeutic Exercise knee;hip  -MS     Row Name 06/07/22 0800          Hip (Therapeutic Exercise)    Hip (Therapeutic Exercise) strengthening exercise  -MS     Hip Strengthening (Therapeutic Exercise) bilateral;sitting;marching while seated;15 repititions;2 sets  -MS     Row Name 06/07/22 0800          Knee (Therapeutic Exercise)    Knee (Therapeutic Exercise) strengthening exercise  -MS     Knee Strengthening (Therapeutic Exercise) bilateral;sitting;LAQ (long arc quad);15 repititions;2 sets  -MS     Row Name 06/07/22 0800          Positioning and Restraints    Pre-Treatment Position sitting in chair/recliner  -MS     Post Treatment Position wheelchair  -MS     In Wheelchair sitting;exit alarm on  in front of nsg station  -MS           User Key  (r) = Recorded By, (t) = Taken By, (c) = Cosigned By    Initials Name Provider Type    Tova Benjamin PT Physical Therapist                 Physical Therapy Education                 Title: PT OT SLP Therapies (In Progress)     Topic: Physical Therapy (Done)     Point: Mobility training (Done)     Learning Progress Summary           Patient Acceptance, E,TB, VU,NR by MS at 6/6/2022 1045                   Point: Home exercise program (Done)     Learning Progress Summary           Patient Acceptance, E,TB, VU,NR by MS at 6/6/2022 1045                   Point: Body mechanics (Done)     Learning Progress Summary           Patient Acceptance, E,TB, VU,NR by MS at 6/6/2022 1045                   Point: Precautions (Done)     Learning Progress Summary           Patient Acceptance, E,TB, VU,NR by MS at 6/6/2022 1045                               User Key     Initials Effective Dates Name Provider Type Discipline    MS 06/16/21 -  Tova Bennett PT Physical Therapist PT                PT  Recommendation and Plan                          Time Calculation:      PT Charges     Row Name 06/07/22 1102             Time Calculation    Start Time 0800  -MS      Stop Time 0900  -MS      Time Calculation (min) 60 min  -MS      PT Received On 06/07/22  -MS      PT - Next Appointment 06/08/22  -MS            User Key  (r) = Recorded By, (t) = Taken By, (c) = Cosigned By    Initials Name Provider Type    MS Tova Bennett, PT Physical Therapist                Therapy Charges for Today     Code Description Service Date Service Provider Modifiers Qty    62155150223  PT THERAPEUTIC ACT EA 15 MIN 6/6/2022 BennettTova, PT GP 1    [id number]  PT THER PROC EA 15 MIN 6/6/2022 BennettTova, PT GP 3    [id number]  PT THER SUPP EA 15 MIN 6/6/2022 BennettTova, PT GP 1    [id number]  PT THER PROC EA 15 MIN 6/7/2022 BennettTova, PT GP 3    [id number]  PT THERAPEUTIC ACT EA 15 MIN 6/7/2022 BennettTova, PT GP 1            PT G-Codes  AM-PAC 6 Clicks Score (PT): 16      Tova Bennett, PT  6/7/2022

## 2022-06-07 NOTE — PROGRESS NOTES
Case Management  Inpatient Rehabilitation Team Conference    Conference Date/Time: 6/7/2022 8:39:01 AM    Team Conference Attendees:  Saleem Betancourt, Pharmacist  Reina Ramos, MARY ELLENW  Tova Bennett, PT  Jenny Jones, OT  Evelina Gibson, SLP  Wendy Polo, CTRS  Yun Bar RD, LD  Cristina Bermudez, PRINCESS Flores, RN  Guzman Garcia,     Demographics            Age: 87Y            Gender: Male    Admission Date: 6/3/2022 6:28:00 PM  Rehabilitation Diagnosis:  Debility  Past Medical History: Medical History  Past Medical History:  DiagnosisDate  ?3-vessel CAD?  ?Anticoagulated on warfarin?  ?Atrial fibrillation (HCC)?  ?BMI 28.0-28.9,adult?  ?Bunion, right foot?  ?Complaints of memory disturbance?  ?Dementia (HCC)?  ?Edema?  ?Encounter for immunization?  ?Generalized convulsive seizure (HCC)?  ?Gout?  ?History of double vision?  ?HL (hearing loss)?  ?Hyperlipidemia?  ?Hypertension?  ?Left foot pain?  ?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?  ?Movement disorder?  ?Nocturnal leg cramps?  ?OA (osteoarthritis)?  ?Prepatellar effusion?  ?Pulmonary embolism (HCC)?  ?Puncture wound of hand, right?  ?Screening for cardiovascular condition?  ?Sleep apnea?  ?Stasis dermatitis?  ?Statin intolerance?  ?Stroke (HCC)?  ?Taking drug for chronic disease?  ?Thoracic back pain?  ?Transient ischemic attack?    ?  Surgical History  Past Surgical History:  ProcedureLateralityDate  ?ADENOIDECTOMY??  ?APPENDECTOMY??  ?CATARACT EXTRACTION, BILATERAL??  ?CORONARY ARTERY BYPASS GRAFT??  ?CORONARY ARTERY BYPASS GRAFT??  ?CYSTOSCOPY TRANSURETHRAL RESECTION OF PROSTATE??  ?HERNIA REPAIR??  ?OTHER SURGICAL HISTORY??  ?carotid thromboendarterectomy  ?SHOULDER SURGERY??  ?SKIN CANCER EXCISION??    ?      Plan of Care  Anticipated Discharge Date/Estimated Length of Stay: 2 Weeks  Anticipated Discharge Destination: Community discharge with assistance  Discharge Plan : Patient lives with wife in  one story home with 3 steps to enter  with rails.  D/C plan is home with wife. Step-dgt to stay after d/c.  Medical Necessity Expected Level Rationale: Goals are to achieve a level of SBA  with mobility and ADL's.  Rehab prognosis fair.  Medical prognosis fair.  Intensity and Duration: an average of 3 hours/5 days per week  Medical Supervision and 24 Hour Rehab Nursing: x  Physical Therapy: x  PT Intensity/Duration: 1 hour/day, 5 days/week, for approximately 2 weeks.  Occupational Therapy: x  OT Intensity/Duration: 1 hour/day, 5 days/week, for approximately 2 weeks.  Speech and Language Therapy: x  SLP Intensity/Duration: 1 hour/day, 5 days/week, for approximately 2 weeks.  Social Work: x  Therapeutic Recreation: x  Psychology: x  Registered Dietician: x  Updated (if changes indicated)    Anticipated Discharge Date/Estimated Length of Stay:   2 Weeks    Based on the patient's medical and functional status, their prognosis and  expected level of functional improvement is: Goals are to achieve a level of SBA  with mobility and ADL's.  Rehab prognosis fair.  Medical prognosis fair.      Interdisciplinary Problem/Goals/Status    All Rehab Problems:  Cognition    [ST] Executive Functions(Active)  Current Status(06/06/2022): Executive Functioning at moderate level  Weekly Goal(06/13/2022): Improve Executive Functioning to min  Discharge Goal: Executive Functioning WFL        Mobility    [OT] Tub/Shower Transfers(Active)  Current Status(06/06/2022): min A  Weekly Goal(06/13/2022): CGA  Discharge Goal: SBA    [OT] Toilet Transfers(Active)  Current Status(06/06/2022): min A  Weekly Goal(06/13/2022): CGA  Discharge Goal: SBA    [PT] Walk(Active)  Current Status(06/06/2022): 80' Min A, RW, shuffling gait  Weekly Goal(06/14/2022): amb to BR w nsg, Min A RW  Discharge Goal: 160' SV, RW    [PT] Bed/Chair/Wheelchair(Active)  Current Status(06/06/2022): Min-Mod A, RW  Weekly Goal(06/14/2022): Min A, RW  Discharge Goal: SV,  RW    [PT] Bed Mobility(Active)  Current Status(06/06/2022): Lynette  Weekly Goal(06/14/2022): CGA  Discharge Goal: SV        Psychosocial    [RN] Coping/Adjustment(Active)  Current Status(06/06/2022): Expresses appropriate coping  Weekly Goal(06/11/2022): Allow opportunity to express concerns regarding current  status.  Discharge Goal: Adequate coping regarding life changes with ongoing support.        Safety    [RN] Potential for Injury(Active)  Current Status(06/06/2022): Hx falls; impulsive. Attempts to get up without  using call light.  Weekly Goal(06/11/2022): Instruct patient regarding safety precautions & need  for close supervision.  Discharge Goal: Patient /family will be aware of risk of fall & safety in the  home setting        Self Care    [OT] Bathing(Active)  Current Status(06/06/2022): min A  Weekly Goal(06/13/2022): CGA  Discharge Goal: SBA    [OT] Dressing (Lower)(Active)  Current Status(06/06/2022): mod A  Weekly Goal(06/13/2022): min A  Discharge Goal: SBA    [OT] Dressing (Upper)(Active)  Current Status(06/06/2022): min A  Weekly Goal(06/13/2022): SBA  Discharge Goal: s/up    [OT] Grooming(Active)  Current Status(06/06/2022): s/up  Weekly Goal(06/13/2022): s/up  Discharge Goal: mod (I)    [OT] Toileting(Active)  Current Status(06/06/2022): mod A  Weekly Goal(06/13/2022): min A  Discharge Goal: SBA        Sphincter Control    [RN] Bladder & Bowel management(Active)  Current Status(06/06/2022): Incontinent of B&B at times.  Weekly Goal(06/11/2022): Continent 75%  Discharge Goal: Continent 100%        Comments: 6/7 Amb 80' Min A, Toilet trransfers Min A, needs a lot of safety  awareness cues.  Difficulty with hearing, lost one of his hearing aides in acute  care.  Shuffling gait, hx of old stroke.  Robert regular diet with thins.  Continent mostly, incontinent at times.    Signed by: Cristina Bermudez RN    Physician CoSigned By: [place] Ewing 06/07/2022 09:01:16

## 2022-06-07 NOTE — THERAPY TREATMENT NOTE
Inpatient Rehabilitation - Speech Language Pathology Treatment Note    [facility]     Patient Name: Alireza Carreon  : 1935  MRN: [id number]    Today's Date: 2022                   Admit Date: 6/3/2022       Visit Dx:    No diagnosis found.    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[facility])   • Monoclonal gammopathy of unknown significance (MGUS)   • Chronic anticoagulation   • Arteriosclerosis of coronary artery   • Cerebrovascular accident (CVA) (HCC)   • Seizure disorder (HCC)   • Swelling of left lower extremity   • Hearing loss   • Localized edema   • Blister   • Skin nodule of toe of right foot   • Status epilepticus (HCC)   • Immobility syndrome       Past Medical History:   Diagnosis Date   • 3-vessel CAD    • Anticoagulated on warfarin    • Atrial fibrillation (HCC)    • BMI 28.0-28.9,adult    • Bunion, right foot    • Complaints of memory disturbance    • Dementia (HCC)    • Edema    • Encounter for immunization    • Generalized convulsive seizure (HCC)    • Gout    • History of double vision    • HL (hearing loss)    • Hyperlipidemia    • Hypertension    • Left foot pain    • 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    • Movement disorder    • Nocturnal leg cramps    • OA (osteoarthritis)    • Prepatellar effusion    • Pulmonary embolism (HCC)    • Puncture wound of hand, right    • Screening for cardiovascular condition    • Sleep apnea    • Stasis dermatitis    • Statin intolerance    • Stroke (HCC)    • Taking drug for chronic disease    • Thoracic back pain    • Transient ischemic attack        Past Surgical History:   Procedure Laterality Date   •  ADENOIDECTOMY     • APPENDECTOMY     • CATARACT EXTRACTION, BILATERAL     • CORONARY ARTERY BYPASS GRAFT     • CORONARY ARTERY BYPASS GRAFT     • CYSTOSCOPY TRANSURETHRAL RESECTION OF PROSTATE     • HERNIA REPAIR     • OTHER SURGICAL HISTORY      carotid thromboendarterectomy   • SHOULDER SURGERY     • SKIN CANCER EXCISION         SLP Recommendation and Plan                                                   SLP EVALUATION (last 72 hours)     SLP SLC Evaluation     Row Name 06/07/22 1200 06/07/22 1000 06/06/22 1300 06/06/22 1000          Communication Assessment/Intervention    Document Type therapy note (daily note)  -SR therapy note (daily note)  -SR therapy note (daily note)  -SR therapy note (daily note)  -SR     Subjective Information no complaints  -SR no complaints  -SR no complaints  -SR no complaints  -SR     Patient Observations alert;agree to therapy;cooperative  -SR alert;cooperative;agree to therapy  -SR alert;cooperative;agree to therapy  -SR alert;cooperative;agree to therapy  -SR     Patient/Family/Caregiver Comments/Observations -- -- -- Pt said that he lost his hearing aids over the weekend.  -SR     Patient Effort good  -SR good  -SR good  -SR good  -SR     Symptoms Noted During/After Treatment none  -SR none  -SR none  -SR --            Pain Scale: Numbers Pre/Post-Treatment    Pretreatment Pain Rating 0/10 - no pain  -SR 0/10 - no pain  -SR 0/10 - no pain  -SR 0/10 - no pain  -SR     Posttreatment Pain Rating 0/10 - no pain  -SR 0/10 - no pain  -SR 0/10 - no pain  -SR 0/10 - no pain  -SR           User Key  (r) = Recorded By, (t) = Taken By, (c) = Cosigned By    Initials Name Effective Dates    SR Evelina Gibson, LANEY 01/12/22 -                    EDUCATION    The patient has been educated in the following areas:       Cognitive Impairment.             SLP GOALS     Row Name 06/07/22 1200 06/07/22 1000 06/06/22 1300       Oral Nutrition/Hydration Goal 1 (SLP)    Oral Nutrition/Hydration Goal 1,  SLP -- Tolerate least restrictive diet with no overt s/s aspiration.  -SR --    Time Frame (Oral Nutrition/Hydration Goal 1, SLP) -- by discharge  -SR --    Barriers (Oral Nutrition/Hydration Goal 1, SLP) -- RN reported patient coughing during lunch yesterday. During AM therapy, patient tolerated mechanical peaches and regular cracker with no overt s/sx of aspiration/penetration. Verbal cues to slow down and take one bite at time. Demonstrated no overt s/sx of aspiration with thin liquid water by cup and straw throughout session. Clear vocal quality. Will continue to follow for diet tolerance.  -SR --       Attention Goal 1 (SLP)    Improve Attention by Goal 1 (SLP) complete selective attention task;70%;with minimal cues (75-90%)  -SR complete selective attention task;70%;with minimal cues (75-90%)  -SR complete selective attention task;70%;with minimal cues (75-90%)  -SR    Time Frame (Attention Goal 1, SLP) by discharge  -SR by discharge  -SR by discharge  -SR    Progress/Outcomes (Attention Goal 1, SLP) continuing progress toward goal  -SR continuing progress toward goal  -SR continuing progress toward goal  -SR    Comment (Attention Goal 1, SLP) Overall, required min cues for redirection to task during PM session  -SR Followed written directions with 40% acc given NO cues; 80% acc given min cues.  -SR Required min cues for redirection to task during PM therapy session.  -SR       Memory Skills Goal 1 (SLP)    Improve Memory Skills Through Goal 1 (SLP) recalling related word lists immediately;recalling related word lists with an imposed delay;recalling unrelated word lists immediately;recalling unrelated word lists with an imposed delay;visual memory task;listen to a paragraph and answer questions;70%;with minimal cues (75-90%)  -SR -- recalling related word lists immediately;recalling related word lists with an imposed delay;recalling unrelated word lists immediately;recalling unrelated word lists with an  imposed delay;visual memory task;listen to a paragraph and answer questions;70%;with minimal cues (75-90%)  -SR    Time Frame (Memory Skills Goal 1, SLP) by discharge  -SR -- by discharge  -SR    Progress (Memory Skills Goal 1, SLP) other (comment)  Mental manipulation; repeated words in order occurance with 80% acc given min cues.  -SR -- --    Progress/Outcomes (Memory Skills Goal 1, SLP) good progress toward goal  -SR -- good progress toward goal  -SR    Comment (Memory Skills Goal 1, SLP) Recalled ingredients for recipe with 80% acc given NO cues.  -SR -- Paragraph retention; 70% acc given NO cues.  -SR       Executive Functional Skills Goal 1 (SLP)    Improve Executive Function Skills Goal 1 (SLP) demonstrate awareness of deficit;time management activity;home management activity;70%;with minimal cues (75-90%)  -SR -- demonstrate awareness of deficit;time management activity;home management activity;70%;with minimal cues (75-90%)  -SR    Time Frame (Executive Function Skills Goal 1, SLP) by discharge  -SR -- by discharge  -SR    Progress/Outcomes (Executive Function Skills Goal 1, SLP) good progress toward goal  -SR -- good progress toward goal  -SR    Comment (Executive Function Skills Goal 1, SLP) Check writing activity; 80% acc given NO cues. Pt explained that he does not normally write checks. His daughter manages finances.  -SR -- Determined time between arrival and appointment time with 80% acc given NO cues.  -SR    Row Name 06/06/22 1000             Attention Goal 1 (SLP)    Improve Attention by Goal 1 (SLP) complete selective attention task;70%;with minimal cues (75-90%)  -SR      Time Frame (Attention Goal 1, SLP) by discharge  -SR      Progress/Outcomes (Attention Goal 1, SLP) continuing progress toward goal  -SR              Memory Skills Goal 1 (SLP)    Improve Memory Skills Through Goal 1 (SLP) recalling related word lists immediately;recalling related word lists with an imposed delay;recalling  unrelated word lists immediately;recalling unrelated word lists with an imposed delay;visual memory task;listen to a paragraph and answer questions;70%;with minimal cues (75-90%)  -SR      Time Frame (Memory Skills Goal 1, SLP) by discharge  -SR      Progress/Outcomes (Memory Skills Goal 1, SLP) good progress toward goal  -SR      Comment (Memory Skills Goal 1, SLP) Visual memory; Answered questions about a visual picture scene with 30% acc given NO cues; 60% acc given min cues  -SR              Functional Problem Solving Skills Goal 1 (SLP)    Improve Problem Solving Through Goal 1 (SLP) tell similarity between items;complete organization/home management task;sequence steps in a task;70%;independently (over 90% accuracy)  -SR      Time Frame (Problem Solving Goal 1, SLP) by discharge  -SR      Progress/Outcomes (Problem Solving Goal 1, SLP) good progress toward goal  -SR      Comment (Problem Solving Goal 1, SLP) Sequenced steps in a task with 80% acc given NO cues. Provided verbal sequence for creating wood frames with 100% acc given NO cues  -SR            User Key  (r) = Recorded By, (t) = Taken By, (c) = Cosigned By    Initials Name Provider Type    Evelina Flanagan SLP Speech and Language Pathologist                            Time Calculation:        Time Calculation- SLP     Row Name 06/07/22 1504 06/07/22 1209          Time Calculation- SLP    SLP Start Time 1230  -SR 1000  -SR     SLP Stop Time 1300  -SR 1030  -SR     SLP Time Calculation (min) 30 min  -SR 30 min  -SR           User Key  (r) = Recorded By, (t) = Taken By, (c) = Cosigned By    Initials Name Provider Type    Evelina Flanagan SLP Speech and Language Pathologist                  Therapy Charges for Today     Code Description Service Date Service Provider Modifiers Qty    07947428333 HC ST DEV OF COGN SKILLS INITIAL 15 MIN 6/6/2022 Evelina Gibson SLP  1    46442521273 HC ST DEV OF COGN SKILLS EACH ADDT'L 15 MIN 6/6/2022 Evelina Gibson  SLP  3    [id number]  ST TREATMENT SWALLOW 1 6/7/2022 Evelina Gibson SLP GN 1    [id number]  ST DEV OF COGN SKILLS INITIAL 15 MIN 6/7/2022 Evelina Gibson SLP  1    [id number]  ST DEV OF COGN SKILLS EACH ADDT'L 15 MIN 6/7/2022 Evelina Gibson, LANEY  2                           LANEY Solis  6/7/2022

## 2022-06-07 NOTE — PROGRESS NOTES
LOS: 4 days   Patient Care Team:  Jose Bingham MD as PCP - General (Internal Medicine)  Yennifer Ruiz MD as Consulting Physician (Cardiology)  Cristian Taylor OD (Optometry)  PARKER Wheeler MD as Consulting Physician (Ophthalmology)  Obdulio Morelos MD as Consulting Physician (Hematology and Oncology)  Zahira Gee MD as Referring Physician (Neurology)  Sanya Sim Jr., DPM as Consulting Physician (Podiatry)      JUANJO MENDOZADA  1935      ADMITTING DIAGNOSIS:  1. Status epilepticus w/ Postictal encephalopathy        Subjective     Patient denies any headache.  No new seizure activity noted.  Hard of hearing which limits history.  Indicates he is tolerating therapies.    Objective     Vitals:    06/07/22 1317   BP: 96/56   Pulse: 61   Resp: 16   Temp: 97.3 °F (36.3 °C)   SpO2: 93%       PHYSICAL EXAM:     Constitutional: Not in acute distress.  [place]. Awake and alert and conversant.   HEENT:  Heart: RRR, no murmur.  Trace pitting edema in legs, L >R  Lungs: Good and equal air entry bilaterally.  Nonlabored breathing  Abdomen:Soft. No tenderness or dullness.  Positive bowel sounds  Extremities: No cyanosis, clubbing.  Warm extremities and well-perfused. DJD changes bilat hands.  Neuro: Slow processing but appropriate.  Does follow commands.  Provides resistance in BUE/BLEs  Psych: O x person and hospital  . No delusions. Affect flat.             MEDICATIONS  Scheduled Meds:donepezil, 10 mg, Oral, Nightly  hydroCHLOROthiazide Oral, 25 mg, Oral, Daily  lacosamide, 100 mg, Oral, Q12H  lisinopril, 20 mg, Oral, BID  PHENobarbital, 64.8 mg, Nasogastric, Daily  rivaroxaban, 15 mg, Oral, Daily With Dinner  sodium chloride, 10 mL, Intravenous, Q12H      Continuous Infusions:   PRN Meds:.•  acetaminophen  •  sodium chloride      RESULTS  No results found for: POCGLU  Results from last 7 days   Lab Units 06/07/22  1022 06/04/22  0801   WBC 10*3/mm3 7.04 5.35   HEMOGLOBIN g/dL  14.2 14.4   HEMATOCRIT % 42.8 44.0   PLATELETS 10*3/mm3 332 273     Results from last 7 days   Lab Units 06/07/22  1022 06/04/22  0801   SODIUM mmol/L 136 135*   POTASSIUM mmol/L 4.6 4.6   CHLORIDE mmol/L 99 95*   CO2 mmol/L 28.0 29.7*   BUN mg/dL 27* 21   CREATININE mg/dL 1.15 1.00   CALCIUM mg/dL 9.2 9.3   BILIRUBIN mg/dL 0.2 0.4   ALK PHOS U/L 75 76   ALT (SGPT) U/L 32 29   AST (SGOT) U/L 28 29   GLUCOSE mg/dL 84 130*           ASSESSMENT and PLAN    Immobility syndrome    2. Status epilepticus w/ Postictal encephalopathy - Admit to Acute inpt rehab with PT, OT, SLP, psychology and rehab medical and nursing care..  3. Seizure d/o - Continue Antiepileptics and monitor levels and side effects. Ativan PRN for seizure.  4. Cellulitis left leg - Completed antibx. Continue care  5. A-Fib - Continue Xarelto  6. HTN - Cont meds and monitor  7. CAD s/p CABG - Monitor response to therapy and inc activity  8. Hypoxia: Likely secondary to hypoventilation and probably obstructive sleep apnea - Check Nocturnal oxygen  9. Bilateral legs edema/venous stasis.  He reports chronic edema in the left lower extremity compared to the right lower extremity secondary to an old injury  10. Vascular dementia - Continue Aricept  11. DVT prophylaxis - anticoagulation / SCDs       Now admit for comprehensive acute inpatient rehabilitation .  This would be an interdisciplinary program with physical therapy 1 hour,  occupational therapy 1 hour, and speech therapy 1 hour, 5 days a week.  Rehabilitation nursing for carryover, monitoring of  neurologic   status, bowel and bladder, and skin  Ongoing physician follow-up.  Weekly team conferences.       The patient's functional status and clinical status is unchanged from preadmission assessment and the patient continues appropriate for acute inpatient rehabilitation.  Goal is for home with  Home health   therapies.  Barrier to discharge:  Impaired mobility and self care  - work on  Strength,  balance, transfers, gait, ADLS, cognition  to overcome.       TEAM CONF - June 7 - BED MIN. TRANSFERS MIN MAD RW. GAIT 80 FEET MIN ASSIST RW, SHUFFLING GAIT. TOILET AND SHOWER TRANSFERS MIN ASSIST. IMPULSIVE IMPACTED BY HEARING. BATH MIN. LBD MOD. UBD MIN. TOILETING MOD ASSIST. MODERATE COGNITIVE IMPAIRMENT WITH MEMORY, ATTENTION, AND EXECUTIVE FUNCTION. TOLERATES REGULAR DIET. CONTINENT FOR THE MOST PART. SKIN INTACT. TYLENOL FOR PAIN.   Memory - Donepezil. Seizure - Lacosamide (Vimpat) and Phenobarbital. Chronic anticoagulation - atrial fibrillation- rivaroxaban (Xarelto).   ELOS - TWO WEEKS    Montana Ewing MD      During rounds, used appropriate personal protective equipment including mask and gloves.  Additional gown if indicated.  Mask used was standard procedure mask. Appropriate PPE was worn during the entire visit.  Hand hygiene was completed before and after.

## 2022-06-08 NOTE — THERAPY TREATMENT NOTE
Inpatient Rehabilitation - Speech Language Pathology Treatment Note    [facility]     Patient Name: Alireza Carreon  : 1935  MRN: [id number]    Today's Date: 2022                   Admit Date: 6/3/2022       Visit Dx:    No diagnosis found.    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[facility])   • Monoclonal gammopathy of unknown significance (MGUS)   • Chronic anticoagulation   • Arteriosclerosis of coronary artery   • Cerebrovascular accident (CVA) (HCC)   • Seizure disorder (HCC)   • Swelling of left lower extremity   • Hearing loss   • Localized edema   • Blister   • Skin nodule of toe of right foot   • Status epilepticus (HCC)   • Immobility syndrome       Past Medical History:   Diagnosis Date   • 3-vessel CAD    • Anticoagulated on warfarin    • Atrial fibrillation (HCC)    • BMI 28.0-28.9,adult    • Bunion, right foot    • Complaints of memory disturbance    • Dementia (HCC)    • Edema    • Encounter for immunization    • Generalized convulsive seizure (HCC)    • Gout    • History of double vision    • HL (hearing loss)    • Hyperlipidemia    • Hypertension    • Left foot pain    • 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    • Movement disorder    • Nocturnal leg cramps    • OA (osteoarthritis)    • Prepatellar effusion    • Pulmonary embolism (HCC)    • Puncture wound of hand, right    • Screening for cardiovascular condition    • Sleep apnea    • Stasis dermatitis    • Statin intolerance    • Stroke (HCC)    • Taking drug for chronic disease    • Thoracic back pain    • Transient ischemic attack        Past Surgical History:   Procedure Laterality Date   •  ADENOIDECTOMY     • APPENDECTOMY     • CATARACT EXTRACTION, BILATERAL     • CORONARY ARTERY BYPASS GRAFT     • CORONARY ARTERY BYPASS GRAFT     • CYSTOSCOPY TRANSURETHRAL RESECTION OF PROSTATE     • HERNIA REPAIR     • OTHER SURGICAL HISTORY      carotid thromboendarterectomy   • SHOULDER SURGERY     • SKIN CANCER EXCISION         SLP Recommendation and Plan        Pt exhibits moderate cognitive impairment characterized by difficulty with attn, memory, and executive functioning skills. Decreased awareness of current deficits. Requires min-mod cues during memory and reasoning activities.                                            SLP EVALUATION (last 72 hours)     SLP SLC Evaluation     Row Name 06/08/22 1300 06/08/22 0900 06/07/22 1200 06/07/22 1000 06/06/22 1300       Communication Assessment/Intervention    Document Type therapy note (daily note)  -SR therapy note (daily note)  -SR therapy note (daily note)  -SR therapy note (daily note)  -SR therapy note (daily note)  -SR    Subjective Information no complaints  -SR no complaints  -SR no complaints  -SR no complaints  -SR no complaints  -SR    Patient Observations alert;cooperative;agree to therapy  -SR alert;cooperative;agree to therapy  -SR alert;agree to therapy;cooperative  -SR alert;cooperative;agree to therapy  -SR alert;cooperative;agree to therapy  -SR    Patient Effort good  -SR good  -SR good  -SR good  -SR good  -SR    Symptoms Noted During/After Treatment none  -SR none  -SR none  -SR none  -SR none  -SR       Pain Scale: Numbers Pre/Post-Treatment    Pretreatment Pain Rating 0/10 - no pain  -SR 0/10 - no pain  -SR 0/10 - no pain  -SR 0/10 - no pain  -SR 0/10 - no pain  -SR    Posttreatment Pain Rating 0/10 - no pain  -SR 0/10 - no pain  -SR 0/10 - no pain  -SR 0/10 - no pain  -SR 0/10 - no pain  -SR    Row Name 06/06/22 1000                   Communication Assessment/Intervention    Document Type therapy note (daily note)  -SR        Subjective  Information no complaints  -SR        Patient Observations alert;cooperative;agree to therapy  -SR        Patient/Family/Caregiver Comments/Observations Pt said that he lost his hearing aids over the weekend.  -SR        Patient Effort good  -SR                  Pain Scale: Numbers Pre/Post-Treatment    Pretreatment Pain Rating 0/10 - no pain  -SR        Posttreatment Pain Rating 0/10 - no pain  -SR              User Key  (r) = Recorded By, (t) = Taken By, (c) = Cosigned By    Initials Name Effective Dates    SR Evelina Gibson, LANEY 01/12/22 -                    EDUCATION    The patient has been educated in the following areas:       Cognitive Impairment.             SLP GOALS     Row Name 06/08/22 1300 06/08/22 0900 06/07/22 1200       Attention Goal 1 (SLP)    Improve Attention by Goal 1 (SLP) complete selective attention task;complete divided attention task;70%;independently (over 90% accuracy)  -SR -- complete selective attention task;70%;with minimal cues (75-90%)  -SR    Time Frame (Attention Goal 1, SLP) by discharge  -SR -- by discharge  -SR    Progress/Outcomes (Attention Goal 1, SLP) continuing progress toward goal  -SR -- continuing progress toward goal  -SR    Comment (Attention Goal 1, SLP) Divided attn; completed task with 70% acc given NO cues. Overall required min cues for redirection to task during PM therapy session.  -SR -- Overall, required min cues for redirection to task during PM session  -SR       Memory Skills Goal 1 (SLP)    Improve Memory Skills Through Goal 1 (SLP) -- recalling related word lists immediately;recalling related word lists with an imposed delay;recalling unrelated word lists immediately;recalling unrelated word lists with an imposed delay;visual memory task;listen to a paragraph and answer questions;70%;with minimal cues (75-90%)  -SR recalling related word lists immediately;recalling related word lists with an imposed delay;recalling unrelated word lists  immediately;recalling unrelated word lists with an imposed delay;visual memory task;listen to a paragraph and answer questions;70%;with minimal cues (75-90%)  -SR    Time Frame (Memory Skills Goal 1, SLP) -- by discharge  -SR by discharge  -SR    Progress (Memory Skills Goal 1, SLP) -- -- other (comment)  Mental manipulation; repeated words in order occurance with 80% acc given min cues.  -SR    Progress/Outcomes (Memory Skills Goal 1, SLP) -- good progress toward goal  -SR good progress toward goal  -SR    Comment (Memory Skills Goal 1, SLP) -- Visual memory (newspaper advertisement); recalled details with 50% acc given NO cues; 70% acc given min cues.  -SR Recalled ingredients for recipe with 80% acc given NO cues.  -SR       Functional Problem Solving Skills Goal 1 (SLP)    Improve Problem Solving Through Goal 1 (SLP) -- tell similarity between items;complete organization/home management task;sequence steps in a task;70%;independently (over 90% accuracy)  -SR --    Time Frame (Problem Solving Goal 1, SLP) -- by discharge  -SR --    Progress/Outcomes (Problem Solving Goal 1, SLP) -- good progress toward goal  -SR --    Comment (Problem Solving Goal 1, SLP) -- Deduction by exclusion; followed directions to find calendar date- 67% acc given min cues; 100% acc given mod cues. Difficulty following directions during activity. Extended time to complete task  -SR --       Executive Functional Skills Goal 1 (SLP)    Improve Executive Function Skills Goal 1 (SLP) demonstrate awareness of deficit;time management activity;home management activity;70%;with minimal cues (75-90%)  -SR -- demonstrate awareness of deficit;time management activity;home management activity;70%;with minimal cues (75-90%)  -SR    Time Frame (Executive Function Skills Goal 1, SLP) by discharge  -SR -- by discharge  -SR    Progress/Outcomes (Executive Function Skills Goal 1, SLP) good progress toward goal  -SR -- good progress toward goal  -SR     Comment (Executive Function Skills Goal 1, SLP) [place] calculation; adding total amounts; 80% acc given NO cues. Repetition of instruction.  -SR -- Check writing activity; 80% acc given NO cues. Pt explained that he does not normally write checks. His daughter manages finances.  -SR    Row Name 06/07/22 1000 06/06/22 1300 06/06/22 1000       Oral Nutrition/Hydration Goal 1 (SLP)    Oral Nutrition/Hydration Goal 1, SLP Tolerate least restrictive diet with no overt s/s aspiration.  -SR -- --    Time Frame (Oral Nutrition/Hydration Goal 1, SLP) by discharge  -SR -- --    Barriers (Oral Nutrition/Hydration Goal 1, SLP) RN reported patient coughing during lunch yesterday. During AM therapy, patient tolerated mechanical peaches and regular cracker with no overt s/sx of aspiration/penetration. Verbal cues to slow down and take one bite at time. Demonstrated no overt s/sx of aspiration with thin liquid water by cup and straw throughout session. Clear vocal quality. Will continue to follow for diet tolerance.  -SR -- --       Attention Goal 1 (SLP)    Improve Attention by Goal 1 (SLP) complete selective attention task;70%;with minimal cues (75-90%)  -SR complete selective attention task;70%;with minimal cues (75-90%)  -SR complete selective attention task;70%;with minimal cues (75-90%)  -SR    Time Frame (Attention Goal 1, SLP) by discharge  -SR by discharge  -SR by discharge  -SR    Progress/Outcomes (Attention Goal 1, SLP) continuing progress toward goal  -SR continuing progress toward goal  -SR continuing progress toward goal  -SR    Comment (Attention Goal 1, SLP) Followed written directions with 40% acc given NO cues; 80% acc given min cues.  -SR Required min cues for redirection to task during PM therapy session.  -SR --       Memory Skills Goal 1 (SLP)    Improve Memory Skills Through Goal 1 (SLP) -- recalling related word lists immediately;recalling related word lists with an imposed delay;recalling unrelated word  lists immediately;recalling unrelated word lists with an imposed delay;visual memory task;listen to a paragraph and answer questions;70%;with minimal cues (75-90%)  -SR recalling related word lists immediately;recalling related word lists with an imposed delay;recalling unrelated word lists immediately;recalling unrelated word lists with an imposed delay;visual memory task;listen to a paragraph and answer questions;70%;with minimal cues (75-90%)  -SR    Time Frame (Memory Skills Goal 1, SLP) -- by discharge  -SR by discharge  -SR    Progress/Outcomes (Memory Skills Goal 1, SLP) -- good progress toward goal  -SR good progress toward goal  -SR    Comment (Memory Skills Goal 1, SLP) -- Paragraph retention; 70% acc given NO cues.  -SR Visual memory; Answered questions about a visual picture scene with 30% acc given NO cues; 60% acc given min cues  -SR       Functional Problem Solving Skills Goal 1 (SLP)    Improve Problem Solving Through Goal 1 (SLP) -- -- tell similarity between items;complete organization/home management task;sequence steps in a task;70%;independently (over 90% accuracy)  -SR    Time Frame (Problem Solving Goal 1, SLP) -- -- by discharge  -SR    Progress/Outcomes (Problem Solving Goal 1, SLP) -- -- good progress toward goal  -SR    Comment (Problem Solving Goal 1, SLP) -- -- Sequenced steps in a task with 80% acc given NO cues. Provided verbal sequence for creating wood frames with 100% acc given NO cues  -SR       Executive Functional Skills Goal 1 (SLP)    Improve Executive Function Skills Goal 1 (SLP) -- demonstrate awareness of deficit;time management activity;home management activity;70%;with minimal cues (75-90%)  -SR --    Time Frame (Executive Function Skills Goal 1, SLP) -- by discharge  -SR --    Progress/Outcomes (Executive Function Skills Goal 1, SLP) -- good progress toward goal  -SR --    Comment (Executive Function Skills Goal 1, SLP) -- Determined time between arrival and appointment  time with 80% acc given NO cues.  -SR --          User Key  (r) = Recorded By, (t) = Taken By, (c) = Cosigned By    Initials Name Provider Type    Evelina Flanagan SLP Speech and Language Pathologist                            Time Calculation:        Time Calculation- SLP     Row Name 06/08/22 1514 06/08/22 1136          Time Calculation- SLP    SLP Start Time 1330  -SR 0900  -SR     SLP Stop Time 1400  -SR 0930  -SR     SLP Time Calculation (min) 30 min  -SR 30 min  -SR           User Key  (r) = Recorded By, (t) = Taken By, (c) = Cosigned By    Initials Name Provider Type    Evelina Flanagan SLP Speech and Language Pathologist                  Therapy Charges for Today     Code Description Service Date Service Provider Modifiers Qty    18362388020 HC ST TREATMENT SWALLOW 1 6/7/2022 Evelina Gibson SLP GN 1    76797297726 HC ST DEV OF COGN SKILLS INITIAL 15 MIN 6/7/2022 Evelina Gibson SLP  1    14964018941 HC ST DEV OF COGN SKILLS EACH ADDT'L 15 MIN 6/7/2022 Evelina Gibson SLP  2    98222580331 HC ST DEV OF COGN SKILLS INITIAL 15 MIN 6/8/2022 Evelina Gibson SLP  1    [id number] HC ST DEV OF COGN SKILLS EACH ADDT'L 15 MIN 6/8/2022 Evelina Gibson SLP  3                           LANEY Solis  6/8/2022

## 2022-06-08 NOTE — THERAPY TREATMENT NOTE
Inpatient Rehabilitation - Physical Therapy Treatment Note       [facility]     Patient Name: Alireza Carreon  : 1935  MRN: [id number]    Today's Date: 2022                    Admit Date: 6/3/2022      Visit Dx:   No diagnosis found.    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[facility])   • Monoclonal gammopathy of unknown significance (MGUS)   • Chronic anticoagulation   • Arteriosclerosis of coronary artery   • Cerebrovascular accident (CVA) (HCC)   • Seizure disorder (HCC)   • Swelling of left lower extremity   • Hearing loss   • Localized edema   • Blister   • Skin nodule of toe of right foot   • Status epilepticus (HCC)   • Immobility syndrome       Past Medical History:   Diagnosis Date   • 3-vessel CAD    • Anticoagulated on warfarin    • Atrial fibrillation (HCC)    • BMI 28.0-28.9,adult    • Bunion, right foot    • Complaints of memory disturbance    • Dementia (HCC)    • Edema    • Encounter for immunization    • Generalized convulsive seizure ([facility])    • Gout    • History of double vision    • HL (hearing loss)    • Hyperlipidemia    • Hypertension    • Left foot pain    • 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    • Movement disorder    • Nocturnal leg cramps    • OA (osteoarthritis)    • Prepatellar effusion    • Pulmonary embolism (HCC)    • Puncture wound of hand, right    • Screening for cardiovascular condition    • Sleep apnea    • Stasis dermatitis    • Statin intolerance    • Stroke (HCC)    • Taking drug for chronic disease    • Thoracic back pain    • Transient ischemic attack        Past Surgical History:   Procedure Laterality Date   • ADENOIDECTOMY      • APPENDECTOMY     • CATARACT EXTRACTION, BILATERAL     • CORONARY ARTERY BYPASS GRAFT     • CORONARY ARTERY BYPASS GRAFT     • CYSTOSCOPY TRANSURETHRAL RESECTION OF PROSTATE     • HERNIA REPAIR     • OTHER SURGICAL HISTORY      carotid thromboendarterectomy   • SHOULDER SURGERY     • SKIN CANCER EXCISION         PT ASSESSMENT (last 12 hours)     IRF PT Evaluation and Treatment     Row Name 06/08/22 0800          PT Time and Intention    Document Type daily treatment  -MS     Mode of Treatment physical therapy  -MS     Patient/Family/Caregiver Comments/Observations Seated in front of nsg station finishing breakfast, exit alarm on, NAD  -MS     Total Minutes, Physical Therapy 60  -MS     Row Name 06/08/22 0800          General Information    Existing Precautions/Restrictions fall  [place], red arm band  -MS     Limitations/Impairments safety/cognitive  -MS     Row Name 06/08/22 0800          Pain Assessment    Pretreatment Pain Rating 0/10 - no pain  -MS     Posttreatment Pain Rating 0/10 - no pain  -MS     Row Name 06/08/22 0800          Cognition/Psychosocial    Affect/Mental Status (Cognition) --  pleasantly confused  -MS     Orientation Status (Cognition) oriented x 4  -MS     Follows Commands (Cognition) follows two-step commands;physical/tactile prompts required;repetition of directions required;verbal cues/prompting required;increased processing time needed  -MS     Cognitive Function memory deficit;safety deficit;executive function deficit;attention deficit  -MS     Attention Deficit (Cognition) concentration;distractible in noisy environment  -MS     Memory Deficit (Cognition) moderate deficit  multiple cues for reorientation and purpose of therapy, reported therapy gym looked familiar but needed cues he has been here multiple days now  -MS     Safety Deficit (Cognition) insight into deficits/self-awareness;judgment  -MS     Row Name 06/08/22 0800          Transfers    Sit-Stand [place] (Transfers)  minimum assist (75% patient effort);verbal cues;contact guard  -MS     Stand-Sit [place] (Transfers) minimum assist (75% patient effort);verbal cues;nonverbal cues (demo/gesture)  -MS     [place] Level (Toilet Transfer) contact guard;verbal cues;nonverbal cues (demo/gesture)  -MS     Assistive Device (Toilet Transfer) wheelchair;grab bars/safety frame  -MS     Row Name 06/08/22 0800          Sit-Stand Transfer    Assistive Device (Sit-Stand Transfers) walker, front-wheeled  -MS     Row Name 06/08/22 0800          Stand-Sit Transfer    Assistive Device (Stand-Sit Transfers) walker, front-wheeled  -MS     Comment, (Stand-Sit Transfer) Max cues to reach back for chair- no carryover noted today.  -MS     Row Name 06/08/22 0800          Toilet Transfer    Type (Toilet Transfer) sit-stand;stand-sit  -MS     Row Name 06/08/22 0800          Gait/Stairs (Locomotion)    [place] Level (Gait) contact guard;minimum assist (75% patient effort);verbal cues;nonverbal cues (demo/gesture)  -MS     Assistive Device (Gait) walker, front-wheeled  -MS     Distance in Feet (Gait) 80' x 2 trials, 60' x 2 trials  -MS     Pattern (Gait) step-through  -MS     Deviations/Abnormal Patterns (Gait) marcel decreased;festinating/shuffling;stride length decreased  -MS     Bilateral Gait Deviations forward flexed posture  -MS     Comment, (Gait/Stairs) Cues for BIG steps and upright posture, VCs for walker guidance.  -MS     Row Name 06/08/22 0800          Balance    Static Sitting Balance standby assist  -MS     Dynamic Sitting Balance standby assist  -MS     Position, Sitting Balance unsupported;sitting in chair  -MS     Sit to Stand Dynamic Balance contact guard;minimal assist  -MS     Static Standing Balance contact guard;minimal assist  -MS     Dynamic Standing Balance minimal assist  -MS     Position/Device Used, Standing Balance supported;walker, front-wheeled  -MS     Balance Interventions standing;static;dynamic;UE activity  with balance activity;combined head and eye movements;weight shifting activity  -MS     Comment, Balance Stepping to colored dots at mello bars  -MS     Row Name 06/08/22 0800          Positioning and Restraints    Pre-Treatment Position sitting in chair/recliner  -MS     In Wheelchair sitting;exit alarm on  awaiting SLP in therapy gym  -MS           User Key  (r) = Recorded By, (t) = Taken By, (c) = Cosigned By    Initials Name Provider Type    MS Tova Bennett, PT Physical Therapist                 Physical Therapy Education                 Title: PT OT SLP Therapies (In Progress)     Topic: Physical Therapy (In Progress)     Point: Mobility training (In Progress)     Learning Progress Summary           Patient Acceptance, E,TB, NR,NL by MS at 6/8/2022 0828    Acceptance, E,TB, VU,NR by MS at 6/6/2022 1045                   Point: Home exercise program (In Progress)     Learning Progress Summary           Patient Acceptance, E,TB, NR,NL by MS at 6/8/2022 0828    Acceptance, E,TB, VU,NR by MS at 6/6/2022 1045                   Point: Body mechanics (In Progress)     Learning Progress Summary           Patient Acceptance, E,TB, NR,NL by MS at 6/8/2022 0828    Acceptance, E,TB, VU,NR by MS at 6/6/2022 1045                   Point: Precautions (In Progress)     Learning Progress Summary           Patient Acceptance, E,TB, NR,NL by MS at 6/8/2022 0828    Acceptance, E,TB, VU,NR by MS at 6/6/2022 1045                               User Key     Initials Effective Dates Name Provider Type Discipline    MS 06/16/21 -  Tova Bennett, PT Physical Therapist PT                   Time Calculation:      PT Charges     Row Name 06/08/22 0824             Time Calculation    Start Time 0800  -MS      Stop Time 0900  -MS      Time Calculation (min) 60 min  -MS      PT Received On 06/08/22  -MS      PT - Next Appointment 06/09/22  -MS            User Key  (r) = Recorded By, (t) = Taken By, (c) = Cosigned By    Initials Name  Provider Type    MS Bennett, Tova HARPER, PT Physical Therapist                Therapy Charges for Today     Code Description Service Date Service Provider Modifiers Qty    84629065041  PT THER PROC EA 15 MIN 6/7/2022 Donald, Tova HARPER, PT GP 3    [id number]  PT THERAPEUTIC ACT EA 15 MIN 6/7/2022 Bennett, Tova HARPER, PT GP 1    [id number]  PT THER PROC EA 15 MIN 6/8/2022 Tova Bennett, PT GP 3    [id number]  PT THERAPEUTIC ACT EA 15 MIN 6/8/2022 Tova Bennett, PT GP 1            PT G-Codes  AM-PAC 6 Clicks Score (PT): 16      Tova Bennett, PT  6/8/2022

## 2022-06-08 NOTE — PROGRESS NOTES
Reviewed ELOS, current status, and d/c goals with patient and son, by phone. Patient hoping he doesn't need to be here for 2 more weeks--feels he is ready to go home sooner. Patient has decreased insight into how much help he requires for functional tasks. Son stated patient's daughter, Adalgisa, is HH aid for VNA and plans to take FMLA when patient d/c to assist at home. Patient has a separate home care policy that will pay for help at home if patient receiving home health services, such as therapy, 3 times/week. Scheduled family conference for Monday, 6/13 at 2:00 p.m.

## 2022-06-08 NOTE — THERAPY TREATMENT NOTE
Inpatient Rehabilitation - Occupational Therapy Treatment Note    [facility]     Patient Name: Alireza Carreon  : 1935  MRN: [id number]    Today's Date: 2022                 Admit Date: 6/3/2022       No diagnosis found.    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[facility])   • Monoclonal gammopathy of unknown significance (MGUS)   • Chronic anticoagulation   • Arteriosclerosis of coronary artery   • Cerebrovascular accident (CVA) (HCC)   • Seizure disorder (HCC)   • Swelling of left lower extremity   • Hearing loss   • Localized edema   • Blister   • Skin nodule of toe of right foot   • Status epilepticus (HCC)   • Immobility syndrome       Past Medical History:   Diagnosis Date   • 3-vessel CAD    • Anticoagulated on warfarin    • Atrial fibrillation (HCC)    • BMI 28.0-28.9,adult    • Bunion, right foot    • Complaints of memory disturbance    • Dementia (HCC)    • Edema    • Encounter for immunization    • Generalized convulsive seizure (HCC)    • Gout    • History of double vision    • HL (hearing loss)    • Hyperlipidemia    • Hypertension    • Left foot pain    • 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    • Movement disorder    • Nocturnal leg cramps    • OA (osteoarthritis)    • Prepatellar effusion    • Pulmonary embolism (HCC)    • Puncture wound of hand, right    • Screening for cardiovascular condition    • Sleep apnea    • Stasis dermatitis    • Statin intolerance    • Stroke (HCC)    • Taking drug for chronic disease    • Thoracic back pain    • Transient ischemic attack        Past Surgical History:   Procedure Laterality Date   • ADENOIDECTOMY     •  APPENDECTOMY     • CATARACT EXTRACTION, BILATERAL     • CORONARY ARTERY BYPASS GRAFT     • CORONARY ARTERY BYPASS GRAFT     • CYSTOSCOPY TRANSURETHRAL RESECTION OF PROSTATE     • HERNIA REPAIR     • OTHER SURGICAL HISTORY      carotid thromboendarterectomy   • SHOULDER SURGERY     • SKIN CANCER EXCISION               IRF OT ASSESSMENT FLOWSHEET (last 12 hours)     IRF OT Evaluation and Treatment     Row Name 06/08/22 1524 06/08/22 1147       OT Time and Intention    Document Type daily treatment  -SG daily treatment  -MW    Mode of Treatment individual therapy;occupational therapy  -SG occupational therapy  -MW    Patient Effort good  -SG good  -MW    Symptoms Noted During/After Treatment none  -SG --    [facility] Name 06/08/22 1524 06/08/22 1147       General Information    Patient Profile Reviewed -- yes  -MW    Patient/Family/Caregiver Comments/Observations Pt sitting up in chair  -SG seated in front of nurse station, NAD  -    Existing Precautions/Restrictions fall  -SG fall  [place], red arm band  -MW    Limitations/Impairments -- safety/cognitive  -MW    Comment, General Information Extremely [place]  -SG --    [facility] Name 06/08/22 1524 06/08/22 1147       Pain Assessment    Pretreatment Pain Rating 0/10 - no pain  -SG 0/10 - no pain  -MW    Posttreatment Pain Rating -- 0/10 - no pain  -MW    [facility] Name 06/08/22 1524 06/08/22 114       Cognition/Psychosocial    Affect/Mental Status (Cognition) -- --  pleasantly confused  -    Orientation Status (Cognition) oriented x 4  -SG --    Follows Commands (Cognition) follows two-step commands;physical/tactile prompts required;repetition of directions required;verbal cues/prompting required;increased processing time needed  -SG --    Personal Safety Interventions gait belt;fall prevention program maintained  -SG --    Row Name 06/08/22 1140          Bathing    [place] Level (Bathing) bathing skills;lower body;upper body;contact guard assist;minimum assist (75% patient effort)   -     Position (Bathing) supported sitting;supported standing  -     Comment (Bathing) pt preferred to complete TBB at sink side this date, cues for safety with standing LB bathing, increased time for task completion  -     Row Name 06/08/22 1147          Upper Body Dressing    [place] Level (Upper Body Dressing) upper body dressing skills;doff;don;pull over garment;set up assistance  -     Position (Upper Body Dressing) supported sitting  -     Comment (Upper Body Dressing) w/c  -     Row Name 06/08/22 1147          Lower Body Dressing    [place] Level (Lower Body Dressing) doff;don;pants/bottoms;shoes/slippers;underwear;minimum assist (75% patient effort)  -     Position (Lower Body Dressing) supported sitting;supported standing  -     Comment (Lower Body Dressing) multiple STS with LBD, grab bar and sink top to assist with balance, cues for hand placement  -     Row Name 06/08/22 1147          Grooming    [place] Level (Grooming) grooming skills;deodorant application;hair care, combing/brushing;oral care regimen;wash face, hands;set up;standby assist  -     Position (Grooming) supported sitting  -     Set-up Assistance (Grooming) obtain supplies  -     Comment (Grooming) w/c at sink side  -     Row Name 06/08/22 1147          Toileting    [place] Level (Toileting) toileting skills;adjust/manage clothing;perform perineal hygiene;minimum assist (75% patient effort)  -     Assistive Device Use (Toileting) raised toilet seat;grab bar/safety frame  -     Position (Toileting) supported standing;supported sitting  -     Comment (Toileting) able to complete posterior hygiene in ssitting/standing from commode with use of grab bar for intermittent assist, no LOBs with task  -     Row Name 06/08/22 1147          Transfers    Sit-Stand [place] (Transfers) minimum assist (75% patient effort);verbal cues;contact guard  -     Stand-Sit [place] (Transfers) minimum  assist (75% patient effort);verbal cues;nonverbal cues (demo/gesture)  -     [place] Level (Toilet Transfer) contact guard;verbal cues;nonverbal cues (demo/gesture)  -     Assistive Device (Toilet Transfer) wheelchair;grab bars/safety frame  -     Row Name 06/08/22 1147          Sit-Stand Transfer    Assistive Device (Sit-Stand Transfers) walker, front-wheeled  -     Row Name 06/08/22 1147          Stand-Sit Transfer    Assistive Device (Stand-Sit Transfers) walker, front-wheeled  -     Row Name 06/08/22 1147          Toilet Transfer    Type (Toilet Transfer) sit-stand;stand-sit  -     Row Name 06/08/22 1524          Elbow/Forearm (Therapeutic Exercise)    Elbow/Forearm Strengthening (Therapeutic Exercise) bilateral;flexion;extension;2 lb free weight;10 repetitions;3 sets  -     Row Name 06/08/22 1524          Balance    Static Standing Balance contact guard;verbal cues  -     Comment, Balance Pt stands at counter for 10 min working on nuts and bolts activity without UE support, rwx present if needed. No LOB but cues needed to stay close to counter  -     Row Name 06/08/22 1524 06/08/22 1147       Positioning and Restraints    Pre-Treatment Position sitting in chair/recliner  - sitting in chair/recliner  -    Post Treatment Position wheelchair  - wheelchair  -    In Wheelchair sitting;exit alarm on  At nsg station  - sitting;exit alarm on  nursing station  -          User Key  (r) = Recorded By, (t) = Taken By, (c) = Cosigned By    Initials Name Effective Dates     Shirley Castellanos OTR 06/16/21 -      Sylvia Brewer OT 08/20/21 -                  Occupational Therapy Education                 Title: PT OT SLP Therapies (In Progress)     Topic: Occupational Therapy (Done)     Point: ADL training (Done)     Description:   Instruct learner(s) on proper safety adaptation and remediation techniques during self care or transfers.   Instruct in proper use of assistive devices.               Learning Progress Summary           Patient Acceptance, E, VU,NR by  at 6/4/2022 1431    Comment: benefit of IRF, role of OT, d/c rec, safety awareness, transfer training, therapy goals                   Point: Home exercise program (Done)     Description:   Instruct learner(s) on appropriate technique for monitoring, assisting and/or progressing therapeutic exercises/activities.              Learning Progress Summary           Patient Acceptance, E, VU,NR by  at 6/4/2022 1431    Comment: benefit of IRF, role of OT, d/c rec, safety awareness, transfer training, therapy goals                   Point: Precautions (Done)     Description:   Instruct learner(s) on prescribed precautions during self-care and functional transfers.              Learning Progress Summary           Patient Acceptance, E, VU,NR by  at 6/4/2022 1431    Comment: benefit of IRF, role of OT, d/c rec, safety awareness, transfer training, therapy goals                   Point: Body mechanics (Done)     Description:   Instruct learner(s) on proper positioning and spine alignment during self-care, functional mobility activities and/or exercises.              Learning Progress Summary           Patient Acceptance, E, VU,NR by  at 6/4/2022 1431    Comment: benefit of IRF, role of OT, d/c rec, safety awareness, transfer training, therapy goals                               User Key     Initials Effective Dates Name Provider Type Discipline     08/20/21 -  Sylvia Brewer OT Occupational Therapist OT                    OT Recommendation and Plan                         Time Calculation:      Time Calculation- OT     Row Name 06/08/22 1534 06/08/22 1100          Time Calculation- OT    OT Start Time 1400  -SG 1100  -MW     OT Stop Time 1430  -SG 1130  -MW     OT Time Calculation (min) 30 min  -SG 30 min  -     OT Received On 06/08/22  -SG --           User Key  (r) = Recorded By, (t) = Taken By, (c) = Cosigned By    Initials Name  Provider Type     Shirley Castellanos OTR Occupational Therapist    MW Sylvia Brewer, OT Occupational Therapist              Therapy Charges for Today     Code Description Service Date Service Provider Modifiers Qty    77979124841  OT NEUROMUSC RE EDUCATION EA 15 MIN 6/8/2022 Shirley Castellanos OTR GO 1    [id number]  OT THER PROC EA 15 MIN 6/8/2022 Shirley Castellanos OTR GO 1                   GEOFFREY Carrasco  6/8/2022

## 2022-06-08 NOTE — PROGRESS NOTES
Inpatient Rehabilitation Plan of Care Note    Plan of Care  Care Plan Reviewed - No updates at this time.    Psychosocial    Performed Intervention(s)  Verbalizes needs & concerns      Safety    Performed Intervention(s)  Falls precautions, safety rounds, Items within reach, Bed/chair alarms, &  Non-skid socks.  red armband      Sphincter Control    Performed Intervention(s)  Monitor intake, output, & BM's each shift.  Use incontinence products as needed.    Signed by: Zayda Laguerre RN

## 2022-06-08 NOTE — PLAN OF CARE
Goal Outcome Evaluation:           Progress: improving  Outcome Evaluation: A&OX4. Impulsive, will get up on own. Continent and incontinent. Wears a brief. Wears glasses and is [place]. Only one HA as one is missing. Ate well at meals. Visited with family. Participated in therapy. No code.

## 2022-06-08 NOTE — PLAN OF CARE
Problem: Rehabilitation (IRF) Plan of Care  Goal: Plan of Care Review  Flowsheets (Taken 6/8/2022 4895)  Progress: improving  Plan of Care Reviewed With: patient  Outcome Evaluation: Pt is A&Ox4, cooperative, pleasant, did c/o neck pain, states tylenol did not help, paged  and he ordered a stronger pain pill for increased pain, red arm band as he can be impulsive, he is very [place], sleeping well after pain medicine, continent of bladder, up to side of bed w/ urinal, or up x1 asst to WC, unsteady gait. Will continue to monitor.   Goal Outcome Evaluation:  Plan of Care Reviewed With: patient        Progress: improving  Outcome Evaluation: Pt is A&Ox4, cooperative, pleasant, did c/o neck pain, states tylenol did not help, paged  and he ordered a stronger pain pill for increased pain, red arm band as he can be impulsive, he is very [place], sleeping well after pain medicine, continent of bladder, up to side of bed w/ urinal, or up x1 asst to WC, unsteady gait. Will continue to monitor.

## 2022-06-08 NOTE — PROGRESS NOTES
Inpatient Rehabilitation Plan of Care Note    Plan of Care  Care Plan Reviewed - No updates at this time.    Psychosocial    Performed Intervention(s)  Verbalizes needs & concerns      Safety    Performed Intervention(s)  Falls precautions, safety rounds, Items within reach, Bed/chair alarms, &  Non-skid socks.  red armband      Sphincter Control    Performed Intervention(s)  Monitor intake, output, & BM's each shift.  Use incontinence products as needed.    Signed by: Peewee Powell RN

## 2022-06-08 NOTE — PROGRESS NOTES
LOS: 5 days   Patient Care Team:  Jose Bingham MD as PCP - General (Internal Medicine)  Yennifer Ruiz MD as Consulting Physician (Cardiology)  Cristian Taylor OD (Optometry)  PARKER Wheeler MD as Consulting Physician (Ophthalmology)  Obdulio Morelos MD as Consulting Physician (Hematology and Oncology)  Zahira Gee MD as Referring Physician (Neurology)  Sanya Sim Jr., DPM as Consulting Physician (Podiatry)      JUANJO CHATMAN  1935      ADMITTING DIAGNOSIS:  1. Status epilepticus w/ Postictal encephalopathy        Subjective     Bright affect.  No new seizure activity noted.  Hard of hearing which limits history.  Indicates he is tolerating therapies.    Objective     Vitals:    06/08/22 1302   BP: 120/68   Pulse: 56   Resp: 18   Temp: 97.2 °F (36.2 °C)   SpO2: 95%       PHYSICAL EXAM:     Constitutional: Not in acute distress.  [place]. Awake and alert and conversant.   HEENT:  Heart: RRR, no murmur.    pitting edema in legs, L >R  Lungs: Good and equal air entry bilaterally.  Nonlabored breathing  Abdomen:Soft. No tenderness or dullness.  Positive bowel sounds  Extremities: No cyanosis, clubbing.  Warm extremities and well-perfused. DJD changes bilat hands.  Neuro: Slow processing but appropriate.  Does follow commands.  Provides resistance in BUE/BLEs  Psych: O x person and hospital  . No delusions. Affect flat.             MEDICATIONS  Scheduled Meds:donepezil, 10 mg, Oral, Nightly  hydroCHLOROthiazide Oral, 25 mg, Oral, Daily  lacosamide, 100 mg, Oral, Q12H  lisinopril, 20 mg, Oral, BID  PHENobarbital, 64.8 mg, Nasogastric, Daily  rivaroxaban, 15 mg, Oral, Daily With Dinner  sodium chloride, 10 mL, Intravenous, Q12H      Continuous Infusions:   PRN Meds:.•  acetaminophen  •  oxyCODONE  •  sodium chloride      RESULTS  No results found for: POCGLU  Results from last 7 days   Lab Units 06/07/22  1022 06/04/22  0801   WBC 10*3/mm3 7.04 5.35   HEMOGLOBIN g/dL  14.2 14.4   HEMATOCRIT % 42.8 44.0   PLATELETS 10*3/mm3 332 273     Results from last 7 days   Lab Units 06/07/22  1022 06/04/22  0801   SODIUM mmol/L 136 135*   POTASSIUM mmol/L 4.6 4.6   CHLORIDE mmol/L 99 95*   CO2 mmol/L 28.0 29.7*   BUN mg/dL 27* 21   CREATININE mg/dL 1.15 1.00   CALCIUM mg/dL 9.2 9.3   BILIRUBIN mg/dL 0.2 0.4   ALK PHOS U/L 75 76   ALT (SGPT) U/L 32 29   AST (SGOT) U/L 28 29   GLUCOSE mg/dL 84 130*           ASSESSMENT and PLAN    Immobility syndrome    2. Status epilepticus w/ Postictal encephalopathy - Admit to Acute inpt rehab with PT, OT, SLP, psychology and rehab medical and nursing care..  3. Seizure d/o - Continue Antiepileptics and monitor levels and side effects. Ativan PRN for seizure.  4. Cellulitis left leg - Completed antibx. Continue care  5. A-Fib - Continue Xarelto  6. HTN - Cont meds and monitor  7. CAD s/p CABG - Monitor response to therapy and inc activity  8. Hypoxia: Likely secondary to hypoventilation and probably obstructive sleep apnea - Check Nocturnal oxygen  9. Bilateral legs edema/venous stasis.  He reports chronic edema in the left lower extremity compared to the right lower extremity secondary to an old injury  10. Vascular dementia - Continue Aricept  11. DVT prophylaxis - anticoagulation / SCDs       Now admit for comprehensive acute inpatient rehabilitation .  This would be an interdisciplinary program with physical therapy 1 hour,  occupational therapy 1 hour, and speech therapy 1 hour, 5 days a week.  Rehabilitation nursing for carryover, monitoring of  neurologic   status, bowel and bladder, and skin  Ongoing physician follow-up.  Weekly team conferences.       The patient's functional status and clinical status is unchanged from preadmission assessment and the patient continues appropriate for acute inpatient rehabilitation.  Goal is for home with  Home health   therapies.  Barrier to discharge:  Impaired mobility and self care  - work on  Strength,  balance, transfers, gait, ADLS, cognition  to overcome.       TEAM CONF - June 7 - BED MIN. TRANSFERS MIN MAD RW. GAIT 80 FEET MIN ASSIST RW, SHUFFLING GAIT. TOILET AND SHOWER TRANSFERS MIN ASSIST. IMPULSIVE IMPACTED BY HEARING. BATH MIN. LBD MOD. UBD MIN. TOILETING MOD ASSIST. MODERATE COGNITIVE IMPAIRMENT WITH MEMORY, ATTENTION, AND EXECUTIVE FUNCTION. TOLERATES REGULAR DIET. CONTINENT FOR THE MOST PART. SKIN INTACT. TYLENOL FOR PAIN.   Memory - Donepezil. Seizure - Lacosamide (Vimpat) and Phenobarbital. Chronic anticoagulation - atrial fibrillation- rivaroxaban (Xarelto).   ELOS - TWO WEEKS    [place]ana Ewing MD      During rounds, used appropriate personal protective equipment including mask and gloves.  Additional gown if indicated.  Mask used was standard procedure mask. Appropriate PPE was worn during the entire visit.  Hand hygiene was completed before and after.

## 2022-06-08 NOTE — THERAPY TREATMENT NOTE
Inpatient Rehabilitation - Occupational Therapy Treatment Note    [facility]     Patient Name: Alireza Carreon  : 1935  MRN: [id number]    Today's Date: 2022                 Admit Date: 6/3/2022       No diagnosis found.    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[facility])   • Monoclonal gammopathy of unknown significance (MGUS)   • Chronic anticoagulation   • Arteriosclerosis of coronary artery   • Cerebrovascular accident (CVA) (HCC)   • Seizure disorder (HCC)   • Swelling of left lower extremity   • Hearing loss   • Localized edema   • Blister   • Skin nodule of toe of right foot   • Status epilepticus (HCC)   • Immobility syndrome       Past Medical History:   Diagnosis Date   • 3-vessel CAD    • Anticoagulated on warfarin    • Atrial fibrillation (HCC)    • BMI 28.0-28.9,adult    • Bunion, right foot    • Complaints of memory disturbance    • Dementia (HCC)    • Edema    • Encounter for immunization    • Generalized convulsive seizure (HCC)    • Gout    • History of double vision    • HL (hearing loss)    • Hyperlipidemia    • Hypertension    • Left foot pain    • 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    • Movement disorder    • Nocturnal leg cramps    • OA (osteoarthritis)    • Prepatellar effusion    • Pulmonary embolism (HCC)    • Puncture wound of hand, right    • Screening for cardiovascular condition    • Sleep apnea    • Stasis dermatitis    • Statin intolerance    • Stroke (HCC)    • Taking drug for chronic disease    • Thoracic back pain    • Transient ischemic attack        Past Surgical History:   Procedure Laterality Date   • ADENOIDECTOMY     •  APPENDECTOMY     • CATARACT EXTRACTION, BILATERAL     • CORONARY ARTERY BYPASS GRAFT     • CORONARY ARTERY BYPASS GRAFT     • CYSTOSCOPY TRANSURETHRAL RESECTION OF PROSTATE     • HERNIA REPAIR     • OTHER SURGICAL HISTORY      carotid thromboendarterectomy   • SHOULDER SURGERY     • SKIN CANCER EXCISION               IRF OT ASSESSMENT FLOWSHEET (last 12 hours)     IRF OT Evaluation and Treatment     Row Name 06/08/22 1147          OT Time and Intention    Document Type daily treatment  -MW     Mode of Treatment occupational therapy  -MW     Patient Effort good  -MW     Row Name 06/08/22 1147          General Information    Patient Profile Reviewed yes  -MW     Patient/Family/Caregiver Comments/Observations seated in front of nurse station, NAD  -MW     Existing Precautions/Restrictions fall  Elim IRA, red arm band  -MW     Limitations/Impairments safety/cognitive  -MW     Row Name 06/08/22 1147          Pain Assessment    Pretreatment Pain Rating 0/10 - no pain  -MW     Posttreatment Pain Rating 0/10 - no pain  -MW     Row Name 06/08/22 1147          Cognition/Psychosocial    Affect/Mental Status (Cognition) --  pleasantly confused  -MW     Row Name 06/08/22 1147          Bathing    [place] Level (Bathing) bathing skills;lower body;upper body;contact guard assist;minimum assist (75% patient effort)  -MW     Position (Bathing) supported sitting;supported standing  -MW     Comment (Bathing) pt preferred to complete TBB at sink side this date, cues for safety with standing LB bathing, increased time for task completion  -MW     Row Name 06/08/22 1147          Upper Body Dressing    [place] Level (Upper Body Dressing) upper body dressing skills;doff;don;pull over garment;set up assistance  -MW     Position (Upper Body Dressing) supported sitting  -MW     Comment (Upper Body Dressing) w/c  -MW     Row Name 06/08/22 1147          Lower Body Dressing    [place] Level (Lower Body Dressing)  doff;don;pants/bottoms;shoes/slippers;underwear;minimum assist (75% patient effort)  -     Position (Lower Body Dressing) supported sitting;supported standing  -     Comment (Lower Body Dressing) multiple STS with LBD, grab bar and sink top to assist with balance, cues for hand placement  -     Row Name 06/08/22 1147          Grooming    [place] Level (Grooming) grooming skills;deodorant application;hair care, combing/brushing;oral care regimen;wash face, hands;set up;standby assist  -     Position (Grooming) supported sitting  -     Set-up Assistance (Grooming) obtain supplies  -     Comment (Grooming) w/c at sink side  -     Row Name 06/08/22 1147          Toileting    [place] Level (Toileting) toileting skills;adjust/manage clothing;perform perineal hygiene;minimum assist (75% patient effort)  -     Assistive Device Use (Toileting) raised toilet seat;grab bar/safety frame  -     Position (Toileting) supported standing;supported sitting  -     Comment (Toileting) able to complete posterior hygiene in ssitting/standing from commode with use of grab bar for intermittent assist, no LOBs with task  -     Row Name 06/08/22 1147          Transfers    Sit-Stand [place] (Transfers) minimum assist (75% patient effort);verbal cues;contact guard  -     Stand-Sit [place] (Transfers) minimum assist (75% patient effort);verbal cues;nonverbal cues (demo/gesture)  -     [place] Level (Toilet Transfer) contact guard;verbal cues;nonverbal cues (demo/gesture)  [facility]     Assistive Device (Toilet Transfer) wheelchair;grab bars/safety frame  -     Row Name 06/08/22 1147          Sit-Stand Transfer    Assistive Device (Sit-Stand Transfers) walker, front-wheeled  -     Row Name 06/08/22 1147          Stand-Sit Transfer    Assistive Device (Stand-Sit Transfers) walker, front-wheeled  -MW     Row Name 06/08/22 1147          Toilet Transfer    Type (Toilet Transfer) sit-stand;stand-sit   -     Row Name 06/08/22 1147          Positioning and Restraints    Pre-Treatment Position sitting in chair/recliner  -     Post Treatment Position wheelchair  -     In Wheelchair sitting;exit alarm on  nursing station  -           User Key  (r) = Recorded By, (t) = Taken By, (c) = Cosigned By    Initials Name Effective Dates    MW Sylvia Brewer, OT 08/20/21 -                  Occupational Therapy Education                 Title: PT OT SLP Therapies (In Progress)     Topic: Occupational Therapy (Done)     Point: ADL training (Done)     Description:   Instruct learner(s) on proper safety adaptation and remediation techniques during self care or transfers.   Instruct in proper use of assistive devices.              Learning Progress Summary           Patient Acceptance, E, VU,NR by  at 6/4/2022 1431    Comment: benefit of IRF, role of OT, d/c rec, safety awareness, transfer training, therapy goals                   Point: Home exercise program (Done)     Description:   Instruct learner(s) on appropriate technique for monitoring, assisting and/or progressing therapeutic exercises/activities.              Learning Progress Summary           Patient Acceptance, E, VU,NR by  at 6/4/2022 1431    Comment: benefit of IRF, role of OT, d/c rec, safety awareness, transfer training, therapy goals                   Point: Precautions (Done)     Description:   Instruct learner(s) on prescribed precautions during self-care and functional transfers.              Learning Progress Summary           Patient Acceptance, E, VU,NR by  at 6/4/2022 1431    Comment: benefit of IRF, role of OT, d/c rec, safety awareness, transfer training, therapy goals                   Point: Body mechanics (Done)     Description:   Instruct learner(s) on proper positioning and spine alignment during self-care, functional mobility activities and/or exercises.              Learning Progress Summary           Patient Acceptance, E, VU,NR by  JAIRON at 6/4/2022 1431    Comment: benefit of IRF, role of OT, d/c rec, safety awareness, transfer training, therapy goals                               User Key     Initials Effective Dates Name Provider Type Discipline     08/20/21 -  Sylvia Brewer OT Occupational Therapist OT                    OT Recommendation and Plan    Planned Therapy Interventions (OT): activity tolerance training, BADL retraining, functional balance retraining, neuromuscular control/coordination retraining, occupation/activity based interventions, patient/caregiver education/training, ROM/therapeutic exercise, strengthening exercise, transfer/mobility retraining                    Time Calculation:      Time Calculation- OT     Row Name 06/08/22 1100             Time Calculation- OT    OT Start Time 1100  -MW      OT Stop Time 1130  -MW      OT Time Calculation (min) 30 min  -MW            User Key  (r) = Recorded By, (t) = Taken By, (c) = Cosigned By    Initials Name Provider Type    Sylvia Caro OT Occupational Therapist              Therapy Charges for Today     Code Description Service Date Service Provider Modifiers Qty    98787297682 HC OT SELF CARE/MGMT/TRAIN EA 15 MIN 6/8/2022 Sylvia Brewer OT GO 2                   Sylvia Brewer OT  6/8/2022

## 2022-06-09 NOTE — PROGRESS NOTES
Inpatient Rehabilitation Plan of Care Note    Plan of Care  Care Plan Reviewed - No updates at this time.    Psychosocial    [RN] Coping/Adjustment(Active)  Current Status(06/09/2022): Expresses appropriate coping  Weekly Goal(06/14/2022): Allow opportunity to express concerns regarding current  status.  Discharge Goal: Adequate coping regarding life changes with ongoing support.    Performed Intervention(s)  Verbalizes needs & concerns      Safety    [RN] Potential for Injury(Active)  Current Status(06/09/2022): Hx falls; impulsive. Attempts to get up without  using call light.  Weekly Goal(06/15/2022): Instruct patient regarding safety precautions & need  for close supervision.  Discharge Goal: Patient /family will be aware of risk of fall & safety in the  home setting    Performed Intervention(s)  Falls precautions, safety rounds, Items within reach, Bed/chair alarms, &  Non-skid socks.  red armband      Sphincter Control    [RN] Bladder & Bowel management(Active)  Current Status(06/09/2022): Incontinent of B&B at times.  Weekly Goal(06/14/2022): Continent 75%  Discharge Goal: Continent 100%    Performed Intervention(s)  Monitor intake, output, & BM's each shift.  Use incontinence products as needed.    Signed by: Leila Foote RN

## 2022-06-09 NOTE — PLAN OF CARE
Goal Outcome Evaluation:  Plan of Care Reviewed With: patient             Problem: Rehabilitation (IRF) Plan of Care  Goal: Plan of Care Review  Outcome: Ongoing, Progressing  Flowsheets (Taken 6/9/2022 0256)  Plan of Care Reviewed With: patient  Outcome Evaluation: Alert and oriented x 4. Lummi. Very impulsive, wearing red arm band. Continent with incontinent episodes. On seizure and swallow precautions. suction at bedside. Conplained of neck pain, PRN oxycodone administered at HS. Turned and repositioned q 2 on rounding. No unsafe behaviors. Resting well with eyes closed. Call light within reach.

## 2022-06-09 NOTE — THERAPY TREATMENT NOTE
Inpatient Rehabilitation - Speech Language Pathology Treatment Note    [facility]     Patient Name: Alireza Carreon  : 1935  MRN: [id number]    Today's Date: 2022                   Admit Date: 6/3/2022       Visit Dx:    No diagnosis found.    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[facility])   • Monoclonal gammopathy of unknown significance (MGUS)   • Chronic anticoagulation   • Arteriosclerosis of coronary artery   • Cerebrovascular accident (CVA) (HCC)   • Seizure disorder (HCC)   • Swelling of left lower extremity   • Hearing loss   • Localized edema   • Blister   • Skin nodule of toe of right foot   • Status epilepticus (HCC)   • Immobility syndrome       Past Medical History:   Diagnosis Date   • 3-vessel CAD    • Anticoagulated on warfarin    • Atrial fibrillation (HCC)    • BMI 28.0-28.9,adult    • Bunion, right foot    • Complaints of memory disturbance    • Dementia (HCC)    • Edema    • Encounter for immunization    • Generalized convulsive seizure (HCC)    • Gout    • History of double vision    • HL (hearing loss)    • Hyperlipidemia    • Hypertension    • Left foot pain    • 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    • Movement disorder    • Nocturnal leg cramps    • OA (osteoarthritis)    • Prepatellar effusion    • Pulmonary embolism (HCC)    • Puncture wound of hand, right    • Screening for cardiovascular condition    • Sleep apnea    • Stasis dermatitis    • Statin intolerance    • Stroke (HCC)    • Taking drug for chronic disease    • Thoracic back pain    • Transient ischemic attack        Past Surgical History:   Procedure Laterality Date   •  ADENOIDECTOMY     • APPENDECTOMY     • CATARACT EXTRACTION, BILATERAL     • CORONARY ARTERY BYPASS GRAFT     • CORONARY ARTERY BYPASS GRAFT     • CYSTOSCOPY TRANSURETHRAL RESECTION OF PROSTATE     • HERNIA REPAIR     • OTHER SURGICAL HISTORY      carotid thromboendarterectomy   • SHOULDER SURGERY     • SKIN CANCER EXCISION         SLP Recommendation and Plan                                                   SLP EVALUATION (last 72 hours)     SLP SLC Evaluation     Row Name 06/09/22 1200 06/09/22 1000 06/08/22 1300 06/08/22 0900 06/07/22 1200       Communication Assessment/Intervention    Document Type therapy note (daily note)  -SR therapy note (daily note)  -SR therapy note (daily note)  -SR therapy note (daily note)  -SR therapy note (daily note)  -SR    Subjective Information no complaints  -SR no complaints  -SR no complaints  -SR no complaints  -SR no complaints  -SR    Patient Observations alert;cooperative;agree to therapy  -SR alert;cooperative;agree to therapy  -SR alert;cooperative;agree to therapy  -SR alert;cooperative;agree to therapy  -SR alert;agree to therapy;cooperative  -SR    Patient Effort good  -SR good  -SR good  -SR good  -SR good  -SR    Symptoms Noted During/After Treatment none  -SR none  -SR none  -SR none  -SR none  -SR       Pain Scale: Numbers Pre/Post-Treatment    Pretreatment Pain Rating 0/10 - no pain  -SR 0/10 - no pain  -SR 0/10 - no pain  -SR 0/10 - no pain  -SR 0/10 - no pain  -SR    Posttreatment Pain Rating 0/10 - no pain  -SR 0/10 - no pain  -SR 0/10 - no pain  -SR 0/10 - no pain  -SR 0/10 - no pain  -SR    Row Name 06/07/22 1000                   Communication Assessment/Intervention    Document Type therapy note (daily note)  -SR        Subjective Information no complaints  -SR        Patient Observations alert;cooperative;agree to therapy  -SR        Patient Effort good  -SR        Symptoms Noted During/After Treatment none  -SR                  Pain Scale: Numbers  Pre/Post-Treatment    Pretreatment Pain Rating 0/10 - no pain  -SR        Posttreatment Pain Rating 0/10 - no pain  -SR              User Key  (r) = Recorded By, (t) = Taken By, (c) = Cosigned By    Initials Name Effective Dates    SR Evelina Gibson, LANEY 01/12/22 -                    EDUCATION    The patient has been educated in the following areas:       Cognitive Impairment.             SLP GOALS     Row Name 06/09/22 1200 06/09/22 1000 06/08/22 1300       Attention Goal 1 (SLP)    Improve Attention by Goal 1 (SLP) -- complete selective attention task;complete divided attention task;70%;independently (over 90% accuracy)  -SR complete selective attention task;complete divided attention task;70%;independently (over 90% accuracy)  -SR    Time Frame (Attention Goal 1, SLP) -- by discharge  -SR by discharge  -SR    Progress/Outcomes (Attention Goal 1, SLP) -- continuing progress toward goal  -SR continuing progress toward goal  -SR    Comment (Attention Goal 1, SLP) -- Followed 2 step-4 component written directions with 80% acc given NO cues. Attended to task without redirection.  -SR Divided attn; completed task with 70% acc given NO cues. Overall required min cues for redirection to task during PM therapy session.  -SR       Memory Skills Goal 1 (SLP)    Improve Memory Skills Through Goal 1 (SLP) recalling related word lists immediately;recalling related word lists with an imposed delay;recalling unrelated word lists immediately;recalling unrelated word lists with an imposed delay;visual memory task;listen to a paragraph and answer questions;70%;with minimal cues (75-90%)  -SR recalling related word lists immediately;recalling related word lists with an imposed delay;recalling unrelated word lists immediately;recalling unrelated word lists with an imposed delay;visual memory task;listen to a paragraph and answer questions;70%;with minimal cues (75-90%)  -SR --    Time Frame (Memory Skills Goal 1, SLP) by discharge   "-SR by discharge  -SR --    Progress/Outcomes (Memory Skills Goal 1, SLP) good progress toward goal  -SR good progress toward goal  -SR --    Comment (Memory Skills Goal 1, SLP) Paragraph retention; Pt read paragraph and answered questions during immediate recall task with 90% acc given NO cues  -SR Visual memory; recalled details from visual scene with 50% acc given NO cues. When he was asked the questions, pt stated \"I don't remember you asking me to look at the whole picture.\" Accuracy increased with verbal choice cues.  -SR --       Functional Problem Solving Skills Goal 1 (SLP)    Improve Problem Solving Through Goal 1 (SLP) -- tell similarity between items;complete organization/home management task;sequence steps in a task;answer questions about ADL problems;70%;independently (over 90% accuracy)  -SR --    Time Frame (Problem Solving Goal 1, SLP) -- by discharge  -SR --    Progress/Outcomes (Problem Solving Goal 1, SLP) -- good progress toward goal  -SR --    Comment (Problem Solving Goal 1, SLP) -- Provided problem and solution for visual picture scenes regarding everyday situations with 90% acc given NO cues.  -SR --       Executive Functional Skills Goal 1 (SLP)    Improve Executive Function Skills Goal 1 (SLP) demonstrate awareness of deficit;time management activity;home management activity;70%;with minimal cues (75-90%)  -SR -- demonstrate awareness of deficit;time management activity;home management activity;70%;with minimal cues (75-90%)  -SR    Time Frame (Executive Function Skills Goal 1, SLP) by discharge  -SR -- by discharge  -SR    Progress/Outcomes (Executive Function Skills Goal 1, SLP) good progress toward goal  -SR -- good progress toward goal  -SR    Comment (Executive Function Skills Goal 1, SLP) Followed directions using street map with 60% acc given NO cues; 90% acc given min cues. Extended time to complete task.  -SR -- [place] calculation; adding total amounts; 80% acc given NO cues. " Repetition of instruction.  -SR    Row Name 06/08/22 0900 06/07/22 1200 06/07/22 1000       Oral Nutrition/Hydration Goal 1 (SLP)    Oral Nutrition/Hydration Goal 1, SLP -- -- Tolerate least restrictive diet with no overt s/s aspiration.  -SR    Time Frame (Oral Nutrition/Hydration Goal 1, SLP) -- -- by discharge  -SR    Barriers (Oral Nutrition/Hydration Goal 1, SLP) -- -- RN reported patient coughing during lunch yesterday. During AM therapy, patient tolerated mechanical peaches and regular cracker with no overt s/sx of aspiration/penetration. Verbal cues to slow down and take one bite at time. Demonstrated no overt s/sx of aspiration with thin liquid water by cup and straw throughout session. Clear vocal quality. Will continue to follow for diet tolerance.  -SR       Attention Goal 1 (SLP)    Improve Attention by Goal 1 (SLP) -- complete selective attention task;70%;with minimal cues (75-90%)  -SR complete selective attention task;70%;with minimal cues (75-90%)  -SR    Time Frame (Attention Goal 1, SLP) -- by discharge  -SR by discharge  -SR    Progress/Outcomes (Attention Goal 1, SLP) -- continuing progress toward goal  -SR continuing progress toward goal  -SR    Comment (Attention Goal 1, SLP) -- Overall, required min cues for redirection to task during PM session  -SR Followed written directions with 40% acc given NO cues; 80% acc given min cues.  -SR       Memory Skills Goal 1 (SLP)    Improve Memory Skills Through Goal 1 (SLP) recalling related word lists immediately;recalling related word lists with an imposed delay;recalling unrelated word lists immediately;recalling unrelated word lists with an imposed delay;visual memory task;listen to a paragraph and answer questions;70%;with minimal cues (75-90%)  -SR recalling related word lists immediately;recalling related word lists with an imposed delay;recalling unrelated word lists immediately;recalling unrelated word lists with an imposed delay;visual memory  task;listen to a paragraph and answer questions;70%;with minimal cues (75-90%)  -SR --    Time Frame (Memory Skills Goal 1, SLP) by discharge  -SR by discharge  -SR --    Progress (Memory Skills Goal 1, SLP) -- other (comment)  Mental manipulation; repeated words in order occurance with 80% acc given min cues.  -SR --    Progress/Outcomes (Memory Skills Goal 1, SLP) good progress toward goal  -SR good progress toward goal  -SR --    Comment (Memory Skills Goal 1, SLP) Visual memory (newspaper advertisement); recalled details with 50% acc given NO cues; 70% acc given min cues.  -SR Recalled ingredients for recipe with 80% acc given NO cues.  -SR --       Functional Problem Solving Skills Goal 1 (SLP)    Improve Problem Solving Through Goal 1 (SLP) tell similarity between items;complete organization/home management task;sequence steps in a task;70%;independently (over 90% accuracy)  -SR -- --    Time Frame (Problem Solving Goal 1, SLP) by discharge  -SR -- --    Progress/Outcomes (Problem Solving Goal 1, SLP) good progress toward goal  -SR -- --    Comment (Problem Solving Goal 1, SLP) Deduction by exclusion; followed directions to find calendar date- 67% acc given min cues; 100% acc given mod cues. Difficulty following directions during activity. Extended time to complete task  -SR -- --       Executive Functional Skills Goal 1 (SLP)    Improve Executive Function Skills Goal 1 (SLP) -- demonstrate awareness of deficit;time management activity;home management activity;70%;with minimal cues (75-90%)  -SR --    Time Frame (Executive Function Skills Goal 1, SLP) -- by discharge  -SR --    Progress/Outcomes (Executive Function Skills Goal 1, SLP) -- good progress toward goal  -SR --    Comment (Executive Function Skills Goal 1, SLP) -- Check writing activity; 80% acc given NO cues. Pt explained that he does not normally write checks. His daughter manages finances.  -SR --          User Key  (r) = Recorded By, (t) = Taken  By, (c) = Cosigned By    Initials Name Provider Type    Evelina Flanagan SLP Speech and Language Pathologist                            Time Calculation:        Time Calculation- SLP     Row Name 06/09/22 1508 06/09/22 1133          Time Calculation- SLP    SLP Start Time 1230  -SR 1030  -SR     SLP Stop Time 1300  -SR 1100  -SR     SLP Time Calculation (min) 30 min  -SR 30 min  -SR           User Key  (r) = Recorded By, (t) = Taken By, (c) = Cosigned By    Initials Name Provider Type    Evelina Flanagan SLP Speech and Language Pathologist                  Therapy Charges for Today     Code Description Service Date Service Provider Modifiers Qty    32747660984 HC ST DEV OF COGN SKILLS INITIAL 15 MIN 6/8/2022 Evelina Gibson SLP  1    [id number] HC ST DEV OF COGN SKILLS EACH ADDT'L 15 MIN 6/8/2022 Evelina Gibson SLP  3    [id number] HC ST DEV OF COGN SKILLS INITIAL 15 MIN 6/9/2022 Evelina Gibson SLP  1    [id number] HC ST DEV OF COGN SKILLS EACH ADDT'L 15 MIN 6/9/2022 Evelina Gibson SLP  3                           LANEY Solis  6/9/2022

## 2022-06-09 NOTE — PLAN OF CARE
Goal Outcome Evaluation:           Progress: improving  Outcome Evaluation: A&OX4. Impulsive, will get up on own. Continent and incontinent. Wears a brief. Wears glasses and is [place]. Only one HA as one is missing. Ate well at meals. Visited with family. Participated in therapy. No code.

## 2022-06-09 NOTE — THERAPY PROGRESS REPORT/RE-CERT
Inpatient Rehabilitation - Occupational Therapy Progress Note    [facility]     Patient Name: Alireza Carreon  : 1935  MRN: [id number]    Today's Date: 2022                 Admit Date: 6/3/2022       No diagnosis found.    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[facility])   • Monoclonal gammopathy of unknown significance (MGUS)   • Chronic anticoagulation   • Arteriosclerosis of coronary artery   • Cerebrovascular accident (CVA) (HCC)   • Seizure disorder (HCC)   • Swelling of left lower extremity   • Hearing loss   • Localized edema   • Blister   • Skin nodule of toe of right foot   • Status epilepticus (HCC)   • Immobility syndrome       Past Medical History:   Diagnosis Date   • 3-vessel CAD    • Anticoagulated on warfarin    • Atrial fibrillation (HCC)    • BMI 28.0-28.9,adult    • Bunion, right foot    • Complaints of memory disturbance    • Dementia (HCC)    • Edema    • Encounter for immunization    • Generalized convulsive seizure (HCC)    • Gout    • History of double vision    • HL (hearing loss)    • Hyperlipidemia    • Hypertension    • Left foot pain    • 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    • Movement disorder    • Nocturnal leg cramps    • OA (osteoarthritis)    • Prepatellar effusion    • Pulmonary embolism (HCC)    • Puncture wound of hand, right    • Screening for cardiovascular condition    • Sleep apnea    • Stasis dermatitis    • Statin intolerance    • Stroke (HCC)    • Taking drug for chronic disease    • Thoracic back pain    • Transient ischemic attack        Past Surgical History:   Procedure Laterality Date   • ADENOIDECTOMY     •  APPENDECTOMY     • CATARACT EXTRACTION, BILATERAL     • CORONARY ARTERY BYPASS GRAFT     • CORONARY ARTERY BYPASS GRAFT     • CYSTOSCOPY TRANSURETHRAL RESECTION OF PROSTATE     • HERNIA REPAIR     • OTHER SURGICAL HISTORY      carotid thromboendarterectomy   • SHOULDER SURGERY     • SKIN CANCER EXCISION               IRF OT ASSESSMENT FLOWSHEET (last 12 hours)     IRF OT Evaluation and Treatment     Row Name 06/09/22 1539          OT Time and Intention    Document Type daily treatment;progress note  -CC     Mode of Treatment occupational therapy  -CC     Patient Effort good  -CC     Symptoms Noted During/After Treatment none  -CC     Row Name 06/09/22 1539          Pain Assessment    Pretreatment Pain Rating 0/10 - no pain  -CC     Posttreatment Pain Rating 0/10 - no pain  -CC     Row Name 06/09/22 1539          Cognition/Psychosocial    Orientation Status (Cognition) oriented x 4  -CC     Follows Commands (Cognition) follows one-step commands;follows two-step commands  -CC     Personal Safety Interventions fall prevention program maintained;gait belt;nonskid shoes/slippers when out of bed  -CC     Attention Deficit (Cognition) concentration  -CC     Row Name 06/09/22 1539          Bathing    [place] Level (Bathing) bathing skills;lower body;upper body;contact guard assist;verbal cues;nonverbal cues (demo/gesture)  -CC     Position (Bathing) sink side;supported sitting;supported standing  -CC     Set-up Assistance (Bathing) adjust water temperature;obtain supplies  -CC     Comment (Bathing) refused shower this date  -CC     Row Name 06/09/22 1539          Upper Body Dressing    [place] Level (Upper Body Dressing) upper body dressing skills;doff;don;pull over garment;minimum assist (75% or more patient effort);verbal cues  -CC     Position (Upper Body Dressing) supported sitting  -CC     Set-up Assistance (Upper Body Dressing) obtain clothing  -CC     Comment (Upper Body Dressing) incraesed assist needed  secondary to distraction/attention  -     Row Name 06/09/22 1539          Lower Body Dressing    [place] Level (Lower Body Dressing) doff;don;pants/bottoms;shoes/slippers;underwear;minimum assist (75% patient effort)  -     Position (Lower Body Dressing) supported sitting;supported standing  -     Set-up Assistance (Lower Body Dressing) obtain clothing  -[facility] Name 06/09/22 1539          Grooming    [place] Level (Grooming) grooming skills;deodorant application;hair care, combing/brushing;oral care regimen;wash face, hands;set up;standby assist  -     Position (Grooming) supported sitting  -     Set-up Assistance (Grooming) obtain supplies  -[facility] Name 06/09/22 1539          Toileting    [place] Level (Toileting) toileting skills;adjust/manage clothing;perform perineal hygiene;minimum assist (75% patient effort)  -     Assistive Device Use (Toileting) grab bar/safety frame;raised toilet seat  -     Position (Toileting) supported sitting;supported standing  -[facility] Name 06/09/22 1539          Bed Mobility    Comment, (Bed Mobility) in w/c  -[facility] Name 06/09/22 1539          Transfers    Sit-Stand [place] (Transfers) contact guard  -     Stand-Sit [place] (Transfers) contact guard  -     [place] Level (Toilet Transfer) contact guard;verbal cues;nonverbal cues (demo/gesture)  -     Assistive Device (Toilet Transfer) commode chair  -[facility] Name 06/09/22 1539          Sit-Stand Transfer    Assistive Device (Sit-Stand Transfers) wheelchair  -[facility] Name 06/09/22 1539          Stand-Sit Transfer    Assistive Device (Stand-Sit Transfers) wheelchair  -[facility] Name 06/09/22 1539          Toilet Transfer    Type (Toilet Transfer) sit-stand;stand-sit  -[facility] Name 06/09/22 1539          Shoulder (Therapeutic Exercise)    Shoulder (Therapeutic Exercise) strengthening exercise;wand exercises  -     Shoulder Wand Exercises (Therapeutic Exercise)  flexion;extension;aBduction;aDduction;sitting;10 repetitions;2 sets  2# dowel  -     Row Name 06/09/22 1539          Elbow/Forearm (Therapeutic Exercise)    Elbow/Forearm (Therapeutic Exercise) strengthening exercise  -     Elbow/Forearm Strengthening (Therapeutic Exercise) bilateral;flexion;extension;supination;pronation;sitting;2 lb free weight;15 repititions;3 sets  -     Row Name 06/09/22 1539          Balance    Balance Interventions standing;static;minimal challenge;UE activity with balance activity;weight shifting activity  tossed bean bags in container placed at floor level. unilater support w wx placed on L. CGA/min. vc for base of support  -     Row Name 06/09/22 1539          Positioning and Restraints    Pre-Treatment Position sitting in chair/recliner  -     Post Treatment Position wheelchair  -     In Wheelchair sitting;with nsg;exit alarm on;with family/caregiver  -     Row Name 06/09/22 1539          Transfer Goal 1 (OT-IRF)    Progress/Outcomes (Transfer Goal 1, OT-IRF) goal ongoing  -     Row Name 06/09/22 1539          Transfer Goal 2 (OT-IRF)    Progress/Outcomes (Transfer Goal 2, OT-IRF) goal ongoing  -     Row Name 06/09/22 1539          Bathing Goal 1 (OT-IRF)    Progress/Outcomes (Bathing Goal 1, OT-IRF) goal ongoing  -     Row Name 06/09/22 1539          UB Dressing Goal 1 (OT-IRF)    [place] (UB Dress Goal 1, OT-IRF) standby assist  -     Progress/Outcomes (UB Dressing Goal 1, OT-IRF) goal ongoing;goal revised this date  -     Row Name 06/09/22 1539          LB Dressing Goal 1 (OT-IRF)    Progress/Outcomes (LB Dressing Goal 1, OT-IRF) goal ongoing  -     Row Name 06/09/22 1539          LB Dressing Goal 2 (OT-IRF)    Progress/Outcomes (LB Dressing Goal 2, OT-IRF) goal ongoing  -     Row Name 06/09/22 1539          Grooming Goal 1 (OT-IRF)    [place] (Grooming Goal 1, OT-IRF) supervision required  -     Progress/Outcomes (Grooming Goal 1, OT-IRF) goal  revised this date  -     Row Name 06/09/22 1539          Toileting Goal 1 (OT-IRF)    Progress/Outcomes (Toileting Goal 1, OT-IRF) goal ongoing  -     Row Name 06/09/22 1539          Toileting Goal 2 (OT-IRF)    Progress/Outcomes (Toileting Goal 2, OT-IRF) goal ongoing  -     Row Name 06/09/22 1539          Strength Goal 1 (OT-IRF)    Progress/Outcomes (Strength Goal 1, OT-IRF) goal ongoing  -     Row Name 06/09/22 1539          Balance Goal 1 (OT)    Progress/Outcomes (Balance Goal 1, OT) goal ongoing  -     Row Name 06/09/22 1539           Activity Tolerance Goal 1 (OT)    Progress/Outcome (Activity Tolerance Goal 1, OT) goal met  -     Row Name 06/09/22 1539          Caregiver Training Goal 1 (OT-IRF)    Progress/Outcomes (Caregiver Training Goal 1, OT-IRF) goal ongoing  -     Row Name 06/09/22 1539          Safety Awareness Goal 1 (OT-IRF)    Progress/Outcomes (Safety Awareness Goal 1, OT-IRF) goal ongoing  -           User Key  (r) = Recorded By, (t) = Taken By, (c) = Cosigned By    Initials Name Effective Dates     Jenny Jones, OTR 06/16/21 -                  Occupational Therapy Education                 Title: PT OT SLP Therapies (In Progress)     Topic: Occupational Therapy (Done)     Point: ADL training (Done)     Description:   Instruct learner(s) on proper safety adaptation and remediation techniques during self care or transfers.   Instruct in proper use of assistive devices.              Learning Progress Summary           Patient Acceptance, E, VU,NR by MW at 6/4/2022 1431    Comment: benefit of IRF, role of OT, d/c rec, safety awareness, transfer training, therapy goals                   Point: Home exercise program (Done)     Description:   Instruct learner(s) on appropriate technique for monitoring, assisting and/or progressing therapeutic exercises/activities.              Learning Progress Summary           Patient Acceptance, E, VU,NR by MW at 6/4/2022 1431    Comment:  benefit of IRF, role of OT, d/c rec, safety awareness, transfer training, therapy goals                   Point: Precautions (Done)     Description:   Instruct learner(s) on prescribed precautions during self-care and functional transfers.              Learning Progress Summary           Patient Acceptance, E, VU,NR by  at 6/4/2022 1431    Comment: benefit of IRF, role of OT, d/c rec, safety awareness, transfer training, therapy goals                   Point: Body mechanics (Done)     Description:   Instruct learner(s) on proper positioning and spine alignment during self-care, functional mobility activities and/or exercises.              Learning Progress Summary           Patient Acceptance, E, VU,NR by  at 6/4/2022 1431    Comment: benefit of IRF, role of OT, d/c rec, safety awareness, transfer training, therapy goals                               User Key     Initials Effective Dates Name Provider Type Discipline     08/20/21 -  Sylvia Brewer, OT Occupational Therapist OT                    OT Recommendation and Plan                         Time Calculation:      Time Calculation- OT     Row Name 06/09/22 1430 06/09/22 0900          Time Calculation- OT    OT Start Time 1430  -CC 0900  -CC     OT Stop Time 1515  -CC 0930  -CC     OT Time Calculation (min) 45 min  -CC 30 min  -CC     OT Goal Re-Cert Due Date 06/16/22  -CC --           User Key  (r) = Recorded By, (t) = Taken By, (c) = Cosigned By    Initials Name Provider Type     Jenny Jones OTR Occupational Therapist              Therapy Charges for Today     Code Description Service Date Service Provider Modifiers Qty    88271167114 HC OT SELF CARE/MGMT/TRAIN EA 15 MIN 6/9/2022 Jenny Jones OTR GO 3    19503389539 HC OT THER PROC EA 15 MIN 6/9/2022 Jenny Jones OTR GO 1    93046857101 HC OT NEUROMUSC RE EDUCATION EA 15 MIN 6/9/2022 Jenny Jones OTR GO 1                   GEOFFREY Ge  6/9/2022

## 2022-06-09 NOTE — THERAPY TREATMENT NOTE
Inpatient Rehabilitation - Physical Therapy Treatment Note       [facility]     Patient Name: Alireza Carreon  : 1935  MRN: [id number]    Today's Date: 2022                    Admit Date: 6/3/2022      Visit Dx:   No diagnosis found.    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[facility])   • Monoclonal gammopathy of unknown significance (MGUS)   • Chronic anticoagulation   • Arteriosclerosis of coronary artery   • Cerebrovascular accident (CVA) (HCC)   • Seizure disorder (HCC)   • Swelling of left lower extremity   • Hearing loss   • Localized edema   • Blister   • Skin nodule of toe of right foot   • Status epilepticus (HCC)   • Immobility syndrome       Past Medical History:   Diagnosis Date   • 3-vessel CAD    • Anticoagulated on warfarin    • Atrial fibrillation (HCC)    • BMI 28.0-28.9,adult    • Bunion, right foot    • Complaints of memory disturbance    • Dementia (HCC)    • Edema    • Encounter for immunization    • Generalized convulsive seizure (HCC)    • Gout    • History of double vision    • HL (hearing loss)    • Hyperlipidemia    • Hypertension    • Left foot pain    • 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    • Movement disorder    • Nocturnal leg cramps    • OA (osteoarthritis)    • Prepatellar effusion    • Pulmonary embolism (HCC)    • Puncture wound of hand, right    • Screening for cardiovascular condition    • Sleep apnea    • Stasis dermatitis    • Statin intolerance    • Stroke (HCC)    • Taking drug for chronic disease    • Thoracic back pain    • Transient ischemic attack        Past Surgical History:   Procedure Laterality Date   • ADENOIDECTOMY      • APPENDECTOMY     • CATARACT EXTRACTION, BILATERAL     • CORONARY ARTERY BYPASS GRAFT     • CORONARY ARTERY BYPASS GRAFT     • CYSTOSCOPY TRANSURETHRAL RESECTION OF PROSTATE     • HERNIA REPAIR     • OTHER SURGICAL HISTORY      carotid thromboendarterectomy   • SHOULDER SURGERY     • SKIN CANCER EXCISION         PT ASSESSMENT (last 12 hours)     IRF PT Evaluation and Treatment     Row Name 06/09/22 0815          PT Time and Intention    Document Type daily treatment  -MS     Mode of Treatment physical therapy  -MS     Patient/Family/Caregiver Comments/Observations Both sessions: sitting in front of nsg station, NAD, exit alarm on  -MS     Total Minutes, Physical Therapy 60  -MS     Row Name 06/09/22 0815          General Information    Existing Precautions/Restrictions fall  [place], red arm band  -MS     Limitations/Impairments safety/cognitive  -MS     Row Name 06/09/22 0815          Pain Assessment    Pretreatment Pain Rating 0/10 - no pain  -MS     Posttreatment Pain Rating 0/10 - no pain  -MS     Row Name 06/09/22 0815          Cognition/Psychosocial    Affect/Mental Status (Cognition) --  pleasantly confused  -MS     Orientation Status (Cognition) oriented x 4  -MS     Follows Commands (Cognition) follows two-step commands;physical/tactile prompts required;repetition of directions required;verbal cues/prompting required;increased processing time needed  -MS     Cognitive Function memory deficit;safety deficit;executive function deficit;attention deficit  -MS     Attention Deficit (Cognition) concentration;distractible in noisy environment;distractible in quiet environment;perseverates on recent conversation;perseverates on recent task  -MS     Memory Deficit (Cognition) moderate deficit  -MS     Safety Deficit (Cognition) judgment;insight into deficits/self-awareness  -MS     Row Name 06/09/22 0815          Transfers    Sit-Stand [place] (Transfers) minimum assist (75% patient effort);verbal cues;contact  guard  -MS     Stand-Sit [place] (Transfers) minimum assist (75% patient effort);verbal cues;nonverbal cues (demo/gesture)  -MS     [place] Level (Toilet Transfer) contact guard;verbal cues;nonverbal cues (demo/gesture)  -MS     Assistive Device (Toilet Transfer) wheelchair;grab bars/safety frame  -MS     Row Name 06/09/22 0815          Sit-Stand Transfer    Assistive Device (Sit-Stand Transfers) walker, front-wheeled  -MS     Row Name 06/09/22 0815          Stand-Sit Transfer    Assistive Device (Stand-Sit Transfers) walker, front-wheeled  -MS     Row Name 06/09/22 0815          Toilet Transfer    Type (Toilet Transfer) sit-stand;stand-sit  -MS     Row Name 06/09/22 0815          Gait/Stairs (Locomotion)    [place] Level (Gait) contact guard;minimum assist (75% patient effort);verbal cues;nonverbal cues (demo/gesture)  -MS     Assistive Device (Gait) walker, front-wheeled  -MS     Distance in Feet (Gait) 160' x 2 trials, 80' x 2 trials, 75'  -MS     Pattern (Gait) step-through  -MS     Deviations/Abnormal Patterns (Gait) mar[place] decreased;festinating/shuffling;stride length decreased  -MS     Bilateral Gait Deviations forward flexed posture  -MS     Comment, (Gait/Stairs) Ambulating around large orange cones- max VCs for foot placement and staying inside RW, shuffling gait incr with fatigue  -MS     Row Name 06/09/22 0815          Balance    Static Sitting Balance standby assist  -MS     Dynamic Sitting Balance standby assist  -MS     Position, Sitting Balance unsupported;sitting in chair  -MS     Sit to Stand [place] Balance contact guard;minimal assist  -MS     Static Standing Balance contact guard;minimal assist;verbal cues  -MS     Dynamic Standing Balance contact guard;minimal assist  -MS     Position/Device Used, Standing Balance supported;walker, front-wheeled  -MS     Row Name 06/09/22 0815          Hip (Therapeutic Exercise)    Hip Strengthening (Therapeutic Exercise)  bilateral;sitting;marching while seated;15 repititions;2 sets;resistance band;genesis CABRERA     Row Name 06/09/22 0815          Knee (Therapeutic Exercise)    Knee Strengthening (Therapeutic Exercise) bilateral;sitting;LAQ (long arc quad);15 repititions;2 sets;resistance band;genesis CABRERA     Row Name 06/09/22 0815          Positioning and Restraints    Pre-Treatment Position sitting in chair/recliner  -MS     Post Treatment Position wheelchair  -MS     In Wheelchair sitting;exit alarm on  Both sessions: in front of nsg station  -MS           User Key  (r) = Recorded By, (t) = Taken By, (c) = Cosigned By    Initials Name Provider Type    MS BennettTova PT Physical Therapist                 Physical Therapy Education                 Title: PT OT SLP Therapies (In Progress)     Topic: Physical Therapy (In Progress)     Point: Mobility training (In Progress)     Learning Progress Summary           Patient Acceptance, E,TB, VU,NR by MS at 6/9/2022 0927    Acceptance, E,TB, NR by MS at 6/9/2022 0927    Acceptance, E,TB, NR,NL by MS at 6/8/2022 0828    Acceptance, E,TB, VU,NR by MS at 6/6/2022 1045                   Point: Home exercise program (In Progress)     Learning Progress Summary           Patient Acceptance, E,TB, VU,NR by MS at 6/9/2022 0927    Acceptance, E,TB, NR by MS at 6/9/2022 0927    Acceptance, E,TB, NR,NL by MS at 6/8/2022 0828    Acceptance, E,TB, VU,NR by MS at 6/6/2022 1045                   Point: Body mechanics (In Progress)     Learning Progress Summary           Patient Acceptance, E,TB, VU,NR by MS at 6/9/2022 0927    Acceptance, E,TB, NR by MS at 6/9/2022 0927    Acceptance, E,TB, NR,NL by MS at 6/8/2022 0828    Acceptance, E,TB, VU,NR by MS at 6/6/2022 1045                   Point: Precautions (In Progress)     Learning Progress Summary           Patient Acceptance, E,TB, VU,NR by MS at 6/9/2022 0927    Acceptance, E,TB, NR by MS at 6/9/2022 0927    Acceptance, E,TB, NR,NL by MS at 6/8/2022  0828    Acceptance, E,TB, VU,NR by MS at 6/6/2022 1045                               User Key     Initials Effective Dates Name Provider Type Discipline    MS 06/16/21 -  Tova Bennett, PT Physical Therapist PT                PT Recommendation and Plan                          Time Calculation:      PT Charges     Row Name 06/09/22 1216 06/09/22 0922          Time Calculation    Start Time 1215  -MS 0815  -MS     Stop Time 1230  -MS 0900  -MS     Time Calculation (min) 15 min  -MS 45 min  -MS     PT Received On -- 06/09/22  -MS     PT - Next Appointment -- 06/10/22  -MS           User Key  (r) = Recorded By, (t) = Taken By, (c) = Cosigned By    Initials Name Provider Type    MS BennettTova, PT Physical Therapist                Therapy Charges for Today     Code Description Service Date Service Provider Modifiers Qty    87251033550 HC PT THER PROC EA 15 MIN 6/8/2022 Tova Bennett, PT GP 3    81632494512 HC PT THERAPEUTIC ACT EA 15 MIN 6/8/2022 Tova Bennett, PT GP 1    04654733474 HC PT THER PROC EA 15 MIN 6/9/2022 Tova Bennett, PT GP 4            PT G-Codes  AM-PAC 6 Clicks Score (PT): 16      Tova Bennett PT  6/9/2022

## 2022-06-09 NOTE — PROGRESS NOTES
LOS: 6 days   Patient Care Team:  Jose Bingham MD as PCP - General (Internal Medicine)  Yennifer Ruiz MD as Consulting Physician (Cardiology)  Cristian Taylor OD (Optometry)  PARKER Wheeler MD as Consulting Physician (Ophthalmology)  Obdulio Morelos MD as Consulting Physician (Hematology and Oncology)  Zahira Gee MD as Referring Physician (Neurology)  aSnya Sim Jr., DPM as Consulting Physician (Podiatry)      JUANJO CHATMAN  1935      ADMITTING DIAGNOSIS:  1. Status epilepticus w/ Postictal encephalopathy        Subjective     Bright affect.  No new seizure activity noted.  Hard of hearing which limits history.  Indicates he is tolerating therapies.    Objective     Vitals:    06/09/22 1223   BP: 100/51   Pulse: 59   Resp: 18   Temp: 97.7 °F (36.5 °C)   SpO2: 98%       PHYSICAL EXAM:     Constitutional: Not in acute distress.  [place]. Awake and alert and conversant.   HEENT:  Heart: RRR, no murmur.    pitting edema in legs, L >R  Lungs: Good and equal air entry bilaterally.  Nonlabored breathing  Abdomen:Soft. No tenderness or dullness.  Positive bowel sounds  Extremities: No cyanosis, clubbing.  Warm extremities and well-perfused. DJD changes bilat hands.  Neuro: Slow processing but appropriate.  Does follow commands.  Provides resistance in BUE/BLEs  Psych: O x person and hospital  . No delusions. Affect flat.             MEDICATIONS  Scheduled Meds:donepezil, 10 mg, Oral, Nightly  hydroCHLOROthiazide Oral, 25 mg, Oral, Daily  lacosamide, 100 mg, Oral, Q12H  lisinopril, 20 mg, Oral, BID  PHENobarbital, 64.8 mg, Nasogastric, Daily  rivaroxaban, 15 mg, Oral, Daily With Dinner  sodium chloride, 10 mL, Intravenous, Q12H      Continuous Infusions:   PRN Meds:.•  acetaminophen  •  oxyCODONE      RESULTS  No results found for: POCGLU  Results from last 7 days   Lab Units 06/09/22  1323 06/07/22  1022 06/04/22  0801   WBC 10*3/mm3 9.15 7.04 5.35   HEMOGLOBIN g/dL  13.5 14.2 14.4   HEMATOCRIT % 40.4 42.8 44.0   PLATELETS 10*3/mm3 353 332 273     Results from last 7 days   Lab Units 06/09/22  1323 06/07/22  1022 06/04/22  0801   SODIUM mmol/L 134* 136 135*   POTASSIUM mmol/L 4.4 4.6 4.6   CHLORIDE mmol/L 96* 99 95*   CO2 mmol/L 27.0 28.0 29.7*   BUN mg/dL 29* 27* 21   CREATININE mg/dL 1.27 1.15 1.00   CALCIUM mg/dL 9.1 9.2 9.3   BILIRUBIN mg/dL 0.2 0.2 0.4   ALK PHOS U/L 74 75 76   ALT (SGPT) U/L 29 32 29   AST (SGOT) U/L 29 28 29   GLUCOSE mg/dL 94 84 130*           ASSESSMENT and PLAN    Immobility syndrome    2. Status epilepticus w/ Postictal encephalopathy - Admit to Acute inpt rehab with PT, OT, SLP, psychology and rehab medical and nursing care..  3. Seizure d/o - Continue Antiepileptics and monitor levels and side effects. Ativan PRN for seizure.  4. Cellulitis left leg - Completed antibx. Continue care  5. A-Fib - Continue Xarelto  6. HTN - Cont meds and monitor  7. CAD s/p CABG - Monitor response to therapy and inc activity  8. Hypoxia: Likely secondary to hypoventilation and probably obstructive sleep apnea - Check Nocturnal oxygen  9. Bilateral legs edema/venous stasis.  He reports chronic edema in the left lower extremity compared to the right lower extremity secondary to an old injury  10. Vascular dementia - Continue Aricept  11. DVT prophylaxis - anticoagulation / SCDs       Now admit for comprehensive acute inpatient rehabilitation .  This would be an interdisciplinary program with physical therapy 1 hour,  occupational therapy 1 hour, and speech therapy 1 hour, 5 days a week.  Rehabilitation nursing for carryover, monitoring of  neurologic   status, bowel and bladder, and skin  Ongoing physician follow-up.  Weekly team conferences.       The patient's functional status and clinical status is unchanged from preadmission assessment and the patient continues appropriate for acute inpatient rehabilitation.  Goal is for home with  Home health   therapies.  Barrier  to discharge:  Impaired mobility and self care  - work on  Strength, balance, transfers, gait, ADLS, cognition  to overcome.       TEAM CONF - June 7 - BED MIN. TRANSFERS MIN MAD RW. GAIT 80 FEET MIN ASSIST RW, SHUFFLING GAIT. TOILET AND SHOWER TRANSFERS MIN ASSIST. IMPULSIVE IMPACTED BY HEARING. BATH MIN. LBD MOD. UBD MIN. TOILETING MOD ASSIST. MODERATE COGNITIVE IMPAIRMENT WITH MEMORY, ATTENTION, AND EXECUTIVE FUNCTION. TOLERATES REGULAR DIET. CONTINENT FOR THE MOST PART. SKIN INTACT. TYLENOL FOR PAIN.   Memory - Donepezil. Seizure - Lacosamide (Vimpat) and Phenobarbital. Chronic anticoagulation - atrial fibrillation- rivaroxaban (Xarelto).   ELOS - TWO WEEKS    Montana Ewing MD      During rounds, used appropriate personal protective equipment including mask and gloves.  Additional gown if indicated.  Mask used was standard procedure mask. Appropriate PPE was worn during the entire visit.  Hand hygiene was completed before and after.

## 2022-06-10 NOTE — THERAPY TREATMENT NOTE
Inpatient Rehabilitation - Occupational Therapy Treatment Note    [facility]     Patient Name: Alireza Carreon  : 1935  MRN: [id number]    Today's Date: 6/10/2022                 Admit Date: 6/3/2022       No diagnosis found.    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[facility])   • Monoclonal gammopathy of unknown significance (MGUS)   • Chronic anticoagulation   • Arteriosclerosis of coronary artery   • Cerebrovascular accident (CVA) (HCC)   • Seizure disorder (HCC)   • Swelling of left lower extremity   • Hearing loss   • Localized edema   • Blister   • Skin nodule of toe of right foot   • Status epilepticus (HCC)   • Immobility syndrome       Past Medical History:   Diagnosis Date   • 3-vessel CAD    • Anticoagulated on warfarin    • Atrial fibrillation (HCC)    • BMI 28.0-28.9,adult    • Bunion, right foot    • Complaints of memory disturbance    • Dementia (HCC)    • Edema    • Encounter for immunization    • Generalized convulsive seizure (HCC)    • Gout    • History of double vision    • HL (hearing loss)    • Hyperlipidemia    • Hypertension    • Left foot pain    • 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    • Movement disorder    • Nocturnal leg cramps    • OA (osteoarthritis)    • Prepatellar effusion    • Pulmonary embolism (HCC)    • Puncture wound of hand, right    • Screening for cardiovascular condition    • Sleep apnea    • Stasis dermatitis    • Statin intolerance    • Stroke (HCC)    • Taking drug for chronic disease    • Thoracic back pain    • Transient ischemic attack        Past Surgical History:   Procedure Laterality Date   • ADENOIDECTOMY     •  APPENDECTOMY     • CATARACT EXTRACTION, BILATERAL     • CORONARY ARTERY BYPASS GRAFT     • CORONARY ARTERY BYPASS GRAFT     • CYSTOSCOPY TRANSURETHRAL RESECTION OF PROSTATE     • HERNIA REPAIR     • OTHER SURGICAL HISTORY      carotid thromboendarterectomy   • SHOULDER SURGERY     • SKIN CANCER EXCISION               IRF OT ASSESSMENT FLOWSHEET (last 12 hours)     IRF OT Evaluation and Treatment     Row Name 06/10/22 1134          OT Time and Intention    Document Type daily treatment  -MW     Mode of Treatment occupational therapy  -MW     Patient Effort good  -MW     Symptoms Noted During/After Treatment none  -MW     Row Name 06/10/22 1134          General Information    Patient Profile Reviewed yes  -MW     Patient/Family/Caregiver Comments/Observations AM; pt seated at nrg station  -MW     Existing Precautions/Restrictions fall  -MW     Limitations/Impairments safety/cognitive  -MW     Comment, General Information red arm band  -MW     Row Name 06/10/22 1134          Pain Assessment    Pretreatment Pain Rating 0/10 - no pain  -MW     Posttreatment Pain Rating 0/10 - no pain  -MW     Row Name 06/10/22 1134          Cognition/Psychosocial    Orientation Status (Cognition) oriented x 4  -MW     Follows Commands (Cognition) follows one-step commands;follows two-step commands  -MW     Personal Safety Interventions fall prevention program maintained;gait belt;nonskid shoes/slippers when out of bed;supervised activity  -MW     Row Name 06/10/22 1134          Bathing    [place]on Level (Bathing) bathing skills;lower body;upper body;contact guard assist;verbal cues;nonverbal cues (demo/gesture)  -MW     Assistive Device (Bathing) grab bar/tub rail;shower chair  -MW     Position (Bathing) supported sitting;supported standing  -MW     Set-up Assistance (Bathing) adjust water temperature;obtain supplies  -MW     Comment (Bathing) agreeable to shower  -MW     Row Name 06/10/22 1134          Upper Body Dressing     [place] Level (Upper Body Dressing) upper body dressing skills;doff;don;pull over garment;minimum assist (75% or more patient effort);contact guard;supervision  -     Position (Upper Body Dressing) supported sitting  -     Set-up Assistance (Upper Body Dressing) obtain clothing  -     Comment (Upper Body Dressing) cues for attention to task  -[facility] Name 06/10/22 1134          Lower Body Dressing    [place] Level (Lower Body Dressing) doff;don;pants/bottoms;shoes/slippers;underwear;minimum assist (75% patient effort)  -     Position (Lower Body Dressing) supported sitting;supported standing  -     Set-up Assistance (Lower Body Dressing) obtain clothing  -     Row Name 06/10/22 1134          Grooming    [place] Level (Grooming) grooming skills;deodorant application;hair care, combing/brushing;oral care regimen;wash face, hands;set up;standby assist  -     Position (Grooming) supported sitting  -     Set-up Assistance (Grooming) obtain supplies  -     Comment (Grooming) w/c level  -[facility] Name 06/10/22 1134          Toileting    [place] Level (Toileting) toileting skills;adjust/manage clothing;perform perineal hygiene;minimum assist (75% patient effort)  -     Assistive Device Use (Toileting) grab bar/safety frame;raised toilet seat  -     Position (Toileting) supported sitting;supported standing  -[facility] Name 06/10/22 1134          Transfers    Sit-Stand [place] (Transfers) contact guard  -     Stand-Sit [place] (Transfers) contact guard  -     [place] Level (Toilet Transfer) contact guard;verbal cues;nonverbal cues (demo/gesture)  -     [place] Level (Shower Transfer) contact guard;verbal cues;nonverbal cues (demo/gesture)  -     Assistive Device (Shower Transfer) shower chair;grab bar, tub/shower  -     Row Name 06/10/22 1134          Sit-Stand Transfer    Assistive Device (Sit-Stand Transfers) wheelchair  -[facility] Name 06/10/22  1134          Toilet Transfer    Type (Toilet Transfer) sit-stand;stand-sit  -     Row Name 06/10/22 1134          Shower Transfer    Type (Shower Transfer) sit-stand;stand-sit  -     Row Name 06/10/22 1134          Balance    Dynamic Standing Balance contact guard  -     Row Name 06/10/22 1134          Positioning and Restraints    Pre-Treatment Position sitting in chair/recliner  -     Post Treatment Position wheelchair  -     In Wheelchair notified nsg;sitting;encouraged to call for assist;exit alarm on  -           User Key  (r) = Recorded By, (t) = Taken By, (c) = Cosigned By    Initials Name Effective Dates     Sylvia Brewer, DEVIKA 08/20/21 -                  Occupational Therapy Education                 Title: PT OT SLP Therapies (In Progress)     Topic: Occupational Therapy (Done)     Point: ADL training (Done)     Description:   Instruct learner(s) on proper safety adaptation and remediation techniques during self care or transfers.   Instruct in proper use of assistive devices.              Learning Progress Summary           Patient Acceptance, E, VU,NR by  at 6/4/2022 1431    Comment: benefit of IRF, role of OT, d/c rec, safety awareness, transfer training, therapy goals                   Point: Home exercise program (Done)     Description:   Instruct learner(s) on appropriate technique for monitoring, assisting and/or progressing therapeutic exercises/activities.              Learning Progress Summary           Patient Acceptance, E, VU,NR by  at 6/4/2022 1431    Comment: benefit of IRF, role of OT, d/c rec, safety awareness, transfer training, therapy goals                   Point: Precautions (Done)     Description:   Instruct learner(s) on prescribed precautions during self-care and functional transfers.              Learning Progress Summary           Patient Acceptance, E, VU,NR by  at 6/4/2022 1431    Comment: benefit of IRF, role of OT, d/c rec, safety awareness, transfer  training, therapy goals                   Point: Body mechanics (Done)     Description:   Instruct learner(s) on proper positioning and spine alignment during self-care, functional mobility activities and/or exercises.              Learning Progress Summary           Patient Acceptance, E, VU,NR by  at 6/4/2022 1431    Comment: benefit of IRF, role of OT, d/c rec, safety awareness, transfer training, therapy goals                               User Key     Initials Effective Dates Name Provider Type Discipline     08/20/21 -  Sylvia Brewer OT Occupational Therapist OT                    OT Recommendation and Plan    Planned Therapy Interventions (OT): activity tolerance training, BADL retraining, functional balance retraining, neuromuscular control/coordination retraining, occupation/activity based interventions, patient/caregiver education/training, ROM/therapeutic exercise, strengthening exercise, transfer/mobility retraining                    Time Calculation:      Time Calculation- OT     Row Name 06/10/22 0930             Time Calculation- OT    OT Start Time 0930  -MW      OT Stop Time 1000  -MW      OT Time Calculation (min) 30 min  -MW            User Key  (r) = Recorded By, (t) = Taken By, (c) = Cosigned By    Initials Name Provider Type     Sylvia Brewer OT Occupational Therapist              Therapy Charges for Today     Code Description Service Date Service Provider Modifiers Qty    01954002959 HC OT SELF CARE/MGMT/TRAIN EA 15 MIN 6/10/2022 Sylvia Brewer OT GO 2                   Sylvia Brewer OT  6/10/2022

## 2022-06-10 NOTE — PROGRESS NOTES
[facility]     Progress Note    Patient Name: Alireza Carreon  : 1935  MRN: [id number]  Primary Care Physician:  Jose Bingham MD  Date of admission: 6/3/2022    Subjective Pt is awake and alert. No new issues.   Subjective     Chief Complaint: same    History of Present Illness  Patient Reports     Review of Systems    Objective exam unchanged calves soft and NT. resp unlabored and regular  Objective     Vitals:   Temp:  [97.7 °F (36.5 °C)-98.2 °F (36.8 °C)] 98.2 °F (36.8 °C)  Heart Rate:  [57-59] 57  Resp:  [14-18] 16  BP: ()/(51-71) 114/71    Physical Exam     Result Review    Result Review:  I have personally reviewed the results from the time of this admission to 6/10/2022 08:53 EDT and agree with these findings:  []  Laboratory list / accordionNa+ 134, Cl 96, BUN 29, CBC WNL  []  Microbiology  []  Radiology  []  EKG/Telemetry   []  Cardiology/Vascular   []  Pathology  []  Old records  []  Other:  Most notable findings include:       Assessment & Plan  Continue to prepare for dc. Pt remains medically stable and is making fxnl progress toward dc goals. Pt remains confused.   Assessment / Plan     Brief Patient Summary:  Alireza Carreon is a 87 y.o. male who     Active Hospital Problems:  Active Hospital Problems    Diagnosis    • Immobility syndrome      Plan:       DVT prophylaxis:  Medical and mechanical DVT prophylaxis orders are present.    CODE STATUS:    Medical Intervention Limits: NO intubation (DNI)  Code Status (Patient has no pulse and is not breathing): No CPR (Do Not Attempt to Resuscitate)  Medical Interventions (Patient has pulse or is breathing): Limited Support  Comments: No chest compression    Disposition:  I expect patient to be discharged home. .    Broderick Coyle MD

## 2022-06-10 NOTE — PROGRESS NOTES
Inpatient Rehabilitation Plan of Care Note    Plan of Care  Care Plan Reviewed - No updates at this time.    Sphincter Control    [RN] Bladder & Bowel management(Active)  Current Status(06/09/2022): Continent/Incontinent 50% of B&B at times.  Weekly Goal(06/14/2022): Continent 75%  Discharge Goal: Continent 100%    Performed Intervention(s)  Monitor intake, output, & BM's each shift.  Use incontinence products as needed.      Psychosocial    Performed Intervention(s)  Verbalizes needs & concerns      Safety    Performed Intervention(s)  Falls precautions, safety rounds, Items within reach, Bed/chair alarms, &  Non-skid socks.  red armband    Signed by: Lizbeth Do RN

## 2022-06-10 NOTE — THERAPY TREATMENT NOTE
Inpatient Rehabilitation - Speech Language Pathology Treatment Note    [facility]     Patient Name: Alireza Carreon  : 1935  MRN: [id number]    Today's Date: 6/10/2022                   Admit Date: 6/3/2022       Visit Dx:    No diagnosis found.    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[facility])   • Monoclonal gammopathy of unknown significance (MGUS)   • Chronic anticoagulation   • Arteriosclerosis of coronary artery   • Cerebrovascular accident (CVA) (HCC)   • Seizure disorder (HCC)   • Swelling of left lower extremity   • Hearing loss   • Localized edema   • Blister   • Skin nodule of toe of right foot   • Status epilepticus (HCC)   • Immobility syndrome       Past Medical History:   Diagnosis Date   • 3-vessel CAD    • Anticoagulated on warfarin    • Atrial fibrillation (HCC)    • BMI 28.0-28.9,adult    • Bunion, right foot    • Complaints of memory disturbance    • Dementia (HCC)    • Edema    • Encounter for immunization    • Generalized convulsive seizure (HCC)    • Gout    • History of double vision    • HL (hearing loss)    • Hyperlipidemia    • Hypertension    • Left foot pain    • 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    • Movement disorder    • Nocturnal leg cramps    • OA (osteoarthritis)    • Prepatellar effusion    • Pulmonary embolism (HCC)    • Puncture wound of hand, right    • Screening for cardiovascular condition    • Sleep apnea    • Stasis dermatitis    • Statin intolerance    • Stroke (HCC)    • Taking drug for chronic disease    • Thoracic back pain    • Transient ischemic attack        Past Surgical History:   Procedure Laterality Date   •  ADENOIDECTOMY     • APPENDECTOMY     • CATARACT EXTRACTION, BILATERAL     • CORONARY ARTERY BYPASS GRAFT     • CORONARY ARTERY BYPASS GRAFT     • CYSTOSCOPY TRANSURETHRAL RESECTION OF PROSTATE     • HERNIA REPAIR     • OTHER SURGICAL HISTORY      carotid thromboendarterectomy   • SHOULDER SURGERY     • SKIN CANCER EXCISION         SLP Recommendation and Plan                                                   SLP EVALUATION (last 72 hours)     SLP SLC Evaluation     Row Name 06/10/22 1230 06/10/22 0900 06/09/22 1200 06/09/22 1000 06/08/22 1300       Communication Assessment/Intervention    Document Type therapy note (daily note)  -SR therapy note (daily note)  -SR therapy note (daily note)  -SR therapy note (daily note)  -SR therapy note (daily note)  -SR    Subjective Information no complaints  -SR no complaints  -SR no complaints  -SR no complaints  -SR no complaints  -SR    Patient Observations alert;cooperative;agree to therapy  -SR alert;cooperative;agree to therapy  -SR alert;cooperative;agree to therapy  -SR alert;cooperative;agree to therapy  -SR alert;cooperative;agree to therapy  -SR    Patient Effort good  -SR good  -SR good  -SR good  -SR good  -SR    Symptoms Noted During/After Treatment none  -SR none  -SR none  -SR none  -SR none  -SR       Pain Scale: Numbers Pre/Post-Treatment    Pretreatment Pain Rating 0/10 - no pain  -SR 0/10 - no pain  -SR 0/10 - no pain  -SR 0/10 - no pain  -SR 0/10 - no pain  -SR    Posttreatment Pain Rating 0/10 - no pain  -SR 0/10 - no pain  -SR 0/10 - no pain  -SR 0/10 - no pain  -SR 0/10 - no pain  -SR    Row Name 06/08/22 0900                   Communication Assessment/Intervention    Document Type therapy note (daily note)  -SR        Subjective Information no complaints  -SR        Patient Observations alert;cooperative;agree to therapy  -SR        Patient Effort good  -SR        Symptoms Noted During/After Treatment none  -SR                  Pain Scale: Numbers  Pre/Post-Treatment    Pretreatment Pain Rating 0/10 - no pain  -SR        Posttreatment Pain Rating 0/10 - no pain  -SR              User Key  (r) = Recorded By, (t) = Taken By, (c) = Cosigned By    Initials Name Effective Dates    SR Evelina Gibson, LANEY 01/12/22 -                    EDUCATION    The patient has been educated in the following areas:       Cognitive Impairment.             SLP GOALS     Row Name 06/10/22 1230 06/10/22 0900 06/09/22 1200       Attention Goal 1 (SLP)    Improve Attention by Goal 1 (SLP) complete selective attention task;complete divided attention task;70%;independently (over 90% accuracy)  -SR complete selective attention task;complete divided attention task;70%;independently (over 90% accuracy)  -SR --    Time Frame (Attention Goal 1, SLP) by discharge  -SR by discharge  -SR --    Progress/Outcomes (Attention Goal 1, SLP) continuing progress toward goal  -SR continuing progress toward goal  -SR --    Comment (Attention Goal 1, SLP) Followed written directions with 30% acc given NO cues; 80% acc given min cues. Extended time to complete task.  -SR Identified differences between 2 visual scenes with 80% acc given NO cues  -SR --       Memory Skills Goal 1 (SLP)    Improve Memory Skills Through Goal 1 (SLP) -- recalling related word lists immediately;recalling related word lists with an imposed delay;recalling unrelated word lists immediately;recalling unrelated word lists with an imposed delay;visual memory task;listen to a paragraph and answer questions;70%;with minimal cues (75-90%)  -SR recalling related word lists immediately;recalling related word lists with an imposed delay;recalling unrelated word lists immediately;recalling unrelated word lists with an imposed delay;visual memory task;listen to a paragraph and answer questions;70%;with minimal cues (75-90%)  -SR    Time Frame (Memory Skills Goal 1, SLP) -- by discharge  -SR by discharge  -SR    Progress/Outcomes (Memory Skills  "Goal 1, SLP) -- good progress toward goal  -SR good progress toward goal  -SR    Comment (Memory Skills Goal 1, SLP) -- Fxnal memory (paragraph retention); recalled details during immediate memory task with 50% acc given NO cues; 100% acc given min cues  -SR Paragraph retention; Pt read paragraph and answered questions during immediate recall task with 90% acc given NO cues  -SR       Reasoning Goal 1 (SLP)    Improve Reasoning Through Goal 1 (SLP) -- complete basic reasoning task;complete deductive reasoning task;with minimal cues (75-90%);90%  -SR --    Time Frame (Reasoning Goal 1, SLP) -- by discharge  -SR --    Progress/Outcomes (Reasoning Goal 1, SLP) -- good progress toward goal  -SR --    Comment (Reasoning Goal 1, SLP) -- Deduction puzzle; followed directions with 40% acc given min cues; 100% acc given mod cues.  -SR --       Executive Functional Skills Goal 1 (SLP)    Improve Executive Function Skills Goal 1 (SLP) demonstrate awareness of deficit;time management activity;home management activity;70%;with minimal cues (75-90%)  -SR -- demonstrate awareness of deficit;time management activity;home management activity;70%;with minimal cues (75-90%)  -SR    Time Frame (Executive Function Skills Goal 1, SLP) by discharge  -SR -- by discharge  -SR    Progress/Outcomes (Executive Function Skills Goal 1, SLP) good progress toward goal  -SR -- good progress toward goal  -SR    Comment (Executive Function Skills Goal 1, SLP) Answered questions about a billing statement with 50% acc given NO cues; 80% acc given min-mod cues. Pt explained, \"I already told you I can add and subtract just fine.\" Redirected to task with verbal encouragement.  -SR -- Followed directions using street map with 60% acc given NO cues; 90% acc given min cues. Extended time to complete task.  -SR    Row Name 06/09/22 1000 06/08/22 1300 06/08/22 0900       Attention Goal 1 (SLP)    Improve Attention by Goal 1 (SLP) complete selective attention " "task;complete divided attention task;70%;independently (over 90% accuracy)  -SR complete selective attention task;complete divided attention task;70%;independently (over 90% accuracy)  -SR --    Time Frame (Attention Goal 1, SLP) by discharge  -SR by discharge  -SR --    Progress/Outcomes (Attention Goal 1, SLP) continuing progress toward goal  -SR continuing progress toward goal  -SR --    Comment (Attention Goal 1, SLP) Followed 2 step-4 component written directions with 80% acc given NO cues. Attended to task without redirection.  -SR Divided attn; completed task with 70% acc given NO cues. Overall required min cues for redirection to task during PM therapy session.  -SR --       Memory Skills Goal 1 (SLP)    Improve Memory Skills Through Goal 1 (SLP) recalling related word lists immediately;recalling related word lists with an imposed delay;recalling unrelated word lists immediately;recalling unrelated word lists with an imposed delay;visual memory task;listen to a paragraph and answer questions;70%;with minimal cues (75-90%)  -SR -- recalling related word lists immediately;recalling related word lists with an imposed delay;recalling unrelated word lists immediately;recalling unrelated word lists with an imposed delay;visual memory task;listen to a paragraph and answer questions;70%;with minimal cues (75-90%)  -SR    Time Frame (Memory Skills Goal 1, SLP) by discharge  -SR -- by discharge  -SR    Progress/Outcomes (Memory Skills Goal 1, SLP) good progress toward goal  -SR -- good progress toward goal  -SR    Comment (Memory Skills Goal 1, SLP) Visual memory; recalled details from visual scene with 50% acc given NO cues. When he was asked the questions, pt stated \"I don't remember you asking me to look at the whole picture.\" Accuracy increased with verbal choice cues.  -SR -- Visual memory (newspaper advertisement); recalled details with 50% acc given NO cues; 70% acc given min cues.  -SR       Functional Problem " Solving Skills Goal 1 (SLP)    Improve Problem Solving Through Goal 1 (SLP) tell similarity between items;complete organization/home management task;sequence steps in a task;answer questions about ADL problems;70%;independently (over 90% accuracy)  -SR -- tell similarity between items;complete organization/home management task;sequence steps in a task;70%;independently (over 90% accuracy)  -SR    Time Frame (Problem Solving Goal 1, SLP) by discharge  -SR -- by discharge  -SR    Progress/Outcomes (Problem Solving Goal 1, SLP) good progress toward goal  -SR -- good progress toward goal  -SR    Comment (Problem Solving Goal 1, SLP) Provided problem and solution for visual picture scenes regarding everyday situations with 90% acc given NO cues.  -SR -- Deduction by exclusion; followed directions to find calendar date- 67% acc given min cues; 100% acc given mod cues. Difficulty following directions during activity. Extended time to complete task  -SR       Executive Functional Skills Goal 1 (SLP)    Improve Executive Function Skills Goal 1 (SLP) -- demonstrate awareness of deficit;time management activity;home management activity;70%;with minimal cues (75-90%)  -SR --    Time Frame (Executive Function Skills Goal 1, SLP) -- by discharge  -SR --    Progress/Outcomes (Executive Function Skills Goal 1, SLP) -- good progress toward goal  -SR --    Comment (Executive Function Skills Goal 1, SLP) -- [place] calculation; adding total amounts; 80% acc given NO cues. Repetition of instruction.  -SR --          User Key  (r) = Recorded By, (t) = Taken By, (c) = Cosigned By    Initials Name Provider Type    Evelina Flanagan, SLP Speech and Language Pathologist                            Time Calculation:        Time Calculation- SLP     Row Name 06/10/22 1357 06/10/22 1046          Time Calculation- [facility]    SLP Start Time 1230  -SR 0900  -SR     SLP Stop Time 1300  -SR 0930  -SR     SLP Time Calculation (min) 30 min  -SR 30 min  -SR            User Key  (r) = Recorded By, (t) = Taken By, (c) = Cosigned By    Initials Name Provider Type    Evelina Flanagan SLP Speech and Language Pathologist                  Therapy Charges for Today     Code Description Service Date Service Provider Modifiers Qty    48758394789 HC ST DEV OF COGN SKILLS INITIAL 15 MIN 6/9/2022 Evelina Gibson SLP  1    11589269076 HC ST DEV OF COGN SKILLS EACH ADDT'L 15 MIN 6/9/2022 Evelina Gibson SLP  3    [id number] HC ST DEV OF COGN SKILLS INITIAL 15 MIN 6/10/2022 Evelina Gibson SLP  1    82940686093 HC ST DEV OF COGN SKILLS EACH ADDT'L 15 MIN 6/10/2022 Evelina Gibson SLP  3                           LANEY Solis  6/10/2022

## 2022-06-10 NOTE — PLAN OF CARE
Problem: Rehabilitation ([facility]) Plan of Care  Goal: Plan of Care Review  Flowsheets (Taken 6/10/2022 0996)  Progress: improving  Plan of Care Reviewed With: patient  Outcome Evaluation: Pt is A&Ox4, cooperative, meds whole w/ water, no c/o pain, can be impulsive, is continent and incontinent, is very [place], up w/ asst x1 to bathroom, will continue to monitor.   Goal Outcome Evaluation:  Plan of Care Reviewed With: patient        Progress: improving  Outcome Evaluation: Pt is A&Ox4, cooperative, meds whole w/ water, no c/o pain, can be impulsive, is continent and incontinent, is very [place], up w/ asst x1 to bathroom, will continue to monitor.

## 2022-06-10 NOTE — THERAPY TREATMENT NOTE
Inpatient Rehabilitation - Occupational Therapy Treatment Note    [facility]     Patient Name: Alireza Carreon  : 1935  MRN: [id number]    Today's Date: 6/10/2022                 Admit Date: 6/3/2022       No diagnosis found.    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[facility])   • Monoclonal gammopathy of unknown significance (MGUS)   • Chronic anticoagulation   • Arteriosclerosis of coronary artery   • Cerebrovascular accident (CVA) (HCC)   • Seizure disorder (HCC)   • Swelling of left lower extremity   • Hearing loss   • Localized edema   • Blister   • Skin nodule of toe of right foot   • Status epilepticus (HCC)   • Immobility syndrome       Past Medical History:   Diagnosis Date   • 3-vessel CAD    • Anticoagulated on warfarin    • Atrial fibrillation (HCC)    • BMI 28.0-28.9,adult    • Bunion, right foot    • Complaints of memory disturbance    • Dementia (HCC)    • Edema    • Encounter for immunization    • Generalized convulsive seizure (HCC)    • Gout    • History of double vision    • HL (hearing loss)    • Hyperlipidemia    • Hypertension    • Left foot pain    • 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    • Movement disorder    • Nocturnal leg cramps    • OA (osteoarthritis)    • Prepatellar effusion    • Pulmonary embolism (HCC)    • Puncture wound of hand, right    • Screening for cardiovascular condition    • Sleep apnea    • Stasis dermatitis    • Statin intolerance    • Stroke (HCC)    • Taking drug for chronic disease    • Thoracic back pain    • Transient ischemic attack        Past Surgical History:   Procedure Laterality Date   • ADENOIDECTOMY     •  APPENDECTOMY     • CATARACT EXTRACTION, BILATERAL     • CORONARY ARTERY BYPASS GRAFT     • CORONARY ARTERY BYPASS GRAFT     • CYSTOSCOPY TRANSURETHRAL RESECTION OF PROSTATE     • HERNIA REPAIR     • OTHER SURGICAL HISTORY      carotid thromboendarterectomy   • SHOULDER SURGERY     • SKIN CANCER EXCISION               IRF OT ASSESSMENT FLOWSHEET (last 12 hours)     IRF OT Evaluation and Treatment     Row Name 06/10/22 1439 06/10/22 1134       OT Time and Intention    Document Type daily treatment  -MW daily treatment  -MW    Mode of Treatment occupational therapy  -MW occupational therapy  -MW    Patient Effort good  -MW good  -MW    Symptoms Noted During/After Treatment none  -MW none  -MW    Row Name 06/10/22 1439 06/10/22 1134       General Information    Patient Profile Reviewed yes  -MW yes  -MW    Patient/Family/Caregiver Comments/Observations seated in w/c at nurse station, NAD  -MW AM; pt seated at nrg station  -MW    Existing Precautions/Restrictions fall  -MW fall  -MW    Limitations/Impairments safety/cognitive  -MW safety/cognitive  -MW    Comment, General Information red arm band  -MW red arm band  -MW    Row Name 06/10/22 1439 06/10/22 1134       Pain Assessment    Pretreatment Pain Rating 0/10 - no pain  -MW 0/10 - no pain  -MW    Posttreatment Pain Rating 0/10 - no pain  -MW 0/10 - no pain  -MW    Row Name 06/10/22 1439 06/10/22 1134       Cognition/Psychosocial    Affect/Mental Status (Cognition) WFL  -MW --    Orientation Status (Cognition) oriented x 4  -MW oriented x 4  -MW    Follows Commands (Cognition) -- follows one-step commands;follows two-step commands  -MW    Personal Safety Interventions fall prevention program maintained;gait belt;nonskid shoes/slippers when out of bed;muscle strengthening facilitated;supervised activity  -MW fall prevention program maintained;gait belt;nonskid shoes/slippers when out of bed;supervised activity  -MW    Row Name 06/10/22 1134          Bathing     [place] Level (Bathing) bathing skills;lower body;upper body;contact guard assist;verbal cues;nonverbal cues (demo/gesture)  -     Assistive Device (Bathing) grab bar/tub rail;shower chair  -     Position (Bathing) supported sitting;supported standing  -     Set-up Assistance (Bathing) adjust water temperature;obtain supplies  -     Comment (Bathing) agreeable to shower  -     Row Name 06/10/22 1134          Upper Body Dressing    [place] Level (Upper Body Dressing) upper body dressing skills;doff;don;pull over garment;minimum assist (75% or more patient effort);contact guard;supervision  -     Position (Upper Body Dressing) supported sitting  -     Set-up Assistance (Upper Body Dressing) obtain clothing  -     Comment (Upper Body Dressing) cues for attention to task  -     Row Name 06/10/22 1134          Lower Body Dressing    [place] Level (Lower Body Dressing) doff;don;pants/bottoms;shoes/slippers;underwear;minimum assist (75% patient effort)  -     Position (Lower Body Dressing) supported sitting;supported standing  -     Set-up Assistance (Lower Body Dressing) obtain clothing  -     Row Name 06/10/22 1134          Grooming    [place] Level (Grooming) grooming skills;deodorant application;hair care, combing/brushing;oral care regimen;wash face, hands;set up;standby assist  -     Position (Grooming) supported sitting  -     Set-up Assistance (Grooming) obtain supplies  -     Comment (Grooming) w/c level  -     Row Name 06/10/22 1134          Toileting    [place] Level (Toileting) toileting skills;adjust/manage clothing;perform perineal hygiene;minimum assist (75% patient effort)  -     Assistive Device Use (Toileting) grab bar/safety frame;raised toilet seat  -     Position (Toileting) supported sitting;supported standing  -     Row Name 06/10/22 1439 06/10/22 1134       Transfers    Sit-Stand [place] (Transfers) contact guard  - contact guard   -    Stand-Sit [place] (Transfers) contact guard  - contact guard  -    [place] Level (Toilet Transfer) -- contact guard;verbal cues;nonverbal cues (demo/gesture)  -    [place] Level (Shower Transfer) -- contact guard;verbal cues;nonverbal cues (demo/gesture)  -    Assistive Device (Shower Transfer) -- shower chair;grab bar, tub/shower  -    Row Name 06/10/22 1439 06/10/22 1134       Sit-Stand Transfer    Assistive Device (Sit-Stand Transfers) wheelchair  - wheelchair  -    Row Name 06/10/22 1439          Stand-Sit Transfer    Assistive Device (Stand-Sit Transfers) walker, front-wheeled  -     Row Name 06/10/22 1134          Toilet Transfer    Type (Toilet Transfer) sit-stand;stand-sit  -     Row Name 06/10/22 1134          Shower Transfer    Type (Shower Transfer) sit-stand;stand-sit  -     Row Name 06/10/22 1439          Motor Skills    Motor Skills functional endurance  -     Functional Endurance pt completed functional standing task, total 6:00 mins before seated RB, completed FM task at tabletop with min cues  -     Row Name 06/10/22 1439          Shoulder (Therapeutic Exercise)    Shoulder (Therapeutic Exercise) strengthening exercise  -     Shoulder AROM (Therapeutic Exercise) left;flexion;extension;sitting;10 repetitions;3 sets  -     Shoulder Strengthening (Therapeutic Exercise) right;extension;aBduction;flexion;aDduction;horizontal aBduction/aDduction;sitting;2 lb free weight;10 repetitions;3 sets  -     Row Name 06/10/22 1439          Elbow/Forearm (Therapeutic Exercise)    Elbow/Forearm (Therapeutic Exercise) strengthening exercise  -     Elbow/Forearm Strengthening (Therapeutic Exercise) bilateral;extension;flexion;3 lb free weight;sitting;10 repetitions;3 sets  -     Row Name 06/10/22 1439          Wrist (Therapeutic Exercise)    Wrist (Therapeutic Exercise) strengthening exercise  -     Wrist Strengthening (Therapeutic Exercise)  bilateral;flexion;extension;10 repetitions;2 sets  -     Row Name 06/10/22 1134          Balance    Dynamic Standing Balance contact guard  -     Row Name 06/10/22 1439 06/10/22 1134       Positioning and Restraints    Pre-Treatment Position sitting in chair/recliner  - sitting in chair/recliner  -    Post Treatment Position wheelchair  - wheelchair  -MW    In Wheelchair notified nsg;sitting;call light within reach;encouraged to call for assist;exit alarm on  -MW notified nsg;sitting;encouraged to call for assist;exit alarm on  -MW          User Key  (r) = Recorded By, (t) = Taken By, (c) = Cosigned By    Initials Name Effective Dates    MW Sylvia Brewer, DEVIKA 08/20/21 -                  Occupational Therapy Education                 Title: PT OT SLP Therapies (In Progress)     Topic: Occupational Therapy (Done)     Point: ADL training (Done)     Description:   Instruct learner(s) on proper safety adaptation and remediation techniques during self care or transfers.   Instruct in proper use of assistive devices.              Learning Progress Summary           Patient Acceptance, E, VU,NR by  at 6/4/2022 1431    Comment: benefit of IRF, role of OT, d/c rec, safety awareness, transfer training, therapy goals                   Point: Home exercise program (Done)     Description:   Instruct learner(s) on appropriate technique for monitoring, assisting and/or progressing therapeutic exercises/activities.              Learning Progress Summary           Patient Acceptance, E, VU,NR by  at 6/4/2022 1431    Comment: benefit of IRF, role of OT, d/c rec, safety awareness, transfer training, therapy goals                   Point: Precautions (Done)     Description:   Instruct learner(s) on prescribed precautions during self-care and functional transfers.              Learning Progress Summary           Patient Acceptance, E, VU,NR by  at 6/4/2022 1431    Comment: benefit of IRF, role of OT, d/c rec, safety  awareness, transfer training, therapy goals                   Point: Body mechanics (Done)     Description:   Instruct learner(s) on proper positioning and spine alignment during self-care, functional mobility activities and/or exercises.              Learning Progress Summary           Patient Acceptance, E, VU,NR by  at 6/4/2022 1431    Comment: benefit of IRF, role of OT, d/c rec, safety awareness, transfer training, therapy goals                               User Key     Initials Effective Dates Name Provider Type Discipline     08/20/21 -  Sylvia Brewer OT Occupational Therapist OT                    OT Recommendation and Plan    Planned Therapy Interventions (OT): activity tolerance training, BADL retraining, functional balance retraining, neuromuscular control/coordination retraining, occupation/activity based interventions, patient/caregiver education/training, ROM/therapeutic exercise, strengthening exercise, transfer/mobility retraining                    Time Calculation:      Time Calculation- OT     Row Name 06/10/22 1400 06/10/22 0930          Time Calculation- OT    OT Start Time 1400  -MW 0930  -MW     OT Stop Time 1430  -MW 1000  -MW     OT Time Calculation (min) 30 min  -MW 30 min  -MW           User Key  (r) = Recorded By, (t) = Taken By, (c) = Cosigned By    Initials Name Provider Type     Sylvia Brewer OT Occupational Therapist              Therapy Charges for Today     Code Description Service Date Service Provider Modifiers Qty    54222988410 HC OT SELF CARE/MGMT/TRAIN EA 15 MIN 6/10/2022 Sylvia Brewer OT GO 2    [id number]  OT THER PROC EA 15 MIN 6/10/2022 Sylvia Brewer OT GO 1    [id number]  OT THERAPEUTIC ACT EA 15 MIN 6/10/2022 Sylvia Brewer OT GO 1                   Sylvia Brewer OT  6/10/2022

## 2022-06-10 NOTE — THERAPY TREATMENT NOTE
Inpatient Rehabilitation - Physical Therapy Treatment Note       [facility]     Patient Name: Alireza Carreon  : 1935  MRN: [id number]    Today's Date: 6/10/2022                    Admit Date: 6/3/2022      Visit Dx:   No diagnosis found.    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[facility])   • Monoclonal gammopathy of unknown significance (MGUS)   • Chronic anticoagulation   • Arteriosclerosis of coronary artery   • Cerebrovascular accident (CVA) (HCC)   • Seizure disorder (HCC)   • Swelling of left lower extremity   • Hearing loss   • Localized edema   • Blister   • Skin nodule of toe of right foot   • Status epilepticus (HCC)   • Immobility syndrome       Past Medical History:   Diagnosis Date   • 3-vessel CAD    • Anticoagulated on warfarin    • Atrial fibrillation (HCC)    • BMI 28.0-28.9,adult    • Bunion, right foot    • Complaints of memory disturbance    • Dementia (HCC)    • Edema    • Encounter for immunization    • Generalized convulsive seizure ([facility])    • Gout    • History of double vision    • HL (hearing loss)    • Hyperlipidemia    • Hypertension    • Left foot pain    • 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    • Movement disorder    • Nocturnal leg cramps    • OA (osteoarthritis)    • Prepatellar effusion    • Pulmonary embolism (HCC)    • Puncture wound of hand, right    • Screening for cardiovascular condition    • Sleep apnea    • Stasis dermatitis    • Statin intolerance    • Stroke (HCC)    • Taking drug for chronic disease    • Thoracic back pain    • Transient ischemic attack        Past Surgical History:   Procedure Laterality Date   • ADENOIDECTOMY      • APPENDECTOMY     • CATARACT EXTRACTION, BILATERAL     • CORONARY ARTERY BYPASS GRAFT     • CORONARY ARTERY BYPASS GRAFT     • CYSTOSCOPY TRANSURETHRAL RESECTION OF PROSTATE     • HERNIA REPAIR     • OTHER SURGICAL HISTORY      carotid thromboendarterectomy   • SHOULDER SURGERY     • SKIN CANCER EXCISION         PT ASSESSMENT (last 12 hours)     IRF PT Evaluation and Treatment     Row Name 06/10/22 1100          PT Time and Intention    Document Type daily treatment  -MG     Mode of Treatment physical therapy  -MG     Patient/Family/Caregiver Comments/Observations AM: Pt asleep up in w/c at nsg station. PM: Sitting in w/c in gym, just finished ST.  -MG     Total Minutes, Physical Therapy 60  -MG     Row Name 06/10/22 1100          General Information    Patient Profile Reviewed yes  -MG     General Observations of Patient Pt worrying about wife not arriving at 1pm like planned, nor answering her phone.  -MG     Existing Precautions/Restrictions fall  -MG     Limitations/Impairments safety/cognitive  -MG     Comment, General Information [place], red arm band  -MG     Row Name 06/10/22 1100          Pain Assessment    Pretreatment Pain Rating 0/10 - no pain  -MG     Posttreatment Pain Rating 0/10 - no pain  -MG     Pre/Posttreatment Pain Comment --  No pain during AM or PM session.  -MG     Row Name 06/10/22 1100          Cognition/Psychosocial    Affect/Mental Status (Cognition) WFL  -MG     Orientation Status (Cognition) oriented x 4  -MG     Follows Commands (Cognition) follows one-step commands;follows two-step commands  -MG     Personal Safety Interventions fall prevention program maintained;gait belt;muscle strengthening facilitated;supervised activity;nonskid shoes/slippers when out of bed  -MG     Cognitive Function memory deficit;safety deficit;executive function deficit;attention deficit  -MG     Attention Deficit (Cognition) concentration;distractible in noisy environment  -MG     Row Name 06/10/22 1100           Mobility    Advanced Gait Activity other (see comments)  Weave in/out 3 large cones, ~15' x2. CG w/ RWX, cues for large steps and obstacle navigation.  -MG     Additional Documentation Advanced Gait Activity (Row)  -MG     Row Name 06/10/22 1100          Transfers    Sit-Stand [place] (Transfers) contact guard  -MG     Stand-Sit [place] (Transfers) contact guard  -MG     Row Name 06/10/22 1100          Sit-Stand Transfer    Assistive Device (Sit-Stand Transfers) walker, front-wheeled;wheelchair  -MG     Comment, (Sit-Stand Transfer) Pt performed several throughout each session. VC's for hand placement.  -MG     Row Name 06/10/22 1100          Stand-Sit Transfer    Assistive Device (Stand-Sit Transfers) walker, front-wheeled;wheelchair  -MG     Comment, (Stand-Sit Transfer) Pt performed several throughout both sessions. VCs for hand placement and sequencing.  -MG     Row Name 06/10/22 1100          Gait/Stairs (Locomotion)    [place] Level (Gait) contact guard;verbal cues  -MG     Assistive Device (Gait) walker, front-wheeled  -MG     Distance in Feet (Gait) AM: 160' x3. PM: 160' x2  -MG     Pattern (Gait) step-through  -MG     Deviations/Abnormal Patterns (Gait) festinating/shuffling;stride length decreased  -MG     Bilateral Gait Deviations forward flexed posture  -MG     Comment, (Gait/Stairs) Pt given VC's throughout gait to slow down and take larger steps. Pt able to maintain throughout except during turns or when distracted. Pt turns w/ short-shuffled steps, unable to take larger steps when cued.  -MG     Row Name 06/10/22 1100          Safety Issues, Functional Mobility    Safety Issues Affecting Function (Mobility) safety precaution awareness;sequencing abilities;positioning of assistive device  -MG     Impairments Affecting Function (Mobility) balance;coordination;strength;postural/trunk control;cognition  -MG     Row Name 06/10/22 1100          Balance    Static Sitting Balance standby  assist  -MG     Dynamic Sitting Balance contact guard  -MG     Position, Sitting Balance unsupported;sitting in chair  -MG     Sit to Stand Dynamic Balance contact guard;1-person assist;verbal cues  -MG     Static Standing Balance contact guard;verbal cues;1-person assist  -MG     Dynamic Standing Balance contact guard;verbal cues;1-person assist  -MG     Position/Device Used, Standing Balance walker, front-wheeled  -MG     Row Name 06/10/22 1100          Motor Skills    Therapeutic Exercise ankle  -MG     Row Name 06/10/22 1100          Hip (Therapeutic Exercise)    Hip Strengthening (Therapeutic Exercise) bilateral;marching while seated;aBduction;15 repititions;resistance band;red  -MG     Row Name 06/10/22 1100          Knee (Therapeutic Exercise)    Knee Strengthening (Therapeutic Exercise) bilateral;LAQ (long arc quad);resistance band;red;15 repititions  -MG     Row Name 06/10/22 1100          Ankle (Therapeutic Exercise)    Ankle (Therapeutic Exercise) strengthening exercise  -MG     Ankle Strengthening (Therapeutic Exercise) bilateral;dorsiflexion;plantarflexion;resistance band;red;15 repititions  -MG     Row Name 06/10/22 1100          Positioning and Restraints    Pre-Treatment Position sitting in chair/recliner  w/c at [facility] station both sessions  -     Post Treatment Position wheelchair  -MG     In Wheelchair notified nsg;sitting;encouraged to call for assist;exit alarm on;with nsg  at [facility] station  -           User Key  (r) = Recorded By, (t) = Taken By, (c) = Cosigned By    Initials Name Provider Type    MG Jen Marks, GUILLERMO Physical Therapist                 Physical Therapy Education                 Title: PT OT SLP Therapies (In Progress)     Topic: Physical Therapy (In Progress)     Point: Mobility training (In Progress)     Learning Progress Summary           Patient Acceptance, E,TB, VU,NR by MS at 6/9/2022 0927    Acceptance, E,TB, NR by MS at 6/9/2022 0927    Acceptance, E,TB, NR,NL by MS at  6/8/2022 0828    Acceptance, E,TB, VU,NR by MS at 6/6/2022 1045                   Point: Home exercise program (In Progress)     Learning Progress Summary           Patient Acceptance, E,TB, VU,NR by MS at 6/9/2022 0927    Acceptance, E,TB, NR by MS at 6/9/2022 0927    Acceptance, E,TB, NR,NL by MS at 6/8/2022 0828    Acceptance, E,TB, VU,NR by MS at 6/6/2022 1045                   Point: Body mechanics (In Progress)     Learning Progress Summary           Patient Acceptance, E,TB, VU,NR by MS at 6/9/2022 0927    Acceptance, E,TB, NR by MS at 6/9/2022 0927    Acceptance, E,TB, NR,NL by MS at 6/8/2022 0828    Acceptance, E,TB, VU,NR by MS at 6/6/2022 1045                   Point: Precautions (In Progress)     Learning Progress Summary           Patient Acceptance, E,TB, NR by MG at 6/10/2022 1212    Acceptance, E,TB, VU,NR by MS at 6/9/2022 0927    Acceptance, E,TB, NR by MS at 6/9/2022 0927    Acceptance, E,TB, NR,NL by MS at 6/8/2022 0828    Acceptance, E,TB, VU,NR by MS at 6/6/2022 1045                               User Key     Initials Effective Dates Name Provider Type Discipline    MG 05/24/22 -  Jen Marks, PT Physical Therapist PT    MS 06/16/21 -  Tova Bennett, PT Physical Therapist PT                PT Recommendation and Plan                          Time Calculation:      PT Charges     Row Name 06/10/22 1500 06/10/22 1457          Time Calculation    Start Time 1300  -MG 1100  -MG     Stop Time 1330  -MG 1130  -MG     Time Calculation (min) 30 min  -MG 30 min  -MG     PT Received On 06/10/22  -MG 06/10/22  -MG     PT - Next Appointment 06/11/22  -MG 06/11/22  -MG           User Key  (r) = Recorded By, (t) = Taken By, (c) = Cosigned By    Initials Name Provider Type    MG Jen Marks, PT Physical Therapist                Therapy Charges for Today     Code Description Service Date Service Provider Modifiers Qty    21906809965 HC GAIT TRAINING EA 15 MIN 6/10/2022 Jen Marks, PT GP 2     [id number]  PT THER PROC EA 15 MIN 6/10/2022 Jen Marks, PT GP 1    [id number]  PT THERAPEUTIC ACT EA 15 MIN 6/10/2022 Jen Marks, PT GP 1        Patient was wearing a face mask during this therapy encounter. Therapist used appropriate personal protective equipment including mask and gloves.  Mask used was standard procedure mask. Appropriate PPE was worn during the entire therapy session. Hand hygiene was completed before and after therapy session. Patient is not in enhanced droplet precautions.       PT G-Codes  AM-PAC 6 Clicks Score (PT): 16      Jen Marks, PT  6/10/2022

## 2022-06-10 NOTE — PLAN OF CARE
Goal Outcome Evaluation:  Plan of Care Reviewed With: patient           Outcome Evaluation: Alireza has tolerated all therapies, and been up at the nurses station for meals. He is alert and oriented but forgetful at times. He is very hard of hearing, wears a red arm band, and can be impulsive at night. He is continent, but can be incontinent, takes medication with water, and assist x1. No pain, no unsafe behavior, and family conference scheduled for Monday 6/13 at 2pm.

## 2022-06-11 NOTE — PROGRESS NOTES
Physical Medicine and Rehabilitation Progress Note    Assessment     Immobility syndrome [M62.3]    Subjective     Alireza Carreon is a 87 y.o. male admitted to inpatient rehabilitation for   1. Status epilepticus w/ Postictal encephalopathy Seizure  2. Cellulitis left leg -   3. A-Fib -   4. HTN -   5. CAD s/p CABG -  6. Hypoxia:   7. Bilateral legs edema/venous stasis  8. Vascular dementia      Chart, overnight events reviewed w/nursing. No complaint this morning. Denies sob, no cp, no fevers and or chills.      The patient's medical records have been reviewed.      Objective     Wt Readings from Last 3 Encounters:   06/07/22 63.3 kg (139 lb 8 oz)   05/26/22 73.9 kg (162 lb 14.7 oz)   05/16/22 65.9 kg (145 lb 3 oz)     Temp Readings from Last 3 Encounters:   06/11/22 97.4 °F (36.3 °C) (Oral)   06/03/22 97.7 °F (36.5 °C) (Oral)   05/20/22 98.7 °F (37.1 °C) (Temporal)     BP Readings from Last 3 Encounters:   06/11/22 92/57   06/03/22 121/56   05/20/22 128/68     Pulse Readings from Last 3 Encounters:   06/11/22 86   06/03/22 61   05/20/22 62       Physical Exam:   Gen: Pleasant, full sentences, NAD. Eating lunch    Pulm: CTAB; no r/r/w; non-labored   Heart; RRR; no m/r/g appreciated   Abd: soft; NT/ND; +BS   Neuro: verbal; appropriate   Skin: no rashes on exposed skin   MSK: calves soft   : no Indwelling catheter     CT Head Without Contrast  INDICATION:  Trauma, fall from ladder.  Headache.  Patient on blood thinners.    TECHNIQUE:  CT of the brain was performed without contrast.      Automated mA/kV exposure control was utilized and patient examination was performed  in strict accordance with principles of ALARA.    RADIATION AMOUNT:  836 mGy-cm.    COMPARISON:  None Available.    FINDINGS:   There is no acute intracranial hemorrhage, midline shift, mass effect, or  extra-axial fluid collection.  Gray-white differentiation is preserved.      RIGHT parietal  encephalomalacia noted.  Ventricular system is within normal limits  for age.  Mild patchy areas of low-attenuation are seen in the subcortical and deep  periventricular white matter suggesting areas of chronic microvascular ischemia.   Internal carotid artery calcifications are present.    The skull base and calvarium are intact.  The visualized paranasal sinuses are  unremarkable.  The visualized orbits, globes, and mastoid air cells are  unremarkable.      IMPRESSION:   1.  Likely sequela of prior ischemic disease.  No acute intracranial findings.    Signed by Azeb Snyder II, MD  ##### Final #####    Dictated by:    AZEB SNYDER MD-RAD  Dictated DT/TM: 05/16/2021 10:11 pm  Interpreted and electronically signed by:  AZEB SNYDER MD-RAD  Signed DT/TM:  05/16/2021 10:44 pm      Labs  BMP:  Results from last 7 days   Lab Units 06/11/22  0927   CREATININE mg/dL 1.02   BUN mg/dL 25*   SODIUM mmol/L 133*   POTASSIUM mmol/L 4.4   CHLORIDE mmol/L 97*   CO2 mmol/L 29.0     CBC:  Results from last 7 days   Lab Units 06/11/22  0927   WBC 10*3/mm3 7.24   HEMOGLOBIN g/dL 13.9   HEMATOCRIT % 42.6   MCV fL 89.3   PLATELETS 10*3/mm3 326     PTT:    Coagulation:       Scheduled Meds:donepezil, 10 mg, Oral, Nightly  hydroCHLOROthiazide Oral, 25 mg, Oral, Daily  lacosamide, 100 mg, Oral, Q12H  lisinopril, 20 mg, Oral, BID  PHENobarbital, 64.8 mg, Nasogastric, Daily  rivaroxaban, 15 mg, Oral, Daily With Dinner      Continuous Infusions:   PRN Meds:.•  acetaminophen  •  oxyCODONE    Assessment and Plan:  Active Hospital Problems    Diagnosis    • Immobility syndrome      Spent 15 min coordinating care.   Remains stable. Patient working w/ therapists on ADLs , Functional mobility, increase endurance  Continues to meet criteria for IRF. Having ongoing medical issues that require medication changes or non-pharmacological regimen adjustments.

## 2022-06-11 NOTE — PLAN OF CARE
Goal Outcome Evaluation:           Progress: improving  Outcome Evaluation: A&OX4. Impulsive, will get up on own. Stayed close to nurse's station and talked with peers and staff. Continent today. Wears a brief. Wears glasses and is [place]. Ate well at meals. Visited with family. Participated in therapy. No code. BM y/d. Mepilex to R. elbow d/t skin tear.

## 2022-06-11 NOTE — THERAPY PROGRESS REPORT/RE-CERT
Inpatient Rehabilitation - Physical Therapy Progress Note       [facility]     Patient Name: Alireza Carreon  : 1935  MRN: [id number]    Today's Date: 2022                    Admit Date: 6/3/2022      Visit Dx:   No diagnosis found.    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[facility])   • Monoclonal gammopathy of unknown significance ([facility])   • Chronic anticoagulation   • Arteriosclerosis of coronary artery   • Cerebrovascular accident (CVA) (HCC)   • Seizure disorder (HCC)   • Swelling of left lower extremity   • Hearing loss   • Localized edema   • Blister   • Skin nodule of toe of right foot   • Status epilepticus (HCC)   • Immobility syndrome       Past Medical History:   Diagnosis Date   • 3-vessel CAD    • Anticoagulated on warfarin    • Atrial fibrillation (HCC)    • BMI 28.0-28.9,adult    • Bunion, right foot    • Complaints of memory disturbance    • Dementia (HCC)    • Edema    • Encounter for immunization    • Generalized convulsive seizure ([facility])    • Gout    • History of double vision    • HL (hearing loss)    • Hyperlipidemia    • Hypertension    • Left foot pain    • 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    • Movement disorder    • Nocturnal leg cramps    • OA (osteoarthritis)    • Prepatellar effusion    • Pulmonary embolism (HCC)    • Puncture wound of hand, right    • Screening for cardiovascular condition    • Sleep apnea    • Stasis dermatitis    • Statin intolerance    • Stroke (HCC)    • Taking drug for chronic disease    • Thoracic back pain    • Transient ischemic attack        Past Surgical History:   Procedure Laterality Date   • ADENOIDECTOMY      • APPENDECTOMY     • CATARACT EXTRACTION, BILATERAL     • CORONARY ARTERY BYPASS GRAFT     • CORONARY ARTERY BYPASS GRAFT     • CYSTOSCOPY TRANSURETHRAL RESECTION OF PROSTATE     • HERNIA REPAIR     • OTHER SURGICAL HISTORY      carotid thromboendarterectomy   • SHOULDER SURGERY     • SKIN CANCER EXCISION         PT ASSESSMENT (last 12 hours)     IRF PT Evaluation and Treatment     Row Name 06/11/22 1030          PT Time and Intention    Document Type progress note  -MG     Mode of Treatment physical therapy  -MG     Total Minutes, Physical Therapy 30  -MG     Row Name 06/11/22 1030          General Information    Patient Profile Reviewed yes  -MG     General Observations of Patient Pt seated in w/c at [facility] station, excited to see his wife later.  -MG     Existing Precautions/Restrictions fall  -MG     Limitations/Impairments safety/cognitive  -MG     Comment, General Information [place], red arm band  -MG     Row Name 06/11/22 1030          Pain Assessment    Pretreatment Pain Rating 0/10 - no pain  Stated R Ant thigh was sore, but not painful  -MG     Posttreatment Pain Rating 0/10 - no pain  R ant. thigh soreness decreased  -MG     Row Name 06/11/22 1030          Cognition/Psychosocial    Affect/Mental Status (Cognition) WFL  -MG     Orientation Status (Cognition) oriented x 4  -MG     Follows Commands (Cognition) follows one-step commands;follows two-step commands  -MG     Personal Safety Interventions fall prevention program maintained;gait belt;muscle strengthening facilitated;nonskid shoes/slippers when out of bed;supervised activity  -MG     Cognitive Function memory deficit;safety deficit;attention deficit  -MG     Attention Deficit (Cognition) concentration;distractible in noisy environment  talkative  -MG     Memory Deficit (Cognition) moderate deficit;minimal deficit  -MG     Safety Deficit (Cognition) insight into deficits/self-awareness;judgment;awareness of need for assistance  -MG     Row Name 06/11/22  1030          Transfer Assessment/Treatment    Transfers chair-bed transfer  -MG     Row Name 06/11/22 1030          Transfers    Bed-Chair [place] (Transfers) contact guard;1 person assist  -MG     Chair-Bed [place] (Transfers) contact guard;1 person assist  -MG     Sit-Stand [place] (Transfers) standby assist;contact guard;1 person assist  -MG     Stand-Sit [place] (Transfers) standby assist;contact guard;1 person assist  -MG     Row Name 06/11/22 1030          Bed-Chair Transfer    Comment, (Bed-Chair Transfer) Stand pivot w/ rwx  -MG     Row Name 06/11/22 1030          Chair-Bed Transfer    Assistive Device (Chair-Bed Transfers) walker, front-wheeled  -MG     Comment, (Chair-Bed Transfer) Stand pivot w/ rwx  -MG     Row Name 06/11/22 1030          Sit-Stand Transfer    Assistive Device (Sit-Stand Transfers) walker, front-wheeled  -MG     Comment, (Sit-Stand Transfer) STS x5 throughout session.  -MG     Row Name 06/11/22 1030          Stand-Sit Transfer    Assistive Device (Stand-Sit Transfers) walker, front-wheeled  -MG     Comment, (Stand-Sit Transfer) STS x5 throughout session.  -MG     Row Name 06/11/22 1030          Stand Pivot/Stand Step Transfer    Stand Pivot/Stand Step [place] (Transfers) contact guard;1 person assist  -MG     Assistive Device (Stand Pivot Stand Step Transfer) walker, front-wheeled  -MG     Row Name 06/11/22 1030          Gait/Stairs (Locomotion)    [place] Level (Gait) standby assist;contact guard;1 person assist;verbal cues  -MG     Assistive Device (Gait) walker, front-wheeled  -MG     Distance in Feet (Gait) 80', 160', 240'  -MG     Pattern (Gait) step-through  -MG     Deviations/Abnormal Patterns (Gait) festinating/shuffling;stride length decreased  -MG     Bilateral Gait Deviations forward flexed posture  -MG     Gait Assessment/Intervention Pt w/ improved step length quality/consistency this session. Pt cont to have short shuffling steps during turns.  Pt able to ambulate 35' w/ SBA w/ fair to good balance prior to turn that required CGA. VC's for slow, large steps as pt tends to increase speed and decrease step length.  -MG     Row Name 06/11/22 1030          Safety Issues, Functional Mobility    Impairments Affecting Function (Mobility) balance;coordination;strength;cognition  -MG     Cognitive Impairments, Mobility Safety/Performance safety precaution awareness;insight into deficits/self-awareness;sequencing abilities  -MG     Row Name 06/11/22 1030          Balance    Static Sitting Balance standby assist  -MG     Dynamic Sitting Balance standby assist  -MG     Position, Sitting Balance unsupported;supported;sitting in chair;sitting edge of mat  -MG     Sit to Stand Dynamic Balance standby assist;contact guard;verbal cues;1-person assist  -MG     Static Standing Balance standby assist;contact guard;verbal cues  -MG     Dynamic Standing Balance standby assist;verbal cues;contact guard;1-person assist  -MG     Position/Device Used, Standing Balance walker, front-wheeled  -MG     Row Name 06/11/22 1030          Hip (Therapeutic Exercise)    Hip Strengthening (Therapeutic Exercise) bilateral;marching while seated;15 repititions  -     Row Name 06/11/22 1030          Knee (Therapeutic Exercise)    Knee Strengthening (Therapeutic Exercise) bilateral;LAQ (long arc quad);hamstring curls;15 repititions  -     Row Name 06/11/22 1030          Ankle (Therapeutic Exercise)    Ankle (Therapeutic Exercise) AROM (active range of motion)  -     Ankle AROM (Therapeutic Exercise) bilateral;dorsiflexion;plantarflexion;15 repititions  -     Row Name 06/11/22 1030          Positioning and Restraints    Pre-Treatment Position sitting in chair/recliner  at [facility] station  -     Post Treatment Position wheelchair  -MG     In Wheelchair sitting;encouraged to call for assist;exit alarm on;with [facility]  at [facility] station  -     Row Name 06/11/22 1030          Weekly Progress Summary  (PT)    Functional Goal Overall Progress (PT) progressing toward functional goals as expected  -MG     Weekly Progress Summary (PT) Pt is progressing towards his gait and bed mobility goals as demonstrated by requiring less assistance and ability to walk further distances. Pt continues to show impairments in balance, strength and gait sequencing leading to decreased independence with functional mobiliy. Pt will continue to benefit from skilled PT to address the above impairments.  -MG     Impairments Still Limiting Function (PT) balance impairment;cognitive impairment;strength deficit  -MG     Row Name 06/11/22 1030          Bed Mobility Goal 1 (PT-IRF)    Progress/Outcomes (Bed Mobility Goal 1, PT-IRF) goal met  -MG     Row Name 06/11/22 1030          Bed Mobility Goal 2 (PT-IRF)    Progress/Outcomes (Bed Mobility Goal 2, PT-IRF) goal ongoing  -MG     Row Name 06/11/22 1030          Transfer Goal 1 (PT-IRF)    Progress/Outcomes (Transfer Goal 1, PT-IRF) goal met  -MG     Row Name 06/11/22 1030          Transfer Goal 2 (PT-IRF)    Progress/Outcomes (Transfer Goal 2, PT-IRF) goal ongoing  -MG     Row Name 06/11/22 1030          Gait/Walking Locomotion Goal 1 (PT-IRF)    Progress/Outcomes (Gait/Walking Locomotion Goal 1, PT-IRF) goal met  -MG     Row Name 06/11/22 1030          Gait/Walking Locomotion Goal 2 (PT-IRF)    Activity/Assistive Device (Gait/Walking Locomotion Goal 2, PT-IRF) gait (walking locomotion);assistive device use  -MG     Gait/Walking Locomotion Distance Goal 2 (PT-IRF) 200'  -MG     [place] Level (Gait/Walking Locomotion Goal 2, PT-IRF) verbal cues required;nonverbal cues (demo/gesture) required;standby assist  -MG     Time Frame (Gait/Walking Locomotion Goal 2, PT-IRF) 1 week  -MG     Progress/Outcomes (Gait/Walking Locomotion Goal 2, PT-IRF) goal revised this date  -MG           User Key  (r) = Recorded By, (t) = Taken By, (c) = Cosigned By    Initials Name Provider Type    MG Marks  Jen, PT Physical Therapist                 Physical Therapy Education                 Title: PT OT SLP Therapies (In Progress)     Topic: Physical Therapy (In Progress)     Point: Mobility training (Done)     Learning Progress Summary           Patient Acceptance, E,TB,D, NR,VU by MG at 6/11/2022 1156    Acceptance, E,TB, VU,NR by MS at 6/9/2022 0927    Acceptance, E,TB, NR by MS at 6/9/2022 0927    Acceptance, E,TB, NR,NL by MS at 6/8/2022 0828    Acceptance, E,TB, VU,NR by MS at 6/6/2022 1045                   Point: Home exercise program (In Progress)     Learning Progress Summary           Patient Acceptance, E,TB, VU,NR by MS at 6/9/2022 0927    Acceptance, E,TB, NR by MS at 6/9/2022 0927    Acceptance, E,TB, NR,NL by MS at 6/8/2022 0828    Acceptance, E,TB, VU,NR by MS at 6/6/2022 1045                   Point: Body mechanics (Done)     Learning Progress Summary           Patient Acceptance, E,TB,D, NR,VU by MG at 6/11/2022 1156    Acceptance, E,TB, VU,NR by MS at 6/9/2022 0927    Acceptance, E,TB, NR by MS at 6/9/2022 0927    Acceptance, E,TB, NR,NL by MS at 6/8/2022 0828    Acceptance, E,TB, VU,NR by MS at 6/6/2022 1045                   Point: Precautions (Done)     Learning Progress Summary           Patient Acceptance, E,TB,D, NR,VU by MG at 6/11/2022 1156    Acceptance, E,TB, NR by MG at 6/10/2022 1212    Acceptance, E,TB, VU,NR by MS at 6/9/2022 0927    Acceptance, E,TB, NR by MS at 6/9/2022 0927    Acceptance, E,TB, NR,NL by MS at 6/8/2022 0828    Acceptance, E,TB, VU,NR by MS at 6/6/2022 1045                               User Key     Initials Effective Dates Name Provider Type Discipline    MG 05/24/22 -  Jen Marks, PT Physical Therapist PT    MS 06/16/21 -  Bennett, Tova A, PT Physical Therapist PT                PT Recommendation and Plan                          Time Calculation:      PT Charges     Row Name 06/11/22 1351             Time Calculation    Start Time 1030  -MG      Stop Time  1100  -MG      Time Calculation (min) 30 min  -MG      PT Received On 06/11/22  -MG      PT - Next Appointment 06/13/22  -MG      PT Goal Re-Cert Due Date 06/17/22  -MG            User Key  (r) = Recorded By, (t) = Taken By, (c) = Cosigned By    Initials Name Provider Type    MG Jen Marks, PT Physical Therapist                Therapy Charges for Today     Code Description Service Date Service Provider Modifiers Qty    62184537529 HC GAIT TRAINING EA 15 MIN 6/10/2022 Jen Marks, PT GP 2    44875388186 HC PT THER PROC EA 15 MIN 6/10/2022 Jen Marks, PT GP 1    17484109507 HC PT THERAPEUTIC ACT EA 15 MIN 6/10/2022 Jen Marks, PT GP 1    29442711115 HC GAIT TRAINING EA 15 MIN 6/11/2022 Jen Marks, PT GP 1    70428369616 HC PT THERAPEUTIC ACT EA 15 MIN 6/11/2022 Jen Marks, PT GP 1    93331850710 HC PT THER SUPP EA 15 MIN 6/11/2022 Jen Marks, PT GP 2        Patient was wearing a face mask during this therapy encounter. Therapist used appropriate personal protective equipment including mask and gloves.  Mask used was standard procedure mask. Appropriate PPE was worn during the entire therapy session. Hand hygiene was completed before and after therapy session. Patient is not in enhanced droplet precautions.       PT G-Codes  AM-PAC 6 Clicks Score (PT): 16      Jen Marks PT  6/11/2022

## 2022-06-11 NOTE — PLAN OF CARE
Problem: Rehabilitation (IRF) Plan of Care  Goal: Plan of Care Review  Flowsheets (Taken 6/11/2022 0350)  Progress: improving  Plan of Care Reviewed With: patient  Outcome Evaluation: Pt is A&Ox4, forgetful at times, cooperative, [place] hearing aids at bedside , up w/ asst x1, he is a red arm band due to being impulsive and not using call light at times, he is continent of bowel/bladder, bm 6/10, c/o neck pain/soreness, medicated per MAR, hit elbow at some time while sleeping tonight and has an abrasion to R elbow, applied mepilex, resting well so far, will continue to monitor.   Goal Outcome Evaluation:  Plan of Care Reviewed With: patient        Progress: improving  Outcome Evaluation: Pt is A&Ox4, forgetful at times, cooperative, [place] hearing aids at bedside , up w/ asst x1, he is a red arm band due to being impulsive and not using call light at times, he is continent of bowel/bladder, bm 6/10, c/o neck pain/soreness, medicated per MAR, hit elbow at some time while sleeping tonight and has an abrasion to R elbow, applied mepilex, resting well so far, will continue to monitor.

## 2022-06-12 NOTE — PROGRESS NOTES
Physical Medicine and Rehabilitation Progress Note    Assessment     Immobility syndrome [M62.3]    Subjective     Alireza Carreon is a 87 y.o. male admitted to inpatient rehabilitation for   1. Status epilepticus w/ Postictal encephalopathy Seizure  2. Cellulitis left leg -   3. A-Fib -   4. HTN -   5. CAD s/p CABG -  6. Hypoxia:   7. Bilateral legs edema/venous stasis  8. Vascular dementia      Chart, overnight events reviewed w/nursing. No complaint this morning. Denies sob, no cp, no fevers and or chills.      The patient's medical records have been reviewed.      Objective     Wt Readings from Last 3 Encounters:   06/07/22 63.3 kg (139 lb 8 oz)   05/26/22 73.9 kg (162 lb 14.7 oz)   05/16/22 65.9 kg (145 lb 3 oz)     Temp Readings from Last 3 Encounters:   06/12/22 96.8 °F (36 °C) (Oral)   06/03/22 97.7 °F (36.5 °C) (Oral)   05/20/22 98.7 °F (37.1 °C) (Temporal)     BP Readings from Last 3 Encounters:   06/12/22 124/63   06/03/22 121/56   05/20/22 128/68     Pulse Readings from Last 3 Encounters:   06/12/22 58   06/03/22 61   05/20/22 62       Physical Exam:   Gen: Pleasant, full sentences, NAD. Eating lunch    Pulm: CTAB; no r/r/w; non-labored   Heart; RRR; no m/r/g appreciated   Abd: soft; NT/ND; +BS   Neuro: verbal; appropriate   Skin: no rashes on exposed skin   MSK: calves soft   : no Indwelling catheter     CT Head Without Contrast  INDICATION:  Trauma, fall from ladder.  Headache.  Patient on blood thinners.    TECHNIQUE:  CT of the brain was performed without contrast.      Automated mA/kV exposure control was utilized and patient examination was performed  in strict accordance with principles of ALARA.    RADIATION AMOUNT:  836 mGy-cm.    COMPARISON:  None Available.    FINDINGS:   There is no acute intracranial hemorrhage, midline shift, mass effect, or  extra-axial fluid collection.  Gray-white differentiation is preserved.      RIGHT parietal  encephalomalacia noted.  Ventricular system is within normal limits  for age.  Mild patchy areas of low-attenuation are seen in the subcortical and deep  periventricular white matter suggesting areas of chronic microvascular ischemia.   Internal carotid artery calcifications are present.    The skull base and calvarium are intact.  The visualized paranasal sinuses are  unremarkable.  The visualized orbits, globes, and mastoid air cells are  unremarkable.      IMPRESSION:   1.  Likely sequela of prior ischemic disease.  No acute intracranial findings.    Signed by Azeb Snyder II, MD  ##### Final #####    Dictated by:    AZEB SNYDER MD-RAD  Dictated DT/TM: 05/16/2021 10:11 pm  Interpreted and electronically signed by:  AZEB SNYDER MD-RAD  Signed DT/TM:  05/16/2021 10:44 pm      Labs  BMP:  Results from last 7 days   Lab Units 06/11/22  0927   CREATININE mg/dL 1.02   BUN mg/dL 25*   SODIUM mmol/L 133*   POTASSIUM mmol/L 4.4   CHLORIDE mmol/L 97*   CO2 mmol/L 29.0     CBC:  Results from last 7 days   Lab Units 06/11/22  0927   WBC 10*3/mm3 7.24   HEMOGLOBIN g/dL 13.9   HEMATOCRIT % 42.6   MCV fL 89.3   PLATELETS 10*3/mm3 326     PTT:    Coagulation:       Scheduled Meds:donepezil, 10 mg, Oral, Nightly  hydroCHLOROthiazide Oral, 25 mg, Oral, Daily  lacosamide, 100 mg, Oral, Q12H  lisinopril, 20 mg, Oral, BID  PHENobarbital, 64.8 mg, Nasogastric, Daily  rivaroxaban, 15 mg, Oral, Daily With Dinner      Continuous Infusions:   PRN Meds:.•  acetaminophen  •  oxyCODONE    Assessment and Plan:  Active Hospital Problems    Diagnosis    • Immobility syndrome      Spent 15 min coordinating care.   Remains stable. Patient working w/ therapists on ADLs , Functional mobility, increase endurance  Continues to meet criteria for IRF. Having ongoing medical issues that require medication changes or non-pharmacological regimen adjustments.

## 2022-06-12 NOTE — PLAN OF CARE
Goal Outcome Evaluation:           Progress: improving  Outcome Evaluation: A&OX4. Had no incidents of impulsivity. Has called for assistance, waits for staff to help him, and has not tried to get up on own. Stayed close to nurse's station and talked with peers and staff. Continent today. Wears a brief. Wears glasses and is [place]. Ate well at meals. Visited with family. No code. BM y/d. Mepilex to R. elbow d/t skin tear. Family conference tomorrow.

## 2022-06-12 NOTE — PLAN OF CARE
Goal Outcome Evaluation:      Pt A/Ox3-4, calm/cooperative; pt had difficulty with time of day after waking from evening nap, but able to be reoriented. Meds taken whole w thin liquids. Pt c/o L thigh muscle pain; prn Tylenol given x1. Pt continent using urinal overnight.

## 2022-06-13 NOTE — THERAPY TREATMENT NOTE
Inpatient Rehabilitation - Physical Therapy Treatment Note       [facility]     Patient Name: Alireza Carreon  : 1935  MRN: [id number]    Today's Date: 2022                    Admit Date: 6/3/2022      Visit Dx:   No diagnosis found.    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formerly [facility])   • Monoclonal gammopathy of unknown significance (MGUS)   • Chronic anticoagulation   • Arteriosclerosis of coronary artery   • Cerebrovascular accident (CVA) (HCC)   • Seizure disorder (HCC)   • Swelling of left lower extremity   • Hearing loss   • Localized edema   • Blister   • Skin nodule of toe of right foot   • Status epilepticus (HCC)   • Immobility syndrome       Past Medical History:   Diagnosis Date   • 3-vessel CAD    • Anticoagulated on warfarin    • Atrial fibrillation (HCC)    • BMI 28.0-28.9,adult    • Bunion, right foot    • Complaints of memory disturbance    • Dementia (HCC)    • Edema    • Encounter for immunization    • Generalized convulsive seizure (HCC)    • Gout    • History of double vision    • HL (hearing loss)    • Hyperlipidemia    • Hypertension    • Left foot pain    • 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    • Movement disorder    • Nocturnal leg cramps    • OA (osteoarthritis)    • Prepatellar effusion    • Pulmonary embolism (HCC)    • Puncture wound of hand, right    • Screening for cardiovascular condition    • Sleep apnea    • Stasis dermatitis    • Statin intolerance    • Stroke (HCC)    • Taking drug for chronic disease    • Thoracic back pain    • Transient ischemic attack        Past Surgical History:   Procedure Laterality Date   • ADENOIDECTOMY      • APPENDECTOMY     • CATARACT EXTRACTION, BILATERAL     • CORONARY ARTERY BYPASS GRAFT     • CORONARY ARTERY BYPASS GRAFT     • CYSTOSCOPY TRANSURETHRAL RESECTION OF PROSTATE     • HERNIA REPAIR     • OTHER SURGICAL HISTORY      carotid thromboendarterectomy   • SHOULDER SURGERY     • SKIN CANCER EXCISION         PT ASSESSMENT (last 12 hours)     IRF PT Evaluation and Treatment     Row Name 06/13/22 0900          PT Time and Intention    Document Type daily treatment (P)   -KM     Mode of Treatment physical therapy (P)   -KM     Patient/Family/Caregiver Comments/Observations Seated in w/c in room with nurse present, exit alarm on (P)   -KM     Total Minutes, Physical Therapy 60 (P)   -KM     Row Name 06/13/22 0900          General Information    Patient Profile Reviewed yes (P)   -KM     Existing Precautions/Restrictions fall (P)   -KM     Limitations/Impairments safety/cognitive (P)   -KM     Comment, General Information ROSETTE Red arm band (P)   -KM     Row Name 06/13/22 0900          Pain Assessment    Pretreatment Pain Rating 1/10 (P)   -KM     Posttreatment Pain Rating 1/10 (P)   -KM     Pain Location - other (see comments) (P)   Left quad muscle  -KM     Row Name 06/13/22 0900          Cognition/Psychosocial    Affect/Mental Status (Cognition) WFL (P)   -KM     Orientation Status (Cognition) oriented x 4 (P)   -KM     Follows Commands (Cognition) follows one-step commands;follows two-step commands (P)   -KM     Personal Safety Interventions fall prevention program maintained;gait belt;nonskid shoes/slippers when out of bed;muscle strengthening facilitated;supervised activity (P)   -KM     Cognitive Function memory deficit;safety deficit;attention deficit (P)   -KM     Attention Deficit (Cognition) concentration;distractible in noisy environment (P)   -KM     Memory Deficit (Cognition) moderate deficit;minimal deficit (P)   -KM     Safety Deficit (Cognition) insight into  deficits/self-awareness;judgment;awareness of need for assistance (P)   -KM     Row Name 06/13/22 0900          Bed Mobility    Bed Mobility supine-sit;sit-supine;rolling left;rolling right (P)   -KM     Scooting/Bridging [place] (Bed Mobility) contact guard;standby assist;verbal cues (P)   -KM     Supine-Sit [place] (Bed Mobility) contact guard;standby assist;verbal cues (P)   -KM     Sit-Supine [place] (Bed Mobility) standby assist;contact guard;verbal cues (P)   -KM     Comment, (Bed Mobility) on mat (P)   -KM     Row Name 06/13/22 0900          Transfer Assessment/Treatment    Transfers chair-bed transfer (P)   -KM     Comment, (Transfers) Transfers are SBA-CGA x 1, vc's needed for hand placement and body position. (P)   -KM     Row Name 06/13/22 0900          Transfers    Bed-Chair [place] (Transfers) contact guard;1 person assist;standby assist (P)   -KM     Chair-Bed [place] (Transfers) standby assist;contact guard;1 person assist (P)   -KM     Sit-Stand [place] (Transfers) standby assist;contact guard;1 person assist (P)   -KM     Stand-Sit [place] (Transfers) standby assist;contact guard;1 person assist (P)   -KM     [place] Level (Toilet Transfer) contact guard;verbal cues;nonverbal cues (demo/gesture) (P)   -KM     Row Name 06/13/22 0900          Bed-Chair Transfer    Comment, (Bed-Chair Transfer) Stand pivot with SBA-CGA x 1 (P)   -KM     Row Name 06/13/22 0900          Chair-Bed Transfer    Comment, (Chair-Bed Transfer) Stand pivot SBA-CGA x 1 and vc's (P)   -KM     Row Name 06/13/22 0900          Sit-Stand Transfer    Assistive Device (Sit-Stand Transfers) walker, front-wheeled (P)   -KM     Row Name 06/13/22 0900          Stand-Sit Transfer    Assistive Device (Stand-Sit Transfers) walker, front-wheeled (P)   -KM     [facility] Name 06/13/22 0900          Toilet Transfer    Type (Toilet Transfer) sit-stand;stand-sit (P)   -     Row Name 06/13/22 0900           Gait/Stairs (Locomotion)    [place] Level (Gait) standby assist;contact guard;1 person assist;verbal cues (P)   -KM     Assistive Device (Gait) walker, front-wheeled (P)   -KM     Distance in Feet (Gait) 240' x 2 trials (P)   -KM     Pattern (Gait) step-through (P)   -KM     Deviations/Abnormal Patterns (Gait) festinating/shuffling;stride length decreased (P)   -KM     Bilateral Gait Deviations forward flexed posture (P)   -KM     Gait Assessment/Intervention Pt w/ improved step length and posture during ambulation. Pt contines to be SBA-CGA x 1 amd require moderate l vc's on step length and posture. (P)   -KM     [place] Level (Stairs) contact guard;1 person assist (P)   -KM     Handrail Location (Stairs) both sides (P)   -KM     Number of Steps (Stairs) 4 (P)   -KM     Ascending Technique (Stairs) step-to-step (P)   -KM     Descending Technique (Stairs) step-to-step (P)   -KM     Comment, (Gait/Stairs) Pt performed stairs well but required vc's to perform step-to-step gait during stairs and maintained good balance ascending and descending. (P)   -KM     Row Name 06/13/22 0900          Safety Issues, Functional Mobility    Safety Issues Affecting Function (Mobility) safety precaution awareness;sequencing abilities;positioning of assistive device (P)   -KM     Impairments Affecting Function (Mobility) balance;coordination;strength;cognition (P)   -KM     Cognitive Impairments, Mobility Safety/Performance safety precaution awareness;safety precaution follow-through;insight into deficits/self-awareness;sequencing abilities (P)   -KM     Row Name 06/13/22 0900          Balance    Dynamic Sitting Balance standby assist (P)   -KM     Position, Sitting Balance sitting edge of mat;unsupported (P)   -KM     Sit to Stand Dynamic Balance standby assist;verbal cues;1-person assist (P)   -KM     Static Standing Balance standby assist;verbal cues;1-person assist (P)   -KM     Dynamic Standing Balance standby  assist;verbal cues;1-person assist (P)   -KM     Position/Device Used, Standing Balance walker, front-wheeled (P)   -KM     Row Name 06/13/22 0900          Motor Skills    Therapeutic Exercise hip (P)   Step taps with bilateral legs onto aerobic step at mello-bars.  -KM     Row Name 06/13/22 0900          Knee (Therapeutic Exercise)    Knee Strengthening (Therapeutic Exercise) bilateral;LAQ (long arc quad);1 lb free weight;10 repetitions;2 sets (P)   -KM     Row Name 06/13/22 0900          Positioning and Restraints    Pre-Treatment Position sitting in chair/recliner (P)   -KM     Post Treatment Position wheelchair (P)   -KM     In Wheelchair sitting;call light within reach;encouraged to call for assist;exit alarm on (P)   -KM           User Key  (r) = Recorded By, (t) = Taken By, (c) = Cosigned By    Initials Name Provider Type    Sang Cabrera, PT Student PT Student                 Physical Therapy Education                 Title: PT OT SLP Therapies (In Progress)     Topic: Physical Therapy (Done)     Point: Mobility training (Done)     Learning Progress Summary           Patient Acceptance, E, VU,NR by  at 6/13/2022 0956    Acceptance, E,TB,D, NR,VU by MG at 6/11/2022 1156    Acceptance, E,TB, VU,NR by MS at 6/9/2022 0927    Acceptance, E,TB, NR by MS at 6/9/2022 0927    Acceptance, E,TB, NR,NL by MS at 6/8/2022 0828    Acceptance, E,TB, VU,NR by MS at 6/6/2022 1045                   Point: Home exercise program (Done)     Learning Progress Summary           Patient Acceptance, E, VU,NR by KM at 6/13/2022 0956    Acceptance, E,TB, VU,NR by MS at 6/9/2022 0927    Acceptance, E,TB, NR by MS at 6/9/2022 0927    Acceptance, E,TB, NR,NL by MS at 6/8/2022 0828    Acceptance, E,TB, VU,NR by MS at 6/6/2022 1045                   Point: Body mechanics (Done)     Learning Progress Summary           Patient Acceptance, E, VU,NR by KM at 6/13/2022 0956    Acceptance, E,TB,D, NR,VU by MG at 6/11/2022 1156    Acceptance,  E,TB, VU,NR by MS at 6/9/2022 0927    Acceptance, E,TB, NR by MS at 6/9/2022 0927    Acceptance, E,TB, NR,NL by MS at 6/8/2022 0828    Acceptance, E,TB, VU,NR by MS at 6/6/2022 1045                   Point: Precautions (Done)     Learning Progress Summary           Patient Acceptance, E, VU,NR by KM at 6/13/2022 0956    Acceptance, E,TB,D, NR,VU by MG at 6/11/2022 1156    Acceptance, E,TB, NR by MG at 6/10/2022 1212    Acceptance, E,TB, VU,NR by MS at 6/9/2022 0927    Acceptance, E,TB, NR by MS at 6/9/2022 0927    Acceptance, E,TB, NR,NL by MS at 6/8/2022 0828    Acceptance, E,TB, VU,NR by MS at 6/6/2022 1045                               User Key     Initials Effective Dates Name Provider Type Discipline     05/24/22 -  Jen Marks, PT Physical Therapist PT    MS 06/16/21 -  Tova Bennett PT Physical Therapist PT     06/06/22 -  Sang Constantino, PT Student PT Student PT                PT Recommendation and Plan                          Time Calculation:      PT Charges     Row Name 06/13/22 0955             Time Calculation    Start Time 0800 (P)   -KM      Stop Time 0900 (P)   -KM      Time Calculation (min) 60 min (P)   -KM      PT Received On 06/13/22 (P)   -KM      PT - Next Appointment 06/14/22 (P)   -KM              Time Calculation- PT    Total Timed Code Minutes- PT 60 minute(s) (P)   -KM            User Key  (r) = Recorded By, (t) = Taken By, (c) = Cosigned By    Initials Name Provider Type     Sang Constantino, PT Student PT Student                Therapy Charges for Today     Code Description Service Date Service Provider Modifiers Qty    43523826028 HC PT THERAPEUTIC ACT EA 15 MIN 6/13/2022 Sang Constantino, PT Student GP 2    53196082185 HC PT THER PROC EA 15 MIN 6/13/2022 Sang Constantino, PT Student GP 1    51386645681 HC GAIT TRAINING EA 15 MIN 6/13/2022 Dougie, Sang, PT Student GP 1            PT G-Codes  AM-PAC 6 Clicks Score (PT): 16      Sang Constantino, PT Student  6/13/2022

## 2022-06-13 NOTE — THERAPY TREATMENT NOTE
Inpatient Rehabilitation - Physical Therapy Treatment Note       [facility]     Patient Name: Alireza Carreon  : 1935  MRN: [id number]    Today's Date: 2022                    Admit Date: 6/3/2022      Visit Dx:   No diagnosis found.    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[facility])   • Monoclonal gammopathy of unknown significance (MGUS)   • Chronic anticoagulation   • Arteriosclerosis of coronary artery   • Cerebrovascular accident (CVA) (HCC)   • Seizure disorder (HCC)   • Swelling of left lower extremity   • Hearing loss   • Localized edema   • Blister   • Skin nodule of toe of right foot   • Status epilepticus (HCC)   • Immobility syndrome       Past Medical History:   Diagnosis Date   • 3-vessel CAD    • Anticoagulated on warfarin    • Atrial fibrillation (HCC)    • BMI 28.0-28.9,adult    • Bunion, right foot    • Complaints of memory disturbance    • Dementia (HCC)    • Edema    • Encounter for immunization    • Generalized convulsive seizure (HCC)    • Gout    • History of double vision    • HL (hearing loss)    • Hyperlipidemia    • Hypertension    • Left foot pain    • 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    • Movement disorder    • Nocturnal leg cramps    • OA (osteoarthritis)    • Prepatellar effusion    • Pulmonary embolism (HCC)    • Puncture wound of hand, right    • Screening for cardiovascular condition    • Sleep apnea    • Stasis dermatitis    • Statin intolerance    • Stroke (HCC)    • Taking drug for chronic disease    • Thoracic back pain    • Transient ischemic attack        Past Surgical History:   Procedure Laterality Date   • ADENOIDECTOMY      • APPENDECTOMY     • CATARACT EXTRACTION, BILATERAL     • CORONARY ARTERY BYPASS GRAFT     • CORONARY ARTERY BYPASS GRAFT     • CYSTOSCOPY TRANSURETHRAL RESECTION OF PROSTATE     • HERNIA REPAIR     • OTHER SURGICAL HISTORY      carotid thromboendarterectomy   • SHOULDER SURGERY     • SKIN CANCER EXCISION         PT ASSESSMENT (last 12 hours)     IRF PT Evaluation and Treatment     Row Name 06/13/22 0900          PT Time and Intention    Document Type daily treatment (P)   -KM     Mode of Treatment physical therapy (P)   -KM     Patient/Family/Caregiver Comments/Observations Seated in w/c in room with nurse present, exit alarm on (P)   -KM     Total Minutes, Physical Therapy 60 (P)   -KM     Row Name 06/13/22 0900          General Information    Patient Profile Reviewed yes (P)   -KM     Existing Precautions/Restrictions fall (P)   -KM     Limitations/Impairments safety/cognitive (P)   -KM     Comment, General Information ROSETTE Red arm band (P)   -KM     Row Name 06/13/22 0900          Pain Assessment    Pretreatment Pain Rating 1/10 (P)   -KM     Posttreatment Pain Rating 1/10 (P)   -KM     Pain Location - other (see comments) (P)   Left quad muscle  -KM     Row Name 06/13/22 0900          Cognition/Psychosocial    Affect/Mental Status (Cognition) WFL (P)   -KM     Orientation Status (Cognition) oriented x 4 (P)   -KM     Follows Commands (Cognition) follows one-step commands;follows two-step commands (P)   -KM     Personal Safety Interventions fall prevention program maintained;gait belt;nonskid shoes/slippers when out of bed;muscle strengthening facilitated;supervised activity (P)   -KM     Cognitive Function memory deficit;safety deficit;attention deficit (P)   -KM     Attention Deficit (Cognition) concentration;distractible in noisy environment (P)   -KM     Memory Deficit (Cognition) moderate deficit;minimal deficit (P)   -KM     Safety Deficit (Cognition) insight into  deficits/self-awareness;judgment;awareness of need for assistance (P)   -KM     Row Name 06/13/22 0900          Mobility    Advanced Gait Activity -- (P)   2''  -KM     Row Name 06/13/22 0900          Bed Mobility    Bed Mobility supine-sit;sit-supine;rolling left;rolling right (P)   -KM     Scooting/Bridging [place] (Bed Mobility) contact guard;standby assist;verbal cues (P)   -KM     Supine-Sit [place] (Bed Mobility) contact guard;standby assist;verbal cues (P)   -KM     Sit-Supine [place] (Bed Mobility) standby assist;contact guard;verbal cues (P)   -KM     Comment, (Bed Mobility) on mat (P)   -KM     Row Name 06/13/22 0900          Transfer Assessment/Treatment    Transfers chair-bed transfer (P)   -KM     Comment, (Transfers) Transfers are SBA-CGA x 1, vc's needed for hand placement and body position. (P)   -KM     Row Name 06/13/22 0900          Transfers    Bed-Chair [place] (Transfers) contact guard;1 person assist;standby assist (P)   -KM     Chair-Bed [place] (Transfers) standby assist;contact guard;1 person assist (P)   -KM     Sit-Stand [place] (Transfers) standby assist;contact guard;1 person assist (P)   -KM     Stand-Sit [place] (Transfers) standby assist;contact guard;1 person assist (P)   -KM     [place] Level (Toilet Transfer) contact guard;verbal cues;nonverbal cues (demo/gesture) (P)   -KM     Row Name 06/13/22 0900          Bed-Chair Transfer    Comment, (Bed-Chair Transfer) Stand pivot with SBA-CGA x 1 (P)   -KM     Row Name 06/13/22 0900          Chair-Bed Transfer    Comment, (Chair-Bed Transfer) Stand pivot SBA-CGA x 1 and vc's (P)   -KM     Row Name 06/13/22 0900          Sit-Stand Transfer    Assistive Device (Sit-Stand Transfers) walker, front-wheeled (P)   -KM     Row Name 06/13/22 0900          Stand-Sit Transfer    Assistive Device (Stand-Sit Transfers) walker, front-wheeled (P)   -KM     Row Name 06/13/22 0900          Toilet Transfer    Type  (Toilet Transfer) sit-stand;stand-sit (P)   -KM     Row Name 06/13/22 0900          Gait/Stairs (Locomotion)    [place] Level (Gait) standby assist;contact guard;1 person assist;verbal cues (P)   -KM     Assistive Device (Gait) walker, front-wheeled (P)   -KM     Distance in Feet (Gait) 240' x 2 trials (P)   -KM     Pattern (Gait) step-through (P)   -KM     Deviations/Abnormal Patterns (Gait) festinating/shuffling;stride length decreased (P)   -KM     Bilateral Gait Deviations forward flexed posture (P)   -KM     Gait Assessment/Intervention Pt w/ improved step length and posture during ambulation. Pt contines to be SBA-CGA x 1 amd require moderate l vc's on step length and posture. (P)   -KM     [place] Level (Stairs) contact guard;1 person assist (P)   -KM     Handrail Location (Stairs) both sides (P)   -KM     Number of Steps (Stairs) 4 (P)   -KM     Ascending Technique (Stairs) step-to-step (P)   -KM     Descending Technique (Stairs) step-to-step (P)   -KM     Comment, (Gait/Stairs) Pt performed stairs well but required vc's to perform step-to-step gait during stairs and maintained good balance ascending and descending. (P)   -KM     Row Name 06/13/22 0900          Safety Issues, Functional Mobility    Safety Issues Affecting Function (Mobility) safety precaution awareness;sequencing abilities;positioning of assistive device (P)   -KM     Impairments Affecting Function (Mobility) balance;coordination;strength;cognition (P)   -KM     Cognitive Impairments, Mobility Safety/Performance safety precaution awareness;safety precaution follow-through;insight into deficits/self-awareness;sequencing abilities (P)   -KM     Row Name 06/13/22 1200          Curb Negotiation (Mobility)    [place], Curb Negotiation contact guard;standby assist;1 person assist (P)   -KM     Assistive Device ([place]urb Neg[place]iation) walker, front-wheeled (P)   -KM     Comment, [place]iation (Mobility) 2'' curb x 1 trial, vc needed  for sequencing. (P)   -KM     Row Name 06/13/22 0900          Balance    Dynamic Sitting Balance standby assist (P)   -KM     Position, Sitting Balance sitting edge of mat;unsupported (P)   -KM     Sit to Stand [place] Balance standby assist;verbal cues;1-person assist (P)   -KM     Static Standing Balance standby assist;verbal cues;1-person assist (P)   -KM     Dynamic Standing Balance standby assist;verbal cues;1-person assist (P)   -KM     Position/Device Used, Standing Balance walker, front-wheeled (P)   -KM     Row Name 06/13/22 0900          Motor Skills    Therapeutic Exercise hip (P)   Step taps with bilateral legs onto aerobic step at mello-bars.  -KM     Row Name 06/13/22 0900          Knee (Therapeutic Exercise)    Knee Strengthening (Therapeutic Exercise) bilateral;LAQ (long arc quad);1 lb free weight;10 repetitions;2 sets (P)   -KM     Row Name 06/13/22 0900          Positioning and Restraints    Pre-Treatment Position sitting in chair/recliner (P)   -KM     Post Treatment Position wheelchair (P)   -KM     In Wheelchair sitting;call light within reach;encouraged to call for assist;exit alarm on (P)   -KM           User Key  (r) = Recorded By, (t) = Taken By, (c) = Cosigned By    Initials Name Provider Type    Sang Cabrera, PT Student PT Student                 Physical Therapy Education                 Title: PT OT SLP Therapies (In Progress)     Topic: Physical Therapy (Done)     Point: Mobility training (Done)     Learning Progress Summary           Patient Acceptance, E, VU,NR by KM at 6/13/2022 0956    Acceptance, E,TB,D, NR,VU by MG at 6/11/2022 1156    Acceptance, E,TB, VU,NR by MS at 6/9/2022 0927    Acceptance, E,TB, NR by MS at 6/9/2022 0927    Acceptance, E,TB, NR,NL by MS at 6/8/2022 0828    Acceptance, E,TB, VU,NR by MS at 6/6/2022 1045                   Point: Home exercise program (Done)     Learning Progress Summary           Patient Acceptance, E, VU,NR by KM at 6/13/2022 0956     Acceptance, E,TB, VU,NR by MS at 6/9/2022 0927    Acceptance, E,TB, NR by MS at 6/9/2022 0927    Acceptance, E,TB, NR,NL by MS at 6/8/2022 0828    Acceptance, E,TB, VU,NR by MS at 6/6/2022 1045                   Point: Body mechanics (Done)     Learning Progress Summary           Patient Acceptance, E, VU,NR by KM at 6/13/2022 0956    Acceptance, E,TB,D, NR,VU by MG at 6/11/2022 1156    Acceptance, E,TB, VU,NR by MS at 6/9/2022 0927    Acceptance, E,TB, NR by MS at 6/9/2022 0927    Acceptance, E,TB, NR,NL by MS at 6/8/2022 0828    Acceptance, E,TB, VU,NR by MS at 6/6/2022 1045                   Point: Precautions (Done)     Learning Progress Summary           Patient Acceptance, E, VU,NR by KM at 6/13/2022 0956    Acceptance, E,TB,D, NR,VU by MG at 6/11/2022 1156    Acceptance, E,TB, NR by MG at 6/10/2022 1212    Acceptance, E,TB, VU,NR by MS at 6/9/2022 0927    Acceptance, E,TB, NR by MS at 6/9/2022 0927    Acceptance, E,TB, NR,NL by MS at 6/8/2022 0828    Acceptance, E,TB, VU,NR by MS at 6/6/2022 1045                               User Key     Initials Effective Dates Name Provider Type Discipline     05/24/22 -  Jen Marks, PT Physical Therapist PT    MS 06/16/21 -  Tova Bennett PT Physical Therapist PT    KM 06/06/22 -  Sang Constantino, GUILLERMO Student PT Student PT                PT Recommendation and Plan                          Time Calculation:      PT Charges     Row Name 06/13/22 0955             Time Calculation    Start Time 0800 (P)   -KM      Stop Time 0900 (P)   -KM      Time Calculation (min) 60 min (P)   -KM      PT Received On 06/13/22 (P)   -KM      PT - Next Appointment 06/14/22 (P)   -KM              Time Calculation- PT    Total Timed Code Minutes- PT 60 minute(s) (P)   -CIELO            User Key  (r) = Recorded By, (t) = Taken By, (c) = Cosigned By    Initials Name Provider Type    Sang Cabrera, PT Student PT Student                Therapy Charges for Today     Code Description Service  Date Service Provider Modifiers Qty    31334641786 HC PT THERAPEUTIC ACT EA 15 MIN 6/13/2022 Sang Constantino, PT Student GP 2    65833667391 HC PT THER PROC EA 15 MIN 6/13/2022 Sang Constantino, PT Student GP 1    [id number]  GAIT TRAINING EA 15 MIN 6/13/2022 Sang Constantino, PT Student GP 1            PT G-Codes  AM-PAC 6 Clicks Score (PT): 16      Sang Constantino, PT Student  6/13/2022

## 2022-06-13 NOTE — PROGRESS NOTES
Case Management  Inpatient Rehabilitation Plan of Care and Discharge Plan Note    Rehabilitation Diagnosis:  Branch  Date of Onset:  Branch    Medical Summary:  Branch  Past Medical History: Branch    Plan of Care  Updated Problems/Interventions  Field    Expected Intensity:  Branch  Interdisciplinary Team:  Jaime  Estimated Length of Stay/Anticipated Discharge Date: Branch  Anticipated Discharge Destination:  Anticipated discharge destination from inpatient rehabilitation is community  discharge with assistance. Patient lives with wife in one story home with 3  steps to enter with rails.  Family conference held with patient, wife, step-dgt, son and 3 dgts (by phone)  on 6/13. Will schedule familiy teaching with dgtAdalgisa, who will be assisting  at home.  D/C plan is home with wife. DgtAdalgisa, to take FMLA to assist patient at home.  [facility] to provide home PT, OT, ST.      Based on the patient's medical and functional status, their prognosis and  expected level of functional improvement is:  Jaime    Signed by: JOHN Rojas

## 2022-06-13 NOTE — PROGRESS NOTES
Inpatient Rehabilitation Plan of Care Note    Plan of Care  Updated Problems/Interventions  Mobility    [PT] Walk(Active)  Current Status(06/13/2022): 240' SBA-CGA, shuffling gait  Weekly Goal(06/20/2022): amb to BR w nsg w RW, SBA  Discharge Goal: 160' SV, RW    [PT] Bed/Chair/Wheelchair(Active)  Current Status(06/13/2022): SBA-CGA, RW  Weekly Goal(06/20/2022): SV, RW  Discharge Goal: SV, RW    [PT] Bed Mobility(Active)  Current Status(06/13/2022): SBA-CGA  Weekly Goal(06/20/2022): SV  Discharge Goal: SV    Signed by: Tova Bennett, PT

## 2022-06-13 NOTE — PROGRESS NOTES
Family conference held today with patient, wife, step-daughter, son, PT, OT, ST, NSG and SW present. Patient's 3 daughters were on conference call. Discussed progress and d/c recommendations.  PT reported patient at SBA with bed mobility and transfers. Needs cues for safety. Patient walking with rolling walker SB/CGA. PT reported patient has shuffling gait and needing cues to take bigger steps, especially with left foot, which has been weaker from past stroke. PT reported patient has not had any loss of balance despite gait problems. Patient has gone up/down 4 steps with rail and CGA. PT recommended patient continue to use rolling walker at home and have someone with him at home that can provide close SBA.     OT reported patient requires CGA for commode and shower transfers for safety. Patient does bathing seated but does need close SBA if stands at any time. Patient able to dress UE with set up and Min assist with LE dressing. Toileting requires CGA. OT recommended patient use shower chair and have close SBA for ADL's. OT also working on UE strengthening exercises and visual perceptual puzzles. Patient does have difficulty with visual perceptual puzzles.     ST reported working with patient on attention, memory and thought organization tasks along with safety awareness. Patient does well with immediate recall but has decreased memory after delay. Patient very [place] which also impacts his cognition. Patient does benefit from cues for memory. ST has tried working on finances and medication management. Patient did require cues for these tasks. Recommended patient have assistance with medication and finance management.     NSG reviewed medications and discussed follow ups with PCP and neurology. Most medications same as home meds except for Vimpat. Will use Meds to Bed for prescription. Discussed using pill organizer. Daughter plans to help with medications. No safety issues reported by nurse.     Team discussed home  safety and making clear pathways for patient at home.   Recommended home health PT, OT, ST to work with patient in home environment due to cognition. Daughter, Adalgisa, requested to use [facility] as she works for them. Referral made.    Discussed equipment for home. Patient has shower chair. Will order rolling walker and BSC.     Patient will d/c home with wife. Daughter, Adalgisa, plans to take FMLA so can assist patient at home due to wife having some dementia.   Discussed family teaching with daughter prior to d/c. D/C date to be determined tomorrow in team conference. Will let family know d/c date and assist with plans.

## 2022-06-13 NOTE — DISCHARGE PLACEMENT REQUEST
"Juanjo Carreon (87 y.o. Male)             Date of Birth   1935    Social Security Number       Address   [address] [place] [address]    Home Phone   [phone]    MRN   [id number]       Quaker   None    Marital Status                               Admission Date   6/3/22    Admission Type   Elective    Admitting Provider   Montana Ewing MD    Attending Provider   Montana Ewing MD    Department, Room/Bed   [facility], 4417/1       Discharge Date       Discharge Disposition       Discharge Destination                               Attending Provider: Montana Ewing MD    Allergies: Codeine, Ezetimibe, Hydrocodone-acetaminophen, Levetiracetam, Lorazepam, Phenytoin, Pseudoephedrine, Repatha [Evolocumab], Statins, Zolpidem    Isolation: None   Infection: None   Code Status: No CPR   Advance Care Planning Activity    Ht: 167.6 cm (66\")   Wt: 63.3 kg (139 lb 8 oz)    Admission Cmt: None   Principal Problem: None                Active Insurance as of 6/3/2022     Primary Coverage     Payor Plan Insurance Group Employer/Plan Group    ANTH MEDICARE REPLACEMENT ANTHEM MEDICARE ADVANTAGE [id number]     Payor Plan Address Payor Plan Phone Number Payor Plan Fax Number Effective Dates    [address] [phone]  1/1/2020 - None Entered    [facility] [id number]       Subscriber Name Subscriber Birth Date Member ID       JUANJO CARREON 1935 [id number]                 Emergency Contacts      (Rel.) Home Phone Work Phone Mobile Phone    Elizabeth Carreon (Spouse) [phone] -- --    Lolly Chávez (Daughter) -- -- [phone]    Robby Carreon (Son) [phone] -- --              "

## 2022-06-13 NOTE — THERAPY TREATMENT NOTE
Inpatient Rehabilitation - Speech Language Pathology Treatment Note    [facility]     Patient Name: Alireza Carreon  : 1935  MRN: [id number]    Today's Date: 2022                   Admit Date: 6/3/2022       Visit Dx:    No diagnosis found.    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[facility])   • Monoclonal gammopathy of unknown significance (MGUS)   • Chronic anticoagulation   • Arteriosclerosis of coronary artery   • Cerebrovascular accident (CVA) (HCC)   • Seizure disorder (HCC)   • Swelling of left lower extremity   • Hearing loss   • Localized edema   • Blister   • Skin nodule of toe of right foot   • Status epilepticus (HCC)   • Immobility syndrome       Past Medical History:   Diagnosis Date   • 3-vessel CAD    • Anticoagulated on warfarin    • Atrial fibrillation (HCC)    • BMI 28.0-28.9,adult    • Bunion, right foot    • Complaints of memory disturbance    • Dementia (HCC)    • Edema    • Encounter for immunization    • Generalized convulsive seizure (HCC)    • Gout    • History of double vision    • HL (hearing loss)    • Hyperlipidemia    • Hypertension    • Left foot pain    • 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    • Movement disorder    • Nocturnal leg cramps    • OA (osteoarthritis)    • Prepatellar effusion    • Pulmonary embolism (HCC)    • Puncture wound of hand, right    • Screening for cardiovascular condition    • Sleep apnea    • Stasis dermatitis    • Statin intolerance    • Stroke (HCC)    • Taking drug for chronic disease    • Thoracic back pain    • Transient ischemic attack        Past Surgical History:   Procedure Laterality Date   •  ADENOIDECTOMY     • APPENDECTOMY     • CATARACT EXTRACTION, BILATERAL     • CORONARY ARTERY BYPASS GRAFT     • CORONARY ARTERY BYPASS GRAFT     • CYSTOSCOPY TRANSURETHRAL RESECTION OF PROSTATE     • HERNIA REPAIR     • OTHER SURGICAL HISTORY      carotid thromboendarterectomy   • SHOULDER SURGERY     • SKIN CANCER EXCISION         SLP Recommendation and Plan                                                   SLP EVALUATION (last 72 hours)     SLP SLC Evaluation     Row Name 06/13/22 1200 06/13/22 1030                Communication Assessment/Intervention    Document Type therapy note (daily note)  -SR therapy note (daily note)  -SR       Subjective Information no complaints  -SR no complaints  -SR       Patient Observations alert;cooperative;agree to therapy  -SR alert;cooperative;agree to therapy  -SR       Patient Effort good  -SR good  -SR       Symptoms Noted During/After Treatment none  -SR none  -SR                Pain Scale: Numbers Pre/Post-Treatment    Pretreatment Pain Rating 0/10 - no pain  -SR 0/10 - no pain  -SR       Posttreatment Pain Rating 0/10 - no pain  -SR 0/10 - no pain  -SR             User Key  (r) = Recorded By, (t) = Taken By, (c) = Cosigned By    Initials Name Effective Dates    SR Evelina Gibson, LANEY 01/12/22 -                    EDUCATION    The patient has been educated in the following areas:       Cognitive Impairment.             SLP GOALS     Row Name 06/13/22 1200 06/13/22 1030          Attention Goal 1 (SLP)    Improve Attention by Goal 1 (SLP) complete selective attention task;complete divided attention task;70%;independently (over 90% accuracy)  -SR complete selective attention task;complete divided attention task;70%;independently (over 90% accuracy)  -SR     Time Frame (Attention Goal 1, SLP) by discharge  -SR by discharge  -SR     Progress/Outcomes (Attention Goal 1, SLP) continuing progress toward goal  -SR continuing progress toward goal  -SR     Comment (Attention Goal 1,  SLP) -- Attended to therapy tasks with no redirection  -SR            Memory Skills Goal 1 (SLP)    Improve Memory Skills Through Goal 1 (SLP) recalling related word lists immediately;recalling related word lists with an imposed delay;recalling unrelated word lists immediately;recalling unrelated word lists with an imposed delay;visual memory task;listen to a paragraph and answer questions;70%;with minimal cues (75-90%)  -SR --     Time Frame (Memory Skills Goal 1, SLP) by discharge  -SR --     Progress/Outcomes (Memory Skills Goal 1, SLP) good progress toward goal  -SR --     Comment (Memory Skills Goal 1, SLP) Paragraph retention; recalled details from short paragraph during immediate memory task with 70% acc given NO cues; 100% given min cues.  -SR --            Reasoning Goal 1 (SLP)    Improve Reasoning Through Goal 1 (SLP) -- complete basic reasoning task;complete deductive reasoning task;with minimal cues (75-90%);90%  -SR     Time Frame (Reasoning Goal 1, SLP) -- by discharge  -SR     Progress/Outcomes (Reasoning Goal 1, SLP) -- good progress toward goal  -SR     Comment (Reasoning Goal 1, SLP) -- Deduction by exclusion; Followed written directions to find calendar date with 40% acc given min cues; 100% acc given mod cues. Extended time to complete task.  -SR            Functional Problem Solving Skills Goal 1 (SLP)    Improve Problem Solving Through Goal 1 (SLP) tell similarity between items;complete organization/home management task;sequence steps in a task;answer questions about ADL problems;70%;independently (over 90% accuracy)  -SR --     Time Frame (Problem Solving Goal 1, SLP) by discharge  -SR --     Progress/Outcomes (Problem Solving Goal 1, SLP) good progress toward goal  -SR --     Comment (Problem Solving Goal 1, SLP) Modify sentence incongruities; 70% acc given NO cues; 100% acc given min cues  -SR --            Executive Functional Skills Goal 1 (SLP)    Improve Executive Function Skills Goal 1  (SLP) -- demonstrate awareness of deficit;time management activity;home management activity;70%;with minimal cues (75-90%)  -SR     Time Frame (Executive Function Skills Goal 1, SLP) -- by discharge  -SR     Progress/Outcomes (Executive Function Skills Goal 1, SLP) -- good progress toward goal  -SR     Comment (Executive Function Skills Goal 1, SLP) -- Home management (recipe); sequenced steps and followed recipe with 67% acc given NO cues; 100% acc given min cues  -SR           User Key  (r) = Recorded By, (t) = Taken By, (c) = Cosigned By    Initials Name Provider Type    Evelina Flanagan SLP Speech and Language Pathologist                            Time Calculation:        Time Calculation- SLP     Row Name 06/13/22 1308 06/13/22 1140          Time Calculation- SLP    SLP Start Time 1230  -SR 1030  -SR     SLP Stop Time 1300  -SR 1100  -SR     SLP Time Calculation (min) 30 min  -SR 30 min  -SR           User Key  (r) = Recorded By, (t) = Taken By, (c) = Cosigned By    Initials Name Provider Type    Evelina Flanagan SLP Speech and Language Pathologist                  Therapy Charges for Today     Code Description Service Date Service Provider Modifiers Qty    48486259170 HC ST DEV OF COGN SKILLS INITIAL 15 MIN 6/13/2022 Evelina Gibson SLP  1    11510097669 HC ST DEV OF COGN SKILLS EACH ADDT'L 15 MIN 6/13/2022 Evelina Gibson SLP  3                           LANEY Solis  6/13/2022

## 2022-06-13 NOTE — PROGRESS NOTES
Nutrition Services    Patient Name:  Alireza Carreon  YOB: 1935  MRN: [id number]  Admit Date:  6/3/2022    PROGRESS NOTE        Encounter Information:   6/10: Continues to eat 100% most meals  6/3: Eating well. Likes fruit. Supplements in place.     PO Nutrition      PO Diet: Diet Regular; Thin     PO Supplements: Magic Cup, Mighty Shake, Daily     PO Intake:  100% x many days     Factors Affecting Intake:          Weight      Daily Weight Weight: 63.3 kg (139 lb 8 oz) (06/07/22 1130)     Weight Trend (no wt hx)              Labs, Tests, Meds      Labs   (Reviewed below)     Diagnostic Tests/  Procedures No new   Pertinent Medications Other: Xarelto, HCTZ         Physical Exam    Physical Appearance alert, edematous, room air, hemiparesis   Edema/Fluid lower extremity , 1+ (trace) and 2+ (mild)   Tubes/Drains    Gastrointestinal  last bowel movement: 6/12   Oral/Mouth     Skin skin intact   NFPE not applicable         Nutrition Intervention    Goals Maintain nutrition status, Maintain intake, Maintain weight and PO intake goal %: 75+     RD Intervention Interview for preferences, Menu provided, Encourage intake, Supplement provided, Follow Tx Progress and Care plan reviewed     Prescription            Monitor/Evaluation    Monitor Per protocol, I&O, PO intake, Supplement intake, Pertinent labs, Weight, Skin status, GI status, Symptoms, POC/GOC       Results from last 7 days   Lab Units 06/11/22  0927 06/09/22  1323 06/07/22  1022   SODIUM mmol/L 133* 134* 136   POTASSIUM mmol/L 4.4 4.4 4.6   CHLORIDE mmol/L 97* 96* 99   CO2 mmol/L 29.0 27.0 28.0   BUN mg/dL 25* 29* 27*   CREATININE mg/dL 1.02 1.27 1.15   CALCIUM mg/dL 9.0 9.1 9.2   BILIRUBIN mg/dL 0.4 0.2 0.2   ALK PHOS U/L 69 74 75   ALT (SGPT) U/L 47* 29 32   AST (SGOT) U/L 35 29 28   GLUCOSE mg/dL 79 94 84     Results from last 7 days   Lab Units 06/11/22  0927   HEMOGLOBIN g/dL 13.9   HEMATOCRIT % 42.6     COVID19   Date Value Ref Range  Status   06/03/2022 Not Detected Not Detected - Ref. Range Final     Lab Results   Component Value Date    HGBA1C 5.50 08/25/2020         RD to follow up per protocol.    Electronically signed by:  Yun Bar RD  06/13/22 15:04 EDT

## 2022-06-13 NOTE — PROGRESS NOTES
Inpatient Rehabilitation Functional Measures Assessment and Plan of Care    Plan of Care  Updated Problems/Interventions  Mobility    [OT] Tub/Shower Transfers(Active)  Current Status(06/13/2022): CGA  Weekly Goal(06/21/2022): CGA/SBA  Discharge Goal: SBA/CGA    [OT] Toilet Transfers(Active)  Current Status(06/13/2022): CGA  Weekly Goal(06/20/2022): SBA  Discharge Goal: SBA        Self Care    [OT] Bathing(Active)  Current Status(06/13/2022): CGA  Weekly Goal(06/20/2022): SBA  Discharge Goal: SBA    [OT] Dressing (Lower)(Active)  Current Status(06/13/2022): Min A  Weekly Goal(06/21/2022): CGA/SBA  Discharge Goal: CGA/SBA    [OT] Dressing (Upper)(Active)  Current Status(06/06/2022): SBA  Weekly Goal(06/06/2022): SBA  Discharge Goal: s/up    [OT] Grooming(Active)  Current Status(06/13/2022): s/up  Weekly Goal(06/13/2022): s/up  Discharge Goal: set up    [OT] Toileting(Active)  Current Status(06/13/2022): CGA  Weekly Goal(06/14/2022): SBA  Discharge Goal: SBA    Functional Measures  DARRYN Eating:  Branch  DARRYN Grooming: Branch  DARRYN Bathing:  Branch  DARRYN Upper Body Dressing:  Branch  DARRYN Lower Body Dressing:  Branch  DARRYN Toileting:  Branch    DARRYN Bladder Management  Level of Assistance:  Branch  Frequency/Number of Accidents this Shift:  Branch    DARRYN Bowel Management  Level of Assistance: Branch  Frequency/Number of Accidents this Shift: Branch    DARRYN Bed/Chair/Wheelchair Transfer:  Branch  DARRYN Toilet Transfer:  Branch  DARRYN Tub/Shower Transfer:  Branch    Previously Documented Mode of Locomotion at Discharge: Field  DARRYN Expected Mode of Locomotion at Discharge: Branch  DARRYN Walk/Wheelchair:  Branch  DARRYN Stairs:  Branch    DARRYN Comprehension:  Branch  DARRYN Expression:  Branch  DARRYN Social Interaction:  Branch  DARRYN Problem Solving:  Branch  DARRYN Memory:  Branch    Therapy Mode Minutes  Occupational Therapy: Branch  Physical Therapy: Branch  Speech Language Pathology:  Branch    Signed by: Jenny Jones OTR/L

## 2022-06-13 NOTE — PROGRESS NOTES
Inpatient Rehabilitation Plan of Care Note    Plan of Care  Updated Problems/Interventions  Cognition    [ST] Executive Functions(Active)  Current Status(06/13/2022): Executive Functioning at mild- moderate level  Weekly Goal(06/20/2022): Improve Executive Functioning to min-WFL  Discharge Goal: Executive Functioning WFL    Signed by: Evelina Gibson, SLP

## 2022-06-13 NOTE — PROGRESS NOTES
Psychiatric     Progress Note    Patient Name: Alireza Carreon  : 1935  MRN: [id number]  Primary Care Physician:  Jose Bingham MD  Date of admission: 6/3/2022    Subjective Pt is awake and alert. Pt is generally pleased with his progress.   Subjective     Chief Complaint:same    History of Present Illness  Patient Reports     Review of Systems    Objective  exam unchanged calves soft and nT. Resp unlabored and regular  Objective     Vitals:   Temp:  [97.1 °F (36.2 °C)-97.7 °F (36.5 °C)] 97.1 °F (36.2 °C)  Heart Rate:  [52-61] 52  Resp:  [17-18] 17  BP: (101-126)/(59-61) 126/59    Physical Exam     Result Review    Result Review:  I have personally reviewed the results from the time of this admission to 2022 08:11 EDT and agree with these findings:  []  Laboratory list / accordion  []  Microbiology  []  Radiology  []  EKG/Telemetry   []  Cardiology/Vascular   []  Pathology  []  Old records  []  Other:  Most notable findings include: No new labs to review this am.       Assessment & Plan Continue to prepare for dc. Pt remains medically stable and is making fxnl progress toward dc goals.   Assessment / Plan     Brief Patient Summary:  Alireza Carreon is a 87 y.o. male who     Active Hospital Problems:  Active Hospital Problems    Diagnosis    • Immobility syndrome      Plan:       DVT prophylaxis:  Medical and mechanical DVT prophylaxis orders are present.    CODE STATUS:    Medical Intervention Limits: NO intubation (DNI)  Code Status (Patient has no pulse and is not breathing): No CPR (Do Not Attempt to Resuscitate)  Medical Interventions (Patient has pulse or is breathing): Limited Support  Comments: No chest compression    Disposition:  I expect patient to be discharged home.     Broderick Coyle MD

## 2022-06-13 NOTE — THERAPY TREATMENT NOTE
Inpatient Rehabilitation - Occupational Therapy Treatment Note    [facility]     Patient Name: Alireza Carreon  : 1935  MRN: [id number]    Today's Date: 2022                 Admit Date: 6/3/2022       No diagnosis found.    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[facility])   • Monoclonal gammopathy of unknown significance (MGUS)   • Chronic anticoagulation   • Arteriosclerosis of coronary artery   • Cerebrovascular accident (CVA) (HCC)   • Seizure disorder (HCC)   • Swelling of left lower extremity   • Hearing loss   • Localized edema   • Blister   • Skin nodule of toe of right foot   • Status epilepticus (HCC)   • Immobility syndrome       Past Medical History:   Diagnosis Date   • 3-vessel CAD    • Anticoagulated on warfarin    • Atrial fibrillation (HCC)    • BMI 28.0-28.9,adult    • Bunion, right foot    • Complaints of memory disturbance    • Dementia (HCC)    • Edema    • Encounter for immunization    • Generalized convulsive seizure (HCC)    • Gout    • History of double vision    • HL (hearing loss)    • Hyperlipidemia    • Hypertension    • Left foot pain    • 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    • Movement disorder    • Nocturnal leg cramps    • OA (osteoarthritis)    • Prepatellar effusion    • Pulmonary embolism (HCC)    • Puncture wound of hand, right    • Screening for cardiovascular condition    • Sleep apnea    • Stasis dermatitis    • Statin intolerance    • Stroke (HCC)    • Taking drug for chronic disease    • Thoracic back pain    • Transient ischemic attack        Past Surgical History:   Procedure Laterality Date   • ADENOIDECTOMY     •  APPENDECTOMY     • CATARACT EXTRACTION, BILATERAL     • CORONARY ARTERY BYPASS GRAFT     • CORONARY ARTERY BYPASS GRAFT     • CYSTOSCOPY TRANSURETHRAL RESECTION OF PROSTATE     • HERNIA REPAIR     • OTHER SURGICAL HISTORY      carotid thromboendarterectomy   • SHOULDER SURGERY     • SKIN CANCER EXCISION               IRF OT ASSESSMENT FLOWSHEET (last 12 hours)     IRF OT Evaluation and Treatment     Row Name 06/13/22 1148          OT Time and Intention    Document Type daily treatment  -MW     Mode of Treatment occupational therapy  -MW     Patient Effort good  -MW     Symptoms Noted During/After Treatment none  -MW     Row Name 06/13/22 1148          General Information    Patient Profile Reviewed yes  -MW     Patient/Family/Caregiver Comments/Observations pt with nursing in bathroom upon entry  -MW     Existing Precautions/Restrictions fall  -MW     Limitations/Impairments safety/cognitive  -MW     Comment, General Information red arm band  -MW     Row Name 06/13/22 1148          Pain Assessment    Pretreatment Pain Rating 0/10 - no pain  -MW     Posttreatment Pain Rating 0/10 - no pain  -MW     Row Name 06/13/22 1148          Cognition/Psychosocial    Follows Commands (Cognition) follows one-step commands  -MW     Personal Safety Interventions fall prevention program maintained;gait belt;nonskid shoes/slippers when out of bed;supervised activity  -MW     Row Name 06/13/22 1148          Bathing    [place] Level (Bathing) bathing skills;lower body;upper body  min A for backside, completed at sink side, declined full shower  -MW     Position (Bathing) sink side;supported sitting;supported standing  -MW     Set-up Assistance (Bathing) obtain supplies  -MW     Row Name 06/13/22 1148          Upper Body Dressing    [place] Level (Upper Body Dressing) upper body dressing skills;doff;don;pull over garment;minimum assist (75% or more patient effort)  -MW     Position (Upper Body Dressing) supported sitting  -MW      Set-up Assistance (Upper Body Dressing) obtain clothing  -     Row Name 06/13/22 1148          Lower Body Dressing    [place] Level (Lower Body Dressing) doff;don;pants/bottoms;shoes/slippers;underwear;minimum assist (75% patient effort);contact guard assist  -     Position (Lower Body Dressing) supported sitting;supported standing  -     Set-up Assistance (Lower Body Dressing) obtain clothing  -     Row Name 06/13/22 1148          Grooming    [place] Level (Grooming) grooming skills;deodorant application;hair care, combing/brushing;oral care regimen;wash face, hands;shave face;set up  increased time  -     Position (Grooming) supported sitting;sink side  -     Set-up Assistance (Grooming) obtain supplies  -     Row Name 06/13/22 1148          Toileting    Comment (Toileting) pt just returning to w/c from  commode with nurse aide upon entry  -     Row Name 06/13/22 1148          Transfers    Sit-Stand [place] (Transfers) standby assist;contact guard;1 person assist  -     Stand-Sit [place] (Transfers) standby assist;contact guard;1 person assist  -     Row Name 06/13/22 1148          Positioning and Restraints    Pre-Treatment Position bathroom  -     Post Treatment Position wheelchair  -     In Wheelchair notified nsg;sitting;call light within reach;encouraged to call for assist;exit alarm on  nurse station  -           User Key  (r) = Recorded By, (t) = Taken By, (c) = Cosigned By    Initials Name Effective Dates     Sylvia Brewer  08/20/21 -                  Occupational Therapy Education                 Title: PT OT SLP Therapies (In Progress)     Topic: Occupational Therapy (Done)     Point: ADL training (Done)     Description:   Instruct learner(s) on proper safety adaptation and remediation techniques during self care or transfers.   Instruct in proper use of assistive devices.              Learning Progress Summary           Patient Acceptance, E,  VU,NR by  at 6/4/2022 1431    Comment: benefit of IRF, role of OT, d/c rec, safety awareness, transfer training, therapy goals                   Point: Home exercise program (Done)     Description:   Instruct learner(s) on appropriate technique for monitoring, assisting and/or progressing therapeutic exercises/activities.              Learning Progress Summary           Patient Acceptance, E, VU,NR by  at 6/4/2022 1431    Comment: benefit of IRF, role of OT, d/c rec, safety awareness, transfer training, therapy goals                   Point: Precautions (Done)     Description:   Instruct learner(s) on prescribed precautions during self-care and functional transfers.              Learning Progress Summary           Patient Acceptance, E, VU,NR by  at 6/4/2022 1431    Comment: benefit of IRF, role of OT, d/c rec, safety awareness, transfer training, therapy goals                   Point: Body mechanics (Done)     Description:   Instruct learner(s) on proper positioning and spine alignment during self-care, functional mobility activities and/or exercises.              Learning Progress Summary           Patient Acceptance, E, VU,NR by  at 6/4/2022 1431    Comment: benefit of IRF, role of OT, d/c rec, safety awareness, transfer training, therapy goals                               User Key     Initials Effective Dates Name Provider Type Discipline     08/20/21 -  Sylvia Brewer OT Occupational Therapist OT                    OT Recommendation and Plan    Planned Therapy Interventions (OT): activity tolerance training, BADL retraining, functional balance retraining, neuromuscular control/coordination retraining, occupation/activity based interventions, patient/caregiver education/training, ROM/therapeutic exercise, strengthening exercise, transfer/mobility retraining                    Time Calculation:      Time Calculation- OT     Row Name 06/13/22 1000             Time Calculation- OT    OT Start Time  1000  -MW      OT Stop Time 1030  -MW      OT Time Calculation (min) 30 min  -MW            User Key  (r) = Recorded By, (t) = Taken By, (c) = Cosigned By    Initials Name Provider Type    Sylvia Caro OT Occupational Therapist              Therapy Charges for Today     Code Description Service Date Service Provider Modifiers Qty    [id number] HC OT SELF CARE/MGMT/TRAIN EA 15 MIN 6/13/2022 Sylvia Brewer OT GO 2                   Sylvia Brewer OT  6/13/2022

## 2022-06-13 NOTE — PROGRESS NOTES
Inpatient Rehabilitation Plan of Care Note    Plan of Care  Updated Problems/Interventions  Medical Problem(s)    BNE (Active)  Att'n. - Min Imp.  Exec. Fx. - Mild-Mod. Imp.  Arith. - Mildly Imp.  Visuospatial Skills - Mild-Mod. Imp., inattention to detail  Visual Mem. - Mod. Imp.  Verbal Mem. - Mildly imp. for immediate recall of four words, Severely Imp. for  delayed recall.  Improved with cues  Emot - Pt acknowledged mild dysphoria due to inability to engage in desired  activities.    Signed by: Bart Salinas Psy.d

## 2022-06-13 NOTE — PLAN OF CARE
Problem: Rehabilitation (IRF) Plan of Care  Goal: Plan of Care Review  Flowsheets (Taken 6/13/2022 0224)  Progress: improving  Plan of Care Reviewed With: patient  Outcome Evaluation: Pt is A&Ox4, forgetful at times, cooperative, meds whole w/ water, did c/o neck pain x1 last night and requested tylenol, continent of b/b, does wear a brief at night, he is very [place], does have some hearing aids at bedside and did change battery prior to going to bed, he does have mepilex to R elbox, resting well tonight, family conference 6/13, will continue to monitor.   Goal Outcome Evaluation:  Plan of Care Reviewed With: patient        Progress: improving  Outcome Evaluation: Pt is A&Ox4, forgetful at times, cooperative, meds whole w/ water, did c/o neck pain x1 last night and requested tylenol, continent of b/b, does wear a brief at night, he is very [place], does have some hearing aids at bedside and did change battery prior to going to bed, he does have mepilex to R elbox, resting well tonight, family conference 6/13, will continue to monitor.

## 2022-06-14 NOTE — PROGRESS NOTES
Inpatient Rehabilitation Plan of Care Note    Plan of Care  Care Plan Reviewed - Updates as Follows    Psychosocial    [RN] Coping/Adjustment(Active)  Current Status(06/14/2022): Expresses appropriate coping  Weekly Goal(06/21/2022): Allow opportunity to express concerns regarding current  status.  Discharge Goal: Adequate coping regarding life changes with ongoing support.    Performed Intervention(s)  Verbalizes needs & concerns      Safety    [RN] Potential for Injury(Active)  Current Status(06/14/2022): Hx falls; impulsive. Attempts to get up without  using call light.  Weekly Goal(06/20/2022): Instruct patient regarding safety precautions & need  for close supervision.  Discharge Goal: Patient /family will be aware of risk of fall & safety in the  home setting    Performed Intervention(s)  Falls precautions, safety rounds, Items within reach, Bed/chair alarms, &  Non-skid socks.  red armband      Sphincter Control    [RN] Bladder & Bowel management(Active)  Current Status(06/14/2022): Usually continent with occasional incontinence.  Weekly Goal(06/21/2022): Continent 75%  Discharge Goal: Continent 100%    Performed Intervention(s)  Monitor intake, output, & BM's each shift.  Use incontinence products as needed.    Signed by: Nichelle Mccabe RN

## 2022-06-14 NOTE — PROGRESS NOTES
[facility]     Progress Note    Patient Name: Alireza Carreon  : 1935  MRN: [id number]  Primary Care Physician:  Jose Bingham MD  Date of admission: 6/3/2022    Subjective Pt is awake and alert. Pt  Remains pleased with his progress.   Subjective     Chief Complaint: same    History of Present Illness  Patient Reports     Review of Systems    Objective   Objective     Vitals:   Temp:  [97.5 °F (36.4 °C)-97.7 °F (36.5 °C)] 97.6 °F (36.4 °C)  Heart Rate:  [54-89] 54  Resp:  [16-20] 20  BP: (111-122)/(62-63) 122/62    Physical Exam     Result Review    Result Review:  I have personally reviewed the results from the time of this admission to 2022 08:28 EDT and agree with these findings:  []  Laboratory list / accordion  []  Microbiology  []  Radiology  []  EKG/Telemetry   []  Cardiology/Vascular   []  Pathology  []  Old records  []  Other:  Most notable findings include: same      Assessment & Plan  Continue to prepare for DC. I participated in care coordiantion conference this am and returned to pt room to discuss the dc plans with Mr Zavala.   Assessment / Plan     Brief Patient Summary:  Alireza Carreon is a 87 y.o. male who     Active Hospital Problems:  Active Hospital Problems    Diagnosis    • Immobility syndrome      Plan:       DVT prophylaxis:  Medical and mechanical DVT prophylaxis orders are present.    CODE STATUS:    Medical Intervention Limits: NO intubation (DNI)  Code Status (Patient has no pulse and is not breathing): No CPR (Do Not Attempt to Resuscitate)  Medical Interventions (Patient has pulse or is breathing): Limited Support  Comments: No chest compression    Disposition:  I expect patient to be discharged .    Broderick Coyle MD

## 2022-06-14 NOTE — PROGRESS NOTES
Discussed progress, goals for d/c and d/c date set for Friday, 6/17, with patient and daughter, Adalgisa. Also, discussed with patient's wife and son, by phone. They are all agreeable to plan. Daughter, Adalgisa, to take FMLA to assist patient at home. [facility] to follow for home therapies. Will assist with plans.

## 2022-06-14 NOTE — PLAN OF CARE
Goal Outcome Evaluation:      Pt A/Ox3-4, calm/cooperative; pt has difficulty with time of day at times, but is able to be reoriented. Meds taken whole w thin liquids. No c/o pain. OOB x 1 w CGA w rwx. Pt continent using urinal or toilet overnight.

## 2022-06-14 NOTE — THERAPY TREATMENT NOTE
Inpatient Rehabilitation - Physical Therapy Treatment Note       [facility]     Patient Name: Alireza Carreon  : 1935  MRN: [id number]    Today's Date: 2022                    Admit Date: 6/3/2022      Visit Dx:   No diagnosis found.    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[facility])   • Monoclonal gammopathy of unknown significance (MGUS)   • Chronic anticoagulation   • Arteriosclerosis of coronary artery   • Cerebrovascular accident (CVA) (HCC)   • Seizure disorder (HCC)   • Swelling of left lower extremity   • Hearing loss   • Localized edema   • Blister   • Skin nodule of toe of right foot   • Status epilepticus (HCC)   • Immobility syndrome       Past Medical History:   Diagnosis Date   • 3-vessel CAD    • Anticoagulated on warfarin    • Atrial fibrillation (HCC)    • BMI 28.0-28.9,adult    • Bunion, right foot    • Complaints of memory disturbance    • Dementia (HCC)    • Edema    • Encounter for immunization    • Generalized convulsive seizure (HCC)    • Gout    • History of double vision    • HL (hearing loss)    • Hyperlipidemia    • Hypertension    • Left foot pain    • 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    • Movement disorder    • Nocturnal leg cramps    • OA (osteoarthritis)    • Prepatellar effusion    • Pulmonary embolism (HCC)    • Puncture wound of hand, right    • Screening for cardiovascular condition    • Sleep apnea    • Stasis dermatitis    • Statin intolerance    • Stroke (HCC)    • Taking drug for chronic disease    • Thoracic back pain    • Transient ischemic attack        Past Surgical History:   Procedure Laterality Date   • ADENOIDECTOMY      • APPENDECTOMY     • CATARACT EXTRACTION, BILATERAL     • CORONARY ARTERY BYPASS GRAFT     • CORONARY ARTERY BYPASS GRAFT     • CYSTOSCOPY TRANSURETHRAL RESECTION OF PROSTATE     • HERNIA REPAIR     • OTHER SURGICAL HISTORY      carotid thromboendarterectomy   • SHOULDER SURGERY     • SKIN CANCER EXCISION         PT ASSESSMENT (last 12 hours)     IRF PT Evaluation and Treatment     Row Name 06/14/22 0900          PT Time and Intention    Document Type daily treatment  -MS (r) KM (t) MS (c)     Mode of Treatment physical therapy  -MS (r) KM (t) MS (c)     Patient/Family/Caregiver Comments/Observations AM: pt sitting in w/c at nursing station, exit alarm on.PM: pt sitting in w/c at nursing station. (P)   -KM     Total Minutes, Physical Therapy 60  -MS (r) KM (t) MS (c)     Row Name 06/14/22 0900          General Information    Patient Profile Reviewed yes  -MS (r) KM (t) MS (c)     Existing Precautions/Restrictions fall  -MS (r) KM (t) MS (c)     Limitations/Impairments safety/cognitive  -MS (r) KM (t) MS (c)     Comment, General Information red arm band  -MS (r) KM (t) MS (c)     Row Name 06/14/22 0900          Pain Assessment    Pretreatment Pain Rating 0/10 - no pain  -MS (r) KM (t) MS (c)     Posttreatment Pain Rating 0/10 - no pain  -MS (r) KM (t) MS (c)     Row Name 06/14/22 0900          Cognition/Psychosocial    Affect/Mental Status (Cognition) WFL  -MS (r) KM (t) MS (c)     Orientation Status (Cognition) oriented x 4  -MS (r) KM (t) MS (c)     Follows Commands (Cognition) follows one-step commands  -MS (r) KM (t) MS (c)     Personal Safety Interventions fall prevention program maintained;gait belt;nonskid shoes/slippers when out of bed;supervised activity  -MS (r) KM (t) MS (c)     Cognitive Function memory deficit;safety deficit;attention deficit  -MS (r) KM (t) MS (c)     Attention Deficit (Cognition) concentration;distractible in noisy environment  -MS (r) KM (t) MS (c)     Safety Deficit (Cognition)  insight into deficits/self-awareness;judgment;awareness of need for assistance  -MS (r) KM (t) MS (c)     Row Name 06/14/22 0900          Transfers    Sit-Stand Emery (Transfers) standby assist;contact guard;1 person assist  -MS (r) KM (t) MS (c)     Row Name 06/14/22 0900          Sit-Stand Transfer    Assistive Device (Sit-Stand Transfers) walker, front-wheeled  -MS (r) KM (t) MS (c)     Comment, (Sit-Stand Transfer) STS to put pants on for therapy  -MS (r) KM (t) MS (c)     Row Name 06/14/22 0900          Gait/Stairs (Locomotion)    Emery Level (Gait) standby assist;contact guard;1 person assist;verbal cues  -MS (r) KM (t) MS (c)     Assistive Device (Gait) walker, front-wheeled  -MS (r) KM (t) MS (c)     Distance in Feet (Gait) 240' x 3 trials  -MS (r) KM (t) MS (c)     Pattern (Gait) step-through  -MS (r) KM (t) MS (c)     Deviations/Abnormal Patterns (Gait) festinating/shuffling;stride length decreased  -MS (r) KM (t) MS (c)     Bilateral Gait Deviations forward flexed posture  -MS (r) KM (t) MS (c)     Gait Assessment/Intervention Pt improved step length and posture during session and required fewer vc's. Pt ambulated with shoes on for first time and performed ambulation well with no apparent loss of balance or change in gait pattern. Pt still needs a few vc's for direction and to take larger steps but has improved.  -MS (r) KM (t) MS (c)     Comment, (Gait/Stairs) Pt performed ambulation in PM by having to navigate through cones with SBA-CGA x1 and RW for 2 trials.  -MS (r) KM (t) MS (c)     Row Name 06/14/22 0900          Safety Issues, Functional Mobility    Safety Issues Affecting Function (Mobility) safety precautions follow-through/compliance;sequencing abilities;safety precaution awareness  -MS (r) KM (t) MS (c)     Impairments Affecting Function (Mobility) balance;coordination;strength;cognition  -MS (r) KM (t) MS (c)     Cognitive Impairments, Mobility Safety/Performance safety  precaution follow-through;safety precaution awareness;insight into deficits/self-awareness;awareness, need for assistance  -MS (r) KM (t) MS (c)     Row Name 06/14/22 0900          Balance    Balance Assessment sitting static balance;sit to stand dynamic balance;standing dynamic balance  -MS (r) KM (t) MS (c)     Static Sitting Balance standby assist  -MS (r) KM (t) MS (c)     Sit to Stand Dynamic Balance standby assist;contact guard;verbal cues  -MS (r) KM (t) MS (c)     Static Standing Balance standby assist;contact guard;verbal cues  -MS (r) KM (t) MS (c)     Dynamic Standing Balance standby assist;contact guard;verbal cues;1-person assist  -MS (r) KM (t) MS (c)     Position/Device Used, Standing Balance walker, front-wheeled  -MS (r) KM (t) MS (c)     Balance Interventions standing;foam;weight shifting activity  -MS (r) KM (t) MS (c)     Comment, Balance Pt performed weight shifts on foam pad at mello-bars and STS 5 times x 2 trials at mat without AD and CGA x 1.  -MS (r) KM (t) MS (c)     Row Name 06/14/22 0900          Positioning and Restraints    Pre-Treatment Position sitting in chair/recliner  -MS (r) KM (t) MS (c)     Post Treatment Position wheelchair  -MS (r) KM (t) MS (c)     In Wheelchair sitting;call light within reach;encouraged to call for assist;exit alarm on (P)   AM, sitting at nurses station at PM, exit alarm on,  -KM           User Key  (r) = Recorded By, (t) = Taken By, (c) = Cosigned By    Initials Name Provider Type    MS Bennett Tova LEROY, PT Physical Therapist    KM Sang Constantino, PT Student PT Student                 Physical Therapy Education                 Title: PT OT SLP Therapies (In Progress)     Topic: Physical Therapy (Done)     Point: Mobility training (Done)     Learning Progress Summary           Patient Acceptance, E, VU,NR by KM at 6/14/2022 1158    Acceptance, E, VU,NR by KM at 6/13/2022 0956    Acceptance, E,TB,D, NR,VU by  at 6/11/2022 1156    Acceptance, E,TB, VU,NR  by MS at 6/9/2022 0927    Acceptance, E,TB, NR by MS at 6/9/2022 0927    Acceptance, E,TB, NR,NL by MS at 6/8/2022 0828    Acceptance, E,TB, VU,NR by MS at 6/6/2022 1045                   Point: Home exercise program (Done)     Learning Progress Summary           Patient Acceptance, E, VU,NR by KM at 6/14/2022 1158    Acceptance, E, VU,NR by KM at 6/13/2022 0956    Acceptance, E,TB, VU,NR by MS at 6/9/2022 0927    Acceptance, E,TB, NR by MS at 6/9/2022 0927    Acceptance, E,TB, NR,NL by MS at 6/8/2022 0828    Acceptance, E,TB, VU,NR by MS at 6/6/2022 1045                   Point: Body mechanics (Done)     Learning Progress Summary           Patient Acceptance, E, VU,NR by KM at 6/14/2022 1158    Acceptance, E, VU,NR by KM at 6/13/2022 0956    Acceptance, E,TB,D, NR,VU by MG at 6/11/2022 1156    Acceptance, E,TB, VU,NR by MS at 6/9/2022 0927    Acceptance, E,TB, NR by MS at 6/9/2022 0927    Acceptance, E,TB, NR,NL by MS at 6/8/2022 0828    Acceptance, E,TB, VU,NR by MS at 6/6/2022 1045                   Point: Precautions (Done)     Learning Progress Summary           Patient Acceptance, E, VU,NR by KM at 6/14/2022 1158    Acceptance, E, VU,NR by KM at 6/13/2022 0956    Acceptance, E,TB,D, NR,VU by MG at 6/11/2022 1156    Acceptance, E,TB, NR by MG at 6/10/2022 1212    Acceptance, E,TB, VU,NR by MS at 6/9/2022 0927    Acceptance, E,TB, NR by MS at 6/9/2022 0927    Acceptance, E,TB, NR,NL by MS at 6/8/2022 0828    Acceptance, E,TB, VU,NR by MS at 6/6/2022 1045                               User Key     Initials Effective Dates Name Provider Type Discipline    MG 05/24/22 -  Jen Marks, PT Physical Therapist PT    MS 06/16/21 -  Tova Bennett PT Physical Therapist PT    KM 06/06/22 -  Sang Constantino PT Student PT Student PT                PT Recommendation and Plan                          Time Calculation:      PT Charges     Row Name 06/14/22 1416 06/14/22 1202 06/14/22 1159       Time Calculation    Start  Time 1330  -MS (r) KM (t) MS (c) 1100  -MS (r) KM (t) MS (c) 0815  -MS (r) KM (t) MS (c)    Stop Time 1345  -MS (r) KM (t) MS (c) 1130  -MS (r) KM (t) MS (c) 0830  -MS (r) KM (t) MS (c)    Time Calculation (min) 15 min  -MS (r) KM (t) 30 min  -MS (r) KM (t) 15 min  -MS (r) KM (t)    PT Received On 06/14/22  -MS (r) KM (t) MS (c) 06/14/22  -MS (r) KM (t) MS (c) 06/14/22  -MS (r) KM (t) MS (c)    PT - Next Appointment 06/15/22  -MS (r) KM (t) MS (c) 06/15/22  -MS (r) KM (t) MS (c) 06/15/22  -MS (r) KM (t) MS (c)       Time Calculation- PT    Total Timed Code Minutes- PT 15 minute(s)  -MS (r) KM (t) MS (c) 30 minute(s)  -MS (r) KM (t) MS (c) 15 minute(s)  -MS (r) KM (t) MS (c)          User Key  (r) = Recorded By, (t) = Taken By, (c) = Cosigned By    Initials Name Provider Type    MS BennettTova, PT Physical Therapist     Sang Constantino, PT Student PT Student                Therapy Charges for Today     Code Description Service Date Service Provider Modifiers Qty    64811486311 HC PT THERAPEUTIC ACT EA 15 MIN 6/13/2022 Sang Constantino, PT Student GP 2    97082266330 HC PT THER PROC EA 15 MIN 6/13/2022 Sang Constantino, PT Student GP 1    77769197986 HC GAIT TRAINING EA 15 MIN 6/13/2022 Sang Constantino, PT Student GP 1    65514438520 HC PT THER PROC EA 15 MIN 6/14/2022 Sang Constantino, PT Student GP 3    05860052900 HC PT THERAPEUTIC ACT EA 15 MIN 6/14/2022 Sang Constantino, PT Student GP 1            PT G-Codes  AM-PAC 6 Clicks Score (PT): 16      Sang Constantino PT Student  6/14/2022

## 2022-06-14 NOTE — THERAPY TREATMENT NOTE
Inpatient Rehabilitation - Speech Language Pathology Treatment Note    [facility]     Patient Name: Alireza Carreon  : 1935  MRN: [id number]    Today's Date: 2022                   Admit Date: 6/3/2022       Visit Dx:    No diagnosis found.    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[facility])   • Monoclonal gammopathy of unknown significance (MGUS)   • Chronic anticoagulation   • Arteriosclerosis of coronary artery   • Cerebrovascular accident (CVA) (HCC)   • Seizure disorder (HCC)   • Swelling of left lower extremity   • Hearing loss   • Localized edema   • Blister   • Skin nodule of toe of right foot   • Status epilepticus (HCC)   • Immobility syndrome       Past Medical History:   Diagnosis Date   • 3-vessel CAD    • Anticoagulated on warfarin    • Atrial fibrillation (HCC)    • BMI 28.0-28.9,adult    • Bunion, right foot    • Complaints of memory disturbance    • Dementia (HCC)    • Edema    • Encounter for immunization    • Generalized convulsive seizure (HCC)    • Gout    • History of double vision    • HL (hearing loss)    • Hyperlipidemia    • Hypertension    • Left foot pain    • 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    • Movement disorder    • Nocturnal leg cramps    • OA (osteoarthritis)    • Prepatellar effusion    • Pulmonary embolism (HCC)    • Puncture wound of hand, right    • Screening for cardiovascular condition    • Sleep apnea    • Stasis dermatitis    • Statin intolerance    • Stroke (HCC)    • Taking drug for chronic disease    • Thoracic back pain    • Transient ischemic attack        Past Surgical History:   Procedure Laterality Date   •  ADENOIDECTOMY     • APPENDECTOMY     • CATARACT EXTRACTION, BILATERAL     • CORONARY ARTERY BYPASS GRAFT     • CORONARY ARTERY BYPASS GRAFT     • CYSTOSCOPY TRANSURETHRAL RESECTION OF PROSTATE     • HERNIA REPAIR     • OTHER SURGICAL HISTORY      carotid thromboendarterectomy   • SHOULDER SURGERY     • SKIN CANCER EXCISION         SLP Recommendation and Plan                                                   SLP EVALUATION (last 72 hours)     SLP SLC Evaluation     Row Name 06/14/22 1400 06/13/22 1200 06/13/22 1030             Communication Assessment/Intervention    Document Type therapy note (daily note)  -TH therapy note (daily note)  -SR therapy note (daily note)  -SR      Subjective Information -- no complaints  -SR no complaints  -SR      Patient Observations -- alert;cooperative;agree to therapy  -SR alert;cooperative;agree to therapy  -SR      Patient Effort good  -TH good  -SR good  -SR      Symptoms Noted During/After Treatment -- none  -SR none  -SR              Pain Scale: Numbers Pre/Post-Treatment    Pretreatment Pain Rating -- 0/10 - no pain  -SR 0/10 - no pain  -SR      Posttreatment Pain Rating -- 0/10 - no pain  -SR 0/10 - no pain  -SR            User Key  (r) = Recorded By, (t) = Taken By, (c) = Cosigned By    Initials Name Effective Dates    TH Mirta Hernandez 06/16/21 -     SR Evelina Gibson SLP 01/12/22 -                    EDUCATION    The patient has been educated in the following areas:       Cognitive Impairment.             SLP GOALS     Row Name 06/14/22 1400 06/13/22 1200 06/13/22 1030       Attention Goal 1 (SLP)    Improve Attention by Goal 1 (SLP) -- complete selective attention task;complete divided attention task;70%;independently (over 90% accuracy)  -SR complete selective attention task;complete divided attention task;70%;independently (over 90% accuracy)  -SR    Time Frame (Attention Goal 1, SLP) -- by discharge  -SR by discharge  -SR    Progress/Outcomes (Attention Goal 1, SLP)  continuing progress toward goal  -TH continuing progress toward goal  -SR continuing progress toward goal  -SR    Comment (Attention Goal 1, SLP) Patient participated in novel card game blink and required moderate cues selective attention and mod-max cues for recall of game rules throughout task.  -TH -- Attended to therapy tasks with no redirection  -SR       Memory Skills Goal 1 (SLP)    Improve Memory Skills Through Goal 1 (SLP) -- recalling related word lists immediately;recalling related word lists with an imposed delay;recalling unrelated word lists immediately;recalling unrelated word lists with an imposed delay;visual memory task;listen to a paragraph and answer questions;70%;with minimal cues (75-90%)  -SR --    Time Frame (Memory Skills Goal 1, SLP) -- by discharge  -SR --    Progress/Outcomes (Memory Skills Goal 1, SLP) good progress toward goal  -TH good progress toward goal  -SR --    Comment (Memory Skills Goal 1, SLP) Patient able to recall 4/5 facts re: novel SLP given 5 minute delay. Immediate paragraph recall completed with 40% accuracy.  -TH Paragraph retention; recalled details from short paragraph during immediate memory task with 70% acc given NO cues; 100% given min cues.  -SR --       Reasoning Goal 1 (SLP)    Improve Reasoning Through Goal 1 (SLP) -- -- complete basic reasoning task;complete deductive reasoning task;with minimal cues (75-90%);90%  -SR    Time Frame (Reasoning Goal 1, SLP) -- -- by discharge  -SR    Progress/Outcomes (Reasoning Goal 1, SLP) -- -- good progress toward goal  -SR    Comment (Reasoning Goal 1, SLP) -- -- Deduction by exclusion; Followed written directions to find calendar date with 40% acc given min cues; 100% acc given mod cues. Extended time to complete task.  -SR       Functional Problem Solving Skills Goal 1 (SLP)    Improve Problem Solving Through Goal 1 (SLP) -- tell similarity between items;complete organization/home management task;sequence steps in a  task;answer questions about ADL problems;70%;independently (over 90% accuracy)  -SR --    Time Frame (Problem Solving Goal 1, SLP) -- by discharge  -SR --    Progress/Outcomes (Problem Solving Goal 1, SLP) good progress toward goal  -TH good progress toward goal  -SR --    Comment (Problem Solving Goal 1, SLP) functional reading task completed with 95% accuracy.  -TH Modify sentence incongruities; 70% acc given NO cues; 100% acc given min cues  -SR --       Executive Functional Skills Goal 1 (SLP)    Improve Executive Function Skills Goal 1 (SLP) -- -- demonstrate awareness of deficit;time management activity;home management activity;70%;with minimal cues (75-90%)  -SR    Time Frame (Executive Function Skills Goal 1, SLP) -- -- by discharge  -SR    Progress/Outcomes (Executive Function Skills Goal 1, SLP) -- -- good progress toward goal  -SR    Comment (Executive Function Skills Goal 1, SLP) -- -- Home management (recipe); sequenced steps and followed recipe with 67% acc given NO cues; 100% acc given min cues  -SR          User Key  (r) = Recorded By, (t) = Taken By, (c) = Cosigned By    Initials Name Provider Type     Mirta Hernandez Speech and Language Pathologist     Evelina Gibson, SLP Speech and Language Pathologist                            Time Calculation:        Time Calculation- SLP     Row Name 06/14/22 1456 06/14/22 1448          Time Calculation- [facility]    SLP Start Time 1230  -TH 1030  -TH     SLP Stop Time 1300  -TH 1100  -TH     SLP Time Calculation (min) 30 min  -TH 30 min  -TH           User Key  (r) = Recorded By, (t) = Taken By, (c) = Cosigned By    Initials Name Provider Type     Mirta Hernandez Speech and Language Pathologist                  Therapy Charges for Today     Code Description Service Date Service Provider Modifiers Qty    53167004653 HC ST DEV OF COGN SKILLS INITIAL 15 MIN 6/14/2022 Mirta Hernandez  1    04078813535 HC ST DEV OF COGN SKILLS EACH ADDT'L 15 MIN 6/14/2022  Mirta Hernandez  3                           Mirta Hernandez  6/14/2022

## 2022-06-14 NOTE — THERAPY TREATMENT NOTE
Inpatient Rehabilitation - Occupational Therapy Treatment Note    [facility]     Patient Name: Alireza Carreon  : 1935  MRN: [id number]    Today's Date: 2022                 Admit Date: 6/3/2022       No diagnosis found.    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[facility])   • Monoclonal gammopathy of unknown significance (MGUS)   • Chronic anticoagulation   • Arteriosclerosis of coronary artery   • Cerebrovascular accident (CVA) (HCC)   • Seizure disorder (HCC)   • Swelling of left lower extremity   • Hearing loss   • Localized edema   • Blister   • Skin nodule of toe of right foot   • Status epilepticus (HCC)   • Immobility syndrome       Past Medical History:   Diagnosis Date   • 3-vessel CAD    • Anticoagulated on warfarin    • Atrial fibrillation (HCC)    • BMI 28.0-28.9,adult    • Bunion, right foot    • Complaints of memory disturbance    • Dementia (HCC)    • Edema    • Encounter for immunization    • Generalized convulsive seizure (HCC)    • Gout    • History of double vision    • HL (hearing loss)    • Hyperlipidemia    • Hypertension    • Left foot pain    • 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    • Movement disorder    • Nocturnal leg cramps    • OA (osteoarthritis)    • Prepatellar effusion    • Pulmonary embolism (HCC)    • Puncture wound of hand, right    • Screening for cardiovascular condition    • Sleep apnea    • Stasis dermatitis    • Statin intolerance    • Stroke (HCC)    • Taking drug for chronic disease    • Thoracic back pain    • Transient ischemic attack        Past Surgical History:   Procedure Laterality Date   • ADENOIDECTOMY     •  APPENDECTOMY     • CATARACT EXTRACTION, BILATERAL     • CORONARY ARTERY BYPASS GRAFT     • CORONARY ARTERY BYPASS GRAFT     • CYSTOSCOPY TRANSURETHRAL RESECTION OF PROSTATE     • HERNIA REPAIR     • OTHER SURGICAL HISTORY      carotid thromboendarterectomy   • SHOULDER SURGERY     • SKIN CANCER EXCISION               IRF OT ASSESSMENT FLOWSHEET (last 12 hours)     IRF OT Evaluation and Treatment     Row Name 06/14/22 1448          OT Time and Intention    Document Type daily treatment  -CC     Mode of Treatment occupational therapy  -CC     Patient Effort good  -CC     Symptoms Noted During/After Treatment none  -CC     Row Name 06/14/22 1448          Pain Assessment    Pretreatment Pain Rating 0/10 - no pain  -CC     Posttreatment Pain Rating 0/10 - no pain  -CC     Row Name 06/14/22 1448          Cognition/Psychosocial    Affect/Mental Status (Cognition) WFL  -     Orientation Status (Cognition) oriented x 4  -CC     Personal Safety Interventions fall prevention program maintained;gait belt;nonskid shoes/slippers when out of bed  -     Cognitive Function memory deficit;safety deficit;attention deficit  -     Safety Deficit (Cognition) insight into deficits/self-awareness  -     Row Name 06/14/22 1448          Sensory    Hearing Status hearing aid, left;hearing impairment, bilaterally  -CC     Row Name 06/14/22 1448          Vision Assessment/Intervention    Visual Impairment/Limitations corrective lenses full-time;visual/perceptual impairments present  -     Row Name 06/14/22 1448          Bathing    [place] Level (Bathing) bathing skills;lower body;upper body;verbal cues;contact guard assist;supervision  -     Assistive Device (Bathing) grab bar/tub rail;hand held shower spray hose;shower chair  -     Position (Bathing) supported sitting;supported standing  -     Set-up Assistance (Bathing) adjust water temperature;obtain supplies  -     Row Name 06/14/22 1448          Upper Body Dressing     [place] Level (Upper Body Dressing) upper body dressing skills;don;doff;pull over garment;set up assistance  -     Position (Upper Body Dressing) supported sitting  -     Set-up Assistance (Upper Body Dressing) obtain clothing  -     Row Name 06/14/22 1448          Lower Body Dressing    [place] Level (Lower Body Dressing) doff;don;pants/bottoms;shoes/slippers;underwear;minimum assist (75% patient effort)  -     Position (Lower Body Dressing) supported sitting;supported standing  -     Set-up Assistance (Lower Body Dressing) obtain clothing  -CC     Row Name 06/14/22 1448          Grooming    [place] Level (Grooming) grooming skills;deodorant application;hair care, combing/brushing;oral care regimen;wash face, hands;shave face;set up  -     Position (Grooming) supported sitting;sink side  -     Set-up Assistance (Grooming) obtain supplies  -     Row Name 06/14/22 1448          Elbow/Forearm (Therapeutic Exercise)    Elbow/Forearm (Therapeutic Exercise) strengthening exercise  -     Elbow/Forearm Strengthening (Therapeutic Exercise) bilateral;flexion;extension;supination;pronation;sitting;3 lb free weight;15 repititions;3 sets  -     Row Name 06/14/22 1448          Aerobic Exercise    Type (Aerobic Exercise) arm bike;for 3 minutes  -     Time Performed (Aerobic Exercise) x 2 standing w SBA  -     Row Name 06/14/22 1448          Positioning and Restraints    Pre-Treatment Position sitting in chair/recliner  -     Post Treatment Position wheelchair  -     In Wheelchair sitting;exit alarm on;with PT  -CC           User Key  (r) = Recorded By, (t) = Taken By, (c) = Cosigned By    Initials Name Effective Dates    CC Jenny Jones OTR 06/16/21 -                  Occupational Therapy Education                 Title: PT OT SLP Therapies (In Progress)     Topic: Occupational Therapy (Done)     Point: ADL training (Done)     Description:   Instruct learner(s) on proper safety  adaptation and remediation techniques during self care or transfers.   Instruct in proper use of assistive devices.              Learning Progress Summary           Patient Acceptance, E, VU by CC at 6/13/2022 1522    Comment: Family conf w pt and family. Status, HEP, HH OT, DME and home  needs discussed    Acceptance, E, VU,NR by MW at 6/4/2022 1431    Comment: benefit of IRF, role of OT, d/c rec, safety awareness, transfer training, therapy goals   Family Acceptance, E, VU by CC at 6/13/2022 1522    Comment: Family conf w pt and family. Status, HEP, HH OT, DME and home  needs discussed                   Point: Home exercise program (Done)     Description:   Instruct learner(s) on appropriate technique for monitoring, assisting and/or progressing therapeutic exercises/activities.              Learning Progress Summary           Patient Acceptance, E, VU by CC at 6/13/2022 1522    Comment: Family conf w pt and family. Status, HEP, HH OT, DME and home  needs discussed    Acceptance, E, VU,NR by MW at 6/4/2022 1431    Comment: benefit of IRF, role of OT, d/c rec, safety awareness, transfer training, therapy goals   Family Acceptance, E, VU by CC at 6/13/2022 1522    Comment: Family conf w pt and family. Status, HEP, HH OT, DME and home  needs discussed                   Point: Precautions (Done)     Description:   Instruct learner(s) on prescribed precautions during self-care and functional transfers.              Learning Progress Summary           Patient Acceptance, E, VU by CC at 6/13/2022 1522    Comment: Family conf w pt and family. Status, HEP, HH OT, DME and home  needs discussed    Acceptance, E, VU,NR by MW at 6/4/2022 1431    Comment: benefit of IRF, role of OT, d/c rec, safety awareness, transfer training, therapy goals   Family Acceptance, E, VU by CC at 6/13/2022 1522    Comment: Family conf w pt and family. Status, HEP, HH OT, DME and home  needs discussed                   Point: Body mechanics (Done)      Description:   Instruct learner(s) on proper positioning and spine alignment during self-care, functional mobility activities and/or exercises.              Learning Progress Summary           Patient Acceptance, E, VU by CC at 6/13/2022 1522    Comment: Family conf w pt and family. Status, HEP, HH OT, DME and home  needs discussed    Acceptance, E, VU,NR by  at 6/4/2022 1431    Comment: benefit of IRF, role of OT, d/c rec, safety awareness, transfer training, therapy goals   Family Acceptance, E, VU by CC at 6/13/2022 1522    Comment: Family conf w pt and family. Status, HEP, HH OT, DME and home  needs discussed                               User Key     Initials Effective Dates Name Provider Type Discipline     06/16/21 -  Jenny Jones OTR Occupational Therapist OT     08/20/21 -  Sylvia Brewer OT Occupational Therapist OT                    OT Recommendation and Plan                         Time Calculation:      Time Calculation- OT     Row Name 06/14/22 1300 06/14/22 1000          Time Calculation- OT    OT Start Time 1300  -CC 1000  -CC     OT Stop Time 1330  -CC 1030  -CC     OT Time Calculation (min) 30 min  -CC 30 min  -CC           User Key  (r) = Recorded By, (t) = Taken By, (c) = Cosigned By    Initials Name Provider Type     Jenny Jones OTR Occupational Therapist              Therapy Charges for Today     Code Description Service Date Service Provider Modifiers Qty    19212587009 HC OT NEUROMUSC RE EDUCATION EA 15 MIN 6/13/2022 Jenny Jones OTR GO 2    28029467268 HC OT SELF CARE/MGMT/TRAIN EA 15 MIN 6/14/2022 Jenny Jones OTR GO 2    92561586945 HC OT THER PROC EA 15 MIN 6/14/2022 Jenny Jones OTR GO 2                   GEOFFREY Ge  6/14/2022

## 2022-06-14 NOTE — PLAN OF CARE
Goal Outcome Evaluation:  Plan of Care Reviewed With: patient        Progress: improving  Outcome Evaluation: AAOx4; forgetful. Cooperative with most care; does usually not use call light when in bed, but sets off alarm. Urgency with toileting, but generally continent. Up assist of 1, walker. VS stable. Skin intact other than small area off abrasion on right elbow. D/C on Friday. Home with family.

## 2022-06-15 NOTE — THERAPY TREATMENT NOTE
Inpatient Rehabilitation - Speech Language Pathology Treatment Note    [facility]     Patient Name: Alireza Carreon  : 1935  MRN: [id number]    Today's Date: 6/15/2022                   Admit Date: 6/3/2022       Visit Dx:    No diagnosis found.    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[facility])   • Monoclonal gammopathy of unknown significance (MGUS)   • Chronic anticoagulation   • Arteriosclerosis of coronary artery   • Cerebrovascular accident (CVA) (HCC)   • Seizure disorder (HCC)   • Swelling of left lower extremity   • Hearing loss   • Localized edema   • Blister   • Skin nodule of toe of right foot   • Status epilepticus (HCC)   • Immobility syndrome       Past Medical History:   Diagnosis Date   • 3-vessel CAD    • Anticoagulated on warfarin    • Atrial fibrillation (HCC)    • BMI 28.0-28.9,adult    • Bunion, right foot    • Complaints of memory disturbance    • Dementia (HCC)    • Edema    • Encounter for immunization    • Generalized convulsive seizure (HCC)    • Gout    • History of double vision    • HL (hearing loss)    • Hyperlipidemia    • Hypertension    • Left foot pain    • 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    • Movement disorder    • Nocturnal leg cramps    • OA (osteoarthritis)    • Prepatellar effusion    • Pulmonary embolism (HCC)    • Puncture wound of hand, right    • Screening for cardiovascular condition    • Sleep apnea    • Stasis dermatitis    • Statin intolerance    • Stroke (HCC)    • Taking drug for chronic disease    • Thoracic back pain    • Transient ischemic attack        Past Surgical History:   Procedure Laterality Date   •  ADENOIDECTOMY     • APPENDECTOMY     • CATARACT EXTRACTION, BILATERAL     • CORONARY ARTERY BYPASS GRAFT     • CORONARY ARTERY BYPASS GRAFT     • CYSTOSCOPY TRANSURETHRAL RESECTION OF PROSTATE     • HERNIA REPAIR     • OTHER SURGICAL HISTORY      carotid thromboendarterectomy   • SHOULDER SURGERY     • SKIN CANCER EXCISION         SLP Recommendation and Plan                                                   SLP EVALUATION (last 72 hours)     SLP SLC Evaluation     Row Name 06/15/22 1100 06/14/22 1400 06/13/22 1200 06/13/22 1030          Communication Assessment/Intervention    Document Type therapy note (daily note)  -TH therapy note (daily note)  -TH therapy note (daily note)  -SR therapy note (daily note)  -SR     Subjective Information -- -- no complaints  -SR no complaints  -SR     Patient Observations cooperative;agree to therapy  -TH -- alert;cooperative;agree to therapy  -SR alert;cooperative;agree to therapy  -SR     Patient Effort good  -TH good  -TH good  -SR good  -SR     Symptoms Noted During/After Treatment -- -- none  -SR none  -SR            Pain Scale: Numbers Pre/Post-Treatment    Pretreatment Pain Rating -- -- 0/10 - no pain  -SR 0/10 - no pain  -SR     Posttreatment Pain Rating -- -- 0/10 - no pain  -SR 0/10 - no pain  -SR           User Key  (r) = Recorded By, (t) = Taken By, (c) = Cosigned By    Initials Name Effective Dates    TH Mirta Hernandez 06/16/21 -     SR Evelina Gibson SLP 01/12/22 -                    EDUCATION    The patient has been educated in the following areas:       Cognitive Impairment.             SLP GOALS     Row Name 06/15/22 1100 06/14/22 1400 06/13/22 1200       Attention Goal 1 (SLP)    Improve Attention by Goal 1 (SLP) -- -- complete selective attention task;complete divided attention task;70%;independently (over 90% accuracy)  -SR    Time Frame (Attention Goal 1, SLP) -- -- by discharge  -SR    Progress/Outcomes (Attention Goal 1, SLP) -- continuing progress toward  goal  -TH continuing progress toward goal  -SR    Comment (Attention Goal 1, SLP) -- Patient participated in novel card game blink and required moderate cues selective attention and mod-max cues for recall of game rules throughout task.  -TH --       Memory Skills Goal 1 (SLP)    Improve Memory Skills Through Goal 1 (SLP) -- -- recalling related word lists immediately;recalling related word lists with an imposed delay;recalling unrelated word lists immediately;recalling unrelated word lists with an imposed delay;visual memory task;listen to a paragraph and answer questions;70%;with minimal cues (75-90%)  -SR    Time Frame (Memory Skills Goal 1, SLP) -- -- by discharge  -SR    Progress/Outcomes (Memory Skills Goal 1, SLP) goal ongoing  -TH good progress toward goal  -TH good progress toward goal  -SR    Comment (Memory Skills Goal 1, SLP) Given 5/10 minute delays patient was able to recall 2/3 errands SLP had to complete.  -TH Patient able to recall 4/5 facts re: novel SLP given 5 minute delay. Immediate paragraph recall completed with 40% accuracy.  -TH Paragraph retention; recalled details from short paragraph during immediate memory task with 70% acc given NO cues; 100% given min cues.  -SR       Functional Problem Solving Skills Goal 1 (SLP)    Improve Problem Solving Through Goal 1 (SLP) -- -- tell similarity between items;complete organization/home management task;sequence steps in a task;answer questions about ADL problems;70%;independently (over 90% accuracy)  -SR    Time Frame (Problem Solving Goal 1, SLP) -- -- by discharge  -SR    Progress/Outcomes (Problem Solving Goal 1, SLP) goal ongoing  -TH good progress toward goal  -TH good progress toward goal  -SR    Comment (Problem Solving Goal 1, SLP) Completed functional reading task on the iPad (emails, grocery lists, menus,weather, etc) and patient required mod-max cues throughout task to answer simple questions.  -TH functional reading task completed with  95% accuracy.  - Modify sentence incongruities; 70% acc given NO cues; 100% acc given min cues  -SR    Row Name 06/13/22 1030             Attention Goal 1 (SLP)    Improve Attention by Goal 1 (SLP) complete selective attention task;complete divided attention task;70%;independently (over 90% accuracy)  -SR      Time Frame (Attention Goal 1, SLP) by discharge  -SR      Progress/Outcomes (Attention Goal 1, SLP) continuing progress toward goal  -SR      Comment (Attention Goal 1, SLP) Attended to therapy tasks with no redirection  -SR              Reasoning Goal 1 (SLP)    Improve Reasoning Through Goal 1 (SLP) complete basic reasoning task;complete deductive reasoning task;with minimal cues (75-90%);90%  -SR      Time Frame (Reasoning Goal 1, SLP) by discharge  -SR      Progress/Outcomes (Reasoning Goal 1, SLP) good progress toward goal  -SR      Comment (Reasoning Goal 1, SLP) Deduction by exclusion; Followed written directions to find calendar date with 40% acc given min cues; 100% acc given mod cues. Extended time to complete task.  -SR              Executive Functional Skills Goal 1 (SLP)    Improve Executive Function Skills Goal 1 (SLP) demonstrate awareness of deficit;time management activity;home management activity;70%;with minimal cues (75-90%)  -SR      Time Frame (Executive Function Skills Goal 1, SLP) by discharge  -SR      Progress/Outcomes (Executive Function Skills Goal 1, SLP) good progress toward goal  -SR      Comment (Executive Function Skills Goal 1, SLP) Home management (recipe); sequenced steps and followed recipe with 67% acc given NO cues; 100% acc given min cues  -SR            User Key  (r) = Recorded By, (t) = Taken By, (c) = Cosigned By    Initials Name Provider Type    Mirta Balderrama Speech and Language Pathologist     Evelina Gibson, SLP Speech and Language Pathologist                            Time Calculation:        Time Calculation- SLP     Row Name 06/15/22 9065              Time Calculation- SLP    SLP Start Time 1030  -TH      SLP Stop Time 1100  -TH      SLP Time Calculation (min) 30 min  -TH            User Key  (r) = Recorded By, (t) = Taken By, (c) = Cosigned By    Initials Name Provider Type    TH Mirta Hernandez Speech and Language Pathologist                  Therapy Charges for Today     Code Description Service Date Service Provider Modifiers Qty    48395713004 HC ST DEV OF COGN SKILLS INITIAL 15 MIN 6/14/2022 Mirta Hernandez  1    10965184930 HC ST DEV OF COGN SKILLS EACH ADDT'L 15 MIN 6/14/2022 Mirta Hernandez  3    74420043429 HC ST DEV OF COGN SKILLS INITIAL 15 MIN 6/15/2022 Mirta Hernandez  1    [id number] HC ST DEV OF COGN SKILLS EACH ADDT'L 15 MIN 6/15/2022 Mirta Hernandez  1                           Mirta Hernandez  6/15/2022

## 2022-06-15 NOTE — PLAN OF CARE
Goal Outcome Evaluation:      Pt A/Ox3-4, calm/cooperative. Meds taken whole w thin liquids. Pt c/o low back pain; pt repositioned and prn Tylenol given x1. OOB x 1 w CGA w rwx. Pt was continent using urinal or toilet overnight.

## 2022-06-15 NOTE — THERAPY TREATMENT NOTE
Inpatient Rehabilitation - Occupational Therapy Treatment Note    [facility]     Patient Name: Alireza Carreon  : 1935  MRN: [id number]    Today's Date: 6/15/2022                 Admit Date: 6/3/2022       No diagnosis found.    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[facility])   • Monoclonal gammopathy of unknown significance (MGUS)   • Chronic anticoagulation   • Arteriosclerosis of coronary artery   • Cerebrovascular accident (CVA) (HCC)   • Seizure disorder (HCC)   • Swelling of left lower extremity   • Hearing loss   • Localized edema   • Blister   • Skin nodule of toe of right foot   • Status epilepticus (HCC)   • Immobility syndrome       Past Medical History:   Diagnosis Date   • 3-vessel CAD    • Anticoagulated on warfarin    • Atrial fibrillation (HCC)    • BMI 28.0-28.9,adult    • Bunion, right foot    • Complaints of memory disturbance    • Dementia (HCC)    • Edema    • Encounter for immunization    • Generalized convulsive seizure (HCC)    • Gout    • History of double vision    • HL (hearing loss)    • Hyperlipidemia    • Hypertension    • Left foot pain    • 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    • Movement disorder    • Nocturnal leg cramps    • OA (osteoarthritis)    • Prepatellar effusion    • Pulmonary embolism (HCC)    • Puncture wound of hand, right    • Screening for cardiovascular condition    • Sleep apnea    • Stasis dermatitis    • Statin intolerance    • Stroke (HCC)    • Taking drug for chronic disease    • Thoracic back pain    • Transient ischemic attack        Past Surgical History:   Procedure Laterality Date   • ADENOIDECTOMY     •  APPENDECTOMY     • CATARACT EXTRACTION, BILATERAL     • CORONARY ARTERY BYPASS GRAFT     • CORONARY ARTERY BYPASS GRAFT     • CYSTOSCOPY TRANSURETHRAL RESECTION OF PROSTATE     • HERNIA REPAIR     • OTHER SURGICAL HISTORY      carotid thromboendarterectomy   • SHOULDER SURGERY     • SKIN CANCER EXCISION               IRF OT ASSESSMENT FLOWSHEET (last 12 hours)     IRF OT Evaluation and Treatment     Row Name 06/15/22 1545          OT Time and Intention    Document Type daily treatment  -CC     Mode of Treatment occupational therapy  -CC     Patient Effort good  -CC     Symptoms Noted During/After Treatment none  -CC     Row Name 06/15/22 1545          Pain Assessment    Pretreatment Pain Rating 0/10 - no pain  -CC     Posttreatment Pain Rating 0/10 - no pain  -CC     [facility] Name 06/15/22 1545          Cognition/Psychosocial    Affect/Mental Status (Cognition) WFL  -     Orientation Status (Cognition) oriented x 4  -CC     Personal Safety Interventions fall prevention program maintained;gait belt;nonskid shoes/slippers when out of bed  -     Cognitive Function memory deficit;safety deficit;attention deficit  -CC     [facility] Name 06/15/22 1545          Vision Assessment/Intervention    Visual Impairment/Limitations corrective lenses full-time;visual/perceptual impairments present  -CC     Row Name 06/15/22 1545          Bathing    [place] Level (Bathing) --  refused  -     Row Name 06/15/22 1545          Upper Body Dressing    [place] Level (Upper Body Dressing) upper body dressing skills;doff;don;pull over garment;set up assistance;supervision  -CC     Position (Upper Body Dressing) supported sitting  -     Set-up Assistance (Upper Body Dressing) obtain clothing  -     Row Name 06/15/22 1545          Lower Body Dressing    [place] Level (Lower Body Dressing) doff;don;pants/bottoms;shoes/slippers;underwear;minimum assist (75% patient effort)  -     Position (Lower Body Dressing) supported  sitting;supported standing  -     Set-up Assistance (Lower Body Dressing) obtain clothing  -     Row Name 06/15/22 1545          Grooming    [place] Level (Grooming) grooming skills;deodorant application;hair care, combing/brushing;oral care regimen;wash face, hands;set up  -     Position (Grooming) supported sitting;sink side  -     Set-up Assistance (Grooming) obtain supplies  -     Row Name 06/15/22 1545          Bed Mobility    Comment, (Bed Mobility) in w/c  -     Row Name 06/15/22 1545          Transfers    Sit-Stand [place] (Transfers) standby assist;contact guard  -     Stand-Sit [place] (Transfers) standby assist;contact guard  -     Row Name 06/15/22 1545          Sit-Stand Transfer    Assistive Device (Sit-Stand Transfers) walker, front-wheeled  -     Row Name 06/15/22 1545          Stand-Sit Transfer    Assistive Device (Stand-Sit Transfers) walker, front-wheeled  -     Row Name 06/15/22 1545          Shoulder (Therapeutic Exercise)    Shoulder (Therapeutic Exercise) strengthening exercise  -     Shoulder Strengthening (Therapeutic Exercise) bilateral;extension;flexion;sitting;1 lb free weight;10 repetitions;3 sets  -     Row Name 06/15/22 1545          Elbow/Forearm (Therapeutic Exercise)    Elbow/Forearm (Therapeutic Exercise) strengthening exercise  -     Elbow/Forearm Strengthening (Therapeutic Exercise) bilateral;flexion;extension;supination;pronation;sitting;2 lb free weight;15 repititions;2 sets  -     Row Name 06/15/22 1545          Wrist (Therapeutic Exercise)    Wrist (Therapeutic Exercise) strengthening exercise  -     Wrist Strengthening (Therapeutic Exercise) bilateral;flexion;extension;3 lb free weight;15 repititions;3 sets  -     Row Name 06/15/22 1545          Hand (Therapeutic Exercise)    Hand (Therapeutic Exercise) strengthening exercise  -     Hand Strengthening (Therapeutic Exercise) bilateral; strengthening;hand gripper  -      Row Name 06/15/22 1545          Aerobic Exercise    Type (Aerobic Exercise) arm bike;for 5 minutes  -CC     Time Performed (Aerobic Exercise) standing w SBA  -CC     Row Name 06/15/22 1545          Positioning and Restraints    Pre-Treatment Position sitting in chair/recliner  -CC     Post Treatment Position wheelchair  -CC     In Wheelchair sitting;exit alarm on;with nsg  -CC           User Key  (r) = Recorded By, (t) = Taken By, (c) = Cosigned By    Initials Name Effective Dates    CC Jenny Jones OTR 06/16/21 -                  Occupational Therapy Education                 Title: PT OT SLP Therapies (In Progress)     Topic: Occupational Therapy (Done)     Point: ADL training (Done)     Description:   Instruct learner(s) on proper safety adaptation and remediation techniques during self care or transfers.   Instruct in proper use of assistive devices.              Learning Progress Summary           Patient Acceptance, E, VU by CC at 6/13/2022 1522    Comment: Family conf w pt and family. Status, HEP, HH OT, DME and home  needs discussed    Acceptance, E, VU,NR by  at 6/4/2022 1431    Comment: benefit of IRF, role of OT, d/c rec, safety awareness, transfer training, therapy goals   Family Acceptance, E, VU by CC at 6/13/2022 1522    Comment: Family conf w pt and family. Status, HEP, HH OT, DME and home  needs discussed                   Point: Home exercise program (Done)     Description:   Instruct learner(s) on appropriate technique for monitoring, assisting and/or progressing therapeutic exercises/activities.              Learning Progress Summary           Patient Acceptance, E, VU by CC at 6/13/2022 1522    Comment: Family conf w pt and family. Status, HEP, HH OT, DME and home  needs discussed    Acceptance, E, VU,NR by  at 6/4/2022 1431    Comment: benefit of IRF, role of OT, d/c rec, safety awareness, transfer training, therapy goals   Family Acceptance, E, VU by CC at 6/13/2022 1522    Comment:  Family conf w pt and family. Status, HEP, HH OT, DME and home  needs discussed                   Point: Precautions (Done)     Description:   Instruct learner(s) on prescribed precautions during self-care and functional transfers.              Learning Progress Summary           Patient Acceptance, E, VU by CC at 6/13/2022 1522    Comment: Family conf w pt and family. Status, HEP, HH OT, DME and home  needs discussed    Acceptance, E, VU,NR by MW at 6/4/2022 1431    Comment: benefit of IRF, role of OT, d/c rec, safety awareness, transfer training, therapy goals   Family Acceptance, E, VU by CC at 6/13/2022 1522    Comment: Family conf w pt and family. Status, HEP, HH OT, DME and home  needs discussed                   Point: Body mechanics (Done)     Description:   Instruct learner(s) on proper positioning and spine alignment during self-care, functional mobility activities and/or exercises.              Learning Progress Summary           Patient Acceptance, E, VU by CC at 6/13/2022 1522    Comment: Family conf w pt and family. Status, HEP, HH OT, DME and home  needs discussed    Acceptance, E, VU,NR by MW at 6/4/2022 1431    Comment: benefit of IRF, role of OT, d/c rec, safety awareness, transfer training, therapy goals   Family Acceptance, E, VU by CC at 6/13/2022 1522    Comment: Family conf w pt and family. Status, HEP, HH OT, DME and home  needs discussed                               User Key     Initials Effective Dates Name Provider Type Discipline     06/16/21 -  Jenny Jones OTR Occupational Therapist OT     08/20/21 -  Sylvia Brewer OT Occupational Therapist OT                    OT Recommendation and Plan                         Time Calculation:      Time Calculation- OT     Row Name 06/15/22 1430 06/15/22 1000          Time Calculation- OT    OT Start Time 1430  -CC 1000  -CC     OT Stop Time 1500  -CC 1030  -CC     OT Time Calculation (min) 30 min  -CC 30 min  -CC           User Key   (r) = Recorded By, (t) = Taken By, (c) = Cosigned By    Initials Name Provider Type    CC Jenny Jones OTR Occupational Therapist              Therapy Charges for Today     Code Description Service Date Service Provider Modifiers Qty    69114714746 HC OT SELF CARE/MGMT/TRAIN EA 15 MIN 6/14/2022 Jenny Jones OTR GO 2    81421576073 HC OT THER PROC EA 15 MIN 6/14/2022 Jenny Jones OTR GO 2    51519314526 HC OT SELF CARE/MGMT/TRAIN EA 15 MIN 6/15/2022 Jenny Jones OTR GO 2    25560872470 HC OT THER PROC EA 15 MIN 6/15/2022 Jenny Jones OTR GO 2                   GEOFFREY Ge  6/15/2022

## 2022-06-15 NOTE — PLAN OF CARE
Goal Outcome Evaluation:  Plan of Care Reviewed With: patient        Progress: improving  Outcome Evaluation: AAOx4. Up assist of 1. No safety concerns noted today. Used call light properly once in bed. Sat at nurses station for supervision between therapies. Continent. VS stable. No c/o pain other than discomfort in hands d/t arthritis.

## 2022-06-15 NOTE — PROGRESS NOTES
Inpatient Rehabilitation Plan of Care Note    Plan of Care  Care Plan Reviewed - No updates at this time.    Psychosocial    [RN] Coping/Adjustment(Active)  Current Status(06/14/2022): Expresses appropriate coping  Weekly Goal(06/21/2022): Allow opportunity to express concerns regarding current  status.  Discharge Goal: Adequate coping regarding life changes with ongoing support.    Performed Intervention(s)  Verbalizes needs & concerns      Safety    [RN] Potential for Injury(Active)  Current Status(06/14/2022): Hx falls; impulsive. Attempts to get up without  using call light.  Weekly Goal(06/20/2022): Instruct patient regarding safety precautions & need  for close supervision.  Discharge Goal: Patient /family will be aware of risk of fall & safety in the  home setting    Performed Intervention(s)  Falls precautions, safety rounds, Items within reach, Bed/chair alarms, &  Non-skid socks.  red armband      Sphincter Control    [RN] Bladder & Bowel management(Active)  Current Status(06/14/2022): Usually continent with occasional incontinence.  Weekly Goal(06/21/2022): Continent 75%  Discharge Goal: Continent 100%    Performed Intervention(s)  Monitor intake, output, & BM's each shift.  Use incontinence products as needed.    Signed by: Nichelle Mccabe RN

## 2022-06-15 NOTE — PROGRESS NOTES
[facility]     Progress Note    Patient Name: Alireza Carreon  : 1935  MRN: [id number]  Primary Care Physician:  Jose Bingham MD  Date of admission: 6/3/2022    Subjective Pt is awake and alert. Pt remains pleased with his progress and dc plan.   Subjective     Chief Complaint: same    History of Present Illness  Patient Reports     Review of Systems    Objective   Objective     Vitals:   Temp:  [97 °F (36.1 °C)-98 °F (36.7 °C)] 98 °F (36.7 °C)  Heart Rate:  [51-62] 51  Resp:  [19-20] 20  BP: (106-122)/(56-63) 112/56    Physical Exam     Result Review    Result Review:  I have personally reviewed the results from the time of this admission to 6/15/2022 08:16 EDT and agree with these findings:  []  Laboratory list / accordion  []  Microbiology  []  Radiology  []  EKG/Telemetry   []  Cardiology/Vascular   []  Pathology  []  Old records  []  Other:  Most notable findings include: No new labs to review this am.       Assessment & Plan Continue to prepare for dc . Pt remains medically stable and is making fxnl progress toward dc goals.   Assessment / Plan     Brief Patient Summary:  Alireza Carreon is a 87 y.o. male who     Active Hospital Problems:  Active Hospital Problems    Diagnosis    • Immobility syndrome      Plan:       DVT prophylaxis:  Medical and mechanical DVT prophylaxis orders are present.    CODE STATUS:    Medical Intervention Limits: NO intubation (DNI)  Code Status (Patient has no pulse and is not breathing): No CPR (Do Not Attempt to Resuscitate)  Medical Interventions (Patient has pulse or is breathing): Limited Support  Comments: No chest compression    Disposition:  I expect patient to be discharged    Broderick Coyle MD

## 2022-06-15 NOTE — PROGRESS NOTES
Inpatient Rehabilitation Plan of Care Note    Plan of Care  Care Plan Reviewed - Updates as Follows    Psychosocial    [RN] Coping/Adjustment(Active)  Current Status(06/15/2022): Expresses appropriate coping  Weekly Goal(06/22/2022): Allow opportunity to express concerns regarding current  status.  Discharge Goal: Adequate coping regarding life changes with ongoing support.    Performed Intervention(s)  Verbalizes needs & concerns      Safety    [RN] Potential for Injury(Active)  Current Status(06/15/2022): Hx falls; impulsive. Attempts to get up without  using call light.  Weekly Goal(06/22/2022): Instruct patient regarding safety precautions & need  for close supervision.  Discharge Goal: Patient /family will be aware of risk of fall & safety in the  home setting    Performed Intervention(s)  Falls precautions, safety rounds, Items within reach, Bed/chair alarms, &  Non-skid socks.  red armband      Sphincter Control    [RN] Bladder & Bowel management(Active)  Current Status(06/15/2022): Usually continent with occasional incontinence.  Weekly Goal(06/22/2022): Continent 75%  Discharge Goal: Continent 100%    Performed Intervention(s)  Monitor intake, output, & BM's each shift.  Use incontinence products as needed.    Signed by: Zayda Diaz RN

## 2022-06-15 NOTE — THERAPY TREATMENT NOTE
Inpatient Rehabilitation - Physical Therapy Treatment Note       [facility]     Patient Name: Alireza Carreon  : 1935  MRN: [id number]    Today's Date: 6/15/2022                    Admit Date: 6/3/2022      Visit Dx:   No diagnosis found.    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[facility])   • Monoclonal gammopathy of unknown significance (MGUS)   • Chronic anticoagulation   • Arteriosclerosis of coronary artery   • Cerebrovascular accident (CVA) (HCC)   • Seizure disorder (HCC)   • Swelling of left lower extremity   • Hearing loss   • Localized edema   • Blister   • Skin nodule of toe of right foot   • Status epilepticus (HCC)   • Immobility syndrome       Past Medical History:   Diagnosis Date   • 3-vessel CAD    • Anticoagulated on warfarin    • Atrial fibrillation (HCC)    • BMI 28.0-28.9,adult    • Bunion, right foot    • Complaints of memory disturbance    • Dementia (HCC)    • Edema    • Encounter for immunization    • Generalized convulsive seizure (HCC)    • Gout    • History of double vision    • HL (hearing loss)    • Hyperlipidemia    • Hypertension    • Left foot pain    • 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    • Movement disorder    • Nocturnal leg cramps    • OA (osteoarthritis)    • Prepatellar effusion    • Pulmonary embolism (HCC)    • Puncture wound of hand, right    • Screening for cardiovascular condition    • Sleep apnea    • Stasis dermatitis    • Statin intolerance    • Stroke (HCC)    • Taking drug for chronic disease    • Thoracic back pain    • Transient ischemic attack        Past Surgical History:   Procedure Laterality Date   • ADENOIDECTOMY      • APPENDECTOMY     • CATARACT EXTRACTION, BILATERAL     • CORONARY ARTERY BYPASS GRAFT     • CORONARY ARTERY BYPASS GRAFT     • CYSTOSCOPY TRANSURETHRAL RESECTION OF PROSTATE     • HERNIA REPAIR     • OTHER SURGICAL HISTORY      carotid thromboendarterectomy   • SHOULDER SURGERY     • SKIN CANCER EXCISION         PT ASSESSMENT (last 12 hours)     IRF PT Evaluation and Treatment     Row Name 06/15/22 0700          PT Time and Intention    Document Type daily treatment  -MS (r) KM (t) MS (c)     Mode of Treatment physical therapy  -MS (r) KM (t) MS (c)     Patient/Family/Caregiver Comments/Observations AM: Pt at nursing station in w/c with exit alarm on. PM Pt in w/c in gym following speech therapy, exit alarm on.  -MS (r) KM (t) MS (c)     Total Minutes, Physical Therapy 60  -MS (r) KM (t) MS (c)     Row Name 06/15/22 0700          General Information    Patient Profile Reviewed yes  -MS (r) KM (t) MS (c)     Existing Precautions/Restrictions fall  -MS (r) KM (t) MS (c)     Limitations/Impairments safety/cognitive  -MS (r) KM (t) MS (c)     Comment, General Information red arm band  -MS (r) KM (t) MS (c)     Row Name 06/15/22 0700          Pain Assessment    Pretreatment Pain Rating 0/10 - no pain  -MS (r) KM (t) MS (c)     Posttreatment Pain Rating 0/10 - no pain  -MS (r) KM (t) MS (c)     Row Name 06/15/22 0700          Cognition/Psychosocial    Affect/Mental Status (Cognition) WFL  -MS (r) KM (t) MS (c)     Orientation Status (Cognition) oriented x 4  -MS (r) KM (t) MS (c)     Follows Commands (Cognition) follows one-step commands  -MS (r) KM (t) MS (c)     Personal Safety Interventions fall prevention program maintained;gait belt;muscle strengthening facilitated;nonskid shoes/slippers when out of bed;supervised activity  -MS (r) KM (t) MS (c)     Cognitive Function memory deficit;safety deficit;attention deficit  -MS (r) KM (t) MS (c)     Attention Deficit (Cognition) concentration;distractible in noisy  environment  -MS (r) KM (t) MS (c)     Safety Deficit (Cognition) insight into deficits/self-awareness  -MS (r) KM (t) MS (c)     Row Name 06/15/22 0700          Transfers    [place] Level (Toilet Transfer) contact guard;verbal cues;nonverbal cues (demo/gesture)  -MS (r) KM (t) MS (c)     Assistive Device (Toilet Transfer) commode chair  -MS (r) KM (t) MS (c)     Row Name 06/15/22 0700          Toilet Transfer    Type (Toilet Transfer) sit-stand;stand-sit  -MS (r) KM (t) MS (c)     Row Name 06/15/22 0700          Car Transfer    Type (Car Transfer) sit-stand;lateral  -MS (r) KM (t) MS (c)     [place] Level (Car Transfer) contact guard;standby assist;verbal cues;1 person assist  -MS (r) KM (t) MS (c)     Assistive Device (Car Transfer) walker, front-wheeled;wheelchair  -MS (r) KM (t) MS (c)     Comment, (Car Transfer) Pt performed transfer efficiently. Pt required just minimal cues for body positon and hand placement. Pt maintained good balance throughout transfer.  -MS (r) KM (t) MS (c)     Row Name 06/15/22 0700          Gait/Stairs (Locomotion)    [place] Level (Gait) standby assist;contact guard;1 person assist;verbal cues  -MS (r) KM (t) MS (c)     Assistive Device (Gait) walker, front-wheeled  -MS (r) KM (t) MS (c)     Distance in Feet (Gait) 160', 200' x 2 trials  -MS (r) KM (t) MS (c)     Pattern (Gait) step-through  -MS (r) KM (t) MS (c)     Deviations/Abnormal Patterns (Gait) festinating/shuffling;stride length decreased  -MS (r) KM (t) MS (c)     Bilateral Gait Deviations forward flexed posture  -MS (r) KM (t) MS (c)     Gait Assessment/Intervention Pt step length has improved and posture continues to improve only requiring minimal cues to correct it. Pt has improved at staying within the walker has he navigates turns but does need cues to slow down at times.  -MS (r) KM (t) MS (c)     [place] Level (Stairs) contact guard;1 person assist  -MS (r) KM (t) MS (c)     Handrail Location  (Stairs) both sides  -MS (r) KM (t) MS (c)     Number of Steps (Stairs) 4 x 2 trials  -MS (r) KM (t) MS (c)     Ascending Technique (Stairs) step-to-step  -MS (r) KM (t) MS (c)     Descending Technique (Stairs) step-to-step  -MS (r) KM (t) MS (c)     Comment, (Gait/Stairs) vc's needed to perform step to step gait on the stairs  -MS (r) KM (t) MS (c)     Row Name 06/15/22 0700          Balance    Static Sitting Balance standby assist  -MS (r) KM (t) MS (c)     Sit to Stand Dynamic Balance standby assist;contact guard;1-person assist;verbal cues  -MS (r) KM (t) MS (c)     Static Standing Balance standby assist;contact guard;1-person assist  -MS (r) KM (t) MS (c)     Dynamic Standing Balance standby assist;contact guard;verbal cues;1-person assist  -MS (r) KM (t) MS (c)     Row Name 06/15/22 0700          Motor Skills    Therapeutic Exercise aerobic;knee  -MS (r) KM (t) MS (c)     Additional Documentation --  squats at mello bars, CGA x 1, 1 x 10 reps  -MS (r) KM (t) MS (c)     Row Name 06/15/22 0700          Hip (Therapeutic Exercise)    Hip (Therapeutic Exercise) strengthening exercise  -MS (r) KM (t) MS (c)     Hip Strengthening (Therapeutic Exercise) bilateral;aBduction;standing;sitting;resistance band;red  -MS (r) KM (t) MS (c)     Row Name 06/15/22 0700          Knee (Therapeutic Exercise)    Knee (Therapeutic Exercise) strengthening exercise  -MS (r) KM (t) MS (c)     Knee Strengthening (Therapeutic Exercise) marching while seated;marching while standing;resistance band;red;10 repetitions;2 sets;hamstring curls;standing  -MS (r) KM (t) MS (c)     Row Name 06/15/22 0700          Positioning and Restraints    Pre-Treatment Position sitting in chair/recliner  -MS (r) KM (t) MS (c)     Post Treatment Position wheelchair  -MS (r) KM (t) MS (c)     In Wheelchair sitting;call light within reach;encouraged to call for assist;exit alarm on  PM  -MS (r) KM (t) MS (c)           User Key  (r) = Recorded By, (t) = Taken By,  (c) = Cosigned By    Initials Name Provider Type    Tova Benjamin LEROY, PT Physical Therapist    KM Sang Constantino, GUILLERMO Student PT Student                 Physical Therapy Education                 Title: PT OT SLP Therapies (In Progress)     Topic: Physical Therapy (Done)     Point: Mobility training (Done)     Learning Progress Summary           Patient Acceptance, E, VU,NR by KM at 6/15/2022 1211    Acceptance, E, VU,NR by KM at 6/14/2022 1158    Acceptance, E, VU,NR by KM at 6/13/2022 0956    Acceptance, E,TB,D, NR,VU by MG at 6/11/2022 1156    Acceptance, E,TB, VU,NR by MS at 6/9/2022 0927    Acceptance, E,TB, NR by MS at 6/9/2022 0927    Acceptance, E,TB, NR,NL by MS at 6/8/2022 0828    Acceptance, E,TB, VU,NR by MS at 6/6/2022 1045                   Point: Home exercise program (Done)     Learning Progress Summary           Patient Acceptance, E, VU,NR by KM at 6/15/2022 1211    Acceptance, E, VU,NR by KM at 6/14/2022 1158    Acceptance, E, VU,NR by KM at 6/13/2022 0956    Acceptance, E,TB, VU,NR by MS at 6/9/2022 0927    Acceptance, E,TB, NR by MS at 6/9/2022 0927    Acceptance, E,TB, NR,NL by MS at 6/8/2022 0828    Acceptance, E,TB, VU,NR by MS at 6/6/2022 1045                   Point: Body mechanics (Done)     Learning Progress Summary           Patient Acceptance, E, VU,NR by KM at 6/15/2022 1211    Acceptance, E, VU,NR by KM at 6/14/2022 1158    Acceptance, E, VU,NR by KM at 6/13/2022 0956    Acceptance, E,TB,D, NR,VU by MG at 6/11/2022 1156    Acceptance, E,TB, VU,NR by MS at 6/9/2022 0927    Acceptance, E,TB, NR by MS at 6/9/2022 0927    Acceptance, E,TB, NR,NL by MS at 6/8/2022 0828    Acceptance, E,TB, VU,NR by MS at 6/6/2022 1045                   Point: Precautions (Done)     Learning Progress Summary           Patient Acceptance, E, VU,NR by KM at 6/15/2022 1211    Acceptance, E, VU,NR by KM at 6/14/2022 1158    Acceptance, E, VU,NR by KM at 6/13/2022 0956    Acceptance, E,TB,D, NR,VU by MG at  6/11/2022 1156    Acceptance, E,TB, NR by MG at 6/10/2022 1212    Acceptance, E,TB, VU,NR by MS at 6/9/2022 0927    Acceptance, E,TB, NR by MS at 6/9/2022 0927    Acceptance, E,TB, NR,NL by MS at 6/8/2022 0828    Acceptance, E,TB, VU,NR by MS at 6/6/2022 1045                               User Key     Initials Effective Dates Name Provider Type Discipline    MG 05/24/22 -  Jen Marks, PT Physical Therapist PT    MS 06/16/21 -  Tova Bennett, PT Physical Therapist PT     06/06/22 -  Sang Constantino, PT Student PT Student PT                PT Recommendation and Plan                          Time Calculation:      PT Charges     Row Name 06/15/22 1209 06/15/22 1038          Time Calculation    Start Time 1100  -MS (r) KM (t) MS (c) 0800  -MS (r) KM (t) MS (c)     Stop Time 1130  -MS (r) KM (t) MS (c) 0830  -MS (r) KM (t) MS (c)     Time Calculation (min) 30 min  -MS (r) KM (t) 30 min  -MS (r) KM (t)     PT Received On 06/15/22  -MS (r) KM (t) MS (c) 06/15/22  -MS (r) KM (t) MS (c)     PT - Next Appointment 06/16/22  -MS (r) KM (t) MS (c) 06/16/22  -MS (r) KM (t) MS (c)            Time Calculation- PT    Total Timed Code Minutes- PT 30 minute(s)  -MS (r) KM (t) MS (c) 30 minute(s)  -MS (r) KM (t) MS (c)           User Key  (r) = Recorded By, (t) = Taken By, (c) = Cosigned By    Initials Name Provider Type    MS Bennett Tova A, PT Physical Therapist     Sang Constantino, PT Student PT Student                Therapy Charges for Today     Code Description Service Date Service Provider Modifiers Qty    54340028226 HC PT THER PROC EA 15 MIN 6/14/2022 Sang Constantino, PT Student GP 3    16418820926 HC PT THERAPEUTIC ACT EA 15 MIN 6/14/2022 Sang Constantino, PT Student GP 1    00257657909 HC PT THERAPEUTIC ACT EA 15 MIN 6/15/2022 Sang Constantino, PT Student GP 1    99452667108 HC GAIT TRAINING EA 15 MIN 6/15/2022 Sang Constantino, PT Student GP 1    [id number]  PT THER PROC EA 15 MIN 6/15/2022 Sang Constantino, PT Student GP  1    65961352560 HC GAIT TRAINING EA 15 MIN 6/15/2022 Sang Constantino, PT Student GP 1            PT G-Codes  AM-PAC 6 Clicks Score (PT): 16      Sang Constantino, PT Student  6/15/2022

## 2022-06-15 NOTE — THERAPY TREATMENT NOTE
Inpatient Rehabilitation - Speech Language Pathology Treatment Note    [facility]     Patient Name: Alireza Carreon  : 1935  MRN: [id number]    Today's Date: 6/15/2022                   Admit Date: 6/3/2022       Visit Dx:    No diagnosis found.    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[facility])   • Monoclonal gammopathy of unknown significance (MGUS)   • Chronic anticoagulation   • Arteriosclerosis of coronary artery   • Cerebrovascular accident (CVA) (HCC)   • Seizure disorder (HCC)   • Swelling of left lower extremity   • Hearing loss   • Localized edema   • Blister   • Skin nodule of toe of right foot   • Status epilepticus (HCC)   • Immobility syndrome       Past Medical History:   Diagnosis Date   • 3-vessel CAD    • Anticoagulated on warfarin    • Atrial fibrillation (HCC)    • BMI 28.0-28.9,adult    • Bunion, right foot    • Complaints of memory disturbance    • Dementia (HCC)    • Edema    • Encounter for immunization    • Generalized convulsive seizure (HCC)    • Gout    • History of double vision    • HL (hearing loss)    • Hyperlipidemia    • Hypertension    • Left foot pain    • 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    • Movement disorder    • Nocturnal leg cramps    • OA (osteoarthritis)    • Prepatellar effusion    • Pulmonary embolism (HCC)    • Puncture wound of hand, right    • Screening for cardiovascular condition    • Sleep apnea    • Stasis dermatitis    • Statin intolerance    • Stroke (HCC)    • Taking drug for chronic disease    • Thoracic back pain    • Transient ischemic attack        Past Surgical History:   Procedure Laterality Date   •  ADENOIDECTOMY     • APPENDECTOMY     • CATARACT EXTRACTION, BILATERAL     • CORONARY ARTERY BYPASS GRAFT     • CORONARY ARTERY BYPASS GRAFT     • CYSTOSCOPY TRANSURETHRAL RESECTION OF PROSTATE     • HERNIA REPAIR     • OTHER SURGICAL HISTORY      carotid thromboendarterectomy   • SHOULDER SURGERY     • SKIN CANCER EXCISION         SLP Recommendation and Plan                                                   SLP EVALUATION (last 72 hours)     SLP SLC Evaluation     Row Name 06/15/22 1200 06/15/22 1100 06/14/22 1400 06/13/22 1200 06/13/22 1030       Communication Assessment/Intervention    Document Type therapy note (daily note)  -SR therapy note (daily note)  -TH therapy note (daily note)  -TH therapy note (daily note)  -SR therapy note (daily note)  -SR    Subjective Information -- -- -- no complaints  -SR no complaints  -SR    Patient Observations alert;cooperative;agree to therapy  -SR cooperative;agree to therapy  -TH -- alert;cooperative;agree to therapy  -SR alert;cooperative;agree to therapy  -SR    Patient Effort good  -SR good  -TH good  -TH good  -SR good  -SR    Symptoms Noted During/After Treatment none  -SR -- -- none  -SR none  -SR       Pain Scale: Numbers Pre/Post-Treatment    Pretreatment Pain Rating 0/10 - no pain  -SR -- -- 0/10 - no pain  -SR 0/10 - no pain  -SR    Posttreatment Pain Rating 0/10 - no pain  -SR -- -- 0/10 - no pain  -SR 0/10 - no pain  -SR          User Key  (r) = Recorded By, (t) = Taken By, (c) = Cosigned By    Initials Name Effective Dates    TH Mirta Hernandez 06/16/21 -     SR Evelina Gibson SLP 01/12/22 -                    EDUCATION    The patient has been educated in the following areas:       Cognitive Impairment.             SLP GOALS     Row Name 06/15/22 1300 06/15/22 1100 06/14/22 1400       Attention Goal 1 (SLP)    Progress/Outcomes (Attention Goal 1, SLP) -- -- continuing progress toward goal  -    Comment (Attention Goal 1, SLP) -- -- Patient participated in  novel card game blink and required moderate cues selective attention and mod-max cues for recall of game rules throughout task.  -TH       Memory Skills Goal 1 (SLP)    Improve Memory Skills Through Goal 1 (SLP) recalling related word lists immediately;recalling related word lists with an imposed delay;recalling unrelated word lists immediately;recalling unrelated word lists with an imposed delay;visual memory task;listen to a paragraph and answer questions;70%;with minimal cues (75-90%)  -SR -- --    Time Frame (Memory Skills Goal 1, SLP) by discharge  -SR -- --    Progress/Outcomes (Memory Skills Goal 1, SLP) goal partially met  -SR goal ongoing  -TH good progress toward goal  -TH    Comment (Memory Skills Goal 1, SLP) Visual memory; recalled details from visual picture scene with 50% acc given NO cues; 70% acc given min cues  -SR Given 5/10 minute delays patient was able to recall 2/3 errands SLP had to complete.  -TH Patient able to recall 4/5 facts re: novel SLP given 5 minute delay. Immediate paragraph recall completed with 40% accuracy.  -TH       Reasoning Goal 1 (SLP)    Improve Reasoning Through Goal 1 (SLP) complete basic reasoning task;complete deductive reasoning task;with minimal cues (75-90%);70%  -SR -- --    Time Frame (Reasoning Goal 1, SLP) by discharge  -SR -- --    Progress/Outcomes (Reasoning Goal 1, SLP) goal partially met  -SR -- --    Comment (Reasoning Goal 1, SLP) Mental manipulation; recalled 3 words in alphabetical order with 90% acc given min cues.  -SR -- --       Functional Problem Solving Skills Goal 1 (SLP)    Progress/Outcomes (Problem Solving Goal 1, SLP) -- goal ongoing  -TH good progress toward goal  -TH    Comment (Problem Solving Goal 1, SLP) -- Completed functional reading task on the iPad (emails, grocery lists, menus,weather, etc) and patient required mod-max cues throughout task to answer simple questions.  -TH functional reading task completed with 95% accuracy.  -TH        Executive Functional Skills Goal 1 (SLP)    Improve Executive Function Skills Goal 1 (SLP) demonstrate awareness of deficit;time management activity;home management activity;70%;with minimal cues (75-90%)  -SR -- --    Time Frame (Executive Function Skills Goal 1, SLP) by discharge  -SR -- --    Progress/Outcomes (Executive Function Skills Goal 1, SLP) goal partially met  -SR -- --    Comment (Executive Function Skills Goal 1, SLP) Followed verbal directions to find specific room using hospital map with 80% acc given min cues.  -SR -- --    Row Name 06/13/22 1200 06/13/22 1030          Attention Goal 1 (SLP)    Improve Attention by Goal 1 (SLP) complete selective attention task;complete divided attention task;70%;independently (over 90% accuracy)  -SR complete selective attention task;complete divided attention task;70%;independently (over 90% accuracy)  -SR     Time Frame (Attention Goal 1, SLP) by discharge  -SR by discharge  -SR     Progress/Outcomes (Attention Goal 1, SLP) continuing progress toward goal  -SR continuing progress toward goal  -SR     Comment (Attention Goal 1, SLP) -- Attended to therapy tasks with no redirection  -SR            Memory Skills Goal 1 (SLP)    Improve Memory Skills Through Goal 1 (SLP) recalling related word lists immediately;recalling related word lists with an imposed delay;recalling unrelated word lists immediately;recalling unrelated word lists with an imposed delay;visual memory task;listen to a paragraph and answer questions;70%;with minimal cues (75-90%)  -SR --     Time Frame (Memory Skills Goal 1, SLP) by discharge  -SR --     Progress/Outcomes (Memory Skills Goal 1, SLP) good progress toward goal  -SR --     Comment (Memory Skills Goal 1, SLP) Paragraph retention; recalled details from short paragraph during immediate memory task with 70% acc given NO cues; 100% given min cues.  -SR --            Reasoning Goal 1 (SLP)    Improve Reasoning Through Goal 1 (SLP) --  complete basic reasoning task;complete deductive reasoning task;with minimal cues (75-90%);90%  -SR     Time Frame (Reasoning Goal 1, SLP) -- by discharge  -SR     Progress/Outcomes (Reasoning Goal 1, SLP) -- good progress toward goal  -SR     Comment (Reasoning Goal 1, SLP) -- Deduction by exclusion; Followed written directions to find calendar date with 40% acc given min cues; 100% acc given mod cues. Extended time to complete task.  -SR            Functional Problem Solving Skills Goal 1 (SLP)    Improve Problem Solving Through Goal 1 (SLP) tell similarity between items;complete organization/home management task;sequence steps in a task;answer questions about ADL problems;70%;independently (over 90% accuracy)  -SR --     Time Frame (Problem Solving Goal 1, SLP) by discharge  -SR --     Progress/Outcomes (Problem Solving Goal 1, SLP) good progress toward goal  -SR --     Comment (Problem Solving Goal 1, SLP) Modify sentence incongruities; 70% acc given NO cues; 100% acc given min cues  -SR --            Executive Functional Skills Goal 1 (SLP)    Improve Executive Function Skills Goal 1 (SLP) -- demonstrate awareness of deficit;time management activity;home management activity;70%;with minimal cues (75-90%)  -SR     Time Frame (Executive Function Skills Goal 1, SLP) -- by discharge  -SR     Progress/Outcomes (Executive Function Skills Goal 1, SLP) -- good progress toward goal  -SR     Comment (Executive Function Skills Goal 1, SLP) -- Home management (recipe); sequenced steps and followed recipe with 67% acc given NO cues; 100% acc given min cues  -SR           User Key  (r) = Recorded By, (t) = Taken By, (c) = Cosigned By    Initials Name Provider Type     Mirta Hernandez Speech and Language Pathologist     Evelina Gibson SLP Speech and Language Pathologist                            Time Calculation:        Time Calculation- SLP     Row Name 06/15/22 1308 06/15/22 1157          Time Calculation- SLP    SLP  Start Time 1230  -SR 1030  -TH     SLP Stop Time 1300  -SR 1100  -TH     SLP Time Calculation (min) 30 min  -SR 30 min  -TH           User Key  (r) = Recorded By, (t) = Taken By, (c) = Cosigned By    Initials Name Provider Type     Mirta Hernandez Speech and Language Pathologist     Evelina Gibson SLP Speech and Language Pathologist                  Therapy Charges for Today     Code Description Service Date Service Provider Modifiers Qty    42847130501 [facility] DEV OF COGN SKILLS EACH ADDT'L 15 MIN 6/15/2022 Evelina Gibson SLP  2                           LANEY Solis  6/15/2022

## 2022-06-16 PROBLEM — R41.3 MILD MEMORY DISTURBANCES NOT AMOUNTING TO DEMENTIA: Status: RESOLVED | Noted: 2019-09-17 | Resolved: 2022-01-01

## 2022-06-16 PROBLEM — Z00.00 MEDICARE ANNUAL WELLNESS VISIT, SUBSEQUENT: Status: RESOLVED | Noted: 2019-09-17 | Resolved: 2022-01-01

## 2022-06-16 PROBLEM — N50.89 SWELLING OF RIGHT TESTICLE: Status: RESOLVED | Noted: 2020-07-06 | Resolved: 2022-01-01

## 2022-06-16 PROBLEM — R60.9 EDEMA: Status: RESOLVED | Noted: 2019-09-17 | Resolved: 2022-01-01

## 2022-06-16 PROBLEM — M10.9 GOUT: Status: RESOLVED | Noted: 2019-09-17 | Resolved: 2022-01-01

## 2022-06-16 NOTE — THERAPY DISCHARGE NOTE
Inpatient Rehabilitation - [facility] Occupational Therapy Treatment Note/Discharge  [facility]     Patient Name: Alireza Carreon  : 1935  MRN: [id number]  Today's Date: 2022               Admit Date: 6/3/2022       ICD-10-CM ICD-9-CM   1. Seizure disorder (HCC)  G40.909 345.90     Patient Active Problem List   Diagnosis   • Hypertension   • Atrial fibrillation (HCC)   • Hyperlipidemia   • Dementia (HCC)   • Gait abnormality   • Vascular dementia with behavior disturbance (HCC)   • Monoclonal gammopathy of unknown significance (MGUS)   • Chronic anticoagulation   • Arteriosclerosis of coronary artery   • Cerebrovascular accident (CVA) (HCC)   • Seizure disorder (HCC)   • Swelling of left lower extremity   • Hearing loss   • Localized edema   • Blister   • Skin nodule of toe of right foot   • Status epilepticus (HCC)   • Immobility syndrome     Past Medical History:   Diagnosis Date   • 3-vessel CAD    • Anticoagulated on warfarin    • Atrial fibrillation (HCC)    • BMI 28.0-28.9,adult    • Bunion, right foot    • Complaints of memory disturbance    • Dementia (HCC)    • Edema    • Encounter for immunization    • Generalized convulsive seizure (HCC)    • Gout    • History of double vision    • HL (hearing loss)    • Hyperlipidemia    • Hypertension    • Left foot pain    • 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    • Movement disorder    • Nocturnal leg cramps    • OA (osteoarthritis)    • Prepatellar effusion    • Pulmonary embolism (HCC)    • Puncture wound of hand, right    • Screening for cardiovascular condition    • Sleep apnea    • Stasis dermatitis    • Statin intolerance    • Stroke (HCC)    • Taking drug for chronic disease    • Thoracic back pain    • Transient ischemic attack      Past Surgical History:   Procedure Laterality Date   • ADENOIDECTOMY     • APPENDECTOMY     • CATARACT EXTRACTION, BILATERAL     • CORONARY ARTERY BYPASS GRAFT     • CORONARY ARTERY BYPASS GRAFT     •  CYSTOSCOPY TRANSURETHRAL RESECTION OF PROSTATE     • HERNIA REPAIR     • OTHER SURGICAL HISTORY      carotid thromboendarterectomy   • SHOULDER SURGERY     • SKIN CANCER EXCISION         IRF OT ASSESSMENT FLOWSHEET (last 12 hours)     IRF OT Evaluation and Treatment     Row Name 06/16/22 1529          OT Time and Intention    Document Type discharge evaluation  -CC     Mode of Treatment occupational therapy  -CC     Patient Effort good  -CC     Symptoms Noted During/After Treatment none  -CC     Row Name 06/16/22 1529          Pain Assessment    Pretreatment Pain Rating 0/10 - no pain  -CC     Posttreatment Pain Rating 0/10 - no pain  -CC     Row Name 06/16/22 1529          Cognition/Psychosocial    Affect/Mental Status (Cognition) WFL  -     Orientation Status (Cognition) oriented x 4  -CC     Follows Commands (Cognition) follows one-step commands  -     Personal Safety Interventions fall prevention program maintained;gait belt;nonskid shoes/slippers when out of bed  -     Attention Deficit (Cognition) concentration  -     Memory Deficit (Cognition) moderate deficit  -     Safety Deficit (Cognition) insight into deficits/self-awareness;judgment;impulsivity  -     Row Name 06/16/22 1529          Strength (Manual Muscle Testing)    Strength (Manual Muscle Testing) strength is WFL;bilateral upper extremities  -     Row Name 06/16/22 1529          Sensory    Hearing Status hearing aid, bilateral;hearing impairment, bilaterally  -     Row Name 06/16/22 1529          Vision Assessment/Intervention    Visual Impairment/Limitations corrective lenses full-time  -     Visual Perception Impairment visual discrimination  -     Vision Assessment Comment decreased visual perceptual skills  -     Row Name 06/16/22 1529          Bathing    [place] Level (Bathing) bathing skills;lower body;upper body;standby assist;verbal cues;nonverbal cues (demo/gesture)  -     Position (Bathing) supported  sitting;supported standing  -     Set-up Assistance (Bathing) adjust water temperature;obtain supplies  -     Row Name 06/16/22 1529          Upper Body Dressing    [place] Level (Upper Body Dressing) upper body dressing skills;doff;don;set up assistance  -CC     Position (Upper Body Dressing) supported sitting  -     Set-up Assistance (Upper Body Dressing) obtain clothing  -     Row Name 06/16/22 1529          Lower Body Dressing    [place] Level (Lower Body Dressing) doff;don;pants/bottoms;shoes/slippers;underwear;minimum assist (75% patient effort);contact guard assist  -     Position (Lower Body Dressing) supported sitting;supported standing  -     Set-up Assistance (Lower Body Dressing) obtain clothing  -     Row Name 06/16/22 1529          Grooming    [place] Level (Grooming) grooming skills;deodorant application;hair care, combing/brushing;oral care regimen;set up;shave face;wash face, hands  -     Position (Grooming) supported sitting;sink side  -     Set-up Assistance (Grooming) obtain supplies  -     Row Name 06/16/22 1529          Toileting    [place] Level (Toileting) toileting skills;adjust/manage clothing;perform perineal hygiene;contact guard assist  -     Position (Toileting) supported sitting;supported standing  -     Row Name 06/16/22 1529          Bed Mobility    Scooting/Bridging [place] (Bed Mobility) standby assist;supervision;verbal cues  -     Supine-Sit [place] (Bed Mobility) standby assist;1 person assist;verbal cues  -     Row Name 06/16/22 1529          Transfers    Sit-Stand [place] (Transfers) standby assist;contact guard  -CC     Stand-Sit [place] (Transfers) standby assist;contact guard  -CC     [place] Level (Toilet Transfer) verbal cues;nonverbal cues (demo/gesture);standby assist;supervision  -     [place] Level (Shower Transfer) contact guard;verbal cues;nonverbal cues (demo/gesture)  -     Assistive  Device (Shower Transfer) shower chair;grab bar, tub/shower  -     Row Name 06/16/22 1529          Sit-Stand Transfer    Assistive Device (Sit-Stand Transfers) walker, front-wheeled  -     Row Name 06/16/22 1529          Stand-Sit Transfer    Assistive Device (Stand-Sit Transfers) walker, front-wheeled  -[facility] Name 06/16/22 1529          Toilet Transfer    Type (Toilet Transfer) sit-stand;stand-sit  -     Row Name 06/16/22 1529          Shower Transfer    Type (Shower Transfer) sit-stand;stand-sit  -[facility] Name 06/16/22 1529          Motor Skills    Coordination WFL  -     Row Name 06/16/22 1529          Shoulder (Therapeutic Exercise)    Shoulder (Therapeutic Exercise) strengthening exercise  -     Shoulder Strengthening (Therapeutic Exercise) bilateral;flexion;extension;sitting;2 lb free weight;15 repititions;2 sets  -[facility] Name 06/16/22 1529          Elbow/Forearm (Therapeutic Exercise)    Elbow/Forearm (Therapeutic Exercise) strengthening exercise  -     Elbow/Forearm Strengthening (Therapeutic Exercise) bilateral;flexion;extension;supination;pronation;sitting;2 lb free weight;15 repititions;3 sets  -[facility] Name 06/16/22 1529          Wrist (Therapeutic Exercise)    Wrist (Therapeutic Exercise) strengthening exercise  -     Wrist Strengthening (Therapeutic Exercise) bilateral;flexion;extension;2 lb free weight;10 repetitions;2 sets  -[facility] Name 06/16/22 1529          Aerobic Exercise    Type (Aerobic Exercise) arm bike;for 2 minutes  [facility]     Time Performed (Aerobic Exercise) x 2 in standing  -     Row Name 06/16/22 1529          Balance    Static Sitting Balance independent  -     Static Standing Balance standby assist  -[facility] Name 06/16/22 1529          Positioning and Restraints    Pre-Treatment Position sitting in chair/recliner  -     Post Treatment Position wheelchair  -     In Wheelchair sitting;exit alarm on;with nsg  -     Row Name 06/16/22 1529           Transfer Goal 1 (OT-IRF)    Progress/Outcomes (Transfer Goal 1, OT-IRF) goal met  -     Row Name 06/16/22 1529          Transfer Goal 2 (OT-IRF)    Progress/Outcomes (Transfer Goal 2, OT-IRF) goal met  -[facility] Name 06/16/22 1529          Bathing Goal 1 (OT-IRF)    Progress/Outcomes (Bathing Goal 1, OT-IRF) goal met  -[facility] Name 06/16/22 1529          UB Dressing Goal 1 (OT-IRF)    Progress/Outcomes (UB Dressing Goal 1, OT-IRF) goal met  -[facility] Name 06/16/22 1529          LB Dressing Goal 1 (OT-IRF)    Progress/Outcomes (LB Dressing Goal 1, OT-IRF) goal partially met  -[facility] Name 06/16/22 1529          LB Dressing Goal 2 (OT-IRF)    Progress/Outcomes (LB Dressing Goal 2, OT-IRF) goal not met  -CC     Row Name 06/16/22 1529          Grooming Goal 1 (OT-IRF)    Progress/Outcomes (Grooming Goal 1, OT-IRF) goal met  -[facility] Name 06/16/22 1529          Toileting Goal 1 (OT-IRF)    Progress/Outcomes (Toileting Goal 1, OT-IRF) goal met  -[facility] Name 06/16/22 1529          Toileting Goal 2 (OT-IRF)    Progress/Outcomes (Toileting Goal 2, OT-IRF) goal met  -[facility] Name 06/16/22 1529          Strength Goal 1 (OT-IRF)    Progress/Outcomes (Strength Goal 1, OT-IRF) goal met  -[facility] Name 06/16/22 1529          Balance Goal 1 (OT)    Progress/Outcomes (Balance Goal 1, OT) goal met  -CC     Row Name 06/16/22 1529          Caregiver Training Goal 1 (OT-IRF)    Progress/Outcomes (Caregiver Training Goal 1, OT-IRF) goal met  -[facility] Name 06/16/22 1529          Safety Awareness Goal 1 (OT-IRF)    Progress/Outcomes (Safety Awareness Goal 1, OT-IRF) goal not met  -[facility] Name 06/16/22 1529          Discharge Summary (OT)    Outcomes Achieved Upon Discharge (OT) goals partially achieved prior to discharge  -     Discharge Summary Statement (OT) Pt home w family to assist and HH.  -           User Key  (r) = Recorded By, (t) = Taken By, (c) = Cosigned By    Initials Name Effective Dates     CC Jenny Jones OTR 06/16/21 -                    Occupational Therapy Education                 Title: PT OT SLP Therapies (In Progress)     Topic: Occupational Therapy (Resolved)     Point: ADL training (Resolved)     Description:   Instruct learner(s) on proper safety adaptation and remediation techniques during self care or transfers.   Instruct in proper use of assistive devices.              Learning Progress Summary           Patient Acceptance, E,H, VU by CC at 6/16/2022 1533    Comment: Home safety and ADls    Acceptance, E, VU by CC at 6/13/2022 1522    Comment: Family conf w pt and family. Status, HEP, HH OT, DME and home  needs discussed    Acceptance, E, VU,NR by  at 6/4/2022 1431    Comment: benefit of IRF, role of OT, d/c rec, safety awareness, transfer training, therapy goals   Family Acceptance, E,H, VU by CC at 6/16/2022 1533    Comment: Home safety and ADls    Acceptance, E, VU by CC at 6/13/2022 1522    Comment: Family conf w pt and family. Status, HEP, HH OT, DME and home  needs discussed                   Point: Home exercise program (Resolved)     Description:   Instruct learner(s) on appropriate technique for monitoring, assisting and/or progressing therapeutic exercises/activities.              Learning Progress Summary           Patient Acceptance, E,H, VU by CC at 6/16/2022 1533    Comment: Home safety and ADls    Acceptance, E, VU by CC at 6/13/2022 1522    Comment: Family conf w pt and family. Status, HEP, HH OT, DME and home  needs discussed    Acceptance, E, VU,NR by  at 6/4/2022 1431    Comment: benefit of IRF, role of OT, d/c rec, safety awareness, transfer training, therapy goals   Family Acceptance, E,H, VU by CC at 6/16/2022 1533    Comment: Home safety and ADls    Acceptance, E, VU by CC at 6/13/2022 1522    Comment: Family conf w pt and family. Status, HEP, HH OT, DME and home  needs discussed                   Point: Precautions (Resolved)     Description:   Instruct  learner(s) on prescribed precautions during self-care and functional transfers.              Learning Progress Summary           Patient Acceptance, E,H, VU by  at 6/16/2022 1533    Comment: Home safety and ADls    Acceptance, E, VU by  at 6/13/2022 1522    Comment: Family conf w pt and family. Status, HEP, HH OT, DME and home  needs discussed    Acceptance, E, VU,NR by  at 6/4/2022 1431    Comment: benefit of IRF, role of OT, d/c rec, safety awareness, transfer training, therapy goals   Family Acceptance, E,H, VU by  at 6/16/2022 1533    Comment: Home safety and ADls    Acceptance, E, VU by  at 6/13/2022 1522    Comment: Family conf w pt and family. Status, HEP, HH OT, DME and home  needs discussed                   Point: Body mechanics (Resolved)     Description:   Instruct learner(s) on proper positioning and spine alignment during self-care, functional mobility activities and/or exercises.              Learning Progress Summary           Patient Acceptance, E,H, VU by  at 6/16/2022 1533    Comment: Home safety and ADls    Acceptance, E, VU by  at 6/13/2022 1522    Comment: Family conf w pt and family. Status, HEP, HH OT, DME and home  needs discussed    Acceptance, E, VU,NR by  at 6/4/2022 1431    Comment: benefit of IRF, role of OT, d/c rec, safety awareness, transfer training, therapy goals   Family Acceptance, E,H, VU by  at 6/16/2022 1533    Comment: Home safety and ADls    Acceptance, E, VU by  at 6/13/2022 1522    Comment: Family conf w pt and family. Status, HEP, HH OT, DME and home  needs discussed                               User Key     Initials Effective Dates Name Provider Type Discipline     06/16/21 -  Jenny Jones OTR Occupational Therapist OT     08/20/21 -  Sylvia Brewer OT Occupational Therapist OT                OT Recommendation and Plan  Anticipated Discharge Disposition (OT): home with assist, home with home health  Planned Therapy Interventions (OT):  activity tolerance training, BADL retraining, functional balance retraining, neuromuscular control/coordination retraining, occupation/activity based interventions, patient/caregiver education/training, ROM/therapeutic exercise, strengthening exercise, transfer/mobility retraining           OT IRF GOALS     Row Name 06/16/22 1529 06/09/22 1539 06/04/22 1407       Transfer Goal 1 (OT-IRF)    Activity/Assistive Device (Transfer Goal 1, OT-IRF) -- -- shower chair;walk-in shower  -MW    [place] Level (Transfer Goal 1, OT-IRF) -- -- standby assist  -MW    Time Frame (Transfer Goal 1, OT-IRF) -- -- long-term goal (LTG)  -MW    Progress/Outcomes (Transfer Goal 1, OT-IRF) goal met  -CC goal ongoing  -CC goal ongoing  -MW       Transfer Goal 2 (OT-IRF)    Activity/Assistive Device (Transfer Goal 2, OT-IRF) -- -- toilet  -MW    [place] Level (Transfer Goal 2, OT-IRF) -- -- supervision required  -MW    Time Frame (Transfer Goal 2, OT-IRF) -- -- long-term goal (LTG)  -MW    Progress/Outcomes (Transfer Goal 2, OT-IRF) goal met  -CC goal ongoing  -CC goal ongoing  -MW       Bathing Goal 1 (OT-IRF)    Activity/Device (Bathing Goal 1, OT-IRF) -- -- bathing skills, all  -MW    [place] Level (Bathing Goal 1, OT-IRF) -- -- standby assist  -MW    Time Frame (Bathing Goal 1, OT-IRF) -- -- long-term goal (LTG)  -MW    Progress/Outcomes (Bathing Goal 1, OT-IRF) goal met  -CC goal ongoing  -CC goal ongoing  -MW       UB Dressing Goal 1 (OT-IRF)    Activity/Device (UB Dressing Goal 1, OT-IRF) -- -- upper body dressing  -MW    [place] (UB Dress Goal 1, OT-IRF) -- standby assist  -CC modified independence  -MW    Time Frame (UB Dressing Goal 1, OT-IRF) -- -- long-term goal (LTG)  -MW    Progress/Outcomes (UB Dressing Goal 1, OT-IRF) goal met  -CC goal ongoing;goal revised this date  -CC goal ongoing  -MW       LB Dressing Goal 1 (OT-IRF)    Activity/Device (LB Dressing Goal 1, OT-IRF) -- -- lower body dressing  -MW     [place] (LB Dressing Goal 1, OT-IRF) -- -- contact guard required  -MW    Time Frame (LB Dressing Goal 1, OT-IRF) -- -- short-term goal (STG)  -MW    Progress/Outcomes (LB Dressing Goal 1, OT-IRF) goal partially met  -CC goal ongoing  -CC goal ongoing  -MW       LB Dressing Goal 2 (OT-IRF)    Activity/Device (LB Dressing Goal 2, OT-IRF) -- -- lower body dressing  -MW    [place] (LB Dressing Goal 2, OT-IRF) -- -- standby assist  -MW    Time Frame (LB Dressing Goal 2, OT-IRF) -- -- long-term goal (LTG)  -MW    Progress/Outcomes (LB Dressing Goal 2, OT-IRF) goal not met  -CC goal ongoing  -CC goal ongoing  -MW       Grooming Goal 1 (OT-IRF)    Activity/Device (Grooming Goal 1, OT-IRF) -- -- grooming skills, all  -MW    [place] (Grooming Goal 1, OT-IRF) -- supervision required  -CC modified independence  -MW    Time Frame (Grooming Goal 1, OT-IRF) -- -- long-term goal (LTG)  -MW    Progress/Outcomes (Grooming Goal 1, OT-IRF) goal met  -CC goal revised this date  -CC goal ongoing  -MW       Toileting Goal 1 (OT-IRF)    Activity/Device (Toileting Goal 1, OT-IRF) -- -- toileting skills, all  -MW    [place] Level (Toileting Goal 1, OT-IRF) -- -- contact guard required  -MW    Progress/Outcomes (Toileting Goal 1, OT-IRF) goal met  -CC goal ongoing  -CC goal ongoing  -MW    Time Frame (Toileting Goal 1, OT-IRF) -- -- short-term goal (STG)  -MW       Toileting Goal 2 (OT-IRF)    Activity/Device (Toileting Goal 2, OT-IRF) -- -- toileting skills, all  -MW    [place] Level (Toileting Goal 2, OT-IRF) -- -- standby assist  -MW    Progress/Outcomes (Toileting Goal 2, OT-IRF) goal met  -CC goal ongoing  -CC goal ongoing  -MW    Time Frame (Toileting Goal 2, OT-IRF) -- -- long-term goal (LTG)  -MW       Strength Goal 1 (OT-IRF)    Strength Goal 1 (OT-IRF) -- -- Pt will improve BUE to 4/5 in all functional planes in order to maximize BUE strength to safely support self during transfers and ADL routine tasks in  d/c environment.  -MW    Time Frame (Strength Goal 1, OT-IRF) -- -- long-term goal (LTG)  -MW    Progress/Outcomes (Strength Goal 1, OT-IRF) goal met  -CC goal ongoing  - goal ongoing  -       Balance Goal 1 (OT)    Activity/Assistive Device (Balance Goal 1, OT) -- -- standing, dynamic  -MW    [place] Level/Cues Needed (Balance Goal 1, OT) -- -- supervision required  in order to reduce risk of falls  -MW    Time Frame (Balance Goal 1, OT) -- -- long term goal (LTG)  -MW    Progress/Outcomes (Balance Goal 1, OT) goal met  -CC goal ongoing  -CC goal ongoing  -MW       Caregiver Training Goal 1 (OT-IRF)    Caregiver Training Goal 1 (OT-IRF) -- -- Pt with assistance of caregiver will demonstrate mod (I) with  ADLs, transfers, and HEP.  -MW    Time Frame (Caregiver Training Goal 1, OT-IRF) -- -- long-term goal (LTG)  -MW    Progress/Outcomes (Caregiver Training Goal 1, OT-IRF) goal met  -CC goal ongoing  - goal ongoing  -       Safety Awareness Goal 1 (OT-IRF)    Activity (Safety Awareness Goal 1, OT-IRF) -- -- insight into deficits/self-awareness;demonstration of safe behaviors  -MW    [place]/Cues/Accuracy (Safety Awareness Goal 1, OT-IRF) -- -- with 90% accuracy  -MW    Time Frame (Safety Awareness Goal 1, OT-IRF) -- -- long-term goal (LTG)  -MW    Progress/Outcomes (Safety Awareness Goal 1, OT-IRF) goal not met  -CC goal ongoing  - goal ongoing  -          User Key  (r) = Recorded By, (t) = Taken By, (c) = Cosigned By    Initials Name Provider Type    Jenny Stafford OTR Occupational Therapist    MW Sylvia Brewer OT Occupational Therapist                    Time Calculation:    Time Calculation- OT     Row Name 06/16/22 1430 06/16/22 1230 06/16/22 1000       Time Calculation- OT    OT Start Time 1430  -CC -- 1000  -CC    OT Stop Time 1500  -CC -- 1030  -CC    OT Time Calculation (min) 30 min  -CC -- 30 min  -CC    OT - Next Appointment -- 06/16/22  -DN --          User Key  (r) =  Recorded By, (t) = Taken By, (c) = Cosigned By    Initials Name Provider Type    CC Jenny Jones OTR Occupational Therapist    Robby Garay OT Occupational Therapist                Therapy Charges for Today     Code Description Service Date Service Provider Modifiers Qty    29705834566 HC OT SELF CARE/MGMT/TRAIN EA 15 MIN 6/15/2022 Jenny Jones OTR GO 2    90208312472 HC OT THER PROC EA 15 MIN 6/15/2022 Jenny Jones OTR GO 2    60739469664 HC OT SELF CARE/MGMT/TRAIN EA 15 MIN 6/16/2022 Jenny Jones OTR GO 2    48475983248 HC OT THER PROC EA 15 MIN 6/16/2022 Jenny Jones OTR GO 2               OT Discharge Summary  Anticipated Discharge Disposition (OT): home with assist, home with home health    GEOFFREY Ge  6/16/2022

## 2022-06-16 NOTE — PROGRESS NOTES
Inpatient Rehabilitation Plan of Care Note    Plan of Care  Care Plan Reviewed - No updates at this time.    Psychosocial    Performed Intervention(s)  Verbalizes needs & concerns      Safety    Performed Intervention(s)  Falls precautions, safety rounds, Items within reach, Bed/chair alarms, &  Non-skid socks.  red armband      Sphincter Control    Performed Intervention(s)  Monitor intake, output, & BM's each shift.  Use incontinence products as needed.    Signed by: Zayda Diaz RN

## 2022-06-16 NOTE — PROGRESS NOTES
Patient's daughter, Adalgisa, here this morning for family teaching with PT. She will be taking FMLA to assist with patient at home. She stated patient's wife and step-dgt will be coming tomorrow to pick patient up for d/c home. PT would like to do some teaching with step-dgt so scheduled step-dgt and wife to be here tomorrow at 11:00 a.m. for teaching prior to d/c. Patient very happy to be going home tomorrow. NATALY  to provide home PT, OT, ST. Will assist with plans.

## 2022-06-16 NOTE — THERAPY DISCHARGE NOTE
Inpatient Rehabilitation - IRF Speech Language Pathology /Discharge  [facility]     Patient Name: Alireza Carreon  : 1935  MRN: [id number]  Today's Date: 2022         Admit Date: 6/3/2022    Visit Dx:     ICD-10-CM ICD-9-CM   1. Seizure disorder (HCC)  G40.909 345.90     Patient Active Problem List   Diagnosis   • Hypertension   • Atrial fibrillation (HCC)   • Hyperlipidemia   • Dementia (HCC)   • Gait abnormality   • Vascular dementia with behavior disturbance (HCC)   • Monoclonal gammopathy of unknown significance (MGUS)   • Chronic anticoagulation   • Arteriosclerosis of coronary artery   • Cerebrovascular accident (CVA) (HCC)   • Seizure disorder (HCC)   • Swelling of left lower extremity   • Hearing loss   • Localized edema   • Blister   • Skin nodule of toe of right foot   • Status epilepticus (HCC)   • Immobility syndrome       SLP Recommendation and Plan          Patient exhibits mild-moderate cognitive impairment characterized by difficulty with attention, memory, and executive functioning skills. Improvement observed with immediate memory tasks. Recommend  speech therapy to further target cognitive skills for increased independence at home.      Anticipated Discharge Disposition (SLP): home with  care (22 1521)                           SLP GOALS     Row Name 22 1000 22 0800 06/15/22 1300       Attention Goal 1 (SLP)    Improve Attention by Goal 1 (SLP) complete selective attention task;complete divided attention task;70%;independently (over 90% accuracy)  -SR -- --    Time Frame (Attention Goal 1, SLP) by discharge  -SR -- --    Progress/Outcomes (Attention Goal 1, SLP) goal met  -SR -- --    Comment (Attention Goal 1, SLP) Participated in structured therapy task with no redirection to task.  -SR -- --       Memory Skills Goal 1 (SLP)    Improve Memory Skills Through Goal 1 (SLP) recalling related word lists immediately;recalling related word lists with an  imposed delay;recalling unrelated word lists immediately;visual memory task;listen to a paragraph and answer questions;70%;with minimal cues (75-90%)  -SR recalling related word lists immediately;recalling related word lists with an imposed delay;recalling unrelated word lists immediately;visual memory task;listen to a paragraph and answer questions;70%;with minimal cues (75-90%)  -SR recalling related word lists immediately;recalling related word lists with an imposed delay;recalling unrelated word lists immediately;recalling unrelated word lists with an imposed delay;visual memory task;listen to a paragraph and answer questions;70%;with minimal cues (75-90%)  -SR    Time Frame (Memory Skills Goal 1, SLP) by discharge  -SR by discharge  -SR by discharge  -SR    Progress (Memory Skills Goal 1, SLP) other (comment)  Recalled activity from previous session upon arrival to  office.  -SR -- --    Progress/Outcomes (Memory Skills Goal 1, SLP) goal met  -SR goal met  -SR goal partially met  -SR    Comment (Memory Skills Goal 1, SLP) Pt read short paragraph and answered questions during immediate memory task with 80% acc given min cues. Patient read paragraph 2x each.  -SR Recalled 3/3 grocery items following 5 minute delay given NO cues.  -SR Visual memory; recalled details from visual picture scene with 50% acc given NO cues; 70% acc given min cues  -SR       Reasoning Goal 1 (SLP)    Improve Reasoning Through Goal 1 (SLP) complete basic reasoning task;complete deductive reasoning task;with minimal cues (75-90%);70%  -SR complete basic reasoning task;complete deductive reasoning task;with minimal cues (75-90%);70%  -SR complete basic reasoning task;complete deductive reasoning task;with minimal cues (75-90%);70%  -SR    Time Frame (Reasoning Goal 1, SLP) by discharge  -SR by discharge  -SR by discharge  -SR    Progress/Outcomes (Reasoning Goal 1, SLP) goal met  -SR goal met  -SR goal partially met  -SR    Comment  (Reasoning Goal 1, SLP) Mental manipulation; recalled 3 words in order of occurance with 80% acc given min cues.  -SR Simple deduction puzzle; followed directions to find target item with 50% acc given NO cues; 100% min cues.  -SR Mental manipulation; recalled 3 words in alphabetical order with 90% acc given min cues.  -SR       Functional Problem Solving Skills Goal 1 (SLP)    Improve Problem Solving Through Goal 1 (SLP) tell similarity between items;complete organization/home management task;sequence steps in a task;answer questions about ADL problems;70%;independently (over 90% accuracy)  -SR -- --    Time Frame (Problem Solving Goal 1, SLP) by discharge  -SR -- --    Progress/Outcomes (Problem Solving Goal 1, SLP) goal met  -SR -- --    Comment (Problem Solving Goal 1, SLP) Patient identified sentences that had the same content with 80% acc given NO cues.  -SR -- --       Executive Functional Skills Goal 1 (SLP)    Improve Executive Function Skills Goal 1 (SLP) -- demonstrate awareness of deficit;time management activity;home management activity;70%;with minimal cues (75-90%)  -SR demonstrate awareness of deficit;time management activity;home management activity;70%;with minimal cues (75-90%)  -SR    Time Frame (Executive Function Skills Goal 1, SLP) -- by discharge  -SR by discharge  -SR    Progress/Outcomes (Executive Function Skills Goal 1, SLP) -- goal met  -SR goal partially met  -SR    Comment (Executive Function Skills Goal 1, SLP) -- Fxnal reading with newspaper advertisement; patient used two articles and answered questions with 90% acc given NO cues.  -SR Followed verbal directions to find specific room using hospital map with 80% acc given min cues.  -SR    Row Name 06/15/22 1100 06/14/22 1400          Attention Goal 1 (SLP)    Progress/Outcomes (Attention Goal 1, SLP) -- continuing progress toward goal  -TH     Comment (Attention Goal 1, SLP) -- Patient participated in novel card game blink and  required moderate cues selective attention and mod-max cues for recall of game rules throughout task.  -TH            Memory Skills Goal 1 (SLP)    Progress/Outcomes (Memory Skills Goal 1, SLP) goal ongoing  -TH good progress toward goal  -TH     Comment (Memory Skills Goal 1, SLP) Given 5/10 minute delays patient was able to recall 2/3 errands SLP had to complete.  -TH Patient able to recall 4/5 facts re: novel SLP given 5 minute delay. Immediate paragraph recall completed with 40% accuracy.  -TH            Functional Problem Solving Skills Goal 1 (SLP)    Progress/Outcomes (Problem Solving Goal 1, SLP) goal ongoing  -TH good progress toward goal  -TH     Comment (Problem Solving Goal 1, SLP) Completed functional reading task on the iPad (emails, grocery lists, menus,weather, etc) and patient required mod-max cues throughout task to answer simple questions.  -TH functional reading task completed with 95% accuracy.  -TH           User Key  (r) = Recorded By, (t) = Taken By, (c) = Cosigned By    Initials Name Provider Type    TH Mirta Hernandez Speech and Language Pathologist     Evelina Gibson SLP Speech and Language Pathologist                EDUCATION  The patient has been educated in the following areas:   Cognitive Impairment.              Time Calculation:    Time Calculation- SLP     Row Name 06/16/22 1148 06/16/22 0900          Time Calculation- SLP    SLP Start Time 1030  -SR 0830  -SR     SLP Stop Time 1100  -SR 0900  -SR     SLP Time Calculation (min) 30 min  -SR 30 min  -SR           User Key  (r) = Recorded By, (t) = Taken By, (c) = Cosigned By    Initials Name Provider Type     Evelina Gibson SLP Speech and Language Pathologist                Therapy Charges for Today     Code Description Service Date Service Provider Modifiers Qty    98886230422 HC ST DEV OF COGN SKILLS EACH ADDT'L 15 MIN 6/15/2022 Evelina Gibson SLP  2    [id number] HC ST DEV OF COGN SKILLS INITIAL 15 MIN 6/16/2022  Evelina Gibson, SLP  1    [id number]  ST DEV OF COGN SKILLS EACH ADDT'L 15 MIN 6/16/2022 Evelina Gibson, SLP  3               SLP Discharge Summary  Anticipated Discharge Disposition (SLP): home with 24/7 care  Reason for Discharge: discharge from this facility  Progress Toward Achieving Short/long Term Goals: all goals met within established timelines  Discharge Destination: home w/ home health    LANEY Solis  6/16/2022

## 2022-06-16 NOTE — THERAPY DISCHARGE NOTE
Inpatient Rehabilitation - Physical Therapy Treatment Note/Discharge  [facility]     Patient Name: Alireza Carreon  : 1935  MRN: [id number]  Today's Date: 2022                Admit Date: 6/3/2022    Visit Dx:    ICD-10-CM ICD-9-CM   1. Seizure disorder (HCC)  G40.909 345.90     Patient Active Problem List   Diagnosis   • Hypertension   • Atrial fibrillation (HCC)   • Hyperlipidemia   • Dementia (HCC)   • Gait abnormality   • Vascular dementia with behavior disturbance (HCC)   • Monoclonal gammopathy of unknown significance (MGUS)   • Chronic anticoagulation   • Arteriosclerosis of coronary artery   • Cerebrovascular accident (CVA) (HCC)   • Seizure disorder (HCC)   • Swelling of left lower extremity   • Hearing loss   • Localized edema   • Blister   • Skin nodule of toe of right foot   • Status epilepticus (HCC)   • Immobility syndrome     Past Medical History:   Diagnosis Date   • 3-vessel CAD    • Anticoagulated on warfarin    • Atrial fibrillation (HCC)    • BMI 28.0-28.9,adult    • Bunion, right foot    • Complaints of memory disturbance    • Dementia (HCC)    • Edema    • Encounter for immunization    • Generalized convulsive seizure (HCC)    • Gout    • History of double vision    • HL (hearing loss)    • Hyperlipidemia    • Hypertension    • Left foot pain    • 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    • Movement disorder    • Nocturnal leg cramps    • OA (osteoarthritis)    • Prepatellar effusion    • Pulmonary embolism (HCC)    • Puncture wound of hand, right    • Screening for cardiovascular condition    • Sleep apnea    • Stasis dermatitis    • Statin intolerance    • Stroke (HCC)    • Taking drug for chronic disease    • Thoracic back pain    • Transient ischemic attack      Past Surgical History:   Procedure Laterality Date   • ADENOIDECTOMY     • APPENDECTOMY     • CATARACT EXTRACTION, BILATERAL     • CORONARY ARTERY BYPASS GRAFT     • CORONARY ARTERY BYPASS GRAFT     •  CYSTOSCOPY TRANSURETHRAL RESECTION OF PROSTATE     • HERNIA REPAIR     • OTHER SURGICAL HISTORY      carotid thromboendarterectomy   • SHOULDER SURGERY     • SKIN CANCER EXCISION         PT ASSESSMENT (last 12 hours)     IRF PT Evaluation and Treatment     Row Name 06/16/22 0700          PT Time and Intention    Document Type discharge evaluation  -MS (r) KM (t) MS (c)     Mode of Treatment physical therapy  -MS (r) KM (t) MS (c)     Patient/Family/Caregiver Comments/Observations AM: Pt sitting at nursing station in w/c with exit alarm on. PM: Pt sitting in w/c in gym following ST.  -MS (r) KM (t) MS (c)     Total Minutes, Physical Therapy 60  -MS (r) KM (t) MS (c)     Row Name 06/16/22 0700          General Information    Patient Profile Reviewed yes  -MS (r) KM (t) MS (c)     Existing Precautions/Restrictions fall  -MS (r) KM (t) MS (c)     Limitations/Impairments safety/cognitive  -MS (r) KM (t) MS (c)     Comment, General Information red arm band  -MS (r) KM (t) MS (c)     Row Name 06/16/22 0700          Pain Assessment    Pretreatment Pain Rating 0/10 - no pain  -MS (r) KM (t) MS (c)     Posttreatment Pain Rating 0/10 - no pain  -MS (r) KM (t) MS (c)     Row Name 06/16/22 0700          Cognition/Psychosocial    Affect/Mental Status (Cognition) WFL  -MS (r) KM (t) MS (c)     Orientation Status (Cognition) oriented x 4  -MS (r) KM (t) MS (c)     Follows Commands (Cognition) follows one-step commands  -MS (r) KM (t) MS (c)     Personal Safety Interventions fall prevention program maintained;gait belt;muscle strengthening facilitated;nonskid shoes/slippers when out of bed;supervised activity  -MS (r) KM (t) MS (c)     Cognitive Function memory deficit;safety deficit;attention deficit  -MS (r) KM (t) MS (c)     Attention Deficit (Cognition) concentration;distractible in noisy environment  -MS (r) KM (t) MS (c)     Memory Deficit (Cognition) moderate deficit;minimal deficit  -MS (r) KM (t) MS (c)     Safety Deficit  (Cognition) insight into deficits/self-awareness;judgment;awareness of need for assistance  -MS (r) KM (t) MS (c)     Row Name 06/16/22 0700          Bed Mobility    Bed Mobility supine-sit;sit-supine;rolling left;rolling right  -MS (r) KM (t) MS (c)     Rolling Left [place] (Bed Mobility) independent  -MS (r) KM (t) MS (c)     Rolling Right [place] (Bed Mobility) independent  -MS (r) KM (t) MS (c)     Scooting/Bridging [place] (Bed Mobility) standby assist;supervision;verbal cues  -MS (r) KM (t) MS (c)     Supine-Sit [place] (Bed Mobility) standby assist;1 person assist;verbal cues  -MS (r) KM (t) MS (c)     Sit-Supine [place] (Bed Mobility) standby assist;contact guard;verbal cues  -MS (r) KM (t) MS (c)     Comment, (Bed Mobility) on mat  -MS (r) KM (t) MS (c)     Row Name 06/16/22 0700          Transfer Assessment/Treatment    Transfers chair-bed transfer  -MS (r) KM (t) MS (c)     Comment, (Transfers) SBA-CGA x1 with vc's for body position and safety  -MS (r) KM (t) MS (c)     Row Name 06/16/22 0700          Transfers    Bed-Chair [place] (Transfers) contact guard;1 person assist;standby assist  -MS (r) KM (t) MS (c)     Chair-Bed [place] (Transfers) standby assist;contact guard;1 person assist  -MS (r) KM (t) MS (c)     [place] Level (Toilet Transfer) contact guard;verbal cues;nonverbal cues (demo/gesture)  -MS (r) KM (t) MS (c)     Assistive Device (Toilet Transfer) commode chair;walker, front-wheeled  -MS (r) KM (t) MS (c)     Row Name 06/16/22 0700          Bed-Chair Transfer    Comment, (Bed-Chair Transfer) Stand-step-pivot  -MS (r) KM (t) MS (c)     Row Name 06/16/22 0700          Chair-Bed Transfer    Assistive Device (Chair-Bed Transfers) walker, front-wheeled  -MS (r) KM (t) MS (c)     Row Name 06/16/22 0700          Toilet Transfer    Type (Toilet Transfer) sit-stand;stand-sit  -MS (r) KM (t) MS (c)     Row Name 06/16/22 0700          Gait/Stairs (Locomotion)     [place] Level (Gait) standby assist;contact guard;1 person assist;verbal cues  -MS (r) KM (t) MS (c)     Assistive Device (Gait) walker, front-wheeled  -MS (r) KM (t) MS (c)     Distance in Feet (Gait) 200' x 2 trials  -MS (r) KM (t) MS (c)     Pattern (Gait) step-through  -MS (r) KM (t) MS (c)     Deviations/Abnormal Patterns (Gait) festinating/shuffling;stride length decreased  -MS (r) KM (t) MS (c)     Bilateral Gait Deviations forward flexed posture  -MS (r) KM (t) MS (c)     Gait Assessment/Intervention Pt continues to improve step length and posture during gait. Pt is improving at navigating around turns and being aware of objects in the way. Pt is SBA-CGA for ambulation. Pt family came in for education and to practice safety precautions during ambulation and transfers and performed all tasks efficiently with no loss of balance.  -MS (r) KM (t) MS (c)     [place] Level (Stairs) contact guard;1 person assist  -MS (r) KM (t) MS (c)     Handrail Location (Stairs) both sides  -MS (r) KM (t) MS (c)     Number of Steps (Stairs) 4 x 2 trials  -MS (r) KM (t) MS (c)     Ascending Technique (Stairs) step-to-step  -MS (r) KM (t) MS (c)     Descending Technique (Stairs) step-to-step  -MS (r) KM (t) MS (c)     Comment, (Gait/Stairs) vc's needed for hand placement and body position on turns a the top  -MS (r) KM (t) MS (c)     Row Name 06/16/22 0700          Balance    Balance Assessment sitting static balance;sitting dynamic balance;sit to stand dynamic balance;standing static balance;standing dynamic balance  -MS (r) KM (t) MS (c)     Static Sitting Balance standby assist  -MS (r) KM (t) MS (c)     Dynamic Sitting Balance standby assist  -MS (r) KM (t) MS (c)     Position, Sitting Balance sitting edge of bed  -MS (r) KM (t) MS (c)     Sit to Stand Dynamic Balance standby assist;contact guard;verbal cues;1-person assist  -MS (r) KM (t) MS (c)     Static Standing Balance standby assist;contact guard;1-person  assist  -MS (r) KM (t) MS (c)     Dynamic Standing Balance standby assist;contact guard  -MS (r) KM (t) MS (c)     Position/Device Used, Standing Balance walker, front-wheeled  -MS (r) KM (t) MS (c)     Row Name 06/16/22 0700          Motor Skills    Therapeutic Exercise --  Pt performed ambulation while picking up cones for dual tasking.  -MS (r) KM (t) MS (c)     Row Name 06/16/22 0700          Positioning and Restraints    Pre-Treatment Position sitting in chair/recliner  -MS (r) KM (t) MS (c)     Post Treatment Position --  In w/c at nurses station for AM and PM sessions.  -MS (r) KM (t) MS (c)     In Wheelchair sitting;call light within reach;encouraged to call for assist;exit alarm on  -MS (r) KM (t) MS (c)     Row Name 06/16/22 0700          Bed Mobility Goal 1 (PT-IRF)    Activity/Assistive Device (Bed Mobility Goal 1, PT-IRF) bed mobility activities, all  -MS (r) KM (t) MS (c)     [place] Level (Bed Mobility Goal 1, PT-IRF) contact guard required  -MS (r) KM (t) MS (c)     Time Frame (Bed Mobility Goal 1, PT-IRF) 1 week  -MS (r) KM (t) MS (c)     Progress/Outcomes (Bed Mobility Goal 1, PT-IRF) goal met  -MS (r) KM (t) MS (c)     Row Name 06/16/22 0700          Bed Mobility Goal 2 (PT-IRF)    Activity/Assistive Device (Bed Mobility Goal 2, PT-IRF) bed mobility activities, all  -MS (r) KM (t) MS (c)     [place] Level (Bed Mobility Goal 2, PT-IRF) standby assist  -MS (r) KM (t) MS (c)     Time Frame (Bed Mobility Goal 2, PT-IRF) 2 weeks  -MS (r) KM (t) MS (c)     Progress/Outcomes (Bed Mobility Goal 2, PT-IRF) goal met  -MS (r) KM (t) MS (c)     Row Name 06/16/22 0700          Transfer Goal 1 (PT-IRF)    Activity/Assistive Device (Transfer Goal 1, PT-IRF) sit-to-stand/stand-to-sit  -MS (r) KM (t) MS (c)     [place] Level (Transfer Goal 1, PT-IRF) contact guard required  -MS (r) KM (t) MS (c)     Time Frame (Transfer Goal 1, PT-IRF) 1 week  -MS (r) KM (t) MS (c)     Progress/Outcomes (Transfer  Goal 1, PT-IRF) goal met  -MS (r) KM (t) MS (c)     Row Name 06/16/22 0700          Transfer Goal 2 (PT-IRF)    Activity/Assistive Device (Transfer Goal 2, PT-IRF) bed-to-chair/chair-to-bed  -MS (r) KM (t) MS (c)     [place] Level (Transfer Goal 2, PT-IRF) standby assist  -MS (r) KM (t) MS (c)     Time Frame (Transfer Goal 2, PT-IRF) 2 weeks  -MS (r) KM (t) MS (c)     Progress/Outcomes (Transfer Goal 2, PT-IRF) goal partially met  -MS (r) KM (t) MS (c)     Row Name 06/16/22 0700          Gait/Walking Locomotion Goal 1 (PT-IRF)    Activity/Assistive Device (Gait/Walking Locomotion Goal 1, PT-IRF) gait (walking locomotion);assistive device use  -MS (r) KM (t) MS (c)     Gait/Walking Locomotion Distance Goal 1 (PT-IRF) 100ft  -MS (r) KM (t) MS (c)     [place] Level (Gait/Walking Locomotion Goal 1, PT-IRF) contact guard required  -MS (r) KM (t) MS (c)     Time Frame (Gait/Walking Locomotion Goal 1, PT-IRF) 1 week  -MS (r) KM (t) MS (c)     Progress/Outcomes (Gait/Walking Locomotion Goal 1, PT-IRF) goal met  -MS (r) KM (t) MS (c)     Row Name 06/16/22 0700          Gait/Walking Locomotion Goal 2 (PT-IRF)    Activity/Assistive Device (Gait/Walking Locomotion Goal 2, PT-IRF) gait (walking locomotion);assistive device use  -MS (r) KM (t) MS (c)     Gait/Walking Locomotion Distance Goal 2 (PT-IRF) 200'  -MS (r) KM (t) MS (c)     [place] Level (Gait/Walking Locomotion Goal 2, PT-IRF) verbal cues required;nonverbal cues (demo/gesture) required;standby assist  -MS (r) KM (t) MS (c)     Time Frame (Gait/Walking Locomotion Goal 2, PT-IRF) 1 week  -MS (r) KM (t) MS (c)     Progress/Outcomes (Gait/Walking Locomotion Goal 2, PT-IRF) goal met  -MS (r) KM (t) MS (c)     Row Name 06/16/22 0700          Discharge Summary (PT)    Outcomes Achieved/Progress Made Upon Discharge (PT) all goals met within established timeframes  -MS (r) KM (t) MS (c)     Transfer to Another Level of Care or Facility (PT) home with home health  recommended;home with outpatient therapy services recommended  -MS (r) KM (t) MS (c)     Discharge Summary Statement (PT) Pt was able to meet all therapy goals during his time in rehab. Pt shows improved gait and bed mobility and is more efficient in completing the functional tasks assigned. Pt is SBA-CGA for ambulation and transfers and should continue to progress towards SBA only. At this time the pt no longer requires skilled inpatient PT services and is to be D/C home with the suggestion of recieving home health and future outpatient PT.  -MS (r) KM (t) MS (c)           User Key  (r) = Recorded By, (t) = Taken By, (c) = Cosigned By    Initials Name Provider Type    MS BennettTova, PT Physical Therapist    Sang Cabrera PT Student PT Student                Physical Therapy Education                 Title: PT OT SLP Therapies (In Progress)     Topic: Physical Therapy (Done)     Point: Mobility training (Done)     Learning Progress Summary           Patient Acceptance, E, VU,NR by KM at 6/16/2022 1243    Acceptance, E, VU,NR by KM at 6/15/2022 1211    Acceptance, E, VU,NR by KM at 6/14/2022 1158    Acceptance, E, VU,NR by KM at 6/13/2022 0956    Acceptance, E,TB,D, NR,VU by MG at 6/11/2022 1156    Acceptance, E,TB, VU,NR by MS at 6/9/2022 0927    Acceptance, E,TB, NR by MS at 6/9/2022 0927    Acceptance, E,TB, NR,NL by MS at 6/8/2022 0828    Acceptance, E,TB, VU,NR by MS at 6/6/2022 1045                   Point: Home exercise program (Done)     Learning Progress Summary           Patient Acceptance, E, VU,NR by KM at 6/16/2022 1243    Acceptance, E, VU,NR by KM at 6/15/2022 1211    Acceptance, E, VU,NR by KM at 6/14/2022 1158    Acceptance, E, VU,NR by KM at 6/13/2022 0956    Acceptance, E,TB, VU,NR by MS at 6/9/2022 0927    Acceptance, E,TB, NR by MS at 6/9/2022 0927    Acceptance, E,TB, NR,NL by MS at 6/8/2022 0828    Acceptance, E,TB, VU,NR by MS at 6/6/2022 1045                   Point: Body  mechanics (Done)     Learning Progress Summary           Patient Acceptance, E, VU,NR by KM at 6/16/2022 1243    Acceptance, E, VU,NR by KM at 6/15/2022 1211    Acceptance, E, VU,NR by KM at 6/14/2022 1158    Acceptance, E, VU,NR by KM at 6/13/2022 0956    Acceptance, E,TB,D, NR,VU by MG at 6/11/2022 1156    Acceptance, E,TB, VU,NR by MS at 6/9/2022 0927    Acceptance, E,TB, NR by MS at 6/9/2022 0927    Acceptance, E,TB, NR,NL by MS at 6/8/2022 0828    Acceptance, E,TB, VU,NR by MS at 6/6/2022 1045                   Point: Precautions (Done)     Learning Progress Summary           Patient Acceptance, E, VU,NR by KM at 6/16/2022 1243    Acceptance, E, VU,NR by KM at 6/15/2022 1211    Acceptance, E, VU,NR by KM at 6/14/2022 1158    Acceptance, E, VU,NR by KM at 6/13/2022 0956    Acceptance, E,TB,D, NR,VU by MG at 6/11/2022 1156    Acceptance, E,TB, NR by MG at 6/10/2022 1212    Acceptance, E,TB, VU,NR by MS at 6/9/2022 0927    Acceptance, E,TB, NR by MS at 6/9/2022 0927    Acceptance, E,TB, NR,NL by MS at 6/8/2022 0828    Acceptance, E,TB, VU,NR by MS at 6/6/2022 1045                               User Key     Initials Effective Dates Name Provider Type Discipline    MG 05/24/22 -  Jen Marks, PT Physical Therapist PT    MS 06/16/21 -  Tova Bennett PT Physical Therapist PT    KM 06/06/22 -  Sang Constantino, PT Student PT Student PT                PT Recommendation and Plan                  Time Calculation:    PT Charges     Row Name 06/16/22 1456 06/16/22 1241          Time Calculation    Start Time 0800 (P)   -KM 1100  -MS (r) KM (t) MS (c)     Stop Time 0830 (P)   -KM 1130  -MS (r) KM (t) MS (c)     Time Calculation (min) 30 min (P)   -KM 30 min  -MS (r) KM (t)     PT Received On 06/16/22 (P)   -KM 06/16/22  -MS (r) KM (t) MS (c)     PT - Next Appointment 06/17/22 (P)   -KM 06/17/22  -MS (r) KM (t) MS (c)            Time Calculation- PT    Total Timed Code Minutes- PT 30 minute(s) (P)   -KM 30 minute(s)   -MS (r) KM (t) MS (c)           User Key  (r) = Recorded By, (t) = Taken By, (c) = Cosigned By    Initials Name Provider Type    Tova Benjamin, PT Physical Therapist    Sang Cabrera, PT Student PT Student                Therapy Charges for Today     Code Description Service Date Service Provider Modifiers Qty    60302334738 HC PT THERAPEUTIC ACT EA 15 MIN 6/15/2022 Sang Constantino, PT Student GP 1    58182975320 HC GAIT TRAINING EA 15 MIN 6/15/2022 Sang Constantino, PT Student GP 1    25992138143 HC PT THER PROC EA 15 MIN 6/15/2022 Sang Constantino, PT Student GP 1    89598328413 HC GAIT TRAINING EA 15 MIN 6/15/2022 Sang Constantino, PT Student GP 1    67812989425 HC GAIT TRAINING EA 15 MIN 6/16/2022 Sang Constantino, PT Student GP 1    10686912885 HC PT THER PROC EA 15 MIN 6/16/2022 Sang Constantino, PT Student GP 1    25972295631 HC PT THERAPEUTIC ACT EA 15 MIN 6/16/2022 Sang Constantino, PT Student GP 2          PT G-Codes  AM-PAC 6 Clicks Score (PT): 16         Sang Constantino, PT Student  6/16/2022

## 2022-06-16 NOTE — PROGRESS NOTES
[facility]     Progress Note    Patient Name: Alireza Carreon  : 1935  MRN: [id number]  Primary Care Physician:  Jose Bingham MD  Date of admission: 6/3/2022    Subjective Pt is awake and alert. No new concerns voiced. Pt leased with his progress and plan to dc home tomorrow.   Subjective     Chief Complaint: same    History of Present Illness  Patient Reports     Review of Systems    Objective Exam unchanged calves soft and NT. resp unlabored and regular.   Objective     Vitals:   Temp:  [97 °F (36.1 °C)-98 °F (36.7 °C)] 97.1 °F (36.2 °C)  Heart Rate:  [51-71] 71  Resp:  [20] 20  BP: (115-162)/(60-81) 162/81    Physical Exam     Result Review    Result Review:  I have personally reviewed the results from the time of this admission to 2022 09:06 EDT and agree with these findings:  []  Laboratory list / accordion  []  Microbiology  []  Radiology  []  EKG/Telemetry   []  Cardiology/Vascular   []  Pathology  []  Old records  []  Other:  Most notable findings include: No new labs to review this am.       Assessment & Plan   Continue to prepare for dc. tomorrow. Med rec completed.   Assessment / Plan     Brief Patient Summary:  Alireza Carreon is a 87 y.o. male who     Active Hospital Problems:  Active Hospital Problems    Diagnosis    • Immobility syndrome    • Dementia (HCC)    • Gait abnormality    • Hypertension    • Atrial fibrillation (HCC)    • Hyperlipidemia      Plan:       DVT prophylaxis:  Medical and mechanical DVT prophylaxis orders are present.    CODE STATUS:    Medical Intervention Limits: NO intubation (DNI)  Code Status (Patient has no pulse and is not breathing): No CPR (Do Not Attempt to Resuscitate)  Medical Interventions (Patient has pulse or is breathing): Limited Support  Comments: No chest compression    Disposition:  I expect patient to be discharged home tomorrow.     Broderick Coyle MD

## 2022-06-16 NOTE — PLAN OF CARE
Goal Outcome Evaluation:  Plan of Care Reviewed With: patient        Progress: improving  Outcome Evaluation: Mr. Carreon attended therapies and is cooperative with staff.  Pt. is A&Ox4, can be forgetful.  Pt. is [place] and wears laila. hearing aides.  Takes medications whole with water.  Assist x 1 to wc.  Can be impulsive at times.  Sat up in wc most of this shift.  No safety issues noted.  Uses call light for assistance.

## 2022-06-16 NOTE — PLAN OF CARE
Problem: Rehabilitation (IRF) Plan of Care  Goal: Plan of Care Review  Outcome: Ongoing, Progressing  Flowsheets (Taken 6/16/2022 0407)  Progress: improving  Plan of Care Reviewed With: patient  Outcome Evaluation: Pt is A&Ox4, forgetful at times, very [place], states he is going to hearing specialist once he is d/c to get better hearing aids, no c/o pain so far tonight, bp still running slightly lower in evening, held nightlly lisinopril, also bradycardic w/ HR in low 50's, pt is red arm band as he does have some urgency/frequency at times, able to use urinal or up to bathroom w/ asst x1, resting well so far tonight, plan is to d/c home on 6/17, will continue to monitor.   Goal Outcome Evaluation:  Plan of Care Reviewed With: patient        Progress: improving  Outcome Evaluation: Pt is A&Ox4, forgetful at times, very [place], states he is going to hearing specialist once he is d/c to get better hearing aids, no c/o pain so far tonight, bp still running slightly lower in evening, held nightlly lisinopril, also bradycardic w/ HR in low 50's, pt is red arm band as he does have some urgency/frequency at times, able to use urinal or up to bathroom w/ asst x1, resting well so far tonight, plan is to d/c home on 6/17, will continue to monitor.

## 2022-06-16 NOTE — PROGRESS NOTES
SECTION GG      Mobility Performance Discharge:     Roll Left and Right: Patient completed the activities by him/herself with no  assistance from a helper.   Sit to Lying: [place] provides verbal cues and/or touching/steadying and/or  contact guard assistance as patient completes activity. Assistance may be  provided throughout the activity or intermittently.   Lying to Sitting on Side of Bed: [place] provides verbal cues and/or  touching/steadying and/or contact guard assistance as patient completes  activity. Assistance may be provided throughout the activity or intermittently.   Sit to Stand: [place] provides verbal cues and/or touching/steadying and/or  contact guard assistance as patient completes activity. Assistance may be  provided throughout the activity or intermittently.   Chair/Bed to Chair Transfer: [place] provides verbal cues and/or  touching/steadying and/or contact guard assistance as patient completes  activity. Assistance may be provided throughout the activity or intermittently.   Car Transfer: [place] provides verbal cues and/or touching/steadying and/or  contact guard assistance as patient completes activity. Assistance may be  provided throughout the activity or intermittently.   Walk 10 Feet:   [place] provides verbal cues and/or touching/steadying and/or  contact guard assistance as patient completes activity. Assistance may be  provided throughout the activity or intermittently.  Walk 50 Feet with 2 Turns:   [place] provides verbal cues and/or  touching/steadying and/or contact guard assistance as patient completes  activity. Assistance may be provided throughout the activity or intermittently.  Walk 150 Feet:   [place] provides verbal cues and/or touching/steadying and/or  contact guard assistance as patient completes activity. Assistance may be  provided throughout the activity or intermittently.  Walking 10 Feet on Uneven Surfaces:   [place] provides verbal cues and/or  touching/steadying and/or  contact guard assistance as patient completes  activity. Assistance may be provided throughout the activity or intermittently.  1 Step Over Curb or Up/Down Stair:   [place] provides verbal cues and/or  touching/steadying and/or contact guard assistance as patient completes  activity. Assistance may be provided throughout the activity or intermittently.  4 Steps Up and Down, With/Without Rail:   [place] provides verbal cues and/or  touching/steadying and/or contact guard assistance as patient completes  activity. Assistance may be provided throughout the activity or intermittently.  12 Steps Up and Down, With/Without Rail:   Not attempted due to medical or  safety concerns.  Picking up an Object:   [place] provides verbal cues and/or touching/steadying  and/or contact guard assistance as patient completes activity. Assistance may be  provided throughout the activity or intermittently. Uses Wheelchair and/or  Scooter: No    Signed by: Tova Bennett, PT

## 2022-06-16 NOTE — SIGNIFICANT NOTE
06/16/22 1230   OTHER   Discipline occupational therapist   Rehab Time/Intention   Session Not Performed patient/family declined treatment  (pt states wanted to finish lunch and have a cup of coffie, before tx, unable to persuade to participate at this time)   Recommendation   OT - Next Appointment 06/16/22

## 2022-06-17 NOTE — PLAN OF CARE
Problem: Rehabilitation (IRF) Plan of Care  Goal: Plan of Care Review  Outcome: Ongoing, Progressing  Flowsheets (Taken 6/17/2022 0452)  Progress: improving  Plan of Care Reviewed With: patient  Outcome Evaluation: Pt is A&Ox4, forgetful at times, very [place], has hearing aids at bedside, meds whole w/ water, continent of BB, up w/ asst x1, impulsivity when has urgency at night, otherwise uses call light appropriately, at nurses desk for meals, plans to d/c home this morning, will continue to monitor.   Goal Outcome Evaluation:  Plan of Care Reviewed With: patient        Progress: improving  Outcome Evaluation: Pt is A&Ox4, forgetful at times, very [place], has hearing aids at bedside, meds whole w/ water, continent of BB, up w/ asst x1, impulsivity when has urgency at night, otherwise uses call light appropriately, at nurses desk for meals, plans to d/c home this morning, will continue to monitor.

## 2022-06-17 NOTE — PROGRESS NOTES
SECTION GG      Self Care Performance Discharge:   Oral Hygiene: Morris sets up or cleans up; patient completes activity. Morris  assists only prior to or following the activity.   Toileting Hygiene: : Morris provides verbal cues and/or touching/steadying  and/or contact guard assistance as patient completes activity.   Shower/Bathe Self: Morris provides verbal cues and/or touching/steadying and/or  contact guard assistance as patient completes activity.   Upper Body Dressing: Morris provides verbal cues and/or touching/steadying  and/or contact guard assistance as patient completes activity.   Lower Body Dressing: Morris provides verbal cues and/or touching/steadying  and/or contact guard assistance as patient completes activity.   Putting On/Taking Off Footwear: Morris does less than half the effort. Morris  lifts, holds or supports trunk or limbs but provides less than half the effort.    Mobility Toilet Transfer Discharge: Morris provides verbal cues or  touching/steadying assistance as patient completes activity.    Signed by: Jenny Jones OTR/ARELIS

## 2022-06-17 NOTE — THERAPY TREATMENT NOTE
Inpatient Rehabilitation - Physical Therapy Treatment Note       [facility]     Patient Name: Alireza Carreon  : 1935  MRN: [id number]    Today's Date: 2022                    Admit Date: 6/3/2022      Visit Dx:     ICD-10-CM ICD-9-CM   1. Seizure disorder (HCC)  G40.909 345.90       Patient Active Problem List   Diagnosis   • Hypertension   • Atrial fibrillation (HCC)   • Hyperlipidemia   • Dementia (HCC)   • Gait abnormality   • Vascular dementia with behavior disturbance (HCC)   • Monoclonal gammopathy of unknown significance (MGUS)   • Chronic anticoagulation   • Arteriosclerosis of coronary artery   • Cerebrovascular accident (CVA) (HCC)   • Seizure disorder (HCC)   • Swelling of left lower extremity   • Hearing loss   • Localized edema   • Blister   • Skin nodule of toe of right foot   • Status epilepticus (HCC)   • Immobility syndrome       Past Medical History:   Diagnosis Date   • 3-vessel CAD    • Anticoagulated on warfarin    • Atrial fibrillation (HCC)    • BMI 28.0-28.9,adult    • Bunion, right foot    • Complaints of memory disturbance    • Dementia (HCC)    • Edema    • Encounter for immunization    • Generalized convulsive seizure (HCC)    • Gout    • History of double vision    • HL (hearing loss)    • Hyperlipidemia    • Hypertension    • Left foot pain    • 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    • Movement disorder    • Nocturnal leg cramps    • OA (osteoarthritis)    • Prepatellar effusion    • Pulmonary embolism (HCC)    • Puncture wound of hand, right    • Screening for cardiovascular condition    • Sleep apnea    • Stasis dermatitis    • Statin intolerance    • Stroke (HCC)    • Taking drug for chronic disease    • Thoracic back pain    • Transient ischemic attack        Past Surgical History:   Procedure Laterality Date   • ADENOIDECTOMY     • APPENDECTOMY     • CATARACT EXTRACTION, BILATERAL     • CORONARY ARTERY BYPASS GRAFT     • CORONARY ARTERY BYPASS  GRAFT     • CYSTOSCOPY TRANSURETHRAL RESECTION OF PROSTATE     • HERNIA REPAIR     • OTHER SURGICAL HISTORY      carotid thromboendarterectomy   • SHOULDER SURGERY     • SKIN CANCER EXCISION         PT ASSESSMENT (last 12 hours)     IRF PT Evaluation and Treatment     Row Name 06/17/22 1100          PT Time and Intention    Document Type daily treatment  -DP (r) KM (t) DP (c)     Mode of Treatment physical therapy  -DP (r) KM (t) DP (c)     Patient/Family/Caregiver Comments/Observations Pt sitting at nurses station in w/c with exit alarm on.  -DP (r) KM (t) DP (c)     Total Minutes, Physical Therapy 20  -DP (r) KM (t) DP (c)     Row Name 06/17/22 1100          General Information    Patient Profile Reviewed yes  -DP (r) KM (t) DP (c)     Existing Precautions/Restrictions fall  -DP (r) KM (t) DP (c)     Limitations/Impairments safety/cognitive  -DP (r) KM (t) DP (c)     Comment, General Information red arm band  -DP (r) KM (t) DP (c)     Row Name 06/17/22 1100          Pain Assessment    Pretreatment Pain Rating 0/10 - no pain  -DP (r) KM (t) DP (c)     Posttreatment Pain Rating 0/10 - no pain  -DP (r) KM (t) DP (c)     Row Name 06/17/22 1100          Car Transfer    Type (Car Transfer) sit-stand;lateral  -DP (r) KM (t) DP (c)     [place] Level (Car Transfer) contact guard;standby assist;verbal cues;1 person assist  -DP (r) KM (t) DP (c)     Assistive Device (Car Transfer) walker, front-wheeled;wheelchair  -DP (r) KM (t) DP (c)     Comment, (Car Transfer) Pt performed car transfer for family teaching. Pt performed it once with therapist and then performed it twice more with family. Pt maintained good balance and used good techniques. Family was educated on cues for body positioning and safety.  -DP (r) KM (t) DP (c)     Row Name 06/17/22 1100          Gait/Stairs (Locomotion)    [place] Level (Gait) standby assist;contact guard;1 person assist;verbal cues  -DP (r) KM (t) DP (c)     Assistive Device (Gait)  walker, front-wheeled  -DP (r) KM (t) DP (c)     Distance in Feet (Gait) 240'  -DP (r) KM (t) DP (c)     Pattern (Gait) step-through  -DP (r) KM (t) DP (c)     Deviations/Abnormal Patterns (Gait) festinating/shuffling;stride length decreased  -DP (r) KM (t) DP (c)     Bilateral Gait Deviations forward flexed posture  -DP (r) KM (t) DP (c)     Gait Assessment/Intervention Pt performed ambulation for family teaching. Pt performed ambulation of 80' with therapist, then 80' with daughter and 80' with wife for a total of 240'. Pt maintained good balance throughout ambulation and used RW. Pt's family maintained good safety precautions and was educated on assistance level and cues to provide for step length and posture.  -DP (r) KM (t) DP (c)     [place] Level (Stairs) contact guard;1 person assist  -DP (r) KM (t) DP (c)     Handrail Location (Stairs) both sides  -DP (r) KM (t) DP (c)     Number of Steps (Stairs) 4 x 2 trials  -DP (r) KM (t) DP (c)     Ascending Technique (Stairs) step-to-step  -DP (r) KM (t) DP (c)     Descending Technique (Stairs) step-to-step  -DP (r) KM (t) DP (c)     Stairs Assessment/Intervention Pt performed stairs for family teaching. Pt ascended and descended 4 steps once with the therapist to demonstrate for the family and then once with the family. Pt maintained good balance and family practiced good safety measures during stairs. Pt's family was educated on best safety practice and cues to give for hand placement and body position.  -DP (r) KM (t) DP (c)     Row Name 06/17/22 1100          Balance    Sit to Stand Dynamic Balance standby assist;contact guard;verbal cues;1-person assist  -DP (r) KM (t) DP (c)     Dynamic Standing Balance standby assist;contact guard;1-person assist;verbal cues  -DP (r) KM (t) DP (c)     Position/Device Used, Standing Balance walker, front-wheeled  -DP (r) KM (t) DP (c)     Row Name 06/17/22 1100          Positioning and Restraints    Pre-Treatment Position  sitting in chair/recliner  -DP (r) KM (t) DP (c)     Post Treatment Position wheelchair  -DP (r) KM (t) DP (c)     In Wheelchair sitting;call light within reach;encouraged to call for assist;exit alarm on  -DP (r) KM (t) DP (c)           User Key  (r) = Recorded By, (t) = Taken By, (c) = Cosigned By    Initials Name Provider Type    DP Willam Raygoza, PT Physical Therapist    Sang Cabrera PT Student PT Student                 Physical Therapy Education                 Title: PT OT SLP Therapies (In Progress)     Topic: Physical Therapy (Done)     Point: Mobility training (Done)     Learning Progress Summary           Patient Acceptance, E,D, VU,NR by KM at 6/17/2022 1335    Acceptance, E, VU,NR by KM at 6/17/2022 1207    Acceptance, E, VU,NR by KM at 6/16/2022 1243    Acceptance, E, VU,NR by KM at 6/15/2022 1211    Acceptance, E, VU,NR by KM at 6/14/2022 1158    Acceptance, E, VU,NR by KM at 6/13/2022 0956    Acceptance, E,TB,D, NR,VU by MG at 6/11/2022 1156    Acceptance, E,TB, VU,NR by MS at 6/9/2022 0927    Acceptance, E,TB, NR by MS at 6/9/2022 0927    Acceptance, E,TB, NR,NL by MS at 6/8/2022 0828    Acceptance, E,TB, VU,NR by MS at 6/6/2022 1045   Family Acceptance, E,D, VU,NR by KM at 6/17/2022 1335                   Point: Home exercise program (Done)     Learning Progress Summary           Patient Acceptance, E,D, VU,NR by KM at 6/17/2022 1335    Acceptance, E, VU,NR by KM at 6/17/2022 1207    Acceptance, E, VU,NR by KM at 6/16/2022 1243    Acceptance, E, VU,NR by KM at 6/15/2022 1211    Acceptance, E, VU,NR by KM at 6/14/2022 1158    Acceptance, E, VU,NR by KM at 6/13/2022 0956    Acceptance, E,TB, VU,NR by MS at 6/9/2022 0927    Acceptance, E,TB, NR by MS at 6/9/2022 0927    Acceptance, E,TB, NR,NL by MS at 6/8/2022 0828    Acceptance, E,TB, VU,NR by MS at 6/6/2022 1045   Family Acceptance, E,D, VU,NR by KM at 6/17/2022 1335                   Point: Body mechanics (Done)     Learning Progress Summary            Patient Acceptance, E,D, VU,NR by KM at 6/17/2022 1335    Acceptance, E, VU,NR by KM at 6/17/2022 1207    Acceptance, E, VU,NR by KM at 6/16/2022 1243    Acceptance, E, VU,NR by KM at 6/15/2022 1211    Acceptance, E, VU,NR by KM at 6/14/2022 1158    Acceptance, E, VU,NR by KM at 6/13/2022 0956    Acceptance, E,TB,D, NR,VU by MG at 6/11/2022 1156    Acceptance, E,TB, VU,NR by MS at 6/9/2022 0927    Acceptance, E,TB, NR by MS at 6/9/2022 0927    Acceptance, E,TB, NR,NL by MS at 6/8/2022 0828    Acceptance, E,TB, VU,NR by MS at 6/6/2022 1045   Family Acceptance, E,D, VU,NR by KM at 6/17/2022 1335                   Point: Precautions (Done)     Learning Progress Summary           Patient Acceptance, E,D, VU,NR by KM at 6/17/2022 1335    Acceptance, E, VU,NR by KM at 6/17/2022 1207    Acceptance, E, VU,NR by KM at 6/16/2022 1243    Acceptance, E, VU,NR by KM at 6/15/2022 1211    Acceptance, E, VU,NR by KM at 6/14/2022 1158    Acceptance, E, VU,NR by KM at 6/13/2022 0956    Acceptance, E,TB,D, NR,VU by MG at 6/11/2022 1156    Acceptance, E,TB, NR by MG at 6/10/2022 1212    Acceptance, E,TB, VU,NR by MS at 6/9/2022 0927    Acceptance, E,TB, NR by MS at 6/9/2022 0927    Acceptance, E,TB, NR,NL by MS at 6/8/2022 0828    Acceptance, E,TB, VU,NR by MS at 6/6/2022 1045   Family Acceptance, E,D, VU,NR by KM at 6/17/2022 1335                               User Key     Initials Effective Dates Name Provider Type Discipline     05/24/22 -  Jen Marks, PT Physical Therapist PT    MS 06/16/21 -  Tova Bennett PT Physical Therapist PT    KM 06/06/22 -  Sang Constantino, PT Student PT Student PT                PT Recommendation and Plan                          Time Calculation:      PT Charges     Row Name 06/17/22 1207             Time Calculation    Start Time 1100  -DP (r) KM (t) DP (c)      Stop Time 1120  -DP (r) KM (t) DP (c)      Time Calculation (min) 20 min  -DP (r) KM (t)      PT Received On 06/17/22  -DP  (r) KM (t) DP (c)              Time Calculation- PT    Total Timed Code Minutes- PT 20 minute(s)  -DP (r) KM (t) DP (c)            User Key  (r) = Recorded By, (t) = Taken By, (c) = Cosigned By    Initials Name Provider Type    Willam Lugo, PT Physical Therapist    Sang Cabrera, PT Student PT Student                Therapy Charges for Today     Code Description Service Date Service Provider Modifiers Qty    59180166846 HC GAIT TRAINING EA 15 MIN 6/16/2022 Sang Constantino, PT Student GP 1    [id number]  PT THER PROC EA 15 MIN 6/16/2022 Sang Constantino, PT Student GP 1    [id number]  PT THERAPEUTIC ACT EA 15 MIN 6/16/2022 Sang Constantino, PT Student GP 2    00972784814 HC PT THERAPEUTIC ACT EA 15 MIN 6/17/2022 Sang Constantino, PT Student GP 1            PT G-Codes  AM-PAC 6 Clicks Score (PT): 16      Sang Constantino PT Student  6/17/2022

## 2022-06-17 NOTE — PROGRESS NOTES
[facility]     Progress Note    Patient Name: Alireza Carreon  : 1935  MRN: [id number]  Primary Care Physician:  Jose Bingham MD  Date of admission: 6/3/2022    Subjective  Pt is awake and alert. Pt remains medically stable and pleased with his progress and plan to dc home this am.    Subjective     Chief Complaint: same    History of Present Illness  Patient Reports     Review of Systems    Objective  exam unchanged calves soft and NT. Resp unlabored and regular  Objective     Vitals:   Temp:  [96.9 °F (36.1 °C)-98.2 °F (36.8 °C)] 96.9 °F (36.1 °C)  Heart Rate:  [54-69] 54  Resp:  [16-20] 16  BP: (149-156)/(65-84) 149/65    Physical Exam     Result Review    Result Review:  I have personally reviewed the results from the time of this admission to 2022 08:12 EDT and agree with these findings:  []  Laboratory list / accordion  []  Microbiology  []  Radiology  []  EKG/Telemetry   []  Cardiology/Vascular   []  Pathology  []  Old records  []  Other:  Most notable findings include:  CMP and CBC WNL      Assessment & Plan  Continue plan to dc home today. Pt has made good fxnl progress. ,  Assessment / Plan     Brief Patient Summary:  Alireza Carreon is a 87 y.o. male who     Active Hospital Problems:  Active Hospital Problems    Diagnosis    • Immobility syndrome    • Dementia (HCC)    • Gait abnormality    • Hypertension    • Atrial fibrillation (HCC)    • Hyperlipidemia      Plan:       DVT prophylaxis:  Medical and mechanical DVT prophylaxis orders are present.    CODE STATUS:    Medical Intervention Limits: NO intubation (DNI)  Code Status (Patient has no pulse and is not breathing): No CPR (Do Not Attempt to Resuscitate)  Medical Interventions (Patient has pulse or is breathing): Limited Support  Comments: No chest compression    Disposition:  I expect patient to be discharged home today.     Broderick Coyle MD

## 2022-06-17 NOTE — PROGRESS NOTES
PPS CMG Coordinator  Inpatient Rehabilitation Discharge    Mode of Locomotion: Walking.    Discharge Against Medical Advice:  No.  Discharge Information  Patient Discharged Alive:  Yes  Discharge Destination/Living Setting: Home with Home Health Services  Diagnosis for Interruption/Death: ICD    Impairment Group: 16 Debility (non-cardiac, non-pulmonary)    Comorbidities: ICD    Complications: ICD    QUALITY INDICATORS  Section J Health Conditions: Fall(s) Since Admission:  No    Section M. Skin Conditions Discharge:  Unhealed Pressure Ulcer(s) at Stage 1 or  Higher:  No    . Current Number of Unhealed Pressure Ulcers  Branch    Section N. Medication:  Medication Intervention: N/A - There were no potential clinically significant  medication issues identified since admission or patient is not taking any  medications.    Signed by: Cristina Bemrudez RN

## 2022-06-17 NOTE — PLAN OF CARE
Goal Outcome Evaluation:  Plan of Care Reviewed With: patient        Progress: improving  Outcome Evaluation: AAOx4. Cooperative with all care. Ready for D/C today. PT/ OT teaching done with family. Contient of B&B. Urgency at times. VS stable. Still has small abrasion on right elbow; dressed with bacitracin and mepilex; elbow protector applied.

## 2022-06-17 NOTE — PROGRESS NOTES
Case Management  Inpatient Rehabilitation Team Conference    Conference Date/Time: 6/14/2022 8:28:51 AM    Team Conference Attendees:  Marcelo Barboza, Pharmacist  Reina Ramos, MARY ELLENW  Tova Bennett, PT  DEVIKA Ge, SLP  Wendy Polo, CTRS  Yun Bar RD, LD  Cristina Bermudez, RN  Briana Rubin, RN  Guzman Garcia, Chaplain Dr. Leonides Salinas    Demographics            Age: 87Y            Gender: Male    Admission Date: 6/3/2022 6:28:00 PM  Rehabilitation Diagnosis:  Debility  Past Medical History: Medical History  Past Medical History:  DiagnosisDate  ?3-vessel CAD?  ?Anticoagulated on warfarin?  ?Atrial fibrillation (HCC)?  ?BMI 28.0-28.9,adult?  ?Bunion, right foot?  ?Complaints of memory disturbance?  ?Dementia (HCC)?  ?Edema?  ?Encounter for immunization?  ?Generalized convulsive seizure (HCC)?  ?Gout?  ?History of double vision?  ?HL (hearing loss)?  ?Hyperlipidemia?  ?Hypertension?  ?Left foot pain?  ?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?  ?Movement disorder?  ?Nocturnal leg cramps?  ?OA (osteoarthritis)?  ?Prepatellar effusion?  ?Pulmonary embolism (HCC)?  ?Puncture wound of hand, right?  ?Screening for cardiovascular condition?  ?Sleep apnea?  ?Stasis dermatitis?  ?Statin intolerance?  ?Stroke (HCC)?  ?Taking drug for chronic disease?  ?Thoracic back pain?  ?Transient ischemic attack?    ?  Surgical History  Past Surgical History:  ProcedureLateralityDate  ?ADENOIDECTOMY??  ?APPENDECTOMY??  ?CATARACT EXTRACTION, BILATERAL??  ?CORONARY ARTERY BYPASS GRAFT??  ?CORONARY ARTERY BYPASS GRAFT??  ?CYSTOSCOPY TRANSURETHRAL RESECTION OF PROSTATE??  ?HERNIA REPAIR??  ?OTHER SURGICAL HISTORY??  ?carotid thromboendarterectomy  ?SHOULDER SURGERY??  ?SKIN CANCER EXCISION??    ?      Plan of Care  Anticipated Discharge Date/Estimated Length of Stay: 2 Weeks  Anticipated Discharge Destination: Community discharge with assistance  Discharge Plan : Patient lives with wife  in one story home with 3 steps to enter  with rails.  Family conference held with patient, wife, step-dgt, son and 3 dgts (by phone)  on 6/13. Will schedule familiy teaching with dgtAdalgisa, who will be assisting  at home.  D/C plan is home with wife. Dgt, Adalgisa, to take FMLA to assist patient at home.  [facility] provide [facility], OT, ST.  Medical Necessity Expected Level Rationale: Goals are to achieve a level of SBA  with mobility and ADL's.  Rehab prognosis fair.  Medical prognosis fair.  Intensity and Duration: an average of 3 hours/5 days per week  Medical Supervision and 24 Hour Rehab Nursing: x  Physical Therapy: x  PT Intensity/Duration: 1 hour/day, 5 days/week, for approximately 2 weeks.  Occupational Therapy: x  OT Intensity/Duration: 1 hour/day, 5 days/week, for approximately 2 weeks.  Speech and Language Therapy: x  SLP Intensity/Duration: 1 hour/day, 5 days/week, for approximately 2 weeks.  Social Work: x  Therapeutic Recreation: x  Psychology: x  Registered Dietician: x  Updated (if changes indicated)    Anticipated Discharge Date/Estimated Length of Stay:   ELOS: DC 6/17    Based on the patient's medical and functional status, their prognosis and  expected level of functional improvement is: Goals are to achieve a level of SBA  with mobility and ADL's.  Rehab prognosis fair.  Medical prognosis fair.      Interdisciplinary Problem/Goals/Status    All Rehab Problems:  Cognition    [ST] Executive Functions(Active)  Current Status(06/13/2022): Executive Functioning at mild- moderate level  Weekly Goal(06/20/2022): Improve Executive Functioning to min-WFL  Discharge Goal: Executive Functioning WFL        Mobility    [OT] Tub/Shower Transfers(Active)  Current Status(06/13/2022): CGA  Weekly Goal(06/21/2022): CGA/SBA  Discharge Goal: SBA/CGA    [OT] Toilet Transfers(Active)  Current Status(06/13/2022): CGA  Weekly Goal(06/20/2022): SBA  Discharge Goal: SBA    [PT] Walk(Active)  Current Status(06/13/2022):  240' SBA-CGA, shuffling gait  Weekly Goal(06/20/2022): amb to BR w nsg w RW, SBA  Discharge Goal: 160' SV, RW    [PT] Bed/Chair/Wheelchair(Active)  Current Status(06/13/2022): SBA-CGA, RW  Weekly Goal(06/20/2022): SV, RW  Discharge Goal: SV, RW    [PT] Bed Mobility(Active)  Current Status(06/13/2022): SBA-CGA  Weekly Goal(06/20/2022): SV  Discharge Goal: SV        Psychosocial    [RN] Coping/Adjustment(Active)  Current Status(06/14/2022): Expresses appropriate coping  Weekly Goal(06/21/2022): Allow opportunity to express concerns regarding current  status.  Discharge Goal: Adequate coping regarding life changes with ongoing support.        Safety    [RN] Potential for Injury(Active)  Current Status(06/14/2022): Hx falls; impulsive. Attempts to get up without  using call light.  Weekly Goal(06/20/2022): Instruct patient regarding safety precautions & need  for close supervision.  Discharge Goal: Patient /family will be aware of risk of fall & safety in the  home setting        Self Care    [OT] Bathing(Active)  Current Status(06/13/2022): CGA  Weekly Goal(06/20/2022): SBA  Discharge Goal: SBA    [OT] Dressing (Lower)(Active)  Current Status(06/13/2022): Min A  Weekly Goal(06/21/2022): CGA/SBA  Discharge Goal: CGA/SBA    [OT] Dressing (Upper)(Active)  Current Status(06/06/2022): SBA  Weekly Goal(06/06/2022): SBA  Discharge Goal: s/up    [OT] Grooming(Active)  Current Status(06/13/2022): s/up  Weekly Goal(06/13/2022): s/up  Discharge Goal: set up    [OT] Toileting(Active)  Current Status(06/13/2022): CGA  Weekly Goal(06/14/2022): SBA  Discharge Goal: SBA        Sphincter Control    [RN] Bladder & Bowel management(Active)  Current Status(06/14/2022): Usually continent with occasional incontinence.  Weekly Goal(06/21/2022): Continent 75%  Discharge Goal: Continent 100%        Comments: 6/7 Amb 80' Min A, Toilet trransfers Min A, needs a lot of safety  awareness cues.  Difficulty with hearing, lost one of his hearing  aides in acute  care.  Shuffling gait, hx of old stroke.  Robert regular diet with thins.  Continent mostly, incontinent at times.    6/14 Family conference yesterday.  Amb 240' SBA / CGA, shuffling gait.  Toilet  transfers CGA.  Needs a lot cues to stay on task.  [facility] following.    Signed by: Cristina Bermudez RN    Physician CoSigned By: Broderick Coyle 06/17/2022 15:13:09

## 2022-06-17 NOTE — PROGRESS NOTES
Patient d/c home today, 6/17, with wife and step-dgt. Patient's daughter, Adalgisa, to stay with them at home to assist. [facility] to provide home PT, OT, ST. Patient happy to be going home today.

## 2022-06-17 NOTE — PROGRESS NOTES
SECTION GG    Eating Performance Discharge: Patient completed the activities by him/herself  with no assistance from a helper.    Signed by: Briana Rubin RN

## 2022-06-23 NOTE — TELEPHONE ENCOUNTER
Caller: Elizabeth Carreon    Relationship: Emergency Contact    Best call back number: [phone] (H)  What was the call regarding: PATIENT MISSED A CALL FROM THE OFFICE. WIFE IS RETURNING THE CALL. [facility] RECEIVED PERMISSION FROM PATIENT WHILE ON THE PHONE TO SPEAK WITH HIS WIFE SINCE SHE IS NOT ON THE VERBAL. THEY DO NOT KNOW WHY THE OFFICE CALLED THEM. [facility] WENT AHEAD AND CONFIRMED AND SCREENED THE PATIENT FOR COVID FOR TOMORROWS APPOINTMENT.     NO VISIBLE NOTES IN PATIENTS CHART FOR [facility] TO SHARE WITH PATIENT     Do you require a callback: ONLY IF NECESSARY

## 2022-06-23 NOTE — TELEPHONE ENCOUNTER
Unsure on who called patient. Nothing is documented as a call in the past couple of days. Patient has appt tomorrow

## 2022-06-24 NOTE — TELEPHONE ENCOUNTER
Spoke with spouse . Spouse was informed that appt was missed for Patient. They presently do not have a mode of transportation at the moment and will call back to reschedule. Spouse stated that patient was doing fine.

## 2022-07-06 NOTE — TELEPHONE ENCOUNTER
Caller: Alireza Carreon    Relationship: Self    Best call back number: [phone]    What is the best time to reach you: ANY     Who are you requesting to speak with (clinical staff, provider,  specific staff member): CLINICAL    What was the call regarding: PATIENT WOULD LIKE TO KNOW WHEN HE IS ABLE TO RETURN TO DRIVING AGAIN?    Do you require a callback: YES

## 2022-07-11 NOTE — TELEPHONE ENCOUNTER
Since the patient did have seizures the current recommendation is 6 months without driving at a minimum unless he continues have seizures.  Then it would be longer.

## 2022-07-14 NOTE — PROGRESS NOTES
"Chief Complaint  Leg Swelling    Subjective          Alireza Carreon presents to [facility]  Left leg swelling noted within the last few days. Patient has a history of leg swelling, typically takes HCTZ but has run out of his prescription. Blood pressure usually controlled with lisinopril and HCTZ. Denies any pain with walking, numbness, tingling, headaches, blurred vision, or chest pain.       Review of Systems   Constitutional: Negative for fatigue and fever.   Eyes: Negative for blurred vision.   Cardiovascular: Positive for leg swelling (left). Negative for chest pain and palpitations.   Neurological: Negative for numbness and headache.      Objective   Vital Signs:   /82 (BP Location: Left arm, Patient Position: Sitting, Cuff Size: Adult)   Pulse 82   Temp 98.9 °F (37.2 °C) (Temporal)   Ht 167.6 cm (66\")   Wt 64.9 kg (143 lb)   SpO2 97%   BMI 23.08 kg/m²     BMI is within normal parameters. No other follow-up for BMI required.      Physical Exam  Vitals reviewed.   Constitutional:       General: He is awake. He is not in acute distress.     Appearance: Normal appearance.   HENT:      Right Ear: Decreased hearing noted.      Left Ear: Decreased hearing noted.   Neck:      Thyroid: No thyroid mass or thyroid tenderness.      Vascular: No carotid bruit or JVD.   Cardiovascular:      Rate and Rhythm: Normal rate and regular rhythm.      Pulses: Normal pulses.           Radial pulses are 2+ on the right side and 2+ on the left side.      Heart sounds: Normal heart sounds.   Pulmonary:      Effort: Pulmonary effort is normal.      Breath sounds: Normal breath sounds.   Musculoskeletal:      Right lower le+ Pitting Edema (ankle) present.      Left lower leg: 3+ Pitting Edema (Shin and ankle) present.   Lymphadenopathy:      Cervical: No cervical adenopathy.   Skin:     General: Skin is warm and dry.      Capillary Refill: Capillary refill takes less than 2 seconds. "        Result Review :     Assessment and Plan    Diagnoses and all orders for this visit:    1. Leg edema, left (Primary)  -     furosemide (Lasix) 20 MG tablet; Take 1 tablet by mouth 2 (Two) Times a Day for 30 days.  Dispense: 60 tablet; Refill: 0    2. Need for COVID-19 vaccine  -     COVID-19 Vaccine (Pfizer) Gray Cap    Discontinued HCTZ, prescribed Lasix to be taken twice daily to help decrease leg edema.  Ordered second booster of COVID-19 vaccine per patient request.    I spent 25 minutes caring for Alireza on this date of service. This time includes time spent by me in the following activities:obtaining and/or reviewing a separately obtained history, performing a medically appropriate examination and/or evaluation , counseling and educating the patient/family/caregiver, ordering medications, tests, or procedures and documenting information in the medical record     Follow Up   Return in about 4 weeks (around 8/11/2022) for Recheck.  Patient was given instructions and counseling regarding his condition or for health maintenance advice. Please see specific information pulled into the AVS if appropriate.

## 2022-07-20 NOTE — TELEPHONE ENCOUNTER
Rx Refill Note  Requested Prescriptions     Pending Prescriptions Disp Refills   • lacosamide (VIMPAT) 100 MG tablet tablet 60 tablet 0     Sig: Take 1 tablet by mouth Every 12 (Twelve) Hours.      Last office visit with prescribing clinician: 7/14/2022    Next office visit with prescribing clinician: Return in about 4 weeks (around 8/11/2022) for Recheck        Miesha Elias MA  07/20/22, 14:40 EDT

## 2022-08-01 NOTE — TELEPHONE ENCOUNTER
Rx Refill Note  Requested Prescriptions     Pending Prescriptions Disp Refills   • donepezil (ARICEPT) 10 MG tablet [Pharmacy Med Name: DONEPEZIL HCL 10 MG TABLET] 90 tablet 3     Sig: TAKE ONE TABLET BY MOUTH ONCE NIGHTLY      Last office visit with prescribing clinician: 1/27/2022      Next office visit with prescribing clinician: Visit date not found            Brittanie Ferguson MA  08/01/22, 08:15 EDT
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
## 2022-10-12 NOTE — TELEPHONE ENCOUNTER
Rx Refill Note  Requested Prescriptions     Pending Prescriptions Disp Refills   • furosemide (LASIX) 20 MG tablet [Pharmacy Med Name: FUROSEMIDE 20 MG TABLET] 60 tablet 0     Sig: TAKE ONE TABLET BY MOUTH TWICE A DAY      Last office visit with prescribing clinician: 8/19/2022      Next office visit with prescribing clinician: Visit date not found

## 2022-10-25 NOTE — TELEPHONE ENCOUNTER
Caller: SARAH    Relationship: Child    Best call back number: [phone]    What was the call regarding: SARAH IS CALLING TO SEE IF DR LINO HAS RECEIVED ANY INFORMATION ABOUT THE PATIENT BEING IN THE HOSPITAL AT [facility] AND THE [facility] [place] [facility].    PLEASE CALL AND ADVISE     Do you require a callback: YES

## 2022-10-31 NOTE — OUTREACH NOTE
Prep Survey    Flowsheet Row Responses   [facility] facility patient discharged from? Non-BH   Is LACE score < 7 ? Non-BH Discharge   Emergency Room discharge w/ pulse ox? No   Eligibility [facility]   Date of Discharge 10/31/22   Discharge diagnosis hypertensive urgency   Does the patient have one of the following disease processes/diagnoses(primary or secondary)? Other   Prep survey completed? Yes          TAMANNA CRUZ - Registered Nurse

## 2022-11-01 NOTE — TELEPHONE ENCOUNTER
Caller: TASHA    Relationship to patient: Child NOT ON  [facility]    Best call back number: [phone]    Patient is needing: PATIENT WAS RELEASED FROM [facility] 10/31 AND THEY WERE SUPPOSE TO SEND IN MED LIST FOR DR. LINO TO REFILL ALL MEDS. HE IS OUT OF SEVERAL OF THE MEDICATIONS ON THE LIST. HIS DAUGHTER DID NOT KNOW ANY MEDICATION NAMES. PLEASE CALL AND ADVISE.     [facility] [id number] - [address] AT [facility] - [phone]  - [phone]   [phone]

## 2022-11-01 NOTE — TELEPHONE ENCOUNTER
Rx Refill Note  Requested Prescriptions     Pending Prescriptions Disp Refills   • bacitracin 500 UNIT/GM ointment 28 g 0     Sig: Apply 1 application topically to the appropriate area as directed 2 (Two) Times a Day.   • furosemide (LASIX) 20 MG tablet 60 tablet 0     Sig: Take 1 tablet by mouth 2 (Two) Times a Day.   • hydroCHLOROthiazide (HYDRODIURIL) 25 MG tablet 30 tablet 1     Sig: Take 1 tablet by mouth Daily.   • lacosamide (VIMPAT) 100 MG tablet tablet 60 tablet 2     Sig: Take 1 tablet by mouth Every 12 (Twelve) Hours.   • lisinopril (PRINIVIL,ZESTRIL) 20 MG tablet 180 tablet 1     Sig: Take 1 tablet by mouth 2 (Two) Times a Day.   • Magnesium 400 MG tablet       Sig: Take 2 tablets by mouth Every Night.   • PHENobarbital (LUMINAL) 64.8 MG tablet 90 tablet 1     Sig: Take 1 tablet by mouth Daily.   • Potassium (Potassimin) 75 MG tablet       Sig: Take  by mouth Every Night.   • rivaroxaban (Xarelto) 15 MG tablet 42 tablet 1     Sig: Take 1 tablet by mouth Daily With Dinner.      Last office visit with prescribing clinician: 08/19/2022      Next office visit with prescribing clinician: Visit date not found

## 2022-11-01 NOTE — OUTREACH NOTE
Call Center TCM Note    Flowsheet Row Responses   [facility] patient discharged from? Non-   Does the patient have one of the following disease processes/diagnoses(primary or secondary)? Other   TCM attempt successful? Yes   Call start time 1200   Call end time 1207   Discharge diagnosis hypertensive urgency   Is patient permission given to speak with other caregiver? Yes   List who call center can speak with Daughter Sabine and pt    Person spoke with today (if not patient) and relationship Daughter Sabine and pt    Meds reviewed with patient/caregiver? Yes   Is the patient having any side effects they believe may be caused by any medication additions or changes? No   Does the patient have all medications ordered at discharge? No   What is keeping the patient from filling the prescriptions? --  [Assisted Living were to fax to a list of his most updated meds-PCP aware and pt is waiting for a return call ]   Nursing Interventions Nurse provided patient education   Is the patient taking all medications as directed (includes completed medication regime)? Yes   Comments Daughter states she would like to call PCP and ask if pt can speak with doctor during spouse's apt - will route message to group    Does the patient have an appointment with their PCP within 7 days of discharge? No   Nursing Interventions Patient declined scheduling/rescheduling appointment at this time, Routed TCM call to PCP office, PCP office requested to make appointment - message sent   Home health comments Daughter states assisted living arranged  services - if no consult, daughter states she has the 's name.  Will route message to PCP    DME comments Pt owns a walker    Psychosocial issues? No   Did the patient receive a copy of their discharge instructions? Yes   Nursing interventions Reviewed instructions with patient   What is the patient's perception of their health status since discharge? Improving   Is the  patient/caregiver able to teach back signs and symptoms related to disease process for when to call PCP? Yes   Is the patient/caregiver able to teach back signs and symptoms related to disease process for when to call 911? Yes   Is the patient/caregiver able to teach back the hierarchy of who to call/visit for symptoms/problems? PCP, Specialist, Home health nurse, Urgent Care, ED, 911 Yes   If the patient is a current smoker, are they able to teach back resources for cessation? Not a smoker   TCM call completed? Yes   Call end time 1207          Gabby Vieira RN    11/1/2022, 12:09 EDT

## 2022-11-03 NOTE — PROGRESS NOTES
Subjective   Alireza Carreon is a 87 y.o. male.     Chief Complaint   Patient presents with   • Follow-up     Patient hasn't been taking his medications   Admitted to         History of Present Illness   Within 48 business hours after discharge our office contacted him via telephone to coordinate his care and needs.  Date of TCM Phone Call 10/31/2022   [facility]   Date of Discharge 10/31/2022     Risk for Readmission (LACE) No data recorded    Daughter was present during the history-taking and subsequent discussion (and for part of the physical exam) with this patient.  Patient agrees to the presence of the individual during this visit.      ALL records were obtained and reviewed and /or discussed with admitting physician  Patient was seen and admitted to [facility] on 10/15/2022 - 10/21/2022 after episode of seizure.  There is question about his compliance with medications.  Hospital course: Patient was admitted and neurology was consulted.  Patient was loaded with phenobarbital again and underwent MRI imaging.  He was followed and eventually discharged home with home health [facility].  Is also having an evaluation by [facility] palliative care.  Upon discussion with Zayda the nurse from [facility] available at [phone], is noted the patient is questionable in his compliance with medications.  There is concern within the home about his wife having some memory issues but the patient and family wants him to be able to stay at home with caregiver at the family coming to the home.  Patient has suggested that he may be willing for DNR.  [facility] is continue to follow and has  to see as well.  Medications upon discharge see list in chart  Disposition Home with family and [facility] home health  [facility] but has since been canceled  Currently c/o no new issues  Condition stable    I reviewed and requested records labs and diagnostics from  the hospital with the patient and family.  The patient is to follow-up with specialist as discussed and directed.  If any problems arise or further questions develop patient is to call or to contact us for any needs.    The following portions of the patient's history were reviewed and updated as appropriate: allergies, current medications, past family history, past medical history, past social history, past surgical history and problem list.    Past Medical History:   Diagnosis Date   • 3-vessel CAD    • Anticoagulated on warfarin    • Atrial fibrillation (HCC)    • BMI 28.0-28.9,adult    • Bunion, right foot    • Complaints of memory disturbance    • Dementia (HCC)    • Edema    • Encounter for immunization    • Generalized convulsive seizure (HCC)    • Gout    • History of double vision    • HL (hearing loss)    • Hyperlipidemia    • Hypertension    • Left foot pain    • 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    • Movement disorder    • Nocturnal leg cramps    • OA (osteoarthritis)    • Prepatellar effusion    • Pulmonary embolism (HCC)    • Puncture wound of hand, right    • Screening for cardiovascular condition    • Sleep apnea    • Stasis dermatitis    • Statin intolerance    • Stroke (HCC)    • Taking drug for chronic disease    • Thoracic back pain    • Transient ischemic attack        Past Surgical History:   Procedure Laterality Date   • ADENOIDECTOMY     • APPENDECTOMY     • CATARACT EXTRACTION, BILATERAL     • CORONARY ARTERY BYPASS GRAFT     • CORONARY ARTERY BYPASS GRAFT     • CYSTOSCOPY TRANSURETHRAL RESECTION OF PROSTATE     • HERNIA REPAIR     • OTHER SURGICAL HISTORY      carotid thromboendarterectomy   • SHOULDER SURGERY     • SKIN CANCER EXCISION         Family History   Problem Relation Age of Onset   • Hyperlipidemia Mother    • Dementia Mother        Social History     Socioeconomic History   • Marital status:      Spouse name: Elizabeth   Tobacco Use   • Smoking status:  Former     Types: Cigarettes     Quit date: 10/30/1979     Years since quittin.0   • Smokeless tobacco: Never   Substance and Sexual Activity   • Alcohol use: Yes   • Drug use: No   • Sexual activity: Defer       Current Outpatient Medications   Medication Sig Dispense Refill   • acetaminophen (Tylenol) 325 MG tablet 2 tablets.     • albuterol sulfate HFA (Proventil HFA) 108 (90 Base) MCG/ACT inhaler 180 mcg.     • bacitracin 500 UNIT/GM ointment Apply 1 application topically to the appropriate area as directed 2 (Two) Times a Day. 28 g 0   • donepezil (ARICEPT) 10 MG tablet TAKE ONE TABLET BY MOUTH ONCE NIGHTLY 90 tablet 3   • furosemide (LASIX) 20 MG tablet Take 1 tablet by mouth 2 (Two) Times a Day. 60 tablet 1   • lisinopril (PRINIVIL,ZESTRIL) 20 MG tablet Take 1 tablet by mouth 2 (Two) Times a Day. 180 tablet 1   • PHENobarbital (LUMINAL) 64.8 MG tablet Take 1 tablet by mouth Daily. 90 tablet 1   • Potassium (Potassimin) 75 MG tablet Take 1 tablet by mouth Every Night. 30 each 1   • rivaroxaban (Xarelto) 15 MG tablet Take 1 tablet by mouth Daily With Dinner. 42 tablet 1     No current facility-administered medications for this visit.       Review of Systems   Constitutional: Negative for activity change, appetite change, fatigue, fever, unexpected weight gain and unexpected weight loss.   HENT: Positive for hearing loss. Negative for nosebleeds, rhinorrhea, trouble swallowing and voice change.         Hearing aids in place bilaterally.   Eyes: Negative for visual disturbance.   Respiratory: Negative for cough, chest tightness, shortness of breath and wheezing.    Cardiovascular: Negative for chest pain, palpitations and leg swelling.   Gastrointestinal: Negative for abdominal pain, blood in stool, constipation, diarrhea, nausea, vomiting, GERD and indigestion.   Genitourinary: Negative for dysuria, frequency and hematuria.   Musculoskeletal: Negative for arthralgias, back pain and myalgias.   Skin:  Negative for rash and wound.   Neurological: Negative for dizziness, tremors, weakness, light-headedness, numbness, headache and memory problem.   Hematological: Negative for adenopathy. Does not bruise/bleed easily.   Psychiatric/Behavioral: Negative for sleep disturbance and depressed mood. The patient is not nervous/anxious.        Objective   Vitals:    11/03/22 1525   BP: 102/62   Pulse: 57   Temp: 98.2 °F (36.8 °C)   SpO2: 96%     Body mass index is 22.11 kg/m².  Physical Exam  Vitals and nursing note reviewed.   Constitutional:       General: He is not in acute distress.     Appearance: He is well-developed. He is not diaphoretic.   HENT:      Head: Normocephalic and atraumatic.      Right Ear: External ear normal.      Left Ear: External ear normal.      Ears:      Comments: Hearing aids in place but still with some significant hearing loss requiring loud speech for patient to understand but conversation could be obtained with clarification from the patient.     Nose: Nose normal.   Eyes:      Conjunctiva/sclera: Conjunctivae normal.      Pupils: Pupils are equal, round, and reactive to light.   Neck:      Thyroid: No thyromegaly.      Trachea: No tracheal deviation.   Cardiovascular:      Rate and Rhythm: Normal rate and regular rhythm.      Heart sounds: Normal heart sounds. No murmur heard.    No friction rub. No gallop.   Pulmonary:      Effort: Pulmonary effort is normal. No respiratory distress.      Breath sounds: Normal breath sounds.   Abdominal:      General: Bowel sounds are normal.      Palpations: Abdomen is soft. There is no mass.      Tenderness: There is no abdominal tenderness. There is no guarding.   Musculoskeletal:         General: Normal range of motion.      Cervical back: Normal range of motion and neck supple.   Lymphadenopathy:      Cervical: No cervical adenopathy.   Skin:     General: Skin is warm and dry.      Capillary Refill: Capillary refill takes less than 2 seconds.       Findings: No rash.   Neurological:      Mental Status: He is alert and oriented to person, place, and time.      Motor: No abnormal muscle tone.      Deep Tendon Reflexes: Reflexes normal.   Psychiatric:         Behavior: Behavior normal.         Thought Content: Thought content normal.         Judgment: Judgment normal.       Assessment & Plan   Diagnoses and all orders for this visit:    1. Seizure disorder (HCC) (Primary)  -     Cancel: Ambulatory Referral to Home Health  -     Ambulatory Referral to Home Health    2. Primary hypertension    3. Immunization due  -     Fluzone High-Dose 65+yrs ([id number])    Discussed and reviewed history and hospitalization with the patient.  History of seizure disorder and recurrence due to noncompliance with medication.  I reiterated multiple times throughout the visit the importance of taking the medication on a regular basis.  Also suggested the use of the pill pack packaging of medications for ease of administering.  Blood pressure is actually on the low side at this time we will continue to monitor as he is asymptomatic.  He is agreeable to the flu shot today.    I discussion concerning patient's wishes for care and end-of-life wishes/DNR.  This was obtained with the daughter in the room.  Patient does state that he did does not wish for CPR or excessive measures and sustaining his life.  Discussed and reviewed the MOST form and patient does state wishes to be DNR and form completed per patient requests, signed and copied for chart.          · COVID-19 Precautions - Patient was compliant in wearing a mask. When I saw the patient, I used appropriate personal protective equipment (PPE) including mask and eye shield (standard procedure).  Additionally, I used gown and gloves if indicated.  Hand hygiene was completed before and after seeing the patient.  · Dictated utilizing Dragon Dictation

## 2022-11-10 NOTE — TELEPHONE ENCOUNTER
Chanelle INGRAM ([phone]) called and requested a verbal order for Start of Care and she wants to add PT.

## 2023-01-01 ENCOUNTER — TELEPHONE (OUTPATIENT)
Dept: FAMILY MEDICINE CLINIC | Facility: CLINIC | Age: 88
End: 2023-01-01

## 2023-01-01 ENCOUNTER — APPOINTMENT (OUTPATIENT)
Dept: CT IMAGING | Facility: HOSPITAL | Age: 88
DRG: 100 | End: 2023-01-01
Payer: COMMERCIAL

## 2023-01-01 ENCOUNTER — APPOINTMENT (OUTPATIENT)
Dept: GENERAL RADIOLOGY | Facility: HOSPITAL | Age: 88
DRG: 100 | End: 2023-01-01
Payer: COMMERCIAL

## 2023-01-01 ENCOUNTER — HOSPITAL ENCOUNTER (INPATIENT)
Facility: HOSPITAL | Age: 88
LOS: 3 days | Discharge: HOSPICE | DRG: 100 | End: 2023-03-09
Attending: EMERGENCY MEDICINE | Admitting: INTERNAL MEDICINE
Payer: COMMERCIAL

## 2023-01-01 ENCOUNTER — APPOINTMENT (OUTPATIENT)
Dept: NEUROLOGY | Facility: HOSPITAL | Age: 88
DRG: 100 | End: 2023-01-01
Payer: COMMERCIAL

## 2023-01-01 ENCOUNTER — HOSPITAL ENCOUNTER (INPATIENT)
Facility: HOSPITAL | Age: 88
LOS: 3 days | DRG: 951 | End: 2023-03-12
Attending: INTERNAL MEDICINE | Admitting: INTERNAL MEDICINE
Payer: COMMERCIAL

## 2023-01-01 VITALS
RESPIRATION RATE: 18 BRPM | TEMPERATURE: 97.5 F | SYSTOLIC BLOOD PRESSURE: 115 MMHG | DIASTOLIC BLOOD PRESSURE: 81 MMHG | WEIGHT: 150.13 LBS | HEART RATE: 71 BPM | BODY MASS INDEX: 17.37 KG/M2 | HEIGHT: 78 IN | OXYGEN SATURATION: 73 %

## 2023-01-01 VITALS — TEMPERATURE: 101.2 F | DIASTOLIC BLOOD PRESSURE: 59 MMHG | SYSTOLIC BLOOD PRESSURE: 104 MMHG

## 2023-01-01 DIAGNOSIS — N28.9 ACUTE RENAL INSUFFICIENCY: ICD-10-CM

## 2023-01-01 DIAGNOSIS — G40.901 STATUS EPILEPTICUS: Primary | ICD-10-CM

## 2023-01-01 DIAGNOSIS — Z79.899 SEIZURE SECONDARY TO SUBTHERAPEUTIC ANTICONVULSANT MEDICATION: ICD-10-CM

## 2023-01-01 DIAGNOSIS — R56.9 SEIZURE SECONDARY TO SUBTHERAPEUTIC ANTICONVULSANT MEDICATION: ICD-10-CM

## 2023-01-01 DIAGNOSIS — Z79.01 ANTICOAGULATED: ICD-10-CM

## 2023-01-01 LAB
ABO GROUP BLD: NORMAL
ALBUMIN SERPL-MCNC: 2.6 G/DL (ref 3.5–5.2)
ALBUMIN SERPL-MCNC: 2.7 G/DL (ref 3.5–5.2)
ALBUMIN SERPL-MCNC: 3.7 G/DL (ref 3.5–5.2)
ALBUMIN/GLOB SERPL: 1 G/DL
ALBUMIN/GLOB SERPL: 1.4 G/DL
ALBUMIN/GLOB SERPL: 1.8 G/DL
ALP SERPL-CCNC: 56 U/L (ref 39–117)
ALP SERPL-CCNC: 60 U/L (ref 39–117)
ALP SERPL-CCNC: 61 U/L (ref 39–117)
ALT SERPL W P-5'-P-CCNC: 15 U/L (ref 1–41)
ALT SERPL W P-5'-P-CCNC: 16 U/L (ref 1–41)
ALT SERPL W P-5'-P-CCNC: 17 U/L (ref 1–41)
ANION GAP SERPL CALCULATED.3IONS-SCNC: 23.2 MMOL/L (ref 5–15)
ANION GAP SERPL CALCULATED.3IONS-SCNC: 4 MMOL/L (ref 5–15)
ANION GAP SERPL CALCULATED.3IONS-SCNC: 6.3 MMOL/L (ref 5–15)
APTT PPP: 27.5 SECONDS (ref 22.7–35.4)
ARTERIAL PATENCY WRIST A: ABNORMAL
ARTERIAL PATENCY WRIST A: POSITIVE
AST SERPL-CCNC: 14 U/L (ref 1–40)
AST SERPL-CCNC: 19 U/L (ref 1–40)
AST SERPL-CCNC: 20 U/L (ref 1–40)
ATMOSPHERIC PRESS: 748.1 MMHG
ATMOSPHERIC PRESS: 751.3 MMHG
ATMOSPHERIC PRESS: 752 MMHG
ATMOSPHERIC PRESS: 760.4 MMHG
BACTERIA BLD CULT: ABNORMAL
BACTERIA SPEC AEROBE CULT: ABNORMAL
BACTERIA SPEC AEROBE CULT: NORMAL
BASE EXCESS BLDA CALC-SCNC: 0 MMOL/L (ref 0–2)
BASE EXCESS BLDA CALC-SCNC: 0.3 MMOL/L (ref 0–2)
BASE EXCESS BLDA CALC-SCNC: 0.6 MMOL/L (ref 0–2)
BASE EXCESS BLDA CALC-SCNC: 2.3 MMOL/L (ref 0–2)
BASOPHILS # BLD AUTO: 0.08 10*3/MM3 (ref 0–0.2)
BASOPHILS NFR BLD AUTO: 0.6 % (ref 0–1.5)
BASOPHILS NFR BLD AUTO: 0.7 % (ref 0–1.5)
BASOPHILS NFR BLD AUTO: 0.8 % (ref 0–1.5)
BDY SITE: ABNORMAL
BDY SITE: NORMAL
BILIRUB SERPL-MCNC: 0.3 MG/DL (ref 0–1.2)
BILIRUB SERPL-MCNC: 0.3 MG/DL (ref 0–1.2)
BILIRUB SERPL-MCNC: 0.5 MG/DL (ref 0–1.2)
BLD GP AB SCN SERPL QL: NEGATIVE
BUN SERPL-MCNC: 16 MG/DL (ref 8–23)
BUN SERPL-MCNC: 20 MG/DL (ref 8–23)
BUN SERPL-MCNC: 29 MG/DL (ref 8–23)
BUN/CREAT SERPL: 16 (ref 7–25)
BUN/CREAT SERPL: 19.6 (ref 7–25)
BUN/CREAT SERPL: 22.3 (ref 7–25)
CALCIUM SPEC-SCNC: 7.8 MG/DL (ref 8.6–10.5)
CALCIUM SPEC-SCNC: 8.1 MG/DL (ref 8.6–10.5)
CALCIUM SPEC-SCNC: 8.8 MG/DL (ref 8.6–10.5)
CHLORIDE SERPL-SCNC: 106 MMOL/L (ref 98–107)
CHLORIDE SERPL-SCNC: 108 MMOL/L (ref 98–107)
CHLORIDE SERPL-SCNC: 99 MMOL/L (ref 98–107)
CO2 SERPL-SCNC: 18.8 MMOL/L (ref 22–29)
CO2 SERPL-SCNC: 26.7 MMOL/L (ref 22–29)
CO2 SERPL-SCNC: 27 MMOL/L (ref 22–29)
CREAT SERPL-MCNC: 1 MG/DL (ref 0.76–1.27)
CREAT SERPL-MCNC: 1.02 MG/DL (ref 0.76–1.27)
CREAT SERPL-MCNC: 1.3 MG/DL (ref 0.76–1.27)
D-LACTATE SERPL-SCNC: 0.7 MMOL/L (ref 0.5–2)
D-LACTATE SERPL-SCNC: 1.7 MMOL/L (ref 0.5–2)
DEPRECATED RDW RBC AUTO: 40 FL (ref 37–54)
DEPRECATED RDW RBC AUTO: 40.9 FL (ref 37–54)
DEPRECATED RDW RBC AUTO: 41.1 FL (ref 37–54)
EGFRCR SERPLBLD CKD-EPI 2021: 53.2 ML/MIN/1.73
EGFRCR SERPLBLD CKD-EPI 2021: 71.1 ML/MIN/1.73
EGFRCR SERPLBLD CKD-EPI 2021: 72.8 ML/MIN/1.73
EOSINOPHIL # BLD AUTO: 0.03 10*3/MM3 (ref 0–0.4)
EOSINOPHIL # BLD AUTO: 0.2 10*3/MM3 (ref 0–0.4)
EOSINOPHIL # BLD AUTO: 0.26 10*3/MM3 (ref 0–0.4)
EOSINOPHIL NFR BLD AUTO: 0.2 % (ref 0.3–6.2)
EOSINOPHIL NFR BLD AUTO: 2.1 % (ref 0.3–6.2)
EOSINOPHIL NFR BLD AUTO: 2.4 % (ref 0.3–6.2)
ERYTHROCYTE [DISTWIDTH] IN BLOOD BY AUTOMATED COUNT: 12.3 % (ref 12.3–15.4)
ERYTHROCYTE [DISTWIDTH] IN BLOOD BY AUTOMATED COUNT: 12.4 % (ref 12.3–15.4)
ERYTHROCYTE [DISTWIDTH] IN BLOOD BY AUTOMATED COUNT: 12.5 % (ref 12.3–15.4)
GEN 5 2HR TROPONIN T REFLEX: 73 NG/L
GLOBULIN UR ELPH-MCNC: 2 GM/DL
GLOBULIN UR ELPH-MCNC: 2.1 GM/DL
GLOBULIN UR ELPH-MCNC: 2.5 GM/DL
GLUCOSE BLDC GLUCOMTR-MCNC: 100 MG/DL (ref 70–130)
GLUCOSE BLDC GLUCOMTR-MCNC: 103 MG/DL (ref 70–130)
GLUCOSE BLDC GLUCOMTR-MCNC: 104 MG/DL (ref 70–130)
GLUCOSE BLDC GLUCOMTR-MCNC: 109 MG/DL (ref 70–130)
GLUCOSE BLDC GLUCOMTR-MCNC: 129 MG/DL (ref 70–130)
GLUCOSE BLDC GLUCOMTR-MCNC: 135 MG/DL (ref 70–130)
GLUCOSE BLDC GLUCOMTR-MCNC: 153 MG/DL (ref 70–130)
GLUCOSE BLDC GLUCOMTR-MCNC: 89 MG/DL (ref 70–130)
GLUCOSE BLDC GLUCOMTR-MCNC: 98 MG/DL (ref 70–130)
GLUCOSE BLDC GLUCOMTR-MCNC: 99 MG/DL (ref 70–130)
GLUCOSE SERPL-MCNC: 102 MG/DL (ref 65–99)
GLUCOSE SERPL-MCNC: 159 MG/DL (ref 65–99)
GLUCOSE SERPL-MCNC: 84 MG/DL (ref 65–99)
GRAM STN SPEC: ABNORMAL
GRAM STN SPEC: ABNORMAL
HCO3 BLDA-SCNC: 24.5 MMOL/L (ref 22–28)
HCO3 BLDA-SCNC: 25 MMOL/L (ref 22–28)
HCO3 BLDA-SCNC: 25.5 MMOL/L (ref 22–28)
HCO3 BLDA-SCNC: 28.8 MMOL/L (ref 22–28)
HCT VFR BLD AUTO: 37.4 % (ref 37.5–51)
HCT VFR BLD AUTO: 38 % (ref 37.5–51)
HCT VFR BLD AUTO: 40.6 % (ref 37.5–51)
HGB BLD-MCNC: 12.5 G/DL (ref 13–17.7)
HGB BLD-MCNC: 12.7 G/DL (ref 13–17.7)
HGB BLD-MCNC: 13 G/DL (ref 13–17.7)
HOLD SPECIMEN: NORMAL
HOLD SPECIMEN: NORMAL
IMM GRANULOCYTES # BLD AUTO: 0.04 10*3/MM3 (ref 0–0.05)
IMM GRANULOCYTES # BLD AUTO: 0.05 10*3/MM3 (ref 0–0.05)
IMM GRANULOCYTES # BLD AUTO: 0.06 10*3/MM3 (ref 0–0.05)
IMM GRANULOCYTES NFR BLD AUTO: 0.4 % (ref 0–0.5)
IMM GRANULOCYTES NFR BLD AUTO: 0.4 % (ref 0–0.5)
IMM GRANULOCYTES NFR BLD AUTO: 0.5 % (ref 0–0.5)
INHALED O2 CONCENTRATION: 100 %
INHALED O2 CONCENTRATION: 30 %
INHALED O2 CONCENTRATION: 40 %
INHALED O2 CONCENTRATION: 70 %
INR PPP: 1.25 (ref 0.9–1.1)
ISOLATED FROM: ABNORMAL
LYMPHOCYTES # BLD AUTO: 0.81 10*3/MM3 (ref 0.7–3.1)
LYMPHOCYTES # BLD AUTO: 1.28 10*3/MM3 (ref 0.7–3.1)
LYMPHOCYTES # BLD AUTO: 2.13 10*3/MM3 (ref 0.7–3.1)
LYMPHOCYTES NFR BLD AUTO: 11.6 % (ref 19.6–45.3)
LYMPHOCYTES NFR BLD AUTO: 22 % (ref 19.6–45.3)
LYMPHOCYTES NFR BLD AUTO: 5.6 % (ref 19.6–45.3)
MAGNESIUM SERPL-MCNC: 2 MG/DL (ref 1.6–2.4)
MCH RBC QN AUTO: 28.6 PG (ref 26.6–33)
MCH RBC QN AUTO: 29.8 PG (ref 26.6–33)
MCH RBC QN AUTO: 30.3 PG (ref 26.6–33)
MCHC RBC AUTO-ENTMCNC: 32 G/DL (ref 31.5–35.7)
MCHC RBC AUTO-ENTMCNC: 33.4 G/DL (ref 31.5–35.7)
MCHC RBC AUTO-ENTMCNC: 33.4 G/DL (ref 31.5–35.7)
MCV RBC AUTO: 89.2 FL (ref 79–97)
MCV RBC AUTO: 89.2 FL (ref 79–97)
MCV RBC AUTO: 90.6 FL (ref 79–97)
MODALITY: ABNORMAL
MODALITY: NORMAL
MONOCYTES # BLD AUTO: 0.85 10*3/MM3 (ref 0.1–0.9)
MONOCYTES # BLD AUTO: 1.05 10*3/MM3 (ref 0.1–0.9)
MONOCYTES # BLD AUTO: 1.13 10*3/MM3 (ref 0.1–0.9)
MONOCYTES NFR BLD AUTO: 10.8 % (ref 5–12)
MONOCYTES NFR BLD AUTO: 7.7 % (ref 5–12)
MONOCYTES NFR BLD AUTO: 7.8 % (ref 5–12)
NEUTROPHILS NFR BLD AUTO: 12.29 10*3/MM3 (ref 1.7–7)
NEUTROPHILS NFR BLD AUTO: 6.18 10*3/MM3 (ref 1.7–7)
NEUTROPHILS NFR BLD AUTO: 63.8 % (ref 42.7–76)
NEUTROPHILS NFR BLD AUTO: 77.2 % (ref 42.7–76)
NEUTROPHILS NFR BLD AUTO: 8.49 10*3/MM3 (ref 1.7–7)
NEUTROPHILS NFR BLD AUTO: 85.4 % (ref 42.7–76)
NRBC BLD AUTO-RTO: 0 /100 WBC (ref 0–0.2)
O2 A-A PPRESDIFF RESPIRATORY: 0.3 MMHG
O2 A-A PPRESDIFF RESPIRATORY: 0.4 MMHG
O2 A-A PPRESDIFF RESPIRATORY: 0.4 MMHG
O2 A-A PPRESDIFF RESPIRATORY: 0.5 MMHG
PCO2 BLDA: 36.4 MM HG (ref 35–45)
PCO2 BLDA: 41.2 MM HG (ref 35–45)
PCO2 BLDA: 42.2 MM HG (ref 35–45)
PCO2 BLDA: 52.3 MM HG (ref 35–45)
PEEP RESPIRATORY: 5 CM[H2O]
PH BLDA: 7.35 PH UNITS (ref 7.35–7.45)
PH BLDA: 7.39 PH UNITS (ref 7.35–7.45)
PH BLDA: 7.39 PH UNITS (ref 7.35–7.45)
PH BLDA: 7.43 PH UNITS (ref 7.35–7.45)
PHENOBARB SERPL-MCNC: <2.4 MCG/ML (ref 10–30)
PHOSPHATE SERPL-MCNC: 3.4 MG/DL (ref 2.5–4.5)
PLATELET # BLD AUTO: 162 10*3/MM3 (ref 140–450)
PLATELET # BLD AUTO: 184 10*3/MM3 (ref 140–450)
PLATELET # BLD AUTO: 236 10*3/MM3 (ref 140–450)
PMV BLD AUTO: 10.3 FL (ref 6–12)
PMV BLD AUTO: 9.4 FL (ref 6–12)
PMV BLD AUTO: 9.6 FL (ref 6–12)
PO2 BLDA: 171.2 MM HG (ref 80–100)
PO2 BLDA: 325.7 MM HG (ref 80–100)
PO2 BLDA: 70.6 MM HG (ref 80–100)
PO2 BLDA: 84.8 MM HG (ref 80–100)
POTASSIUM SERPL-SCNC: 3 MMOL/L (ref 3.5–5.2)
POTASSIUM SERPL-SCNC: 3.5 MMOL/L (ref 3.5–5.2)
POTASSIUM SERPL-SCNC: 3.8 MMOL/L (ref 3.5–5.2)
POTASSIUM SERPL-SCNC: 3.9 MMOL/L (ref 3.5–5.2)
PROT SERPL-MCNC: 4.7 G/DL (ref 6–8.5)
PROT SERPL-MCNC: 5.1 G/DL (ref 6–8.5)
PROT SERPL-MCNC: 5.8 G/DL (ref 6–8.5)
PROTHROMBIN TIME: 15.9 SECONDS (ref 11.7–14.2)
QT INTERVAL: 382 MS
QT INTERVAL: 407 MS
RBC # BLD AUTO: 4.13 10*6/MM3 (ref 4.14–5.8)
RBC # BLD AUTO: 4.26 10*6/MM3 (ref 4.14–5.8)
RBC # BLD AUTO: 4.55 10*6/MM3 (ref 4.14–5.8)
RH BLD: POSITIVE
SAO2 % BLDCOA: 94.6 % (ref 92–99)
SAO2 % BLDCOA: 96.3 % (ref 92–99)
SAO2 % BLDCOA: 99.5 % (ref 92–99)
SAO2 % BLDCOA: 99.9 % (ref 92–99)
SET MECH RESP RATE: 18
SODIUM SERPL-SCNC: 139 MMOL/L (ref 136–145)
SODIUM SERPL-SCNC: 139 MMOL/L (ref 136–145)
SODIUM SERPL-SCNC: 141 MMOL/L (ref 136–145)
T&S EXPIRATION DATE: NORMAL
TOTAL RATE: 18 BREATHS/MINUTE
TOTAL RATE: 25 BREATHS/MINUTE
TRIGL SERPL-MCNC: 93 MG/DL (ref 0–150)
TROPONIN T DELTA: -12 NG/L
TROPONIN T SERPL HS-MCNC: 47 NG/L
TROPONIN T SERPL HS-MCNC: 85 NG/L
VENTILATOR MODE: AC
VT ON VENT VENT: 450 ML
WBC NRBC COR # BLD: 11 10*3/MM3 (ref 3.4–10.8)
WBC NRBC COR # BLD: 14.4 10*3/MM3 (ref 3.4–10.8)
WBC NRBC COR # BLD: 9.69 10*3/MM3 (ref 3.4–10.8)
WHOLE BLOOD HOLD COAG: NORMAL
WHOLE BLOOD HOLD SPECIMEN: NORMAL

## 2023-01-01 PROCEDURE — 83605 ASSAY OF LACTIC ACID: CPT | Performed by: INTERNAL MEDICINE

## 2023-01-01 PROCEDURE — P9612 CATHETERIZE FOR URINE SPEC: HCPCS

## 2023-01-01 PROCEDURE — 99291 CRITICAL CARE FIRST HOUR: CPT

## 2023-01-01 PROCEDURE — 25010000002 FENTANYL CITRATE (PF) 50 MCG/ML SOLUTION: Performed by: INTERNAL MEDICINE

## 2023-01-01 PROCEDURE — 86850 RBC ANTIBODY SCREEN: CPT | Performed by: EMERGENCY MEDICINE

## 2023-01-01 PROCEDURE — 94761 N-INVAS EAR/PLS OXIMETRY MLT: CPT

## 2023-01-01 PROCEDURE — 95714 VEEG EA 12-26 HR UNMNTR: CPT

## 2023-01-01 PROCEDURE — 25010000002 LORAZEPAM PER 2 MG: Performed by: INTERNAL MEDICINE

## 2023-01-01 PROCEDURE — 93010 ELECTROCARDIOGRAM REPORT: CPT | Performed by: INTERNAL MEDICINE

## 2023-01-01 PROCEDURE — 25010000002 MORPHINE PER 10 MG: Performed by: INTERNAL MEDICINE

## 2023-01-01 PROCEDURE — 25010000002 LEVETRIRACETAM PER 10 MG: Performed by: INTERNAL MEDICINE

## 2023-01-01 PROCEDURE — 93005 ELECTROCARDIOGRAM TRACING: CPT | Performed by: EMERGENCY MEDICINE

## 2023-01-01 PROCEDURE — 94799 UNLISTED PULMONARY SVC/PX: CPT

## 2023-01-01 PROCEDURE — 86901 BLOOD TYPING SEROLOGIC RH(D): CPT | Performed by: EMERGENCY MEDICINE

## 2023-01-01 PROCEDURE — 25010000002 ENOXAPARIN PER 10 MG: Performed by: INTERNAL MEDICINE

## 2023-01-01 PROCEDURE — 71045 X-RAY EXAM CHEST 1 VIEW: CPT

## 2023-01-01 PROCEDURE — 36600 WITHDRAWAL OF ARTERIAL BLOOD: CPT

## 2023-01-01 PROCEDURE — 87150 DNA/RNA AMPLIFIED PROBE: CPT | Performed by: INTERNAL MEDICINE

## 2023-01-01 PROCEDURE — 99255 IP/OBS CONSLTJ NEW/EST HI 80: CPT | Performed by: PSYCHIATRY & NEUROLOGY

## 2023-01-01 PROCEDURE — 95819 EEG AWAKE AND ASLEEP: CPT

## 2023-01-01 PROCEDURE — 85610 PROTHROMBIN TIME: CPT | Performed by: EMERGENCY MEDICINE

## 2023-01-01 PROCEDURE — 82962 GLUCOSE BLOOD TEST: CPT

## 2023-01-01 PROCEDURE — 99238 HOSP IP/OBS DSCHRG MGMT 30/<: CPT | Performed by: INTERNAL MEDICINE

## 2023-01-01 PROCEDURE — 99222 1ST HOSP IP/OBS MODERATE 55: CPT | Performed by: INTERNAL MEDICINE

## 2023-01-01 PROCEDURE — 94002 VENT MGMT INPAT INIT DAY: CPT

## 2023-01-01 PROCEDURE — 25010000002 PHENOBARBITAL PER 120 MG: Performed by: EMERGENCY MEDICINE

## 2023-01-01 PROCEDURE — 0042T HC CT CEREBRAL PERFUSION W/WO CONTRAST: CPT

## 2023-01-01 PROCEDURE — 82565 ASSAY OF CREATININE: CPT

## 2023-01-01 PROCEDURE — 31500 INSERT EMERGENCY AIRWAY: CPT

## 2023-01-01 PROCEDURE — 25010000002 PROPOFOL 10 MG/ML EMULSION: Performed by: EMERGENCY MEDICINE

## 2023-01-01 PROCEDURE — 70496 CT ANGIOGRAPHY HEAD: CPT

## 2023-01-01 PROCEDURE — 94003 VENT MGMT INPAT SUBQ DAY: CPT

## 2023-01-01 PROCEDURE — 94760 N-INVAS EAR/PLS OXIMETRY 1: CPT

## 2023-01-01 PROCEDURE — 0 POTASSIUM CHLORIDE 10 MEQ/100ML SOLUTION: Performed by: INTERNAL MEDICINE

## 2023-01-01 PROCEDURE — 85025 COMPLETE CBC W/AUTO DIFF WBC: CPT | Performed by: INTERNAL MEDICINE

## 2023-01-01 PROCEDURE — 25510000001 IOPAMIDOL PER 1 ML: Performed by: EMERGENCY MEDICINE

## 2023-01-01 PROCEDURE — 25010000002 HYDRALAZINE PER 20 MG: Performed by: INTERNAL MEDICINE

## 2023-01-01 PROCEDURE — 85730 THROMBOPLASTIN TIME PARTIAL: CPT | Performed by: EMERGENCY MEDICINE

## 2023-01-01 PROCEDURE — 82803 BLOOD GASES ANY COMBINATION: CPT

## 2023-01-01 PROCEDURE — 99231 SBSQ HOSP IP/OBS SF/LOW 25: CPT | Performed by: INTERNAL MEDICINE

## 2023-01-01 PROCEDURE — 99231 SBSQ HOSP IP/OBS SF/LOW 25: CPT | Performed by: PSYCHIATRY & NEUROLOGY

## 2023-01-01 PROCEDURE — 93005 ELECTROCARDIOGRAM TRACING: CPT

## 2023-01-01 PROCEDURE — 84132 ASSAY OF SERUM POTASSIUM: CPT | Performed by: INTERNAL MEDICINE

## 2023-01-01 PROCEDURE — C9254 INJECTION, LACOSAMIDE: HCPCS | Performed by: PSYCHIATRY & NEUROLOGY

## 2023-01-01 PROCEDURE — 25010000002 PHENOBARBITAL PER 120 MG: Performed by: PSYCHIATRY & NEUROLOGY

## 2023-01-01 PROCEDURE — 25010000002 HYDROMORPHONE 1 MG/ML SOLUTION: Performed by: INTERNAL MEDICINE

## 2023-01-01 PROCEDURE — 25010000002 VANCOMYCIN PER 500 MG: Performed by: INTERNAL MEDICINE

## 2023-01-01 PROCEDURE — 87147 CULTURE TYPE IMMUNOLOGIC: CPT | Performed by: INTERNAL MEDICINE

## 2023-01-01 PROCEDURE — 80184 ASSAY OF PHENOBARBITAL: CPT | Performed by: EMERGENCY MEDICINE

## 2023-01-01 PROCEDURE — 95714 VEEG EA 12-26 HR UNMNTR: CPT | Performed by: STUDENT IN AN ORGANIZED HEALTH CARE EDUCATION/TRAINING PROGRAM

## 2023-01-01 PROCEDURE — 25010000002 PIPERACILLIN SOD-TAZOBACTAM PER 1 G: Performed by: INTERNAL MEDICINE

## 2023-01-01 PROCEDURE — 80053 COMPREHEN METABOLIC PANEL: CPT | Performed by: EMERGENCY MEDICINE

## 2023-01-01 PROCEDURE — 84478 ASSAY OF TRIGLYCERIDES: CPT | Performed by: INTERNAL MEDICINE

## 2023-01-01 PROCEDURE — 84484 ASSAY OF TROPONIN QUANT: CPT | Performed by: EMERGENCY MEDICINE

## 2023-01-01 PROCEDURE — 0BH17EZ INSERTION OF ENDOTRACHEAL AIRWAY INTO TRACHEA, VIA NATURAL OR ARTIFICIAL OPENING: ICD-10-PCS | Performed by: EMERGENCY MEDICINE

## 2023-01-01 PROCEDURE — 25010000002 LORAZEPAM PER 2 MG: Performed by: PSYCHIATRY & NEUROLOGY

## 2023-01-01 PROCEDURE — 95714 VEEG EA 12-26 HR UNMNTR: CPT | Performed by: PSYCHIATRY & NEUROLOGY

## 2023-01-01 PROCEDURE — 99232 SBSQ HOSP IP/OBS MODERATE 35: CPT | Performed by: PSYCHIATRY & NEUROLOGY

## 2023-01-01 PROCEDURE — 3E0G76Z INTRODUCTION OF NUTRITIONAL SUBSTANCE INTO UPPER GI, VIA NATURAL OR ARTIFICIAL OPENING: ICD-10-PCS | Performed by: INTERNAL MEDICINE

## 2023-01-01 PROCEDURE — 80053 COMPREHEN METABOLIC PANEL: CPT | Performed by: INTERNAL MEDICINE

## 2023-01-01 PROCEDURE — 87040 BLOOD CULTURE FOR BACTERIA: CPT | Performed by: INTERNAL MEDICINE

## 2023-01-01 PROCEDURE — 84484 ASSAY OF TROPONIN QUANT: CPT | Performed by: INTERNAL MEDICINE

## 2023-01-01 PROCEDURE — 83735 ASSAY OF MAGNESIUM: CPT | Performed by: EMERGENCY MEDICINE

## 2023-01-01 PROCEDURE — 25010000002 LACOSAMIDE 200 MG/20ML SOLUTION: Performed by: PSYCHIATRY & NEUROLOGY

## 2023-01-01 PROCEDURE — 86900 BLOOD TYPING SEROLOGIC ABO: CPT | Performed by: EMERGENCY MEDICINE

## 2023-01-01 PROCEDURE — 5A1945Z RESPIRATORY VENTILATION, 24-96 CONSECUTIVE HOURS: ICD-10-PCS | Performed by: INTERNAL MEDICINE

## 2023-01-01 PROCEDURE — 95819 EEG AWAKE AND ASLEEP: CPT | Performed by: PSYCHIATRY & NEUROLOGY

## 2023-01-01 PROCEDURE — 84100 ASSAY OF PHOSPHORUS: CPT | Performed by: EMERGENCY MEDICINE

## 2023-01-01 PROCEDURE — 70498 CT ANGIOGRAPHY NECK: CPT

## 2023-01-01 PROCEDURE — 85025 COMPLETE CBC W/AUTO DIFF WBC: CPT | Performed by: EMERGENCY MEDICINE

## 2023-01-01 RX ORDER — SODIUM CHLORIDE 0.9 % (FLUSH) 0.9 %
10 SYRINGE (ML) INJECTION AS NEEDED
Status: DISCONTINUED | OUTPATIENT
Start: 2023-01-01 | End: 2023-01-01 | Stop reason: HOSPADM

## 2023-01-01 RX ORDER — ACETAMINOPHEN 325 MG/1
650 TABLET ORAL EVERY 4 HOURS PRN
Status: DISCONTINUED | OUTPATIENT
Start: 2023-01-01 | End: 2023-01-01

## 2023-01-01 RX ORDER — GLYCOPYRROLATE 0.2 MG/ML
0.2 INJECTION INTRAMUSCULAR; INTRAVENOUS
Status: CANCELLED | OUTPATIENT
Start: 2023-01-01

## 2023-01-01 RX ORDER — ACETAMINOPHEN 650 MG/1
650 SUPPOSITORY RECTAL EVERY 4 HOURS PRN
Status: CANCELLED | OUTPATIENT
Start: 2023-01-01

## 2023-01-01 RX ORDER — GLYCOPYRROLATE 0.2 MG/ML
0.4 INJECTION INTRAMUSCULAR; INTRAVENOUS
Status: CANCELLED | OUTPATIENT
Start: 2023-01-01

## 2023-01-01 RX ORDER — MORPHINE SULFATE 20 MG/ML
20 SOLUTION ORAL
Status: CANCELLED | OUTPATIENT
Start: 2023-01-01 | End: 2023-03-16

## 2023-01-01 RX ORDER — ALUMINA, MAGNESIA, AND SIMETHICONE 2400; 2400; 240 MG/30ML; MG/30ML; MG/30ML
15 SUSPENSION ORAL EVERY 6 HOURS PRN
Status: DISCONTINUED | OUTPATIENT
Start: 2023-01-01 | End: 2023-01-01 | Stop reason: HOSPADM

## 2023-01-01 RX ORDER — CHOLECALCIFEROL (VITAMIN D3) 125 MCG
5 CAPSULE ORAL
Status: ON HOLD | COMMUNITY
Start: 2022-01-01 | End: 2023-01-01

## 2023-01-01 RX ORDER — LEVETIRACETAM 500 MG/5ML
500 INJECTION, SOLUTION, CONCENTRATE INTRAVENOUS EVERY 12 HOURS SCHEDULED
Status: DISCONTINUED | OUTPATIENT
Start: 2023-01-01 | End: 2023-01-01

## 2023-01-01 RX ORDER — MORPHINE SULFATE 20 MG/ML
20 SOLUTION ORAL
Status: DISCONTINUED | OUTPATIENT
Start: 2023-01-01 | End: 2023-01-01 | Stop reason: HOSPADM

## 2023-01-01 RX ORDER — GLYCOPYRROLATE 0.2 MG/ML
0.4 INJECTION INTRAMUSCULAR; INTRAVENOUS
Status: DISCONTINUED | OUTPATIENT
Start: 2023-01-01 | End: 2023-01-01 | Stop reason: HOSPADM

## 2023-01-01 RX ORDER — LORAZEPAM 2 MG/ML
2 CONCENTRATE ORAL
Status: CANCELLED | OUTPATIENT
Start: 2023-01-01 | End: 2023-03-16

## 2023-01-01 RX ORDER — LORAZEPAM 2 MG/ML
1 INJECTION INTRAMUSCULAR
Status: DISCONTINUED | OUTPATIENT
Start: 2023-01-01 | End: 2023-01-01 | Stop reason: HOSPADM

## 2023-01-01 RX ORDER — ACETAMINOPHEN 325 MG/1
650 TABLET ORAL EVERY 4 HOURS PRN
Status: DISCONTINUED | OUTPATIENT
Start: 2023-01-01 | End: 2023-01-01 | Stop reason: HOSPADM

## 2023-01-01 RX ORDER — MORPHINE SULFATE 20 MG/ML
10 SOLUTION ORAL
Status: DISCONTINUED | OUTPATIENT
Start: 2023-01-01 | End: 2023-01-01 | Stop reason: HOSPADM

## 2023-01-01 RX ORDER — LORAZEPAM 2 MG/ML
2 INJECTION INTRAMUSCULAR
Status: DISCONTINUED | OUTPATIENT
Start: 2023-01-01 | End: 2023-01-01 | Stop reason: HOSPADM

## 2023-01-01 RX ORDER — RIVAROXABAN 15 MG/1
TABLET, FILM COATED ORAL
Qty: 30 TABLET | Refills: 1 | Status: SHIPPED | OUTPATIENT
Start: 2023-01-01

## 2023-01-01 RX ORDER — CALCIUM GLUCONATE 20 MG/ML
1 INJECTION, SOLUTION INTRAVENOUS AS NEEDED
Status: DISCONTINUED | OUTPATIENT
Start: 2023-01-01 | End: 2023-01-01 | Stop reason: HOSPADM

## 2023-01-01 RX ORDER — LORAZEPAM 2 MG/ML
0.5 CONCENTRATE ORAL
Status: CANCELLED | OUTPATIENT
Start: 2023-01-01 | End: 2023-03-16

## 2023-01-01 RX ORDER — DIPHENOXYLATE HYDROCHLORIDE AND ATROPINE SULFATE 2.5; .025 MG/1; MG/1
1 TABLET ORAL
Status: CANCELLED | OUTPATIENT
Start: 2023-01-01

## 2023-01-01 RX ORDER — DEXTROSE AND SODIUM CHLORIDE 5; .45 G/100ML; G/100ML
100 INJECTION, SOLUTION INTRAVENOUS CONTINUOUS
Status: ACTIVE | OUTPATIENT
Start: 2023-01-01 | End: 2023-01-01

## 2023-01-01 RX ORDER — HYDROMORPHONE HYDROCHLORIDE 1 MG/ML
0.5 INJECTION, SOLUTION INTRAMUSCULAR; INTRAVENOUS; SUBCUTANEOUS
Status: DISCONTINUED | OUTPATIENT
Start: 2023-01-01 | End: 2023-01-01 | Stop reason: HOSPADM

## 2023-01-01 RX ORDER — ONDANSETRON 2 MG/ML
4 INJECTION INTRAMUSCULAR; INTRAVENOUS EVERY 6 HOURS PRN
Status: DISCONTINUED | OUTPATIENT
Start: 2023-01-01 | End: 2023-01-01 | Stop reason: HOSPADM

## 2023-01-01 RX ORDER — DIPHENOXYLATE HYDROCHLORIDE AND ATROPINE SULFATE 2.5; .025 MG/1; MG/1
1 TABLET ORAL
Status: DISCONTINUED | OUTPATIENT
Start: 2023-01-01 | End: 2023-01-01

## 2023-01-01 RX ORDER — LORAZEPAM 2 MG/ML
2 CONCENTRATE ORAL
Status: DISCONTINUED | OUTPATIENT
Start: 2023-01-01 | End: 2023-01-01 | Stop reason: HOSPADM

## 2023-01-01 RX ORDER — SODIUM CHLORIDE 0.9 % (FLUSH) 0.9 %
10 SYRINGE (ML) INJECTION EVERY 12 HOURS SCHEDULED
Status: DISCONTINUED | OUTPATIENT
Start: 2023-01-01 | End: 2023-01-01

## 2023-01-01 RX ORDER — MORPHINE SULFATE 20 MG/ML
5 SOLUTION ORAL
Status: DISCONTINUED | OUTPATIENT
Start: 2023-01-01 | End: 2023-01-01 | Stop reason: HOSPADM

## 2023-01-01 RX ORDER — LACOSAMIDE 10 MG/ML
100 INJECTION, SOLUTION INTRAVENOUS EVERY 12 HOURS
Status: DISCONTINUED | OUTPATIENT
Start: 2023-01-01 | End: 2023-01-01

## 2023-01-01 RX ORDER — ACETAMINOPHEN 160 MG/5ML
650 SOLUTION ORAL EVERY 4 HOURS PRN
Status: DISCONTINUED | OUTPATIENT
Start: 2023-01-01 | End: 2023-01-01 | Stop reason: HOSPADM

## 2023-01-01 RX ORDER — KETOROLAC TROMETHAMINE 15 MG/ML
15 INJECTION, SOLUTION INTRAMUSCULAR; INTRAVENOUS EVERY 6 HOURS PRN
Status: DISCONTINUED | OUTPATIENT
Start: 2023-01-01 | End: 2023-01-01

## 2023-01-01 RX ORDER — GLYCOPYRROLATE 0.2 MG/ML
0.4 INJECTION INTRAMUSCULAR; INTRAVENOUS
Status: DISCONTINUED | OUTPATIENT
Start: 2023-01-01 | End: 2023-01-01

## 2023-01-01 RX ORDER — LORAZEPAM 2 MG/ML
2 INJECTION INTRAMUSCULAR
Status: CANCELLED | OUTPATIENT
Start: 2023-01-01 | End: 2023-03-16

## 2023-01-01 RX ORDER — POTASSIUM CHLORIDE 750 MG/1
40 TABLET, FILM COATED, EXTENDED RELEASE ORAL AS NEEDED
Status: DISCONTINUED | OUTPATIENT
Start: 2023-01-01 | End: 2023-01-01 | Stop reason: HOSPADM

## 2023-01-01 RX ORDER — MORPHINE SULFATE 2 MG/ML
4 INJECTION, SOLUTION INTRAMUSCULAR; INTRAVENOUS
Status: DISCONTINUED | OUTPATIENT
Start: 2023-01-01 | End: 2023-01-01 | Stop reason: HOSPADM

## 2023-01-01 RX ORDER — KETOROLAC TROMETHAMINE 15 MG/ML
15 INJECTION, SOLUTION INTRAMUSCULAR; INTRAVENOUS EVERY 6 HOURS PRN
Status: DISCONTINUED | OUTPATIENT
Start: 2023-01-01 | End: 2023-01-01 | Stop reason: HOSPADM

## 2023-01-01 RX ORDER — ACETAMINOPHEN 160 MG/5ML
650 SOLUTION ORAL EVERY 4 HOURS PRN
Status: DISCONTINUED | OUTPATIENT
Start: 2023-01-01 | End: 2023-01-01

## 2023-01-01 RX ORDER — HYDROCHLOROTHIAZIDE 25 MG/1
TABLET ORAL
Qty: 30 TABLET | Refills: 3 | Status: SHIPPED | OUTPATIENT
Start: 2023-01-01

## 2023-01-01 RX ORDER — PROPOFOL 10 MG/ML
40 VIAL (ML) INTRAVENOUS ONCE
Status: COMPLETED | OUTPATIENT
Start: 2023-01-01 | End: 2023-01-01

## 2023-01-01 RX ORDER — LEVETIRACETAM 500 MG/5ML
1000 INJECTION, SOLUTION, CONCENTRATE INTRAVENOUS EVERY 12 HOURS SCHEDULED
Status: DISCONTINUED | OUTPATIENT
Start: 2023-01-01 | End: 2023-01-01

## 2023-01-01 RX ORDER — ONDANSETRON 4 MG/1
4 TABLET, FILM COATED ORAL EVERY 6 HOURS PRN
Status: DISCONTINUED | OUTPATIENT
Start: 2023-01-01 | End: 2023-01-01 | Stop reason: HOSPADM

## 2023-01-01 RX ORDER — MORPHINE SULFATE 2 MG/ML
2 INJECTION, SOLUTION INTRAMUSCULAR; INTRAVENOUS
Status: DISCONTINUED | OUTPATIENT
Start: 2023-01-01 | End: 2023-01-01 | Stop reason: HOSPADM

## 2023-01-01 RX ORDER — DIPHENOXYLATE HYDROCHLORIDE AND ATROPINE SULFATE 2.5; .025 MG/1; MG/1
1 TABLET ORAL
Status: DISCONTINUED | OUTPATIENT
Start: 2023-01-01 | End: 2023-01-01 | Stop reason: HOSPADM

## 2023-01-01 RX ORDER — SCOLOPAMINE TRANSDERMAL SYSTEM 1 MG/1
1 PATCH, EXTENDED RELEASE TRANSDERMAL
Status: DISCONTINUED | OUTPATIENT
Start: 2023-01-01 | End: 2023-01-01 | Stop reason: HOSPADM

## 2023-01-01 RX ORDER — SCOLOPAMINE TRANSDERMAL SYSTEM 1 MG/1
1 PATCH, EXTENDED RELEASE TRANSDERMAL
Status: CANCELLED | OUTPATIENT
Start: 2023-01-01

## 2023-01-01 RX ORDER — LORAZEPAM 2 MG/ML
1 CONCENTRATE ORAL
Status: CANCELLED | OUTPATIENT
Start: 2023-01-01 | End: 2023-03-16

## 2023-01-01 RX ORDER — MAGNESIUM SULFATE HEPTAHYDRATE 40 MG/ML
4 INJECTION, SOLUTION INTRAVENOUS AS NEEDED
Status: DISCONTINUED | OUTPATIENT
Start: 2023-01-01 | End: 2023-01-01 | Stop reason: HOSPADM

## 2023-01-01 RX ORDER — HYDROMORPHONE HCL 110MG/55ML
1.5 PATIENT CONTROLLED ANALGESIA SYRINGE INTRAVENOUS
Status: DISCONTINUED | OUTPATIENT
Start: 2023-01-01 | End: 2023-01-01 | Stop reason: HOSPADM

## 2023-01-01 RX ORDER — POTASSIUM CHLORIDE 7.45 MG/ML
10 INJECTION INTRAVENOUS
Status: DISCONTINUED | OUTPATIENT
Start: 2023-01-01 | End: 2023-01-01 | Stop reason: HOSPADM

## 2023-01-01 RX ORDER — LORAZEPAM 2 MG/ML
0.5 INJECTION INTRAMUSCULAR
Status: DISCONTINUED | OUTPATIENT
Start: 2023-01-01 | End: 2023-01-01 | Stop reason: HOSPADM

## 2023-01-01 RX ORDER — GLYCOPYRROLATE 0.2 MG/ML
0.2 INJECTION INTRAMUSCULAR; INTRAVENOUS
Status: DISCONTINUED | OUTPATIENT
Start: 2023-01-01 | End: 2023-01-01

## 2023-01-01 RX ORDER — ACETAMINOPHEN 160 MG/5ML
650 SOLUTION ORAL EVERY 4 HOURS PRN
Status: CANCELLED | OUTPATIENT
Start: 2023-01-01

## 2023-01-01 RX ORDER — GLYCOPYRROLATE 0.2 MG/ML
0.2 INJECTION INTRAMUSCULAR; INTRAVENOUS
Status: DISCONTINUED | OUTPATIENT
Start: 2023-01-01 | End: 2023-01-01 | Stop reason: HOSPADM

## 2023-01-01 RX ORDER — LORAZEPAM 2 MG/ML
1 CONCENTRATE ORAL
Status: DISCONTINUED | OUTPATIENT
Start: 2023-01-01 | End: 2023-01-01 | Stop reason: HOSPADM

## 2023-01-01 RX ORDER — LORAZEPAM 2 MG/ML
0.5 INJECTION INTRAMUSCULAR
Status: CANCELLED | OUTPATIENT
Start: 2023-01-01 | End: 2023-03-16

## 2023-01-01 RX ORDER — HYDROMORPHONE HCL 110MG/55ML
1.5 PATIENT CONTROLLED ANALGESIA SYRINGE INTRAVENOUS
Status: CANCELLED | OUTPATIENT
Start: 2023-01-01 | End: 2023-03-16

## 2023-01-01 RX ORDER — MAGNESIUM SULFATE HEPTAHYDRATE 40 MG/ML
2 INJECTION, SOLUTION INTRAVENOUS AS NEEDED
Status: DISCONTINUED | OUTPATIENT
Start: 2023-01-01 | End: 2023-01-01 | Stop reason: HOSPADM

## 2023-01-01 RX ORDER — LORAZEPAM 2 MG/ML
2 INJECTION INTRAMUSCULAR
Status: DISCONTINUED | OUTPATIENT
Start: 2023-01-01 | End: 2023-01-01

## 2023-01-01 RX ORDER — LORAZEPAM 2 MG/ML
0.5 CONCENTRATE ORAL
Status: DISCONTINUED | OUTPATIENT
Start: 2023-01-01 | End: 2023-01-01 | Stop reason: HOSPADM

## 2023-01-01 RX ORDER — LORAZEPAM 2 MG/ML
1 INJECTION INTRAMUSCULAR
Status: CANCELLED | OUTPATIENT
Start: 2023-01-01 | End: 2023-03-16

## 2023-01-01 RX ORDER — MORPHINE SULFATE 4 MG/ML
4 INJECTION, SOLUTION INTRAMUSCULAR; INTRAVENOUS
Status: DISCONTINUED | OUTPATIENT
Start: 2023-01-01 | End: 2023-01-01 | Stop reason: HOSPADM

## 2023-01-01 RX ORDER — CHLORHEXIDINE GLUCONATE 0.12 MG/ML
15 RINSE ORAL EVERY 12 HOURS SCHEDULED
Status: DISCONTINUED | OUTPATIENT
Start: 2023-01-01 | End: 2023-01-01

## 2023-01-01 RX ORDER — MORPHINE SULFATE 20 MG/ML
10 SOLUTION ORAL
Status: CANCELLED | OUTPATIENT
Start: 2023-01-01 | End: 2023-03-16

## 2023-01-01 RX ORDER — HYDROMORPHONE HYDROCHLORIDE 1 MG/ML
0.5 INJECTION, SOLUTION INTRAMUSCULAR; INTRAVENOUS; SUBCUTANEOUS
Status: CANCELLED | OUTPATIENT
Start: 2023-01-01 | End: 2023-03-14

## 2023-01-01 RX ORDER — FENTANYL CITRATE 50 UG/ML
50 INJECTION, SOLUTION INTRAMUSCULAR; INTRAVENOUS
Status: DISCONTINUED | OUTPATIENT
Start: 2023-01-01 | End: 2023-01-01 | Stop reason: HOSPADM

## 2023-01-01 RX ORDER — HYDRALAZINE HYDROCHLORIDE 20 MG/ML
20 INJECTION INTRAMUSCULAR; INTRAVENOUS EVERY 4 HOURS PRN
Status: DISCONTINUED | OUTPATIENT
Start: 2023-01-01 | End: 2023-01-01 | Stop reason: HOSPADM

## 2023-01-01 RX ORDER — MORPHINE SULFATE 10 MG/ML
6 INJECTION INTRAMUSCULAR; INTRAVENOUS; SUBCUTANEOUS
Status: DISCONTINUED | OUTPATIENT
Start: 2023-01-01 | End: 2023-01-01 | Stop reason: HOSPADM

## 2023-01-01 RX ORDER — PHENYLEPHRINE HCL IN 0.9% NACL 0.5 MG/5ML
.5-3 SYRINGE (ML) INTRAVENOUS
Status: DISCONTINUED | OUTPATIENT
Start: 2023-01-01 | End: 2023-01-01

## 2023-01-01 RX ORDER — ENOXAPARIN SODIUM 100 MG/ML
1 INJECTION SUBCUTANEOUS EVERY 12 HOURS
Status: DISCONTINUED | OUTPATIENT
Start: 2023-01-01 | End: 2023-01-01

## 2023-01-01 RX ORDER — MORPHINE SULFATE 4 MG/ML
4 INJECTION, SOLUTION INTRAMUSCULAR; INTRAVENOUS
Status: CANCELLED | OUTPATIENT
Start: 2023-01-01 | End: 2023-03-16

## 2023-01-01 RX ORDER — ACETAMINOPHEN 650 MG/1
650 SUPPOSITORY RECTAL EVERY 4 HOURS PRN
Status: DISCONTINUED | OUTPATIENT
Start: 2023-01-01 | End: 2023-01-01 | Stop reason: HOSPADM

## 2023-01-01 RX ORDER — MORPHINE SULFATE 10 MG/ML
6 INJECTION INTRAMUSCULAR; INTRAVENOUS; SUBCUTANEOUS
Status: CANCELLED | OUTPATIENT
Start: 2023-01-01 | End: 2023-03-16

## 2023-01-01 RX ORDER — MAGNESIUM SULFATE 1 G/100ML
1 INJECTION INTRAVENOUS AS NEEDED
Status: DISCONTINUED | OUTPATIENT
Start: 2023-01-01 | End: 2023-01-01 | Stop reason: HOSPADM

## 2023-01-01 RX ORDER — PHENOBARBITAL SODIUM 65 MG/ML
100 INJECTION INTRAMUSCULAR EVERY 12 HOURS
Status: DISCONTINUED | OUTPATIENT
Start: 2023-01-01 | End: 2023-01-01

## 2023-01-01 RX ORDER — KETOROLAC TROMETHAMINE 15 MG/ML
15 INJECTION, SOLUTION INTRAMUSCULAR; INTRAVENOUS EVERY 6 HOURS PRN
Status: CANCELLED | OUTPATIENT
Start: 2023-01-01 | End: 2023-03-14

## 2023-01-01 RX ORDER — GLYCOPYRROLATE 0.2 MG/ML
0.8 INJECTION INTRAMUSCULAR; INTRAVENOUS
Status: DISCONTINUED | OUTPATIENT
Start: 2023-01-01 | End: 2023-01-01 | Stop reason: HOSPADM

## 2023-01-01 RX ORDER — DIAZEPAM 10 MG/2ML
5 GEL RECTAL
Status: ON HOLD | COMMUNITY
Start: 2022-01-01 | End: 2023-01-01

## 2023-01-01 RX ORDER — SODIUM CHLORIDE 9 MG/ML
40 INJECTION, SOLUTION INTRAVENOUS AS NEEDED
Status: DISCONTINUED | OUTPATIENT
Start: 2023-01-01 | End: 2023-01-01 | Stop reason: HOSPADM

## 2023-01-01 RX ORDER — VANCOMYCIN HYDROCHLORIDE 1 G/200ML
1000 INJECTION, SOLUTION INTRAVENOUS EVERY 12 HOURS
Status: DISCONTINUED | OUTPATIENT
Start: 2023-01-01 | End: 2023-01-01 | Stop reason: SDUPTHER

## 2023-01-01 RX ORDER — LORAZEPAM 2 MG/ML
0.5 INJECTION INTRAMUSCULAR
Status: DISCONTINUED | OUTPATIENT
Start: 2023-01-01 | End: 2023-01-01

## 2023-01-01 RX ORDER — MORPHINE SULFATE 2 MG/ML
2 INJECTION, SOLUTION INTRAMUSCULAR; INTRAVENOUS
Status: CANCELLED | OUTPATIENT
Start: 2023-01-01 | End: 2023-03-14

## 2023-01-01 RX ORDER — VANCOMYCIN HYDROCHLORIDE 1 G/200ML
1000 INJECTION, SOLUTION INTRAVENOUS EVERY 24 HOURS
Status: DISCONTINUED | OUTPATIENT
Start: 2023-01-01 | End: 2023-01-01

## 2023-01-01 RX ORDER — LORAZEPAM 2 MG/ML
1 INJECTION INTRAMUSCULAR
Status: DISCONTINUED | OUTPATIENT
Start: 2023-01-01 | End: 2023-01-01

## 2023-01-01 RX ORDER — MORPHINE SULFATE 20 MG/ML
5 SOLUTION ORAL
Status: CANCELLED | OUTPATIENT
Start: 2023-01-01 | End: 2023-03-14

## 2023-01-01 RX ORDER — FAMOTIDINE 10 MG/ML
20 INJECTION, SOLUTION INTRAVENOUS 2 TIMES DAILY
Status: DISCONTINUED | OUTPATIENT
Start: 2023-01-01 | End: 2023-01-01

## 2023-01-01 RX ORDER — SODIUM CHLORIDE, SODIUM LACTATE, POTASSIUM CHLORIDE, CALCIUM CHLORIDE 600; 310; 30; 20 MG/100ML; MG/100ML; MG/100ML; MG/100ML
100 INJECTION, SOLUTION INTRAVENOUS CONTINUOUS
Status: DISCONTINUED | OUTPATIENT
Start: 2023-01-01 | End: 2023-01-01

## 2023-01-01 RX ORDER — POTASSIUM CHLORIDE 1.5 G/1.77G
40 POWDER, FOR SOLUTION ORAL AS NEEDED
Status: DISCONTINUED | OUTPATIENT
Start: 2023-01-01 | End: 2023-01-01 | Stop reason: HOSPADM

## 2023-01-01 RX ORDER — ACETAMINOPHEN 325 MG/1
650 TABLET ORAL EVERY 4 HOURS PRN
Status: CANCELLED | OUTPATIENT
Start: 2023-01-01

## 2023-01-01 RX ADMIN — GLYCOPYRROLATE 0.8 MG: 0.2 INJECTION INTRAMUSCULAR; INTRAVENOUS at 20:43

## 2023-01-01 RX ADMIN — LORAZEPAM 1 MG: 2 INJECTION INTRAMUSCULAR; INTRAVENOUS at 13:29

## 2023-01-01 RX ADMIN — LORAZEPAM 2 MG: 2 INJECTION INTRAMUSCULAR; INTRAVENOUS at 13:06

## 2023-01-01 RX ADMIN — LORAZEPAM 2 MG: 2 INJECTION INTRAMUSCULAR; INTRAVENOUS at 16:52

## 2023-01-01 RX ADMIN — CHLORHEXIDINE GLUCONATE 15 ML: 1.2 RINSE ORAL at 20:43

## 2023-01-01 RX ADMIN — LORAZEPAM 2 MG: 2 INJECTION INTRAMUSCULAR; INTRAVENOUS at 08:52

## 2023-01-01 RX ADMIN — HYDROMORPHONE HYDROCHLORIDE 1 MG: 1 INJECTION, SOLUTION INTRAMUSCULAR; INTRAVENOUS; SUBCUTANEOUS at 08:52

## 2023-01-01 RX ADMIN — MORPHINE SULFATE 4 MG: 2 INJECTION, SOLUTION INTRAMUSCULAR; INTRAVENOUS at 17:38

## 2023-01-01 RX ADMIN — LORAZEPAM 2 MG: 2 INJECTION INTRAMUSCULAR; INTRAVENOUS at 17:37

## 2023-01-01 RX ADMIN — MORPHINE SULFATE 4 MG: 4 INJECTION, SOLUTION INTRAMUSCULAR; INTRAVENOUS at 01:14

## 2023-01-01 RX ADMIN — LORAZEPAM 2 MG: 2 INJECTION INTRAMUSCULAR; INTRAVENOUS at 13:11

## 2023-01-01 RX ADMIN — CHLORHEXIDINE GLUCONATE 15 ML: 1.2 RINSE ORAL at 20:09

## 2023-01-01 RX ADMIN — GLYCOPYRROLATE 0.8 MG: 0.2 INJECTION INTRAMUSCULAR; INTRAVENOUS at 16:52

## 2023-01-01 RX ADMIN — MORPHINE SULFATE 4 MG: 2 INJECTION, SOLUTION INTRAMUSCULAR; INTRAVENOUS at 09:54

## 2023-01-01 RX ADMIN — GLYCOPYRROLATE 0.4 MG: 0.2 INJECTION INTRAMUSCULAR; INTRAVENOUS at 08:48

## 2023-01-01 RX ADMIN — HYDROMORPHONE HYDROCHLORIDE 1 MG: 1 INJECTION, SOLUTION INTRAMUSCULAR; INTRAVENOUS; SUBCUTANEOUS at 00:37

## 2023-01-01 RX ADMIN — LORAZEPAM 2 MG: 2 INJECTION INTRAMUSCULAR; INTRAVENOUS at 20:34

## 2023-01-01 RX ADMIN — Medication 10 ML: at 08:38

## 2023-01-01 RX ADMIN — GLYCOPYRROLATE 0.4 MG: 0.2 INJECTION INTRAMUSCULAR; INTRAVENOUS at 17:38

## 2023-01-01 RX ADMIN — PHENOBARBITAL SODIUM: 65 INJECTION INTRAMUSCULAR at 10:43

## 2023-01-01 RX ADMIN — Medication 10 ML: at 08:13

## 2023-01-01 RX ADMIN — LORAZEPAM 2 MG: 2 INJECTION INTRAMUSCULAR; INTRAVENOUS at 00:36

## 2023-01-01 RX ADMIN — LACOSAMIDE 100 MG: 10 INJECTION, SOLUTION INTRAVENOUS at 12:31

## 2023-01-01 RX ADMIN — MORPHINE SULFATE 4 MG: 4 INJECTION, SOLUTION INTRAMUSCULAR; INTRAVENOUS at 21:00

## 2023-01-01 RX ADMIN — MORPHINE SULFATE 4 MG: 2 INJECTION, SOLUTION INTRAMUSCULAR; INTRAVENOUS at 13:29

## 2023-01-01 RX ADMIN — Medication 10 ML: at 20:43

## 2023-01-01 RX ADMIN — FENTANYL CITRATE 50 MCG: 50 INJECTION, SOLUTION INTRAMUSCULAR; INTRAVENOUS at 13:19

## 2023-01-01 RX ADMIN — LORAZEPAM 2 MG: 2 INJECTION INTRAMUSCULAR; INTRAVENOUS at 04:43

## 2023-01-01 RX ADMIN — PROPOFOL INJECTABLE EMULSION 5 MCG/KG/MIN: 10 INJECTION, EMULSION INTRAVENOUS at 23:53

## 2023-01-01 RX ADMIN — GLYCOPYRROLATE 0.4 MG: 0.2 INJECTION INTRAMUSCULAR; INTRAVENOUS at 12:52

## 2023-01-01 RX ADMIN — PHENOBARBITAL SODIUM 726 MG: 65 INJECTION INTRAMUSCULAR at 01:19

## 2023-01-01 RX ADMIN — POTASSIUM CHLORIDE 10 MEQ: 7.46 INJECTION, SOLUTION INTRAVENOUS at 07:12

## 2023-01-01 RX ADMIN — FAMOTIDINE 20 MG: 10 INJECTION INTRAVENOUS at 08:13

## 2023-01-01 RX ADMIN — FENTANYL CITRATE 50 MCG: 50 INJECTION, SOLUTION INTRAMUSCULAR; INTRAVENOUS at 15:22

## 2023-01-01 RX ADMIN — MORPHINE SULFATE 4 MG: 4 INJECTION, SOLUTION INTRAMUSCULAR; INTRAVENOUS at 13:06

## 2023-01-01 RX ADMIN — HYDRALAZINE HYDROCHLORIDE 20 MG: 20 INJECTION INTRAMUSCULAR; INTRAVENOUS at 13:05

## 2023-01-01 RX ADMIN — MORPHINE SULFATE 4 MG: 4 INJECTION, SOLUTION INTRAMUSCULAR; INTRAVENOUS at 17:25

## 2023-01-01 RX ADMIN — HYDROMORPHONE HYDROCHLORIDE 1 MG: 1 INJECTION, SOLUTION INTRAMUSCULAR; INTRAVENOUS; SUBCUTANEOUS at 13:11

## 2023-01-01 RX ADMIN — HYDROMORPHONE HYDROCHLORIDE 1 MG: 1 INJECTION, SOLUTION INTRAMUSCULAR; INTRAVENOUS; SUBCUTANEOUS at 16:52

## 2023-01-01 RX ADMIN — VANCOMYCIN HYDROCHLORIDE 1000 MG: 1 INJECTION, SOLUTION INTRAVENOUS at 08:39

## 2023-01-01 RX ADMIN — MORPHINE SULFATE 4 MG: 4 INJECTION, SOLUTION INTRAMUSCULAR; INTRAVENOUS at 08:48

## 2023-01-01 RX ADMIN — GLYCOPYRROLATE 0.8 MG: 0.2 INJECTION INTRAMUSCULAR; INTRAVENOUS at 00:36

## 2023-01-01 RX ADMIN — PROPOFOL INJECTABLE EMULSION 30 MCG/KG/MIN: 10 INJECTION, EMULSION INTRAVENOUS at 19:59

## 2023-01-01 RX ADMIN — CHLORHEXIDINE GLUCONATE 15 ML: 1.2 RINSE ORAL at 08:37

## 2023-01-01 RX ADMIN — PROPOFOL INJECTABLE EMULSION 50 MCG/KG/MIN: 10 INJECTION, EMULSION INTRAVENOUS at 09:30

## 2023-01-01 RX ADMIN — HYDROMORPHONE HYDROCHLORIDE 1 MG: 1 INJECTION, SOLUTION INTRAMUSCULAR; INTRAVENOUS; SUBCUTANEOUS at 04:43

## 2023-01-01 RX ADMIN — LORAZEPAM 2 MG: 2 INJECTION INTRAMUSCULAR; INTRAVENOUS at 00:38

## 2023-01-01 RX ADMIN — FAMOTIDINE 20 MG: 10 INJECTION INTRAVENOUS at 20:08

## 2023-01-01 RX ADMIN — IOPAMIDOL 150 ML: 755 INJECTION, SOLUTION INTRAVENOUS at 23:24

## 2023-01-01 RX ADMIN — CHLORHEXIDINE GLUCONATE 15 ML: 1.2 RINSE ORAL at 08:19

## 2023-01-01 RX ADMIN — LORAZEPAM 1 MG: 2 INJECTION INTRAMUSCULAR; INTRAVENOUS at 22:57

## 2023-01-01 RX ADMIN — DEXTROSE AND SODIUM CHLORIDE 100 ML/HR: 5; 450 INJECTION, SOLUTION INTRAVENOUS at 04:09

## 2023-01-01 RX ADMIN — MORPHINE SULFATE 4 MG: 4 INJECTION, SOLUTION INTRAMUSCULAR; INTRAVENOUS at 20:33

## 2023-01-01 RX ADMIN — TAZOBACTAM SODIUM AND PIPERACILLIN SODIUM 3.38 G: 375; 3 INJECTION, SOLUTION INTRAVENOUS at 08:37

## 2023-01-01 RX ADMIN — MORPHINE SULFATE 4 MG: 4 INJECTION, SOLUTION INTRAMUSCULAR; INTRAVENOUS at 00:38

## 2023-01-01 RX ADMIN — PROPOFOL 40 MG: 10 INJECTION, EMULSION INTRAVENOUS at 22:49

## 2023-01-01 RX ADMIN — SODIUM CHLORIDE 1000 ML: 9 INJECTION, SOLUTION INTRAVENOUS at 22:53

## 2023-01-01 RX ADMIN — HYDROMORPHONE HYDROCHLORIDE 1 MG: 1 INJECTION, SOLUTION INTRAMUSCULAR; INTRAVENOUS; SUBCUTANEOUS at 12:57

## 2023-01-01 RX ADMIN — GLYCOPYRROLATE 0.4 MG: 0.2 INJECTION INTRAMUSCULAR; INTRAVENOUS at 13:06

## 2023-01-01 RX ADMIN — TAZOBACTAM SODIUM AND PIPERACILLIN SODIUM 3.38 G: 375; 3 INJECTION, SOLUTION INTRAVENOUS at 12:00

## 2023-01-01 RX ADMIN — LACOSAMIDE 100 MG: 10 INJECTION, SOLUTION INTRAVENOUS at 11:13

## 2023-01-01 RX ADMIN — TAZOBACTAM SODIUM AND PIPERACILLIN SODIUM 3.38 G: 375; 3 INJECTION, SOLUTION INTRAVENOUS at 16:47

## 2023-01-01 RX ADMIN — PHENOBARBITAL SODIUM: 65 INJECTION INTRAMUSCULAR at 13:03

## 2023-01-01 RX ADMIN — MORPHINE SULFATE 4 MG: 4 INJECTION, SOLUTION INTRAMUSCULAR; INTRAVENOUS at 08:33

## 2023-01-01 RX ADMIN — MORPHINE SULFATE 4 MG: 4 INJECTION, SOLUTION INTRAMUSCULAR; INTRAVENOUS at 04:26

## 2023-01-01 RX ADMIN — LORAZEPAM 1 MG: 2 INJECTION INTRAMUSCULAR; INTRAVENOUS at 01:14

## 2023-01-01 RX ADMIN — PROPOFOL INJECTABLE EMULSION 45 MCG/KG/MIN: 10 INJECTION, EMULSION INTRAVENOUS at 04:32

## 2023-01-01 RX ADMIN — MORPHINE SULFATE 4 MG: 2 INJECTION, SOLUTION INTRAMUSCULAR; INTRAVENOUS at 16:51

## 2023-01-01 RX ADMIN — LACOSAMIDE 100 MG: 10 INJECTION, SOLUTION INTRAVENOUS at 15:16

## 2023-01-01 RX ADMIN — CHLORHEXIDINE GLUCONATE 15 ML: 1.2 RINSE ORAL at 05:25

## 2023-01-01 RX ADMIN — SCOPALAMINE 1 PATCH: 1 PATCH, EXTENDED RELEASE TRANSDERMAL at 10:33

## 2023-01-01 RX ADMIN — POTASSIUM CHLORIDE 10 MEQ: 7.46 INJECTION, SOLUTION INTRAVENOUS at 05:22

## 2023-01-01 RX ADMIN — POTASSIUM CHLORIDE 10 MEQ: 7.46 INJECTION, SOLUTION INTRAVENOUS at 04:16

## 2023-01-01 RX ADMIN — LEVETIRACETAM 500 MG: 100 INJECTION INTRAVENOUS at 02:47

## 2023-01-01 RX ADMIN — HYDRALAZINE HYDROCHLORIDE 20 MG: 20 INJECTION INTRAMUSCULAR; INTRAVENOUS at 12:30

## 2023-01-01 RX ADMIN — FAMOTIDINE 20 MG: 10 INJECTION INTRAVENOUS at 08:37

## 2023-01-01 RX ADMIN — GLYCOPYRROLATE 0.4 MG: 0.2 INJECTION INTRAMUSCULAR; INTRAVENOUS at 17:25

## 2023-01-01 RX ADMIN — ENOXAPARIN SODIUM 70 MG: 100 INJECTION SUBCUTANEOUS at 20:08

## 2023-01-01 RX ADMIN — GLYCOPYRROLATE 0.8 MG: 0.2 INJECTION INTRAMUSCULAR; INTRAVENOUS at 13:11

## 2023-01-01 RX ADMIN — Medication 10 ML: at 20:09

## 2023-01-01 RX ADMIN — PROPOFOL INJECTABLE EMULSION 30 MCG/KG/MIN: 10 INJECTION, EMULSION INTRAVENOUS at 15:27

## 2023-01-01 RX ADMIN — TAZOBACTAM SODIUM AND PIPERACILLIN SODIUM 3.38 G: 375; 3 INJECTION, SOLUTION INTRAVENOUS at 15:12

## 2023-01-01 RX ADMIN — LACOSAMIDE 100 MG: 10 INJECTION, SOLUTION INTRAVENOUS at 23:47

## 2023-01-01 RX ADMIN — FAMOTIDINE 20 MG: 10 INJECTION INTRAVENOUS at 08:46

## 2023-01-01 RX ADMIN — Medication 10 ML: at 02:08

## 2023-01-01 RX ADMIN — MORPHINE SULFATE 4 MG: 4 INJECTION, SOLUTION INTRAMUSCULAR; INTRAVENOUS at 04:43

## 2023-01-01 RX ADMIN — LORAZEPAM 2 MG: 2 INJECTION INTRAMUSCULAR; INTRAVENOUS at 12:52

## 2023-01-01 RX ADMIN — LORAZEPAM 1 MG: 2 INJECTION INTRAMUSCULAR; INTRAVENOUS at 09:54

## 2023-01-01 RX ADMIN — FAMOTIDINE 20 MG: 10 INJECTION INTRAVENOUS at 20:43

## 2023-01-01 RX ADMIN — PHENOBARBITAL SODIUM: 65 INJECTION INTRAMUSCULAR at 23:09

## 2023-01-01 RX ADMIN — GLYCOPYRROLATE 0.4 MG: 0.2 INJECTION INTRAMUSCULAR; INTRAVENOUS at 20:33

## 2023-01-01 RX ADMIN — GLYCOPYRROLATE 0.8 MG: 0.2 INJECTION INTRAMUSCULAR; INTRAVENOUS at 08:51

## 2023-01-01 RX ADMIN — GLYCOPYRROLATE 0.8 MG: 0.2 INJECTION INTRAMUSCULAR; INTRAVENOUS at 04:43

## 2023-01-01 RX ADMIN — LORAZEPAM 2 MG: 2 INJECTION INTRAMUSCULAR; INTRAVENOUS at 08:48

## 2023-01-01 RX ADMIN — GLYCOPYRROLATE 0.4 MG: 0.2 INJECTION INTRAMUSCULAR; INTRAVENOUS at 08:33

## 2023-01-01 RX ADMIN — FENTANYL CITRATE 50 MCG: 50 INJECTION, SOLUTION INTRAMUSCULAR; INTRAVENOUS at 21:18

## 2023-01-01 RX ADMIN — PROPOFOL INJECTABLE EMULSION 35 MCG/KG/MIN: 10 INJECTION, EMULSION INTRAVENOUS at 03:44

## 2023-01-01 RX ADMIN — PHENOBARBITAL SODIUM: 65 INJECTION INTRAMUSCULAR at 23:47

## 2023-01-01 RX ADMIN — LORAZEPAM 2 MG: 2 INJECTION INTRAMUSCULAR; INTRAVENOUS at 17:25

## 2023-01-01 RX ADMIN — TAZOBACTAM SODIUM AND PIPERACILLIN SODIUM 3.38 G: 375; 3 INJECTION, SOLUTION INTRAVENOUS at 23:47

## 2023-01-01 RX ADMIN — LORAZEPAM 1 MG: 2 INJECTION INTRAMUSCULAR; INTRAVENOUS at 04:26

## 2023-01-01 RX ADMIN — MORPHINE SULFATE 4 MG: 2 INJECTION, SOLUTION INTRAMUSCULAR; INTRAVENOUS at 05:41

## 2023-01-01 RX ADMIN — GLYCOPYRROLATE 0.4 MG: 0.2 INJECTION INTRAMUSCULAR; INTRAVENOUS at 04:43

## 2023-01-01 RX ADMIN — GLYCOPYRROLATE 0.4 MG: 0.2 INJECTION INTRAMUSCULAR; INTRAVENOUS at 04:26

## 2023-01-01 RX ADMIN — LORAZEPAM 1 MG: 2 INJECTION INTRAMUSCULAR; INTRAVENOUS at 16:50

## 2023-01-01 RX ADMIN — LEVETIRACETAM 1000 MG: 500 INJECTION, SOLUTION, CONCENTRATE INTRAVENOUS at 08:46

## 2023-01-01 RX ADMIN — DEXTROSE AND SODIUM CHLORIDE 100 ML/HR: 5; 450 INJECTION, SOLUTION INTRAVENOUS at 19:59

## 2023-01-01 RX ADMIN — LACOSAMIDE 100 MG: 10 INJECTION, SOLUTION INTRAVENOUS at 23:15

## 2023-01-01 RX ADMIN — GLYCOPYRROLATE 0.4 MG: 0.2 INJECTION INTRAMUSCULAR; INTRAVENOUS at 00:38

## 2023-01-01 RX ADMIN — HYDROMORPHONE HYDROCHLORIDE 1 MG: 1 INJECTION, SOLUTION INTRAMUSCULAR; INTRAVENOUS; SUBCUTANEOUS at 20:43

## 2023-01-01 RX ADMIN — GLYCOPYRROLATE 0.4 MG: 0.2 INJECTION INTRAMUSCULAR; INTRAVENOUS at 10:33

## 2023-01-01 RX ADMIN — ENOXAPARIN SODIUM 70 MG: 100 INJECTION SUBCUTANEOUS at 09:14

## 2023-01-01 RX ADMIN — LORAZEPAM 2 MG: 2 INJECTION INTRAMUSCULAR; INTRAVENOUS at 08:33

## 2023-01-01 RX ADMIN — LORAZEPAM 2 MG: 2 INJECTION INTRAMUSCULAR; INTRAVENOUS at 20:43

## 2023-01-16 NOTE — TELEPHONE ENCOUNTER
Rx Refill Note  Requested Prescriptions     Pending Prescriptions Disp Refills   • Xarelto 15 MG tablet [Pharmacy Med Name: XARELTO 15 MG TABLET] 30 tablet      Sig: TAKE ONE TABLET BY MOUTH DAILY WITH DINNER      Last office visit with prescribing clinician: 11/3/2022   Last telemedicine visit with prescribing clinician: Visit date not found    Next office visit with prescribing clinician: Return in about 3 months (around 2/3/2023) for Next scheduled follow up.      Miesha Elias MA  01/16/23, 14:54 EST

## 2023-02-15 NOTE — TELEPHONE ENCOUNTER
Caller: RAVEN HERNANDEZ    Relationship: Other Relative    Best call back number: [phone]    What is the medical concern/diagnosis: LEFT LEG SWOLLEN / RED / HOT    What specialty or service is being requested: HOME HEALTH    What is the provider, practice or medical service name: [facility]

## 2023-02-16 NOTE — TELEPHONE ENCOUNTER
Called Adalgisa.Then I called NATALY. I am waiting for a call from a [facility] on call nurse to call me back. Also made an appointment for patient.

## 2023-03-06 NOTE — ED PROVIDER NOTES
EMERGENCY DEPARTMENT ENCOUNTER    Room Number:  24/24  Date seen:  3/6/2023  PCP: Jose Bignham MD  Historian: EMS      HPI:  Chief Complaint: Seizure  A complete HPI/ROS/PMH/PSH/SH/FH are unobtainable due to: Patient is unresponsive  Context: Alireza Carreon is a 87 y.o. male who presents to the ED from home by EMS with reports of a seizure.  EMS reports that the family told them the patient began having difficulty speaking about an hour and a half ago.  It is unclear when he was last known well.  When EMS arrived on scene, the patient was having a generalized seizure.  He continued to seize for about 10 minutes and was given intranasal Versed by EMS.  Patient has a history of seizures and takes phenobarbital.  He has history of atrial fibrillation and takes Xarelto.  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/21/2020   • Chronic anticoagulation 01/27/2021   • Arteriosclerosis of coronary artery 07/28/2021   • Cerebrovascular accident (CVA) ([facility]) 07/28/2021   • Seizure disorder ([facility]) 07/28/2021   • Swelling of left lower extremity 12/01/2021   • Hearing loss 01/27/2022   • Localized edema 01/13/2022   • Blister 03/21/2022   • Skin nodule of toe of right foot 03/22/2022   • Status epilepticus ([facility]) 05/23/2022   • Immobility syndrome 06/03/2022     Resolved Ambulatory Problems     Diagnosis Date Noted   • Anal pruritus 08/01/2014   • Edema 09/17/2019   • Gout 09/17/2019   • Medicare annual wellness visit, subsequent 09/17/2019   • Anticoagulated on warfarin 09/17/2019   • 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 amounting to  dementia 2019   • Cat bite of left lower leg 10/25/2019   • Cellulitis of left lower extremity 10/25/2019   • Swelling of right testicle 2020     Past Medical History:   Diagnosis Date   • 3-vessel CAD    • BMI 28.0-28.9,adult    • Bunion, right foot    • Complaints of memory disturbance    • Encounter for immunization    • Generalized convulsive seizure (HCC)    • History of double vision    • HL (hearing loss)    • Left foot pain    • Movement disorder    • Nocturnal leg cramps    • OA (osteoarthritis)    • Prepatellar effusion    • Pulmonary embolism (HCC)    • Puncture wound of hand, right    • Screening for cardiovascular condition    • Sleep apnea    • Stasis dermatitis    • Statin intolerance    • Stroke (HCC)    • Taking drug for chronic disease    • Thoracic back pain    • Transient ischemic attack          PAST SURGICAL HISTORY  Past Surgical History:   Procedure Laterality Date   • ADENOIDECTOMY     • APPENDECTOMY     • CATARACT EXTRACTION, BILATERAL     • CORONARY ARTERY BYPASS GRAFT     • CORONARY ARTERY BYPASS GRAFT     • CYSTOSCOPY TRANSURETHRAL RESECTION OF PROSTATE     • HERNIA REPAIR     • OTHER SURGICAL HISTORY      carotid thromboendarterectomy   • SHOULDER SURGERY     • SKIN CANCER EXCISION           FAMILY HISTORY  Family History   Problem Relation Age of Onset   • Hyperlipidemia Mother    • Dementia Mother          SOCIAL HISTORY  Social History     Socioeconomic History   • Marital status:      Spouse name: Elizabeth   Tobacco Use   • Smoking status: Former     Types: Cigarettes     Quit date: 10/30/1979     Years since quittin.3   • Smokeless tobacco: Never   Substance and Sexual Activity   • Alcohol use: Yes   • Drug use: No   • Sexual activity: Defer         ALLERGIES  Codeine, Ezetimibe, Hydrocodone-acetaminophen, Levetiracetam, Lorazepam, Phenytoin, Pseudoephedrine, Repatha [evolocumab], Statins, and Zolpidem        REVIEW OF SYSTEMS  Review of Systems    Unobtainable    PHYSICAL EXAM  ED Triage Vitals   Temp Heart Rate Resp BP SpO2   03/05/23 2250 03/05/23 2244 03/05/23 2244 03/05/23 2244 03/05/23 2244   97.1 °F (36.2 °C) 92 20 (!) 84/51 93 %      Temp src Heart Rate Source Patient Position BP Location FiO2 (%)   03/05/23 2250 -- 03/05/23 2244 03/05/23 2244 --   Axillary  Lying Right arm        Physical Exam      GENERAL: Frail and elderly appearing male.  He is unresponsive   HENT: NCAT, nares patent, decreased gag reflex  EYES: Pupils are pinpoint and sluggishly reactive to light bilaterally.  Gaze is midline.  No nystagmus.  CV: regular rhythm, normal rate  RESPIRATORY: normal effort, clear to auscultation bilaterally  ABDOMEN: soft, nondistended  MUSCULOSKELETAL: No obvious deformity of the extremities.  There is bilateral pedal edema  NEURO: No response to painful stimuli  SKIN: There are abrasions on the left hand and left elbow.    Vital signs and nursing notes reviewed.          LAB RESULTS  Recent Results (from the past 24 hour(s))   Comprehensive Metabolic Panel    Collection Time: 03/05/23 11:00 PM    Specimen: Blood   Result Value Ref Range    Glucose 84 65 - 99 mg/dL    BUN 29 (H) 8 - 23 mg/dL    Creatinine 1.30 (H) 0.76 - 1.27 mg/dL    Sodium 141 136 - 145 mmol/L    Potassium 3.0 (L) 3.5 - 5.2 mmol/L    Chloride 99 98 - 107 mmol/L    CO2 18.8 (L) 22.0 - 29.0 mmol/L    Calcium 8.8 8.6 - 10.5 mg/dL    Total Protein 5.8 (L) 6.0 - 8.5 g/dL    Albumin 3.7 3.5 - 5.2 g/dL    ALT (SGPT) 17 1 - 41 U/L    AST (SGOT) 20 1 - 40 U/L    Alkaline Phosphatase 60 39 - 117 U/L    Total Bilirubin 0.3 0.0 - 1.2 mg/dL    Globulin 2.1 gm/dL    A/G Ratio 1.8 g/dL    BUN/Creatinine Ratio 22.3 7.0 - 25.0    Anion Gap 23.2 (H) 5.0 - 15.0 mmol/L    eGFR 53.2 (L) >60.0 mL/min/1.73   Protime-INR    Collection Time: 03/05/23 11:00 PM    Specimen: Blood   Result Value Ref Range    Protime 15.9 (H) 11.7 - 14.2 Seconds    INR 1.25 (H) 0.90 - 1.10   aPTT    Collection Time: 03/05/23  11:00 PM    Specimen: Blood   Result Value Ref Range    PTT 27.5 22.7 - 35.4 seconds   Single High Sensitivity Troponin T    Collection Time: 03/05/23 11:00 PM    Specimen: Blood   Result Value Ref Range    HS Troponin T 47 (H) <15 ng/L   Type & Screen    Collection Time: 03/05/23 11:00 PM    Specimen: Blood   Result Value Ref Range    ABO Type A     RH type Positive     Antibody Screen Negative     T&S Expiration Date 3/8/2023 11:59:59 PM    Phenobarbital Level    Collection Time: 03/05/23 11:00 PM    Specimen: Blood   Result Value Ref Range    Phenobarbital <2.4 (L) 10.0 - 30.0 mcg/mL   Green Top (Gel)    Collection Time: 03/05/23 11:00 PM   Result Value Ref Range    Extra Tube Hold for add-ons.    Lavender Top    Collection Time: 03/05/23 11:00 PM   Result Value Ref Range    Extra Tube hold for add-on    Light Blue Top    Collection Time: 03/05/23 11:00 PM   Result Value Ref Range    Extra Tube Hold for add-ons.    CBC Auto Differential    Collection Time: 03/05/23 11:00 PM    Specimen: Blood   Result Value Ref Range    WBC 9.69 3.40 - 10.80 10*3/mm3    RBC 4.13 (L) 4.14 - 5.80 10*6/mm3    Hemoglobin 12.5 (L) 13.0 - 17.7 g/dL    Hematocrit 37.4 (L) 37.5 - 51.0 %    MCV 90.6 79.0 - 97.0 fL    MCH 30.3 26.6 - 33.0 pg    MCHC 33.4 31.5 - 35.7 g/dL    RDW 12.3 12.3 - 15.4 %    RDW-SD 41.1 37.0 - 54.0 fl    MPV 9.6 6.0 - 12.0 fL    Platelets 184 140 - 450 10*3/mm3    Neutrophil % 63.8 42.7 - 76.0 %    Lymphocyte % 22.0 19.6 - 45.3 %    Monocyte % 10.8 5.0 - 12.0 %    Eosinophil % 2.1 0.3 - 6.2 %    Basophil % 0.8 0.0 - 1.5 %    Immature Grans % 0.5 0.0 - 0.5 %    Neutrophils, Absolute 6.18 1.70 - 7.00 10*3/mm3    Lymphocytes, Absolute 2.13 0.70 - 3.10 10*3/mm3    Monocytes, Absolute 1.05 (H) 0.10 - 0.90 10*3/mm3    Eosinophils, Absolute 0.20 0.00 - 0.40 10*3/mm3    Basophils, Absolute 0.08 0.00 - 0.20 10*3/mm3    Immature Grans, Absolute 0.05 0.00 - 0.05 10*3/mm3    nRBC 0.0 0.0 - 0.2 /100 WBC   Magnesium     Collection Time: 03/05/23 11:00 PM    Specimen: Blood   Result Value Ref Range    Magnesium 2.0 1.6 - 2.4 mg/dL   Phosphorus    Collection Time: 03/05/23 11:00 PM    Specimen: Blood   Result Value Ref Range    Phosphorus 3.4 2.5 - 4.5 mg/dL   ECG 12 Lead Stroke Evaluation    Collection Time: 03/05/23 11:55 PM   Result Value Ref Range    QT Interval 382 ms   Gold Top - SST    Collection Time: 03/06/23 12:00 AM   Result Value Ref Range    Extra Tube Hold for add-ons.    Blood Gas, Arterial -    Collection Time: 03/06/23  1:14 AM    Specimen: Arterial Blood   Result Value Ref Range    Site Arterial: right brachial     Mitch's Test N/A     pH, Arterial 7.349 (L) 7.350 - 7.450 pH units    pCO2, Arterial 52.3 (H) 35.0 - 45.0 mm Hg    pO2, Arterial 325.7 (H) 80.0 - 100.0 mm Hg    HCO3, Arterial 28.8 (H) 22.0 - 28.0 mmol/L    Base Excess, Arterial 2.3 (H) 0.0 - 2.0 mmol/L    O2 Saturation Calculated 99.9 (H) 92.0 - 99.0 %    A-a DO2 0.5 mmHg    Barometric Pressure for Blood Gas 752.0 mmHg    Modality Adult Vent     FIO2 100 %    Ventilator Mode AC     Set Tidal Volume 450     Set Mech Resp Rate 18     Rate 18 Breaths/minute    PEEP 5        Ordered the above labs and reviewed the results.        RADIOLOGY  XR Chest 1 View    Result Date: 3/6/2023  SINGLE VIEW OF THE CHEST  HISTORY: Postintubation  COMPARISON: 05/23/2022  FINDINGS: Endotracheal tube terminates in satisfactory position. Heart size is within normal limits. There is calcification of the aorta. No pneumothorax or pleural effusion is seen. There may be some mild left basilar atelectasis.      Endotracheal tube terminates in satisfactory position.  This report was finalized on 3/6/2023 12:09 AM by Dr. Ama Perdomo M.D.      CT Angiogram Head w AI Analysis of LVO, CT Angiogram Neck, CT CEREBRAL PERFUSION WITH & WITHOUT CONTRAST    Result Date: 3/6/2023  NONCONTRAST CRANIAL CT SCAN, CT ANGIOGRAM NECK, CT ANGIOGRAM HEAD, CRANIAL CT PERFUSION.  HISTORY: Acute  CVA,  COMPARISON: None..  TECHNIQUE:  Radiation dose reduction techniques were utilized, including automated exposure control and exposure modulation based on body size. Initially, a routine noncontrast cranial CT performed from the skull base through the vertex region. After review, thin-section contrast enhanced CT angiogram images obtained from the aortic arch through the calvarial vertex utilizing angiographic technique. Multi projection 3-D MIP reformatted images were supplemented and reviewed. Subsequently, CT perfusion imaging performed with ischemia view software.  FINDINGS CRANIAL CT: No acute hemorrhage or midline shift is demonstrated..  There is diffuse atrophy. There is extensive periventricular and deep white matter microangiopathic change. The appearance is very similar to the patient's prior CT from 05/23/2022. There is no midline shift or mass effect. Old lacunar infarcts are suspected within the basal ganglia bilaterally. Postcontrast imaging does not show any evidence of venous occlusion or abnormal enhancement. Bone window images demonstrate mucosal thickening within the ethmoid sinuses..  Study was placed online at 2315. Preliminary interpretation telephoned to extension [phone] during interpretation at 2320.    CERVICAL CAROTID CT ANGIOGRAM:  FINDINGS: There is normal arch anatomy. There are changes of prior bilateral carotid endarterectomies. No hemodynamically significant stenosis is seen on either side. There does appear to be increasing plaque involving the proximal left internal carotid artery. The distal cervical internal carotid arteries are widely patent. There is some plaque seen at the origin of the left to artery, resulting in some mild narrowing. Cervical right vertebral artery is widely patent. There are background emphysematous changes.  NASCET criteria utilized in stenosis measurements. Stenosis mild, 0-49%.   CRANIAL CTA ANGIOGRAM:  FINDINGS:  Endotracheal tube terminates in  satisfactory position position. The intracranial internal carotid arteries demonstrate extensive calcification. Technologist has measured prominent supraclinoid segments of the internal carotid arteries bilaterally. This appearance is stable when compared to prior exam, and likely represents some post stenotic dilatation, related to some narrowing of the distal cavernous carotid arteries.  There is an anterior communicating artery. Anterior cerebral arteries are patent. There is some diffuse mild disease of the middle cerebral arteries bilaterally, with the exception of a moderate narrowing of the proximal right M2. There is some irregularity of the intracranial right vertebral artery, with mild narrowing noted. Left intracranial vertebral artery is dominant.  There is irregularity of the distal basilar artery, with some associated mild narrowing. There is at least moderate stenosis of the right P1. This was also seen on prior imaging. There is diffuse irregularity of both posterior cerebral arteries.    CT PERFUSION:  FINDINGS:  There is an area Tmax greater than 6 seconds within the right cerebral hemisphere, measuring up to 106 cc. There is a corresponding area of cerebral blood flow less than 30% within the right occipital and posterior parietal lobes, where the patient has a known history of encephalomalacia. This area measures up to 14 cc.  Study was placed online at  3269. Preliminary interpretation telephoned to extension [phone] during interpretation at 2434.  AI analysis of LVO was utilized.  Radiation dose reduction techniques were utilized, including automated exposure control and exposure modulation based on body size.       1. No obvious acute intracranial findings. Extensive areas of encephalomalacia again noted within the right occipital and parietal lobes, as well as the posterior right temporal lobe. 2. Area of Tmax greater than 6 seconds within the right cerebral hemisphere, measuring up to 106 cc,  with a corresponding area of cerebral blood flow less than 30%, measuring about 14 cc. This is located within the area of encephalomalacia. 3. Changes of prior bilateral carotid endarterectomies, with no residual hemodynamically significant stenosis, although there appears to be increasing plaque involving the proximal left internal carotid artery. 4. 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  Radiation dose reduction techniques were utilized, including automated exposure control and exposure modulation based on body size.  This report was finalized on 3/6/2023 1:09 AM by Dr. Ama Perdomo M.D.        Ordered the above noted radiological studies. Reviewed by me in PACS.            PROCEDURES  Intubation    Date/Time: 3/5/2023 11:00 PM  Performed by: Broderick Menezes MD  Authorized by: Broderick Menezes MD     Consent:     Consent obtained:  Emergent situation  Universal protocol:     Patient identity confirmed:  Anonymous protocol, patient vented/unresponsive  Pre-procedure details:     Indication: failure to protect airway      Patient status:  Unresponsive    Look externally: no concerns      Obstruction: none      Neck mobility: normal      Pharmacologic strategy: sedation      Induction agents:  Propofol    Paralytics:  None  Procedure details:     Preoxygenation:  Bag valve mask    CPR in progress: no      Intubation method:  Oral    Intubation technique: video assisted      Laryngoscope blade:  Mac 3    Tube size (mm):  7.5    Tube type:  Cuffed    Number of attempts:  1    Tube visualized through cords: yes    Placement assessment:     ETT at teeth/gumline (cm):  24    Tube secured with:  ETT abdi    Breath sounds:  Equal and absent over the epigastrium    Placement verification: chest rise, CXR verification, equal breath sounds and tube exhalation      CXR findings:  Appropriate position  Post-procedure details:     Procedure completion:  Tolerated well, no immediate  complications    Critical Care  Performed by: Broderick Menezes MD  Authorized by: Broderick Menezes MD     Critical care provider statement:     Critical care time (minutes):  39    Critical care time was exclusive of:  Separately billable procedures and treating other patients    Critical care was necessary to treat or prevent imminent or life-threatening deterioration of the following conditions:  CNS failure or compromise    Critical care was time spent personally by me on the following activities:  Development of treatment plan with patient or surrogate, discussions with consultants, discussions with primary provider, evaluation of patient's response to treatment, examination of patient, obtaining history from patient or surrogate, ordering and performing treatments and interventions, ordering and review of laboratory studies, ordering and review of radiographic studies, pulse oximetry, re-evaluation of patient's condition, review of old charts and ventilator management    I assumed direction of critical care for this patient from another provider in my specialty: no      Care discussed with: admitting provider                  MEDICATIONS GIVEN IN ER  Medications   sodium chloride 0.9 % flush 10 mL (has no administration in time range)   propofol (DIPRIVAN) infusion 10 mg/mL 100 mL (35 mcg/kg/min × 72.6 kg Intravenous Rate/Dose Change 3/6/23 0059)   levETIRAcetam (KEPPRA) injection 500 mg (has no administration in time range)   sodium chloride 0.9 % flush 10 mL (has no administration in time range)   sodium chloride 0.9 % flush 10 mL (has no administration in time range)   sodium chloride 0.9 % infusion 40 mL (has no administration in time range)   aluminum-magnesium hydroxide-simethicone (MAALOX MAX) 400-400-40 MG/5ML suspension 15 mL (has no administration in time range)   ondansetron (ZOFRAN) tablet 4 mg (has no administration in time range)     Or   ondansetron (ZOFRAN) injection 4 mg (has no  "administration in time range)   chlorhexidine (PERIDEX) 0.12 % solution 15 mL (has no administration in time range)   famotidine (PEPCID) injection 20 mg (has no administration in time range)   dextrose 5 % and sodium chloride 0.45 % infusion (has no administration in time range)   Propofol (DIPRIVAN) injection 40 mg (40 mg Intravenous Given 3/5/23 2249)   sodium chloride 0.9 % bolus 1,000 mL (0 mL Intravenous Stopped 3/5/23 2355)   iopamidol (ISOVUE-370) 76 % injection 150 mL (150 mL Intravenous Given 3/5/23 2324)   PHENobarbital 726 mg in sodium chloride 0.9 % 100 mL IVPB (726 mg Intravenous Given 3/6/23 0119)                   MEDICAL DECISION MAKING, PROGRESS, and CONSULTS    All labs have been independently reviewed by me.  All radiology studies have been reviewed by me and I have also reviewed the radiology report.   EKG's independently viewed and interpreted by me.  Discussion below represents my analysis of pertinent findings related to patient's condition, differential diagnosis, treatment plan and final disposition.      Additional sources:  - Discussed/ obtained information from independent historians: Family at bedside    - External (non-ED) record review: Patient was admitted here in June 2022 for status epilepticus.  He has had a prior stroke with residual left-sided weakness.  EEG was done in October 2022.  This showed near continuous activity in the right hemisphere with loss of faster frequencies.  There was generalized irregular synchronous and asynchronous background activity.  EEG was \"abnormal due to asymmetric slow background activity\" and \"is suggestive of focal dysfunction in the right hemisphere in the context of mild generalized nonspecific cerebral dysfunction.  No seizures or seizure tendencies were seen\".    - Chronic or social conditions impacting care: N/A          Orders placed during this visit:  Orders Placed This Encounter   Procedures   • Intubation   • Critical Care   • XR Chest " 1 View   • CT Angiogram Head w AI Analysis of LVO   • CT Angiogram Neck   • CT CEREBRAL PERFUSION WITH & WITHOUT CONTRAST   • XR Chest 1 View   • [place] Draw   • Comprehensive Metabolic Panel   • Protime-INR   • aPTT   • Single High Sensitivity Troponin T   • Phenobarbital Level   • Blood Gas, Arterial -   • CBC Auto Differential   • Magnesium   • Phosphorus   • Blood Gas, Arterial -   • Comprehensive Metabolic Panel   • Blood Gas, Arterial -   • Lactic Acid, Plasma   • High Sensitivity Troponin T   • Lactic Acid, Plasma   • NPO Diet NPO Type: Strict NPO   • Initiate Department's Acute Stroke Process (Team D, Code 19, etc.)   • Perform NIH Stroke Scale   • Measure Actual Weight   • Notify MD for SBP < 80 or > 200   • Notify Provider for SBP greater than 140 if hemorrhagic stroke   • Head of bed 30 Degrees or Less   • Undress and Gown   • Vital Signs   • Neuro Checks   • No Hypotonic Fluids   • Nursing Swallow Assessment   • Vital Signs Every Hour and Per Hospital Policy Based on Patient Condition   • Cardiac Monitoring   • Continuous Pulse Oximetry   • Height & Weight   • Daily Weights   • Intake and Output   • Oral Care - Patient Not on NPPV & Not Intubated   • ICU / CCU - Place Order Consult Intensivist For Critical Care Management (If Patient Not Admitted to Cardiology for Primary Cardiology Condition)   • ICU / CCU - Notify All Physicians When Patient is Transferred   • Use Mobility Guidelines for Advancement of Activity   • Saline Lock & Maintain IV Access   • Place Sequential Compression Device   • Maintain Sequential Compression Device   • If Patient has BG Less Than 80 & is Symptomatic But Not on IV Insulin Protocol - Use Adult Hypoglycemia Treatment Orders   • Elevate HOB   • Oral care & teeth brushing for intubated patients   • Subglottic Suctioning Must Be Done Every 6 Hours   • Spontaneous Awakening Trial   • RN May Place Order For ABG As Needed for Respiratory Distress   • Code Status and Medical  Interventions:   • Inpatient Neurology Consult Stroke   • Inpatient Neurology Consult Stroke   • Pulmonology (on-call MD unless specified)   • Inpatient Neurology Consult General   • Oxygen Therapy- Nasal Cannula; Titrate for SPO2: 90% - 95%   • POC Glucose Once   • POC Glucose Q6H   • ECG 12 Lead Stroke Evaluation   • Type & Screen   • EEG   • EEG Continuous Monitoring With Video   • Insert Large-Bore Peripheral IV - RIGHT AC Preferred   • Insert Peripheral IV   • Inpatient Admission   • CBC & Differential   • Green Top (Gel)   • Lavender Top   • Gold Top - SST   • Light Blue Top   • CBC & Differential         Additional orders considered but not ordered:  N/A        Differential diagnosis:    CVA, TIA, status epilepticus, intracranial hemorrhage, encephalopathy      Independent interpretation of labs, radiology studies, and discussions with consultants:  ED Course as of 03/06/23 0205   Sun Mar 05, 2023   2250 Patient was orally intubated for airway protection.  See procedure note for details. [WH]   2254 Case discussed with Dr. Almeida.  Pertinent history and exam findings were discussed with him.  He agrees with plan for stat team D imaging.  Patient is not a thrombolytic candidate as he takes Xarelto. []   2314 Chest x-ray personally interpreted by me.  My personal interpretation is: ET tube is approximately 4 cm above the ashley.  No pleural effusion.  No pneumothorax. []   2317 Head CT personally interpreted by me.  My personal interpretation is: No intracranial hemorrhage.  No midline shift []   2350 Phenobarbital(!): <2.4 []   2351 Phosphorus: 3.4 []   2351 HS Troponin T(!): 47 []   2351 Magnesium: 2.0 []   2351 INR(!): 1.25 []   2351 WBC: 9.69 []   2351 Hemoglobin(!): 12.5 []   2351 BUN(!): 29 []   2351 Creatinine(!): 1.30  1.07 eight months ago []   2351 CO2(!): 18.8 []   Mon Mar 06, 2023   0002 Additional history was obtained from the patient's wife and daughter, who are now at  bedside.  Wife reports that the patient was standing at the kitchen table when he fell to his knees.  He then slowly fell to the ground.  He did not lose consciousness.  Daughter reports that the patient was able to speak but his speech was somewhat slurred.  His eyes then began fluttering and he became unresponsive.  His arms and legs then began shaking.  Patient has a history of seizures and takes phenobarb.  Daughter states that the patient sometimes forgets to take his medications.  His last seizure was over 6 months ago.  He also takes Xarelto.      Test results and plan for admission were discussed with him.  Patient is currently sedated and on the vent with a propofol drip.  Vital signs are stable. [WH]   0018 EKG personally interpreted by me.  My personal interpretation is:         EKG time: 2355  Rhythm/Rate: Sinus rhythm, rate 94  P waves and LA: First-degree AV block  QRS, axis: LAD  ST and T waves: Nonspecific ST/T wave changes in the anterior and lateral leads    Interpreted Contemporaneously by me at 2359, independently viewed  EKG is changed compared to prior EKG done on 5/25/2022.  Heart rate was 54 at that time.   [WH]   0048 I spoke with Isaac, inpatient pharmacist.  Patient will be given phenobarbital 720 mg IV x1. [WH]   0112 Results of the CTA head/neck and CT perfusion were discussed with Dr. Perdomo, radiologist.  There is no large vessel occlusion.  No intracranial hemorrhage.  There is a perfusion defect in the right occipital lobe of uncertain significance as the patient has had a prior stroke.  See dictated report for details. [WH]   0115 ABG is consistent with a mild respiratory acidosis.  FiO2 will be decreased. [WH]   0137 Patient is still sedated on the vent.  IV phenobarbital is infusing.  Vital signs remain stable.  He has not had any further seizure activity.  Patient will be admitted to the ICU pending discussion with the pulmonologist. [WH]   0152 Case discussed with   Josh Riddle and he agrees to admit the patient to the ICU.  Pertinent history, exam findings, test results, ED course, and diagnoses were discussed with him. [WH]   0153 Patient presented to the ED after a seizure that lasted approximately 10 minutes.  Upon ED arrival, the patient was unresponsive and had a decreased gag reflex.  He was intubated for airway protection.  Case was discussed with the stroke neurologist and team D protocol was initiated.  CTs were negative for acute hemorrhage or LVO.  Patient takes phenobarb for seizures and his level was subtherapeutic.  He was given IV phenobarb in the ED.  He was sedated with a propofol drip.  Initial troponin was indeterminate.  EKG did not have any acute ischemic changes.  He had mild acute renal insufficiency.  He was afebrile and was not hypotensive.  Patient takes Xarelto.  Case was discussed with the intensivist and the patient will be admitted to the ICU.  Critical care was performed on this patient. [WH]      ED Course User Index  [WH] Broderick Menezes MD               DIAGNOSIS  Final diagnoses:   Status epilepticus (HCC)   Seizure secondary to subtherapeutic anticonvulsant medication (HCC)   Acute renal insufficiency   Anticoagulated  Left hand abrasion  Left elbow abrasion         DISPOSITION  ADMISSION    Discussed treatment plan and reason for admission with pt/family and admitting physician.  Pt/family voiced understanding of the plan for admission for further testing/treatment as needed.                 Latest Documented Vital Signs:  As of 02:05 EST  BP- 140/81 HR- 59 Temp- 97.1 °F (36.2 °C) (Axillary) O2 sat- 99%              --    Please note that portions of this were completed with a voice recognition program.       Note Disclaimer: At [facility], we believe that sharing information builds trust and better relationships. You are receiving this note because you are receiving care at [facility] or recently visited. It is possible you  will see health information before a provider has talked with you about it. This kind of information can be easy to misunderstand. To help you fully understand what it means for your health, we urge you to discuss this note with your provider.           Broderick Menezes MD  03/06/23 0154       Broderick Menezes MD  03/06/23 0205

## 2023-03-06 NOTE — NURSING NOTE
03/06/23 1431   Wound 03/06/23 0345 Left proximal arm Abrasion   Placement Date/Time: 03/06/23 (c) 0345   Present on Hospital Admission: Yes  Side: Left  Orientation: proximal  Location: arm  Primary Wound Type: Abrasion   Dressing Appearance dry;intact   Base clean;moist;red   Periwound ecchymotic   Edges irregular   Drainage Characteristics/Odor sanguineous   Drainage Amount scant   Care, Wound cleansed with;sterile normal saline   Dressing Care   (vaseline gauze, roll gauze secured with tape)     WOCN consult: Skin tear left hand, lower forearm and elbow due to fall. Bleeding uncontrol.  Vaseline gauze applied with roll gauze . Please call if any further needs. Will order Low air loss mattress when discharged from unit. Is currently on total care mattress.

## 2023-03-06 NOTE — SIGNIFICANT NOTE
03/06/23 0800   Readiness to Wean Daily Screen   Daily Screen Outcome fail  (intubated less than 24 hours)

## 2023-03-06 NOTE — ED NOTES
Nursing report ED to floor  Alireza Carreon  87 y.o.  male    HPI :   Chief Complaint   Patient presents with    Seizures       Admitting doctor:   Josh Riddle MD    Admitting diagnosis:   The primary encounter diagnosis was Status epilepticus (HCC). Diagnoses of Seizure secondary to subtherapeutic anticonvulsant medication (HCC), Acute renal insufficiency, and Anticoagulated were also pertinent to this visit.    Code status:   Current Code Status       Date Active Code Status Order ID Comments User Context       3/6/2023 0156 CPR (Attempt to Resuscitate) [id number]  Josh Riddle MD ED        Question Answer    Code Status (Patient has no pulse and is not breathing) CPR (Attempt to Resuscitate)    Medical Interventions (Patient has pulse or is breathing) Full Support                    Allergies:   Codeine, Ezetimibe, Hydrocodone-acetaminophen, Levetiracetam, Lorazepam, Phenytoin, Pseudoephedrine, Repatha [evolocumab], Statins, and Zolpidem    Isolation:   No active isolations    Intake and Output    Intake/Output Summary (Last 24 hours) at 3/6/2023 0230  Last data filed at 3/5/2023 2355  Gross per 24 hour   Intake 1000 ml   Output --   Net 1000 ml       Weight:       03/05/23 2244   Weight: 72.6 kg (160 lb)       Most recent vitals:   Vitals:    03/06/23 0101 03/06/23 0131 03/06/23 0201 03/06/23 0216   BP: 132/73 140/81 94/69 108/66   Pulse: 65 59 61 57   Resp:       Temp:       TempSrc:       SpO2: 99% 99% 99% 99%   Weight:       Height:           Active LDAs/IV Access:   Lines, Drains & Airways       Active LDAs       Name Placement date Placement time Site Days    Peripheral IV 03/05/23 2247 Anterior;Left;Upper Arm 03/05/23 2247  Arm  less than 1    Peripheral IV 03/05/23 2248 Right Antecubital 03/05/23 2248  Antecubital  less than 1    ETT  03/05/23 2251  -- less than 1                    Labs (abnormal labs have a star):   Labs Reviewed   COMPREHENSIVE METABOLIC PANEL - Abnormal;  Notable for the following components:       Result Value    BUN 29 (*)     Creatinine 1.30 (*)     Potassium 3.0 (*)     CO2 18.8 (*)     Total Protein 5.8 (*)     Anion Gap 23.2 (*)     eGFR 53.2 (*)     All other components within normal limits    Narrative:     GFR Normal >60  Chronic Kidney Disease <60  Kidney Failure <15    The GFR formula is only valid for adults with stable renal function between ages 18 and 70.   PROTIME-INR - Abnormal; Notable for the following components:    Protime 15.9 (*)     INR 1.25 (*)     All other components within normal limits   SINGLE HSTROPONIN T - Abnormal; Notable for the following components:    HS Troponin T 47 (*)     All other components within normal limits    Narrative:     High Sensitive Troponin T Reference Range:  <10.0 ng/L- Negative Female for AMI  <15.0 ng/L- Negative Male for AMI  >=10 - Abnormal Female indicating possible myocardial injury.  >=15 - Abnormal Male indicating possible myocardial injury.   Clinicians would have to utilize clinical acumen, EKG, Troponin, and serial changes to determine if it is an Acute Myocardial Infarction or myocardial injury due to an underlying chronic condition.        PHENOBARBITAL LEVEL - Abnormal; Notable for the following components:    Phenobarbital <2.4 (*)     All other components within normal limits   CBC WITH AUTO DIFFERENTIAL - Abnormal; Notable for the following components:    RBC 4.13 (*)     Hemoglobin 12.5 (*)     Hematocrit 37.4 (*)     Monocytes, Absolute 1.05 (*)     All other components within normal limits   BLOOD GAS, ARTERIAL - Abnormal; Notable for the following components:    pH, Arterial 7.349 (*)     pCO2, Arterial 52.3 (*)     pO2, Arterial 325.7 (*)     HCO3, Arterial 28.8 (*)     Base Excess, Arterial 2.3 (*)     O2 Saturation Calculated 99.9 (*)     All other components within normal limits   APTT - Normal   MAGNESIUM - Normal   PHOSPHORUS - Normal   RAINBOW DRAW    Narrative:     The following  orders were created for panel order [place] Draw.  Procedure                               Abnormality         Status                     ---------                               -----------         ------                     Green Top (Gel)[[id number]]                                  Final result               Lavender Top[[id number]]                                     Final result               Gold Top - SST[[id number]]                                   Final result               Light Blue Top[[id number]]                                   Final result                 Please view results for these tests on the individual orders.   BLOOD GAS, ARTERIAL   BLOOD GAS, ARTERIAL   LACTIC ACID, PLASMA   TROPONIN   POCT GLUCOSE FINGERSTICK   POCT GLUCOSE FINGERSTICK   POCT GLUCOSE FINGERSTICK   POCT GLUCOSE FINGERSTICK   POCT GLUCOSE FINGERSTICK   TYPE AND SCREEN   CBC AND DIFFERENTIAL    Narrative:     The following orders were created for panel order CBC & Differential.  Procedure                               Abnormality         Status                     ---------                               -----------         ------                     CBC Auto Differential[[id number]]        Abnormal            Final result                 Please view results for these tests on the individual orders.   GREEN TOP   LAVENDER TOP   GOLD TOP - SST   LIGHT BLUE TOP       EKG:   ECG 12 Lead Stroke Evaluation   Preliminary Result   HEART RATE= 94  bpm   RR Interval= 638  ms   WV Interval= 183  ms   P Horizontal Axis= -25  deg   P Front Axis= 41  deg   QRSD Interval= 105  ms   QT Interval= 382  ms   QRS Axis= -56  deg   T Wave Axis= 86  deg   - ABNORMAL ECG -   Sinus rhythm   Incomplete RBBB and LAFB   Probable anterolateral infarct, age indeterm   Artifact in lead(s) I,II,III,aVL,V1,V2   Electronically Signed By:    Date and Time of Study: 2023-03-05 23:55:58          Meds given in ED:   Medications   sodium chloride 0.9 % flush 10 mL (has  no administration in time range)   propofol (DIPRIVAN) infusion 10 mg/mL 100 mL (30 mcg/kg/min × 72.6 kg Intravenous Rate/Dose Change 3/6/23 0208)   levETIRAcetam (KEPPRA) injection 500 mg (has no administration in time range)   sodium chloride 0.9 % flush 10 mL (10 mL Intravenous Given 3/6/23 0208)   sodium chloride 0.9 % flush 10 mL (has no administration in time range)   sodium chloride 0.9 % infusion 40 mL (has no administration in time range)   aluminum-magnesium hydroxide-simethicone (MAALOX MAX) 400-400-40 MG/5ML suspension 15 mL (has no administration in time range)   ondansetron (ZOFRAN) tablet 4 mg (has no administration in time range)     Or   ondansetron (ZOFRAN) injection 4 mg (has no administration in time range)   chlorhexidine (PERIDEX) 0.12 % solution 15 mL (has no administration in time range)   famotidine (PEPCID) injection 20 mg (has no administration in time range)   dextrose 5 % and sodium chloride 0.45 % infusion (has no administration in time range)   Propofol (DIPRIVAN) injection 40 mg (40 mg Intravenous Given 3/5/23 2249)   sodium chloride 0.9 % bolus 1,000 mL (0 mL Intravenous Stopped 3/5/23 2355)   iopamidol (ISOVUE-370) 76 % injection 150 mL (150 mL Intravenous Given 3/5/23 2324)   PHENobarbital 726 mg in sodium chloride 0.9 % 100 mL IVPB (726 mg Intravenous Given 3/6/23 0119)       Imaging results:  CT Angiogram Neck    Result Date: 3/6/2023  1. No obvious acute intracranial findings. Extensive areas of encephalomalacia again noted within the right occipital and parietal lobes, as well as the posterior right temporal lobe. 2. Area of Tmax greater than 6 seconds within the right cerebral hemisphere, measuring up to 106 cc, with a corresponding area of cerebral blood flow less than 30%, measuring about 14 cc. This is located within the area of encephalomalacia. 3. Changes of prior bilateral carotid endarterectomies, with no residual hemodynamically significant stenosis, although there  appears to be increasing plaque involving the proximal left internal carotid artery. 4. 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  Radiation dose reduction techniques were utilized, including automated exposure control and exposure modulation based on body size.  This report was finalized on 3/6/2023 1:09 AM by Dr. Ama Perdomo M.D.      XR Chest 1 View    Result Date: 3/6/2023  Endotracheal tube terminates in satisfactory position.  This report was finalized on 3/6/2023 12:09 AM by Dr. Ama Perdomo M.D.      CT Angiogram Head w AI Analysis of LVO    Result Date: 3/6/2023  1. No obvious acute intracranial findings. Extensive areas of encephalomalacia again noted within the right occipital and parietal lobes, as well as the posterior right temporal lobe. 2. Area of Tmax greater than 6 seconds within the right cerebral hemisphere, measuring up to 106 cc, with a corresponding area of cerebral blood flow less than 30%, measuring about 14 cc. This is located within the area of encephalomalacia. 3. Changes of prior bilateral carotid endarterectomies, with no residual hemodynamically significant stenosis, although there appears to be increasing plaque involving the proximal left internal carotid artery. 4. 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  Radiation dose reduction techniques were utilized, including automated exposure control and exposure modulation based on body size.  This report was finalized on 3/6/2023 1:09 AM by Dr. Ama Perdomo M.D.      CT CEREBRAL PERFUSION WITH & WITHOUT CONTRAST    Result Date: 3/6/2023  1. No obvious acute intracranial findings. Extensive areas of encephalomalacia again noted within the right occipital and parietal lobes, as well as the posterior right temporal lobe. 2. Area of Tmax greater than 6 seconds within the right cerebral hemisphere, measuring up to 106 cc, with a corresponding area of cerebral blood flow  less than 30%, measuring about 14 cc. This is located within the area of encephalomalacia. 3. Changes of prior bilateral carotid endarterectomies, with no residual hemodynamically significant stenosis, although there appears to be increasing plaque involving the proximal left internal carotid artery. 4. 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  Radiation dose reduction techniques were utilized, including automated exposure control and exposure modulation based on body size.  This report was finalized on 3/6/2023 1:09 AM by Dr. Ama Perdomo M.D.       Ambulatory status:   - intubated/sedated - full assist at this time.    Social issues:   Social History     Socioeconomic History    Marital status:      Spouse name: Elizabeth   Tobacco Use    Smoking status: Former     Types: Cigarettes     Quit date: 10/30/1979     Years since quittin.3    Smokeless tobacco: Never   Substance and Sexual Activity    Alcohol use: Yes    Drug use: No    Sexual activity: Defer       NIH Stroke Scale:  Interval: baseline      Kenyatta Gutierrez RN  23 02:30 EST

## 2023-03-06 NOTE — ED NOTES
Patient noted to be shivering, more tachypneic than previously and biting ETT. Dr Riddle at bedside, aware of patient condition and recently increased BP. MD states to continue to monitor BP and increase sedation as necessary. Propofol increased at this time and warm blankets placed on patient.

## 2023-03-06 NOTE — PLAN OF CARE
Patient arrived to PACU holding from ED.  VS.  Patient having no s/s of seizure activity.

## 2023-03-06 NOTE — ED NOTES
.Nursing report ED to floor  Alireza Carreon  87 y.o.  male    HPI :   Chief Complaint   Patient presents with    Seizures       Admitting doctor:   Josh Riddle MD    Admitting diagnosis:   The primary encounter diagnosis was Status epilepticus (HCC). Diagnoses of Seizure secondary to subtherapeutic anticonvulsant medication (HCC), Acute renal insufficiency, and Anticoagulated were also pertinent to this visit.    Code status:   Current Code Status       Date Active Code Status Order ID Comments User Context       3/6/2023 0156 CPR (Attempt to Resuscitate) [id number]  Josh Riddle MD ED        Question Answer    Code Status (Patient has no pulse and is not breathing) CPR (Attempt to Resuscitate)    Medical Interventions (Patient has pulse or is breathing) Full Support                    Allergies:   Codeine, Ezetimibe, Hydrocodone-acetaminophen, Levetiracetam, Lorazepam, Phenytoin, Pseudoephedrine, Repatha [evolocumab], Statins, and Zolpidem    Isolation:   No active isolations    Intake and Output    Intake/Output Summary (Last 24 hours) at 3/6/2023 0200  Last data filed at 3/5/2023 2355  Gross per 24 hour   Intake 1000 ml   Output --   Net 1000 ml       Weight:       03/05/23 2244   Weight: 72.6 kg (160 lb)       Most recent vitals:   Vitals:    03/06/23 0032 03/06/23 0046 03/06/23 0101 03/06/23 0131   BP:  146/78 132/73 140/81   Pulse:  67 65 59   Resp:       Temp:       TempSrc:       SpO2: 100% 99% 99% 99%   Weight:       Height:           Active LDAs/IV Access:   Lines, Drains & Airways       Active LDAs       Name Placement date Placement time Site Days    Peripheral IV 03/05/23 2247 Anterior;Left;Upper Arm 03/05/23 2247  Arm  less than 1    Peripheral IV 03/05/23 2248 Right Antecubital 03/05/23 2248  Antecubital  less than 1    ETT  03/05/23 2251  -- less than 1                    Labs (abnormal labs have a star):   Labs Reviewed   COMPREHENSIVE METABOLIC PANEL - Abnormal; Notable  for the following components:       Result Value    BUN 29 (*)     Creatinine 1.30 (*)     Potassium 3.0 (*)     CO2 18.8 (*)     Total Protein 5.8 (*)     Anion Gap 23.2 (*)     eGFR 53.2 (*)     All other components within normal limits    Narrative:     GFR Normal >60  Chronic Kidney Disease <60  Kidney Failure <15    The GFR formula is only valid for adults with stable renal function between ages 18 and 70.   PROTIME-INR - Abnormal; Notable for the following components:    Protime 15.9 (*)     INR 1.25 (*)     All other components within normal limits   SINGLE HSTROPONIN T - Abnormal; Notable for the following components:    HS Troponin T 47 (*)     All other components within normal limits    Narrative:     High Sensitive Troponin T Reference Range:  <10.0 ng/L- Negative Female for AMI  <15.0 ng/L- Negative Male for AMI  >=10 - Abnormal Female indicating possible myocardial injury.  >=15 - Abnormal Male indicating possible myocardial injury.   Clinicians would have to utilize clinical acumen, EKG, Troponin, and serial changes to determine if it is an Acute Myocardial Infarction or myocardial injury due to an underlying chronic condition.        PHENOBARBITAL LEVEL - Abnormal; Notable for the following components:    Phenobarbital <2.4 (*)     All other components within normal limits   CBC WITH AUTO DIFFERENTIAL - Abnormal; Notable for the following components:    RBC 4.13 (*)     Hemoglobin 12.5 (*)     Hematocrit 37.4 (*)     Monocytes, Absolute 1.05 (*)     All other components within normal limits   BLOOD GAS, ARTERIAL - Abnormal; Notable for the following components:    pH, Arterial 7.349 (*)     pCO2, Arterial 52.3 (*)     pO2, Arterial 325.7 (*)     HCO3, Arterial 28.8 (*)     Base Excess, Arterial 2.3 (*)     O2 Saturation Calculated 99.9 (*)     All other components within normal limits   APTT - Normal   MAGNESIUM - Normal   PHOSPHORUS - Normal   RAINBOW DRAW    Narrative:     The following orders were  created for panel order [place] Draw.  Procedure                               Abnormality         Status                     ---------                               -----------         ------                     Green Top (Gel)[[id number]]                                  Final result               Lavender Top[[id number]]                                     Final result               Gold Top - SST[[id number]]                                   Final result               Light Blue Top[[id number]]                                   Final result                 Please view results for these tests on the individual orders.   BLOOD GAS, ARTERIAL   BLOOD GAS, ARTERIAL   POCT GLUCOSE FINGERSTICK   POCT GLUCOSE FINGERSTICK   POCT GLUCOSE FINGERSTICK   POCT GLUCOSE FINGERSTICK   POCT GLUCOSE FINGERSTICK   TYPE AND SCREEN   CBC AND DIFFERENTIAL    Narrative:     The following orders were created for panel order CBC & Differential.  Procedure                               Abnormality         Status                     ---------                               -----------         ------                     CBC Auto Differential[[id number]]        Abnormal            Final result                 Please view results for these tests on the individual orders.   GREEN TOP   LAVENDER TOP   GOLD TOP - SST   LIGHT BLUE TOP       EKG:   ECG 12 Lead Stroke Evaluation   Preliminary Result   HEART RATE= 94  bpm   RR Interval= 638  ms   ME Interval= 183  ms   P Horizontal Axis= -25  deg   P Front Axis= 41  deg   QRSD Interval= 105  ms   QT Interval= 382  ms   QRS Axis= -56  deg   T Wave Axis= 86  deg   - ABNORMAL ECG -   Sinus rhythm   Incomplete RBBB and LAFB   Probable anterolateral infarct, age indeterm   Artifact in lead(s) I,II,III,aVL,V1,V2   Electronically Signed By:    Date and Time of Study: 2023-03-05 23:55:58          Meds given in ED:   Medications   sodium chloride 0.9 % flush 10 mL (has no administration in time range)   propofol  (DIPRIVAN) infusion 10 mg/mL 100 mL (35 mcg/kg/min × 72.6 kg Intravenous Rate/Dose Change 3/6/23 0059)   levETIRAcetam (KEPPRA) injection 500 mg (has no administration in time range)   sodium chloride 0.9 % flush 10 mL (has no administration in time range)   sodium chloride 0.9 % flush 10 mL (has no administration in time range)   sodium chloride 0.9 % infusion 40 mL (has no administration in time range)   aluminum-magnesium hydroxide-simethicone (MAALOX MAX) 400-400-40 MG/5ML suspension 15 mL (has no administration in time range)   ondansetron (ZOFRAN) tablet 4 mg (has no administration in time range)     Or   ondansetron (ZOFRAN) injection 4 mg (has no administration in time range)   chlorhexidine (PERIDEX) 0.12 % solution 15 mL (has no administration in time range)   famotidine (PEPCID) injection 20 mg (has no administration in time range)   Propofol (DIPRIVAN) injection 40 mg (40 mg Intravenous Given 3/5/23 2249)   sodium chloride 0.9 % bolus 1,000 mL (0 mL Intravenous Stopped 3/5/23 2355)   iopamidol (ISOVUE-370) 76 % injection 150 mL (150 mL Intravenous Given 3/5/23 2324)   PHENobarbital 726 mg in sodium chloride 0.9 % 100 mL IVPB (726 mg Intravenous Given 3/6/23 0119)       Imaging results:  CT Angiogram Neck    Result Date: 3/6/2023  1. No obvious acute intracranial findings. Extensive areas of encephalomalacia again noted within the right occipital and parietal lobes, as well as the posterior right temporal lobe. 2. Area of Tmax greater than 6 seconds within the right cerebral hemisphere, measuring up to 106 cc, with a corresponding area of cerebral blood flow less than 30%, measuring about 14 cc. This is located within the area of encephalomalacia. 3. Changes of prior bilateral carotid endarterectomies, with no residual hemodynamically significant stenosis, although there appears to be increasing plaque involving the proximal left internal carotid artery. 4. 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  Radiation dose reduction techniques were utilized, including automated exposure control and exposure modulation based on body size.  This report was finalized on 3/6/2023 1:09 AM by Dr. Ama Perdomo M.D.      XR Chest 1 View    Result Date: 3/6/2023  Endotracheal tube terminates in satisfactory position.  This report was finalized on 3/6/2023 12:09 AM by Dr. Ama Perdomo M.D.      CT Angiogram Head w AI Analysis of LVO    Result Date: 3/6/2023  1. No obvious acute intracranial findings. Extensive areas of encephalomalacia again noted within the right occipital and parietal lobes, as well as the posterior right temporal lobe. 2. Area of Tmax greater than 6 seconds within the right cerebral hemisphere, measuring up to 106 cc, with a corresponding area of cerebral blood flow less than 30%, measuring about 14 cc. This is located within the area of encephalomalacia. 3. Changes of prior bilateral carotid endarterectomies, with no residual hemodynamically significant stenosis, although there appears to be increasing plaque involving the proximal left internal carotid artery. 4. 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  Radiation dose reduction techniques were utilized, including automated exposure control and exposure modulation based on body size.  This report was finalized on 3/6/2023 1:09 AM by Dr. Ama Perdomo M.D.      CT CEREBRAL PERFUSION WITH & WITHOUT CONTRAST    Result Date: 3/6/2023  1. No obvious acute intracranial findings. Extensive areas of encephalomalacia again noted within the right occipital and parietal lobes, as well as the posterior right temporal lobe. 2. Area of Tmax greater than 6 seconds within the right cerebral hemisphere, measuring up to 106 cc, with a corresponding area of cerebral blood flow less than 30%, measuring about 14 cc. This is located within the area of encephalomalacia. 3. Changes of prior bilateral carotid  endarterectomies, with no residual hemodynamically significant stenosis, although there appears to be increasing plaque involving the proximal left internal carotid artery. 4. 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  Radiation dose reduction techniques were utilized, including automated exposure control and exposure modulation based on body size.  This report was finalized on 3/6/2023 1:09 AM by Dr. Ama Perdomo M.D.       Ambulatory status:   - bedrest    Social issues:   Social History     Socioeconomic History    Marital status:      Spouse name: Elizabeth   Tobacco Use    Smoking status: Former     Types: Cigarettes     Quit date: 10/30/1979     Years since quittin.3    Smokeless tobacco: Never   Substance and Sexual Activity    Alcohol use: Yes    Drug use: No    Sexual activity: Defer       NIH Stroke Scale:  Interval: baseline      Latosha Israel RN  23 02:00 EST

## 2023-03-06 NOTE — ED NOTES
Daughter at bedside states she will bring in medication bottles tomorrow to complete home medication list.

## 2023-03-06 NOTE — CONSULTS
Patient Identification:  NAME:  Alireza Carreon  Age:  87 y.o.   Sex:  male   :  1935   MRN:  [id number]       Chief complaint: He does not have one, reason for consult status epilepticus    History of present illness: Patient is an 87-year-old white male with history of dementia known history of stroke and seizure disorder for which he takes phenobarbital.  He comes to the hospital with generalized seizure activity.  Duration more than a few minutes.  EMS saw that he was having generalized seizure activity.  Quality generalized.  Duration continuous until he was given benzodiazepines and intubated and placed on a ventilator with Diprivan drip.  He is not having clinical seizures now.  Location he does have a spastic left arm presumably from his history of stroke.  Quality was tonic-clonic.  Modifying factors benzodiazepines.  He has been on phenobarbital.  We did add Keppra although he has Keppra listed as an allergy due to dizziness is not really an allergy.  Phenobarbital has been 65 mg p.o. nightly.  It is unsure if there is been good compliance or not.  CTA of the head shows no bleed shows no evidence of a cutoff sign and this goes against any acute stroke syndrome.      Past medical history:  Past Medical History:   Diagnosis Date   • 3-vessel CAD    • Anticoagulated on warfarin    • Atrial fibrillation (HCC)    • BMI 28.0-28.9,adult    • Bunion, right foot    • Complaints of memory disturbance    • Dementia (HCC)    • Edema    • Encounter for immunization    • Generalized convulsive seizure (HCC)    • Gout    • History of double vision    • HL (hearing loss)    • Hyperlipidemia    • Hypertension    • Left foot pain    • 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    • Movement disorder    • Nocturnal leg cramps    • OA (osteoarthritis)    • Prepatellar effusion    • Pulmonary embolism (HCC)    • Puncture wound of hand, right    • Screening for cardiovascular condition    • Sleep apnea     • Stasis dermatitis    • Statin intolerance    • Stroke (HCC)    • Taking drug for chronic disease    • Thoracic back pain    • Transient ischemic attack        Past surgical history:  Past Surgical History:   Procedure Laterality Date   • ADENOIDECTOMY     • APPENDECTOMY     • CATARACT EXTRACTION, BILATERAL     • CORONARY ARTERY BYPASS GRAFT     • CORONARY ARTERY BYPASS GRAFT     • CYSTOSCOPY TRANSURETHRAL RESECTION OF PROSTATE     • HERNIA REPAIR     • OTHER SURGICAL HISTORY      carotid thromboendarterectomy   • SHOULDER SURGERY     • SKIN CANCER EXCISION         Allergies:  Codeine, Ezetimibe, Hydrocodone-acetaminophen, Levetiracetam, Lorazepam, Phenytoin, Pseudoephedrine, Repatha [evolocumab], Statins, and Zolpidem    Home medications:  Medications Prior to Admission   Medication Sig Dispense Refill Last Dose   • acetaminophen (Tylenol) 325 MG tablet 2 tablets.      • albuterol sulfate HFA (Proventil HFA) 108 (90 Base) MCG/ACT inhaler 180 mcg.      • bacitracin 500 UNIT/GM ointment Apply 1 application topically to the appropriate area as directed 2 (Two) Times a Day. 28 g 0    • diazePAM (DIASTAT ACUDIAL) 10 MG rectal kit 5 mg.      • donepezil (ARICEPT) 10 MG tablet TAKE ONE TABLET BY MOUTH ONCE NIGHTLY 90 tablet 3    • furosemide (LASIX) 20 MG tablet Take 1 tablet by mouth 2 (Two) Times a Day. 60 tablet 1    • hydroCHLOROthiazide (HYDRODIURIL) 25 MG tablet TAKE ONE TABLET BY MOUTH DAILY 30 tablet 3    • lisinopril (PRINIVIL,ZESTRIL) 20 MG tablet Take 1 tablet by mouth 2 (Two) Times a Day. 180 tablet 1    • melatonin 5 MG tablet tablet 5 mg.      • PHENobarbital (LUMINAL) 64.8 MG tablet Take 1 tablet by mouth Daily. 90 tablet 1    • Potassium (Potassimin) 75 MG tablet Take 1 tablet by mouth Every Night. 30 each 1    • POTASSIUM CHLORIDE ER PO 10 mEq.      • Xarelto 15 MG tablet TAKE ONE TABLET BY MOUTH DAILY WITH DINNER 30 tablet 1         Hospital medications:  chlorhexidine, 15 mL, Mouth/Throat,  Q12H  enoxaparin, 1 mg/kg, Subcutaneous, Q12H  famotidine, 20 mg, Intravenous, BID  levETIRAcetam, 1,000 mg, Intravenous, Q12H  sodium chloride, 10 mL, Intravenous, Q12H      dextrose 5 % and sodium chloride 0.45 %, 100 mL/hr, Last Rate: 100 mL/hr (23 0409)  propofol, 5-50 mcg/kg/min, Last Rate: 40 mcg/kg/min (23 1032)      •  aluminum-magnesium hydroxide-simethicone  •  magnesium sulfate **OR** magnesium sulfate in D5W 1g/100mL (PREMIX) **OR** magnesium sulfate  •  ondansetron **OR** ondansetron  •  potassium & sodium phosphates **OR** potassium & sodium phosphates  •  potassium chloride **OR** potassium chloride **OR** potassium chloride  •  sodium chloride  •  sodium chloride  •  sodium chloride    Family history:  Family History   Problem Relation Age of Onset   • Hyperlipidemia Mother    • Dementia Mother        Social history:  Social History     Tobacco Use   • Smoking status: Former     Types: Cigarettes     Quit date: 10/30/1979     Years since quittin.3   • Smokeless tobacco: Never   Substance Use Topics   • Alcohol use: Yes   • Drug use: No       Review of systems:    He cannot tell me anything no family is present I reviewed the chart fully for the review of system    Objective:  Vitals Ranges:   Temp:  [97.1 °F (36.2 °C)-100.8 °F (38.2 °C)] 100.8 °F (38.2 °C)  Heart Rate:  [57-92] 61  Resp:  [13-33] 18  BP: ()/() 112/78  FiO2 (%):  [40 %-100 %] 40 %      Physical Exam:  Patient is sedated on Diprivan.  No clinical seizures ventilator set on 18 he is breathing 18 cannot answer questions of orientation fund of knowledge attention span concentration are recent and remote memory.  Pupils are about 1 mm without reaction.  No corneals on either side, no doll's eyes.  Face appears symmetrical.  He is intubated.  No other cranial nerves could be tested motor spasticity in the left upper extremity compared to the right.  Reflexes trace throughout slightly brisker on the left side  left Babinski sign.  No Babinski sign on the right side.  Sensation coordination Station and gait could not be tested heart is regular without murmur neck is moderately rigid without bruits.  He is intubated extremities no clubbing cyanosis edema visual acuity does not blink to threat    Results review:   I reviewed the patient's new clinical results.    Data review:  Lab Results (last 24 hours)     Procedure Component Value Units Date/Time    High Sensitivity Troponin T [[id number]]  (Abnormal) Collected: 03/06/23 0440    Specimen: Blood from Arm, Right Updated: 03/06/23 0616     HS Troponin T 85 ng/L     Narrative:      High Sensitive Troponin T Reference Range:  <10.0 ng/L- Negative Female for AMI  <15.0 ng/L- Negative Male for AMI  >=10 - Abnormal Female indicating possible myocardial injury.  >=15 - Abnormal Male indicating possible myocardial injury.   Clinicians would have to utilize clinical acumen, EKG, Troponin, and serial changes to determine if it is an Acute Myocardial Infarction or myocardial injury due to an underlying chronic condition.         Blood Gas, Arterial - [[id number]]  (Abnormal) Collected: 03/06/23 0536    Specimen: Arterial Blood Updated: 03/06/23 0544     Site Arterial: right radial     Mitch's Test Positive     pH, Arterial 7.389 pH units      pCO2, Arterial 42.2 mm Hg      pO2, Arterial 171.2 mm Hg      HCO3, Arterial 25.5 mmol/L      Base Excess, Arterial 0.3 mmol/L      O2 Saturation Calculated 99.5 %      Comment: sat 98% Meter: [id number] : [id number] Jackie Aguilar        A-lesly DO2 0.4 mmHg      Barometric Pressure for Blood Gas 748.1 mmHg      Modality Adult Vent     FIO2 70 %      Ventilator Mode AC     Set Tidal Volume 450     Set Mech Resp Rate 18     Rate 18 Breaths/minute      PEEP 5    POC Glucose Once [[id number]]  (Normal) Collected: 03/06/23 0442    Specimen: Blood Updated: 03/06/23 0453     Glucose 109 mg/dL      Comment: Meter: [id number] : [id number] Riley  Karen PEREZ RN       Lactic Acid, Plasma [[id number]]  (Normal) Collected: 03/06/23 0212    Specimen: Blood Updated: 03/06/23 0240     Lactate 1.7 mmol/L     Blood Gas, Arterial - [[id number]]  (Abnormal) Collected: 03/06/23 0114    Specimen: Arterial Blood Updated: 03/06/23 0116     Site Arterial: right brachial     Mitch's Test N/A     pH, Arterial 7.349 pH units      pCO2, Arterial 52.3 mm Hg      pO2, Arterial 325.7 mm Hg      HCO3, Arterial 28.8 mmol/L      Base Excess, Arterial 2.3 mmol/L      O2 Saturation Calculated 99.9 %      Comment: Spo2 100% Meter: [id number] : [id number] Aidan Jennifer        Wilbert DO2 0.5 mmHg      Barometric Pressure for Blood Gas 752.0 mmHg      Modality Adult Vent     FIO2 100 %      Ventilator Mode AC     Set Tidal Volume 450     Set Mech Resp Rate 18     Rate 18 Breaths/minute      PEEP 5    [place] Draw [[id number]] Collected: 03/05/23 2300    Specimen: Blood Updated: 03/06/23 0115    Narrative:      The following orders were created for panel order [place] Draw.  Procedure                               Abnormality         Status                     ---------                               -----------         ------                     Green Top (Gel)[[id number]]                                  Final result               Lavender Top[[id number]]                                     Final result               Gold Top - SST[[id number]]                                   Final result               Light Blue Top[[id number]]                                   Final result                 Please view results for these tests on the individual orders.    Gold Top - SST [[id number]] Collected: 03/06/23 0000    Specimen: Blood Updated: 03/06/23 0115     Extra Tube Hold for add-ons.     Comment: Auto resulted.       Green Top (Gel) [[id number]] Collected: 03/05/23 2300    Specimen: Blood Updated: 03/06/23 0015     Extra Tube Hold for add-ons.     Comment: Auto resulted.       Light Blue Top  [[id number]] Collected: 03/05/23 2300    Specimen: Blood Updated: 03/06/23 0015     Extra Tube Hold for add-ons.     Comment: Auto resulted       Lavender Top [[id number]] Collected: 03/05/23 2300    Specimen: Blood Updated: 03/06/23 0015     Extra Tube hold for add-on     Comment: Auto resulted       Phenobarbital Level [[id number]]  (Abnormal) Collected: 03/05/23 2300    Specimen: Blood Updated: 03/05/23 2341     Phenobarbital <2.4 mcg/mL     Comprehensive Metabolic Panel [[id number]]  (Abnormal) Collected: 03/05/23 2300    Specimen: Blood Updated: 03/05/23 2341     Glucose 84 mg/dL      BUN 29 mg/dL      Creatinine 1.30 mg/dL      Sodium 141 mmol/L      Potassium 3.0 mmol/L      Chloride 99 mmol/L      CO2 18.8 mmol/L      Calcium 8.8 mg/dL      Total Protein 5.8 g/dL      Albumin 3.7 g/dL      ALT (SGPT) 17 U/L      AST (SGOT) 20 U/L      Alkaline Phosphatase 60 U/L      Total Bilirubin 0.3 mg/dL      Globulin 2.1 gm/dL      A/G Ratio 1.8 g/dL      BUN/Creatinine Ratio 22.3     Anion Gap 23.2 mmol/L      eGFR 53.2 mL/min/1.73     Narrative:      GFR Normal >60  Chronic Kidney Disease <60  Kidney Failure <15    The GFR formula is only valid for adults with stable renal function between ages 18 and 70.    Single High Sensitivity Troponin T [[id number]]  (Abnormal) Collected: 03/05/23 2300    Specimen: Blood Updated: 03/05/23 2341     HS Troponin T 47 ng/L     Narrative:      High Sensitive Troponin T Reference Range:  <10.0 ng/L- Negative Female for AMI  <15.0 ng/L- Negative Male for AMI  >=10 - Abnormal Female indicating possible myocardial injury.  >=15 - Abnormal Male indicating possible myocardial injury.   Clinicians would have to utilize clinical acumen, EKG, Troponin, and serial changes to determine if it is an Acute Myocardial Infarction or myocardial injury due to an underlying chronic condition.         Phosphorus [[id number]]  (Normal) Collected: 03/05/23 2300    Specimen: Blood Updated: 03/05/23 2341      Phosphorus 3.4 mg/dL     Magnesium [[id number]]  (Normal) Collected: 03/05/23 2300    Specimen: Blood Updated: 03/05/23 2341     Magnesium 2.0 mg/dL     Protime-INR [[id number]]  (Abnormal) Collected: 03/05/23 2300    Specimen: Blood Updated: 03/05/23 2330     Protime 15.9 Seconds      INR 1.25    aPTT [[id number]]  (Normal) Collected: 03/05/23 2300    Specimen: Blood Updated: 03/05/23 2330     PTT 27.5 seconds     CBC & Differential [[id number]]  (Abnormal) Collected: 03/05/23 2300    Specimen: Blood Updated: 03/05/23 2321    Narrative:      The following orders were created for panel order CBC & Differential.  Procedure                               Abnormality         Status                     ---------                               -----------         ------                     CBC Auto Differential[[id number]]        Abnormal            Final result                 Please view results for these tests on the individual orders.    CBC Auto Differential [[id number]]  (Abnormal) Collected: 03/05/23 2300    Specimen: Blood Updated: 03/05/23 2321     WBC 9.69 10*3/mm3      RBC 4.13 10*6/mm3      Hemoglobin 12.5 g/dL      Hematocrit 37.4 %      MCV 90.6 fL      MCH 30.3 pg      MCHC 33.4 g/dL      RDW 12.3 %      RDW-SD 41.1 fl      MPV 9.6 fL      Platelets 184 10*3/mm3      Neutrophil % 63.8 %      Lymphocyte % 22.0 %      Monocyte % 10.8 %      Eosinophil % 2.1 %      Basophil % 0.8 %      Immature Grans % 0.5 %      Neutrophils, Absolute 6.18 10*3/mm3      Lymphocytes, Absolute 2.13 10*3/mm3      Monocytes, Absolute 1.05 10*3/mm3      Eosinophils, Absolute 0.20 10*3/mm3      Basophils, Absolute 0.08 10*3/mm3      Immature Grans, Absolute 0.05 10*3/mm3      nRBC 0.0 /100 WBC            Imaging:  Imaging Results (Last 24 Hours)     Procedure Component Value Units Date/Time    CT CEREBRAL PERFUSION WITH & WITHOUT CONTRAST [[id number]] Collected: 03/05/23 2350     Updated: 03/06/23 0113    Narrative:      NONCONTRAST  CRANIAL CT SCAN, CT ANGIOGRAM NECK, CT ANGIOGRAM HEAD,  CRANIAL CT PERFUSION.     HISTORY: Acute CVA,      COMPARISON: None..     TECHNIQUE:  Radiation dose reduction techniques were utilized, including  automated exposure control and exposure modulation based on body size.   Initially, a routine noncontrast cranial CT performed from the skull  base through the vertex region. After review, thin-section contrast  enhanced CT angiogram images obtained from the aortic arch through the  calvarial vertex utilizing angiographic technique. Multi projection 3-D  MIP reformatted images were supplemented and reviewed. Subsequently, CT  perfusion imaging performed with ischemia view software.     FINDINGS CRANIAL CT: No acute hemorrhage or midline shift is  demonstrated..  There is diffuse atrophy. There is extensive  periventricular and deep white matter microangiopathic change. The  appearance is very similar to the patient's prior CT from 05/23/2022.   There is no midline shift or mass effect. Old lacunar infarcts are  suspected within the basal ganglia bilaterally. Postcontrast imaging  does not show any evidence of venous occlusion or abnormal enhancement.   Bone window images demonstrate mucosal thickening within the ethmoid  sinuses..     Study was placed online at 2315. Preliminary interpretation telephoned  to extension [phone] during interpretation at 2320.           CERVICAL CAROTID CT ANGIOGRAM:     FINDINGS: There is normal arch anatomy. There are changes of prior  bilateral carotid endarterectomies. No hemodynamically significant  stenosis is seen on either side. There does appear to be increasing  plaque involving the proximal left internal carotid artery. The distal  cervical internal carotid arteries are widely patent. There is some  plaque seen at the origin of the left to artery, resulting in some mild  narrowing. Cervical right vertebral artery is widely patent. There are  background emphysematous changes.       NASCET criteria utilized in stenosis measurements. Stenosis mild, 0-49%.        CRANIAL CTA ANGIOGRAM:     FINDINGS:  Endotracheal tube terminates in satisfactory position  position. The intracranial internal carotid arteries demonstrate  extensive calcification. Technologist has measured prominent  supraclinoid segments of the internal carotid arteries bilaterally. This  appearance is stable when compared to prior exam, and likely represents  some post stenotic dilatation, related to some narrowing of the distal  cavernous carotid arteries.  There is an anterior communicating artery.  Anterior cerebral arteries are patent. There is some diffuse mild  disease of the middle cerebral arteries bilaterally, with the exception  of a moderate narrowing of the proximal right M2. There is some  irregularity of the intracranial right vertebral artery, with mild  narrowing noted. Left intracranial vertebral artery is dominant.  There  is irregularity of the distal basilar artery, with some associated mild  narrowing. There is at least moderate stenosis of the right P1. This was  also seen on prior imaging. There is diffuse irregularity of both  posterior cerebral arteries.            CT PERFUSION:     FINDINGS:  There is an area Tmax greater than 6 seconds within the right  cerebral hemisphere, measuring up to 106 cc. There is a corresponding  area of cerebral blood flow less than 30% within the right occipital and  posterior parietal lobes, where the patient has a known history of  encephalomalacia. This area measures up to 14 cc.     Study was placed online at  1270. Preliminary interpretation telephoned  to extension [phone] during interpretation at 2180.     AI analysis of LVO was utilized.     Radiation dose reduction techniques were utilized, including automated  exposure control and exposure modulation based on body size.          Impression:      1. No obvious acute intracranial findings. Extensive areas  of  encephalomalacia again noted within the right occipital and parietal  lobes, as well as the posterior right temporal lobe.  2. Area of Tmax greater than 6 seconds within the right cerebral  hemisphere, measuring up to 106 cc, with a corresponding area of  cerebral blood flow less than 30%, measuring about 14 cc. This is  located within the area of encephalomalacia.  3. Changes of prior bilateral carotid endarterectomies, with no residual  hemodynamically significant stenosis, although there appears to be  increasing plaque involving the proximal left internal carotid artery.  4. 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     Radiation dose reduction techniques were utilized, including automated  exposure control and exposure modulation based on body size.     This report was finalized on 3/6/2023 1:09 AM by Dr. Ama Perdomo M.D.       CT Angiogram Head w AI Analysis of LVO [[id number]] Collected: 03/05/23 2350     Updated: 03/06/23 0113    Narrative:      NONCONTRAST CRANIAL CT SCAN, CT ANGIOGRAM NECK, CT ANGIOGRAM HEAD,  CRANIAL CT PERFUSION.     HISTORY: Acute CVA,      COMPARISON: None..     TECHNIQUE:  Radiation dose reduction techniques were utilized, including  automated exposure control and exposure modulation based on body size.   Initially, a routine noncontrast cranial CT performed from the skull  base through the vertex region. After review, thin-section contrast  enhanced CT angiogram images obtained from the aortic arch through the  calvarial vertex utilizing angiographic technique. Multi projection 3-D  MIP reformatted images were supplemented and reviewed. Subsequently, CT  perfusion imaging performed with ischemia view software.     FINDINGS CRANIAL CT: No acute hemorrhage or midline shift is  demonstrated..  There is diffuse atrophy. There is extensive  periventricular and deep white matter microangiopathic change. The  appearance is very similar to the patient's  prior CT from 05/23/2022.   There is no midline shift or mass effect. Old lacunar infarcts are  suspected within the basal ganglia bilaterally. Postcontrast imaging  does not show any evidence of venous occlusion or abnormal enhancement.   Bone window images demonstrate mucosal thickening within the ethmoid  sinuses..     Study was placed online at 2315. Preliminary interpretation telephoned  to extension [phone] during interpretation at 2320.           CERVICAL CAROTID CT ANGIOGRAM:     FINDINGS: There is normal arch anatomy. There are changes of prior  bilateral carotid endarterectomies. No hemodynamically significant  stenosis is seen on either side. There does appear to be increasing  plaque involving the proximal left internal carotid artery. The distal  cervical internal carotid arteries are widely patent. There is some  plaque seen at the origin of the left to artery, resulting in some mild  narrowing. Cervical right vertebral artery is widely patent. There are  background emphysematous changes.      NASCET criteria utilized in stenosis measurements. Stenosis mild, 0-49%.        CRANIAL CTA ANGIOGRAM:     FINDINGS:  Endotracheal tube terminates in satisfactory position  position. The intracranial internal carotid arteries demonstrate  extensive calcification. Technologist has measured prominent  supraclinoid segments of the internal carotid arteries bilaterally. This  appearance is stable when compared to prior exam, and likely represents  some post stenotic dilatation, related to some narrowing of the distal  cavernous carotid arteries.  There is an anterior communicating artery.  Anterior cerebral arteries are patent. There is some diffuse mild  disease of the middle cerebral arteries bilaterally, with the exception  of a moderate narrowing of the proximal right M2. There is some  irregularity of the intracranial right vertebral artery, with mild  narrowing noted. Left intracranial vertebral artery is  dominant.  There  is irregularity of the distal basilar artery, with some associated mild  narrowing. There is at least moderate stenosis of the right P1. This was  also seen on prior imaging. There is diffuse irregularity of both  posterior cerebral arteries.            CT PERFUSION:     FINDINGS:  There is an area Tmax greater than 6 seconds within the right  cerebral hemisphere, measuring up to 106 cc. There is a corresponding  area of cerebral blood flow less than 30% within the right occipital and  posterior parietal lobes, where the patient has a known history of  encephalomalacia. This area measures up to 14 cc.     Study was placed online at  3436. Preliminary interpretation telephoned  to extension [phone] during interpretation at 5185.     AI analysis of LVO was utilized.     Radiation dose reduction techniques were utilized, including automated  exposure control and exposure modulation based on body size.          Impression:      1. No obvious acute intracranial findings. Extensive areas of  encephalomalacia again noted within the right occipital and parietal  lobes, as well as the posterior right temporal lobe.  2. Area of Tmax greater than 6 seconds within the right cerebral  hemisphere, measuring up to 106 cc, with a corresponding area of  cerebral blood flow less than 30%, measuring about 14 cc. This is  located within the area of encephalomalacia.  3. Changes of prior bilateral carotid endarterectomies, with no residual  hemodynamically significant stenosis, although there appears to be  increasing plaque involving the proximal left internal carotid artery.  4. 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     Radiation dose reduction techniques were utilized, including automated  exposure control and exposure modulation based on body size.     This report was finalized on 3/6/2023 1:09 AM by Dr. Ama Perdomo M.D.       CT Angiogram Neck [[id number]] Collected: 03/05/23  2350     Updated: 03/06/23 0113    Narrative:      NONCONTRAST CRANIAL CT SCAN, CT ANGIOGRAM NECK, CT ANGIOGRAM HEAD,  CRANIAL CT PERFUSION.     HISTORY: Acute CVA,      COMPARISON: None..     TECHNIQUE:  Radiation dose reduction techniques were utilized, including  automated exposure control and exposure modulation based on body size.   Initially, a routine noncontrast cranial CT performed from the skull  base through the vertex region. After review, thin-section contrast  enhanced CT angiogram images obtained from the aortic arch through the  calvarial vertex utilizing angiographic technique. Multi projection 3-D  MIP reformatted images were supplemented and reviewed. Subsequently, CT  perfusion imaging performed with ischemia view software.     FINDINGS CRANIAL CT: No acute hemorrhage or midline shift is  demonstrated..  There is diffuse atrophy. There is extensive  periventricular and deep white matter microangiopathic change. The  appearance is very similar to the patient's prior CT from 05/23/2022.   There is no midline shift or mass effect. Old lacunar infarcts are  suspected within the basal ganglia bilaterally. Postcontrast imaging  does not show any evidence of venous occlusion or abnormal enhancement.   Bone window images demonstrate mucosal thickening within the ethmoid  sinuses..     Study was placed online at 2315. Preliminary interpretation telephoned  to extension [phone] during interpretation at 2320.           CERVICAL CAROTID CT ANGIOGRAM:     FINDINGS: There is normal arch anatomy. There are changes of prior  bilateral carotid endarterectomies. No hemodynamically significant  stenosis is seen on either side. There does appear to be increasing  plaque involving the proximal left internal carotid artery. The distal  cervical internal carotid arteries are widely patent. There is some  plaque seen at the origin of the left to artery, resulting in some mild  narrowing. Cervical right vertebral artery is  widely patent. There are  background emphysematous changes.      NASCET criteria utilized in stenosis measurements. Stenosis mild, 0-49%.        CRANIAL CTA ANGIOGRAM:     FINDINGS:  Endotracheal tube terminates in satisfactory position  position. The intracranial internal carotid arteries demonstrate  extensive calcification. Technologist has measured prominent  supraclinoid segments of the internal carotid arteries bilaterally. This  appearance is stable when compared to prior exam, and likely represents  some post stenotic dilatation, related to some narrowing of the distal  cavernous carotid arteries.  There is an anterior communicating artery.  Anterior cerebral arteries are patent. There is some diffuse mild  disease of the middle cerebral arteries bilaterally, with the exception  of a moderate narrowing of the proximal right M2. There is some  irregularity of the intracranial right vertebral artery, with mild  narrowing noted. Left intracranial vertebral artery is dominant.  There  is irregularity of the distal basilar artery, with some associated mild  narrowing. There is at least moderate stenosis of the right P1. This was  also seen on prior imaging. There is diffuse irregularity of both  posterior cerebral arteries.            CT PERFUSION:     FINDINGS:  There is an area Tmax greater than 6 seconds within the right  cerebral hemisphere, measuring up to 106 cc. There is a corresponding  area of cerebral blood flow less than 30% within the right occipital and  posterior parietal lobes, where the patient has a known history of  encephalomalacia. This area measures up to 14 cc.     Study was placed online at  3609. Preliminary interpretation telephoned  to extension [phone] during interpretation at 9294.     AI analysis of LVO was utilized.     Radiation dose reduction techniques were utilized, including automated  exposure control and exposure modulation based on body size.          Impression:      1. No  obvious acute intracranial findings. Extensive areas of  encephalomalacia again noted within the right occipital and parietal  lobes, as well as the posterior right temporal lobe.  2. Area of Tmax greater than 6 seconds within the right cerebral  hemisphere, measuring up to 106 cc, with a corresponding area of  cerebral blood flow less than 30%, measuring about 14 cc. This is  located within the area of encephalomalacia.  3. Changes of prior bilateral carotid endarterectomies, with no residual  hemodynamically significant stenosis, although there appears to be  increasing plaque involving the proximal left internal carotid artery.  4. 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     Radiation dose reduction techniques were utilized, including automated  exposure control and exposure modulation based on body size.     This report was finalized on 3/6/2023 1:09 AM by Dr. Ama Perdomo M.D.       XR Chest 1 View [[id number]] Collected: 03/06/23 0008     Updated: 03/06/23 0012    Narrative:      SINGLE VIEW OF THE CHEST     HISTORY: Postintubation     COMPARISON: 05/23/2022     FINDINGS:  Endotracheal tube terminates in satisfactory position. Heart size is  within normal limits. There is calcification of the aorta. No  pneumothorax or pleural effusion is seen. There may be some mild left  basilar atelectasis.       Impression:      Endotracheal tube terminates in satisfactory position.     This report was finalized on 3/6/2023 12:09 AM by Dr. Ama Perdomo M.D.            PPE worn at all times washed before washed up afterwards disposed of everything properly is not within 6 feet of him for more than a few minutes during my exam no aerosols used at any point    Assessment and Plan:     This patient has a known history of dementia, previous history of stroke and known seizure disorder.  He presents in status epilepticus.  He has listed as an allergy to Keppra which causes dizziness which  is not a true allergy and he has been given that appropriately.  I am going to stop it at this time increase his phenobarbital to 100 mg IV every 12 hours and add Vimpat 100 mg IV every 12 hours note he is listed as being allergic to phenytoin also.  So, phenobarbital 100 mg IV every 12 Vimpat 100 mg IV every 12 hours and he is currently being hooked up to the EEG for continuous EEG and we will see if he is having subclinical seizures at this time.  Note clinically he does not look like he is seizing does have spasticity and a Babinski sign on the left side which I suspect is old.  I have looked at the CTA results and I do not believe this patient has an acute stroke syndrome.    Montana Rizo MD  03/06/23  10:33 EST

## 2023-03-06 NOTE — ED NOTES
Patient noted to have wounds to left arm from fall prior to arrival. Elbow and arm skin tears of left arm irrigated with normal saline, antibiotic ointment applied and telfa applied. Wound to left hand covered with oil emulsion dressing and telfa.

## 2023-03-07 NOTE — TELEPHONE ENCOUNTER
Patient is admitted to [facility] 03/05/23 and daughter feels patients wife, her step mother is unable mentally to care for her father and she wants to obtain guardianship over her father and is unsure of the process, please advise.    PER DR. LINO- informed patients daughter   said she needs to obtain a  to be able to obtain guardianship, daughter voiced understanding.

## 2023-03-07 NOTE — PLAN OF CARE
Problem: Malnutrition  Goal: Improved Nutritional Intake  Outcome: Ongoing, Progressing     Problem: Aspiration (Enteral Nutrition)  Goal: Absence of Aspiration Signs and Symptoms  Outcome: Ongoing, Progressing     Problem: Device-Related Complication Risk (Enteral Nutrition)  Goal: Safe, Effective Therapy Delivery  Outcome: Ongoing, Progressing     Problem: Feeding Intolerance (Enteral Nutrition)  Goal: Feeding Tolerance  Outcome: Ongoing, Progressing   Goal Outcome Evaluation:      TF's per MD order  RD to follow

## 2023-03-07 NOTE — PROGRESS NOTES
[facility] Clinical Pharmacy Services: Medication Reconciliation     Alireza Carreon is a 87 y.o. male presenting to [facility] for Status epilepticus (HCC) [G40.901]  Acute renal insufficiency [N28.9]  Anticoagulated [Z79.01]  Seizure secondary to subtherapeutic anticonvulsant medication (HCC) [R56.9, Z79.899]       He  has a past medical history of 3-vessel CAD, Anticoagulated on warfarin, Atrial fibrillation (HCC), BMI 28.0-28.9,adult, Bunion, right foot, Complaints of memory disturbance, Dementia (HCC), Edema, Encounter for immunization, Generalized convulsive seizure ([facility]), Gout, History of double vision, HL (hearing loss), Hyperlipidemia, Hypertension, Left foot pain, 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, Movement disorder, Nocturnal leg cramps, OA (osteoarthritis), Prepatellar effusion, Pulmonary embolism (HCC), Puncture wound of hand, right, Screening for cardiovascular condition, Sleep apnea, Stasis dermatitis, Statin intolerance, Stroke ([facility]), Taking drug for chronic disease, Thoracic back pain, and Transient ischemic attack.       Allergies as of 03/05/2023 - Reviewed 03/05/2023   Allergen Reaction Noted    Codeine Itching 08/01/2014    Ezetimibe Rash 03/31/2014    Hydrocodone-acetaminophen Other (See Comments) 03/24/2014    Levetiracetam Dizziness 08/01/2014    Lorazepam Unknown - Low Severity 06/14/2021    Phenytoin Itching 08/01/2014    Pseudoephedrine Dizziness 08/01/2014    Repatha [evolocumab] Rash 10/14/2019    Statins Itching 08/01/2014    Zolpidem Unknown - Low Severity 04/15/2015          Medication information was obtained from: external fill history and interview with patient and family     Prior to Admission Medications       Prescriptions Last Dose Informant Patient Reported? Taking?    albuterol sulfate HFA (Proventil HFA) 108 (90 Base) MCG/ACT inhaler   No Yes    180 mcg.    donepezil (ARICEPT) 10 MG tablet   No Yes    TAKE ONE TABLET BY MOUTH ONCE  NIGHTLY    furosemide (LASIX) 20 MG tablet   No Yes    Take 1 tablet by mouth 2 (Two) Times a Day.    hydroCHLOROthiazide (HYDRODIURIL) 25 MG tablet   No Yes    TAKE ONE TABLET BY MOUTH DAILY    lisinopril (PRINIVIL,ZESTRIL) 20 MG tablet   No Yes    Take 1 tablet by mouth 2 (Two) Times a Day.    PHENobarbital (LUMINAL) 64.8 MG tablet   No Yes    Take 1 tablet by mouth Daily.    Potassium (Potassimin) 75 MG tablet   No Yes    Take 1 tablet by mouth Every Night.    Xarelto 15 MG tablet   No Yes    Take 1 tablet by mouth every night with dinner            Medication notes:   After discussion with family and reported fill histories from outpatient pharmacy, non-compliance should be considered. Last fills and day supplies (DS) per [facility] pharmacy listed below:     Medication  Last Filled  Day Supply    Xarelto  1/19/23 30 days   Phenobarbital  11/4/22 90 days   Lisinopril  2/2/23 90 days   Hydrochlorothiazide  1/19/23 30 days   Donepezil  11/4/22 90 days    Furosemide 1/19/23 30 days     This medication list is complete to the best of my knowledge as of 3/7/2023       Please call if questions.     Liza Tineo, PharmD  Clinical Pharmacist

## 2023-03-07 NOTE — PROGRESS NOTES
Patient Identification:  NAME:  Alireza Carreon  Age:  87 y.o.   Sex:  male   :  1935   MRN:  [id number]       Chief complaint: Status epilepticus    History of present illness: He is still sedated with Diprivan.  He is on the phenobarbital and the Vimpat.  The EEG at bedside does not show any current seizure activity.  Recall the CT, CTA, and CT perfusion studies show right hemisphere abnormalities consistent with his history of stroke there and encephalomalacic there was no cutoff sign and no real indication that he has suffered an acute stroke syndrome based upon those studies, nor by his clinical presentation with seizure activity.    Past medical history:  Past Medical History:   Diagnosis Date   • 3-vessel CAD    • Anticoagulated on warfarin    • Atrial fibrillation (HCC)    • BMI 28.0-28.9,adult    • Bunion, right foot    • Complaints of memory disturbance    • Dementia (HCC)    • Edema    • Encounter for immunization    • Generalized convulsive seizure (HCC)    • Gout    • History of double vision    • HL (hearing loss)    • Hyperlipidemia    • Hypertension    • Left foot pain    • 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    • Movement disorder    • Nocturnal leg cramps    • OA (osteoarthritis)    • Prepatellar effusion    • Pulmonary embolism (HCC)    • Puncture wound of hand, right    • Screening for cardiovascular condition    • Sleep apnea    • Stasis dermatitis    • Statin intolerance    • Stroke (HCC)    • Taking drug for chronic disease    • Thoracic back pain    • Transient ischemic attack        Allergies:  Codeine, Ezetimibe, Hydrocodone-acetaminophen, Levetiracetam, Lorazepam, Phenytoin, Pseudoephedrine, Repatha [evolocumab], Statins, and Zolpidem    Home medications:  Medications Prior to Admission   Medication Sig Dispense Refill Last Dose   • donepezil (ARICEPT) 10 MG tablet TAKE ONE TABLET BY MOUTH ONCE NIGHTLY 90 tablet 3 3/5/2023   • furosemide (LASIX) 20 MG tablet  Take 1 tablet by mouth 2 (Two) Times a Day. 60 tablet 1 3/5/2023   • hydroCHLOROthiazide (HYDRODIURIL) 25 MG tablet TAKE ONE TABLET BY MOUTH DAILY 30 tablet 3 3/5/2023   • lisinopril (PRINIVIL,ZESTRIL) 20 MG tablet Take 1 tablet by mouth 2 (Two) Times a Day. 180 tablet 1 3/5/2023   • melatonin 5 MG tablet tablet 1 tablet.   Past Week   • PHENobarbital (LUMINAL) 64.8 MG tablet Take 1 tablet by mouth Daily. 90 tablet 1 3/5/2023   • Potassium (Potassimin) 75 MG tablet Take 1 tablet by mouth Every Night. 30 each 1 Past Week   • POTASSIUM CHLORIDE ER PO 10 mEq.   Past Week   • Xarelto 15 MG tablet TAKE ONE TABLET BY MOUTH DAILY WITH DINNER 30 tablet 1 3/5/2023   • acetaminophen (Tylenol) 325 MG tablet 2 tablets.      • albuterol sulfate HFA (Proventil HFA) 108 (90 Base) MCG/ACT inhaler 180 mcg.      • bacitracin 500 UNIT/GM ointment Apply 1 application topically to the appropriate area as directed 2 (Two) Times a Day. 28 g 0    • diazePAM (DIASTAT ACUDIAL) 10 MG rectal kit 5 mg.           Hospital medications:  chlorhexidine, 15 mL, Mouth/Throat, Q12H  enoxaparin, 1 mg/kg, Subcutaneous, Q12H  famotidine, 20 mg, Intravenous, BID  Lacosamide, 100 mg, Intravenous, Q12H  IVPB builder, , Intravenous, Q12H  piperacillin-tazobactam, 3.375 g, Intravenous, Once  piperacillin-tazobactam, 3.375 g, Intravenous, Q8H  sodium chloride, 10 mL, Intravenous, Q12H      phenylephrine, 0.5-3 mcg/kg/min  propofol, 5-50 mcg/kg/min, Last Rate: 25 mcg/kg/min (03/07/23 0825)      •  aluminum-magnesium hydroxide-simethicone  •  Calcium Gluconate-NaCl **AND** calcium gluconate IVPB **AND** Calcium  •  LORazepam  •  magnesium sulfate **OR** magnesium sulfate **OR** magnesium sulfate  •  magnesium sulfate **OR** magnesium sulfate in D5W 1g/100mL (PREMIX) **OR** magnesium sulfate  •  ondansetron **OR** ondansetron  •  potassium & sodium phosphates **OR** potassium & sodium phosphates  •  potassium & sodium phosphates **OR** potassium & sodium  phosphates  •  potassium chloride **OR** potassium chloride **OR** potassium chloride  •  potassium chloride **OR** potassium chloride **OR** potassium chloride  •  sodium chloride  •  sodium chloride  •  sodium chloride      Objective:  Vitals Ranges:   Temp:  [97.6 °F (36.4 °C)-98.7 °F (37.1 °C)] 97.6 °F (36.4 °C)  Heart Rate:  [44-67] 55  Resp:  [18-26] 18  BP: ()/() 157/82  FiO2 (%):  [30 %-40 %] 30 %      Physical Exam:  Patient is sedated on Diprivan.  Pupils 2 mm with minimal reaction corneals are present.  No pain was given increased tone on the left side increased reflexes in the left side left toe upgoing right toe downgoing    Results review:   I reviewed the patient's new clinical results.    Data review:  Lab Results (last 24 hours)     Procedure Component Value Units Date/Time    POC Glucose Once [[id number]]  (Normal) Collected: 03/07/23 0942    Specimen: Blood Updated: 03/07/23 0943     Glucose 89 mg/dL      Comment: Meter: [id number] : [id number] Ryland Bueno RN       Triglycerides [[id number]]  (Normal) Collected: 03/07/23 0515    Specimen: Blood Updated: 03/07/23 0825     Triglycerides 93 mg/dL     Comprehensive Metabolic Panel [[id number]]  (Abnormal) Collected: 03/07/23 0515    Specimen: Blood Updated: 03/07/23 0623     Glucose 102 mg/dL      BUN 16 mg/dL      Creatinine 1.00 mg/dL      Sodium 139 mmol/L      Potassium 3.9 mmol/L      Comment: Slight hemolysis detected by analyzer. Results may be affected.        Chloride 108 mmol/L      CO2 27.0 mmol/L      Calcium 7.8 mg/dL      Total Protein 4.7 g/dL      Albumin 2.7 g/dL      ALT (SGPT) 16 U/L      AST (SGOT) 19 U/L      Alkaline Phosphatase 56 U/L      Total Bilirubin 0.3 mg/dL      Globulin 2.0 gm/dL      A/G Ratio 1.4 g/dL      BUN/Creatinine Ratio 16.0     Anion Gap 4.0 mmol/L      eGFR 72.8 mL/min/1.73     Narrative:      GFR Normal >60  Chronic Kidney Disease <60  Kidney Failure <15    The GFR formula is only valid for  adults with stable renal function between ages 18 and 70.    POC Glucose Once [[id number]]  (Normal) Collected: 03/07/23 0512    Specimen: Blood Updated: 03/07/23 0513     Glucose 98 mg/dL      Comment: Meter: [id number] : [id number] Darrel Kirk RN       Blood Gas, Arterial - [[id number]] Collected: 03/07/23 0457    Specimen: Arterial Blood Updated: 03/07/23 0459     Site Arterial: right radial     Mitch's Test Positive     pH, Arterial 7.391 pH units      pCO2, Arterial 41.2 mm Hg      pO2, Arterial 84.8 mm Hg      HCO3, Arterial 25.0 mmol/L      Base Excess, Arterial 0.0 mmol/L      O2 Saturation Calculated 96.3 %      Comment: Meter: [id number] : BRANDI Atkins DO2 0.3 mmHg      Barometric Pressure for Blood Gas 751.3 mmHg      Modality Adult Vent     FIO2 40 %      Ventilator Mode AC     Set Tidal Volume 450     Set Mech Resp Rate 18     Rate 18 Breaths/minute      PEEP 5    CBC & Differential [[id number]]  (Abnormal) Collected: 03/07/23 0352    Specimen: Blood Updated: 03/07/23 0431    Narrative:      The following orders were created for panel order CBC & Differential.  Procedure                               Abnormality         Status                     ---------                               -----------         ------                     CBC Auto Differential[[id number]]        Abnormal            Final result                 Please view results for these tests on the individual orders.    CBC Auto Differential [[id number]]  (Abnormal) Collected: 03/07/23 0352    Specimen: Blood Updated: 03/07/23 0431     WBC 11.00 10*3/mm3      RBC 4.55 10*6/mm3      Hemoglobin 13.0 g/dL      Hematocrit 40.6 %      MCV 89.2 fL      MCH 28.6 pg      MCHC 32.0 g/dL      RDW 12.5 %      RDW-SD 40.9 fl      MPV 10.3 fL      Platelets 162 10*3/mm3      Neutrophil % 77.2 %      Lymphocyte % 11.6 %      Monocyte % 7.7 %      Eosinophil % 2.4 %      Basophil % 0.7 %      Immature Grans % 0.4 %       Neutrophils, Absolute 8.49 10*3/mm3      Lymphocytes, Absolute 1.28 10*3/mm3      Monocytes, Absolute 0.85 10*3/mm3      Eosinophils, Absolute 0.26 10*3/mm3      Basophils, Absolute 0.08 10*3/mm3      Immature Grans, Absolute 0.04 10*3/mm3      nRBC 0.0 /100 WBC     POC Glucose Once [[id number]]  (Normal) Collected: 03/06/23 2326    Specimen: Blood Updated: 03/06/23 2327     Glucose 100 mg/dL      Comment: Meter: [id number] : [id number] Darrel Kirk RN       Potassium [[id number]]  (Normal) Collected: 03/06/23 2015    Specimen: Blood Updated: 03/06/23 2145     Potassium 3.5 mmol/L     High Sensitivity Troponin T 2Hr [[id number]]  (Abnormal) Collected: 03/06/23 2015    Specimen: Blood Updated: 03/06/23 2144     HS Troponin T 73 ng/L      Troponin T Delta -12 ng/L     Narrative:      High Sensitive Troponin T Reference Range:  <10.0 ng/L- Negative Female for AMI  <15.0 ng/L- Negative Male for AMI  >=10 - Abnormal Female indicating possible myocardial injury.  >=15 - Abnormal Male indicating possible myocardial injury.   Clinicians would have to utilize clinical acumen, EKG, Troponin, and serial changes to determine if it is an Acute Myocardial Infarction or myocardial injury due to an underlying chronic condition.         POC Glucose Once [[id number]]  (Normal) Collected: 03/06/23 2001    Specimen: Blood Updated: 03/06/23 2002     Glucose 104 mg/dL      Comment: Meter: [id number] : [id number] Darrel Kirk RN       POC Glucose Once [[id number]]  (Normal) Collected: 03/06/23 1834    Specimen: Blood Updated: 03/06/23 1836     Glucose 103 mg/dL      Comment: Meter: [id number] : [id number] Johnnie Caballero RN              Imaging:  Imaging Results (Last 24 Hours)     Procedure Component Value Units Date/Time    XR Chest 1 View [[id number]] Collected: 03/07/23 0526     Updated: 03/07/23 0531    Narrative:      SINGLE VIEW OF THE CHEST     HISTORY: Seizure     COMPARISON: 03/07/2023      FINDINGS:  Endotracheal tube terminates in satisfactory position. Exam is  compromised by poor patient positioning. Left lung base is excluded from  the radiograph. Patient does appear to have increasing consolidation at  the left lung base, as well as a left pleural effusion. There is also  some increasing right basilar atelectasis. No pneumothorax is seen.  There is calcification of the aorta. Cardiac silhouette is unchanged.       Impression:      Increasing left basilar consolidation and left pleural effusion.     This report was finalized on 3/7/2023 5:28 AM by Dr. Aam Perdomo M.D.         PPE worn at all times washed before washed up afterwards disposed of everything properly was not within 6 feet of them for more than few minutes during my exam no aerosols used at any point      Assessment and Plan:   Status epilepticus appears to be completely resolved.    This patient does not appear to be having seizure activity either clinically or by the continuous EEG that I have read at his bedside.  He is still on Diprivan and we are going to discontinue that at this time.  Note he is on Vimpat 100 IV every 12 and phenobarbital 100 IV every 12 and all those these medicines would be sedating.  They would not cause him to be unconscious.  So, lets see how he does off of the Diprivan and if he becomes rambunctious at all.  We can always restart it as needed.      [place]ana Rizo MD  03/07/23  10:42 EST

## 2023-03-07 NOTE — PROGRESS NOTES
Nutrition Services    Patient Name:  Alireza Carreon  YOB: 1935  MRN: [id number]  Admit Date:  3/5/2023  Assessment Date:  03/07/23    Comment: Nutrition Consult for TF assessment and Cortrak placement  Dx: Respiratory failure on ventilator, status epilepticus  ND Cortrak placed with Nasal Bridle.  BMI 17.48, NFPE completed    Based on ASPEN/AND criteria, patient meets a Nutrition Diagnosis of Moderate Malnutrition of Acute Disease based on decrease po intake and wt loss.    Plan  1) Recommend TF's with Fibersource HN at 20ml/hr and advance to goal of 60ml/hr  2) Water flushe [id number]    Will continue to follow clinical course and monitor nutritional needs.       CLINICAL NUTRITION ASSESSMENT      Reason for Assessment Physician Consult, Tube Feeding Assessment      Diagnosis/Problem   Pt Respiratory failure on ventilator, status epilepticus, hx of dementia, seizures   Medical/Surgical History Past Medical History:   Diagnosis Date   • 3-vessel CAD    • Anticoagulated on warfarin    • Atrial fibrillation (HCC)    • BMI 28.0-28.9,adult    • Bunion, right foot    • Complaints of memory disturbance    • Dementia (HCC)    • Edema    • Encounter for immunization    • Generalized convulsive seizure (HCC)    • Gout    • History of double vision    • HL (hearing loss)    • Hyperlipidemia    • Hypertension    • Left foot pain    • 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    • Movement disorder    • Nocturnal leg cramps    • OA (osteoarthritis)    • Prepatellar effusion    • Pulmonary embolism (HCC)    • Puncture wound of hand, right    • Screening for cardiovascular condition    • Sleep apnea    • Stasis dermatitis    • Statin intolerance    • Stroke (HCC)    • Taking drug for chronic disease    • Thoracic back pain    • Transient ischemic attack        Past Surgical History:   Procedure Laterality Date   • ADENOIDECTOMY     • APPENDECTOMY     • CATARACT EXTRACTION, BILATERAL     • CORONARY  "ARTERY BYPASS GRAFT     • CORONARY ARTERY BYPASS GRAFT     • CYSTOSCOPY TRANSURETHRAL RESECTION OF PROSTATE     • HERNIA REPAIR     • OTHER SURGICAL HISTORY      carotid thromboendarterectomy   • SHOULDER SURGERY     • SKIN CANCER EXCISION          Encounter Information        Nutrition History:  Decrease po intake   Food Preferences:    Supplements:    Factors Affecting Intake: decreased appetite     Anthropometrics        Current Height  Current Weight  BMI kg/m2 Height: 198.1 cm (78\")  Weight: 68.6 kg (151 lb 3.8 oz) (03/07/23 0304)  Body mass index is 17.48 kg/m².   Adjusted BMI (if applicable)        Admission Weight        Ideal Body Weight (IBW) 91.4 kg   Adjusted IBW (if applicable)        Usual Body Weight (UBW) See below   Weight Change/Trend Loss       Weight History Wt Readings from Last 30 Encounters:   03/07/23 0304 68.6 kg (151 lb 3.8 oz)   03/05/23 2244 72.6 kg (160 lb)   11/03/22 1525 62.1 kg (137 lb)   08/19/22 1112 64 kg (141 lb)   07/14/22 1109 64.9 kg (143 lb)   06/07/22 1130 63.3 kg (139 lb 8 oz)   06/03/22 1832 68.1 kg (150 lb 3.2 oz)   05/26/22 0250 73.9 kg (162 lb 14.7 oz)   05/25/22 0100 70.7 kg (155 lb 13.8 oz)   05/23/22 2212 70.4 kg (155 lb 3.3 oz)   05/16/22 1200 65.9 kg (145 lb 3 oz)   05/09/22 1030 66.8 kg (147 lb 5 oz)   05/05/22 1025 66.8 kg (147 lb 5 oz)   04/28/22 1232 66.8 kg (147 lb 5 oz)   04/26/22 0952 69 kg (152 lb 1 oz)   04/18/22 1259 (!) 161 kg (354 lb 0.9 oz)   04/14/22 1255 68.5 kg (151 lb)   04/12/22 1155 68.7 kg (151 lb 7 oz)   04/07/22 0959 66.9 kg (147 lb 7 oz)   03/21/22 1534 66.7 kg (147 lb)   01/27/22 1004 68 kg (150 lb)   12/13/21 1518 70.8 kg (156 lb)   11/30/21 0950 71 kg (156 lb 9.6 oz)   08/31/21 0822 72.1 kg (159 lb)   07/28/21 0933 69.9 kg (154 lb)   01/27/21 1027 72.6 kg (160 lb)   10/30/20 1054 72.6 kg (160 lb)   10/14/20 1041 74.4 kg (164 lb)   09/21/20 1431 75.9 kg (167 lb 4.8 oz)   08/25/20 0822 75.3 kg (166 lb)   07/06/20 1014 78.5 kg (173 lb) "   02/17/20 0959 78.9 kg (174 lb)   02/03/20 0956 79.4 kg (175 lb)   01/06/20 0837 78 kg (172 lb)           --  Tests/Procedures        Tests/Procedures Other:EEG     Labs       Pertinent Labs    Results from last 7 days   Lab Units 03/07/23 0515 03/06/23 2015 03/05/23  2300   SODIUM mmol/L 139  --  141   POTASSIUM mmol/L 3.9 3.5 3.0*   CHLORIDE mmol/L 108*  --  99   CO2 mmol/L 27.0  --  18.8*   BUN mg/dL 16  --  29*   CREATININE mg/dL 1.00  --  1.30*   CALCIUM mg/dL 7.8*  --  8.8   BILIRUBIN mg/dL 0.3  --  0.3   ALK PHOS U/L 56  --  60   ALT (SGPT) U/L 16  --  17   AST (SGOT) U/L 19  --  20   GLUCOSE mg/dL 102*  --  84     Results from last 7 days   Lab Units 03/07/23 0515 03/07/23 0352 03/05/23  2300   MAGNESIUM mg/dL  --   --  2.0   PHOSPHORUS mg/dL  --   --  3.4   HEMOGLOBIN g/dL  --  13.0 12.5*   HEMATOCRIT %  --  40.6 37.4*   WBC 10*3/mm3  --  11.00* 9.69   TRIGLYCERIDES mg/dL 93  --   --    ALBUMIN g/dL 2.7*  --  3.7     Results from last 7 days   Lab Units 03/07/23 0352 03/05/23  2300   INR   --  1.25*   APTT seconds  --  27.5   PLATELETS 10*3/mm3 162 184     COVID19   Date Value Ref Range Status   06/03/2022 Not Detected Not Detected - Ref. Range Final     Lab Results   Component Value Date    HGBA1C 5.50 08/25/2020          Medications           Scheduled Medications chlorhexidine, 15 mL, Mouth/Throat, Q12H  famotidine, 20 mg, Intravenous, BID  Lacosamide, 100 mg, Intravenous, Q12H  IVPB builder, , Intravenous, Q12H  piperacillin-tazobactam, 3.375 g, Intravenous, Q8H  sodium chloride, 10 mL, Intravenous, Q12H       Infusions phenylephrine, 0.5-3 mcg/kg/min       PRN Medications •  aluminum-magnesium hydroxide-simethicone  •  Calcium Gluconate-NaCl **AND** calcium gluconate IVPB **AND** Calcium  •  fentaNYL citrate (PF)  •  hydrALAZINE  •  LORazepam  •  magnesium sulfate **OR** magnesium sulfate **OR** magnesium sulfate  •  magnesium sulfate **OR** magnesium sulfate in D5W 1g/100mL (PREMIX) **OR**  magnesium sulfate  •  metoprolol tartrate  •  ondansetron **OR** ondansetron  •  potassium & sodium phosphates **OR** potassium & sodium phosphates  •  potassium & sodium phosphates **OR** potassium & sodium phosphates  •  potassium chloride **OR** potassium chloride **OR** potassium chloride  •  potassium chloride **OR** potassium chloride **OR** potassium chloride  •  sodium chloride  •  sodium chloride  •  sodium chloride     Physical Findings          Physical Appearance loss of muscle mass, loss of subcutaneous fat, underweight, ventilator support   Oral/Mouth Cavity teeth missing   Edema  1+ (trace), 2+ (mild)   Gastrointestinal normoactive   Skin  skin tear   Tubes/Drains Cortrak, ND tube, bridle in place   NFPE Date Completed: 3/7   -  Malnutrition Severity Assessment      Patient meets criteria for : Moderate (non-severe) Malnutrition  Malnutrition Type (last 8 hours)     Malnutrition Severity Assessment     Row Name 03/07/23 [facility]5       Malnutrition Severity Assessment    Malnutrition Type Acute Disease or Injury - Related Malnutrition    Row Name 03/07/23 1355       Insufficient Energy Intake     Insufficient Energy Intake Findings Moderate    Insufficient Energy Intake  <75% of est. energy requirement for >7d)    Row Name 03/07/23 1355       Unintentional Weight Loss     Unintentional Weight Loss Findings Moderate    Unintentional Weight Loss  Weight loss of 1-2% in one week    Row Name 03/07/23 1355       Muscle Loss    Loss of Muscle Mass Findings Moderate    [place] Moderate - slight depression    Clavicle Bone Region Moderate - some protrusion in females, visible in males    Row Name 03/07/23 1355       Fat Loss    Orbital Region  Moderate -  somewhat hollowness, slightly dark circles    Row Name 03/07/23 1355       Criteria Met (Must meet criteria for severity in at least 2 of these categories: M Wasting, Fat Loss, Fluid, Secondary Signs, Wt. Status, Intake)    Patient meets criteria for   "Moderate (non-severe) Malnutrition                   Estimated/Assessed Needs       Energy Requirements    Height for Calculation  Height: 198.1 cm (78\")   Weight for Calculation 68.6 kg   Method for Estimation  25 kcal/kg, 30 kcal/kg   EST Needs (kcal/day) [id number]       Protein Requirements    Weight for Calculation 68.6 kg   EST Protein Needs (g/kg) 1.0 - 1.2 gm/kg   EST Daily Needs (g/day) 68-81       Fluid Requirements     Method for Estimation 1 mL/kcal    Estimated Needs (mL/day)        Fluid Deficit    Current Na Level (mEq/L)    Desired Na Level (mEq/L)    Estimated Fluid Deficit (L)     -  Current Nutrition Orders & Evaluation of Intake       Oral Nutrition     Food Allergies NKFA   Current PO Diet NPO Diet NPO Type: Strict NPO   Supplement n/a   PO Evaluation     % PO Intake     # of Days Evaluated    --  PES STATEMENT / NUTRITION DIAGNOSIS      Nutrition Dx Problem  Problem: Needs Alternative Route  Etiology: Medical Diagnosis and Factors Affecting Nutrition respiratory failure on the vent  Signs/Symptoms: NPO    Comment:    --  NUTRITION INTERVENTION / PLAN OF CARE      Intervention Goal(s) Maintain nutrition status, Reduce/improve symptoms, Meet estimated needs, Disease management/therapy, Initiate TF/PN, Tolerate TF/PN at goal, Transition TF to PO and Maintain weight         RD Intervention/Action Follow Tx Progress, Care plan reviewed and Recommend/ordered:                                         Enteral Prescription:     Enteral Route ND    TF Delivery Method Continuous    Enteral Product Fibersource HN    Modular     Propofol Rate/Kcal     TF Start Rate (mL/hr) 20    TF Goal Rate (mL/hr) 60    Free Water Flush (mL) [id number]    TF Provision at Goal: 1728kcal, 77gm protein, 1166mL free water + 180mL water flushes         Calories 100 % needs met         Protein  100 % needs met         Fluid (mL) 1346 mL total     Prescription Ordered Yes   --        Education topic: Enteral nutrition     Resources " given:       Advised regarding:      Learner:  Spouse, Child or Children     Readiness to learn: Accepting     RD contact info provided: Yes     Outpatient referral:          Monitor/Evaluation Per protocol, I&O, Pertinent labs, EN delivery/tolerance, Weight, Skin status, GI status, Symptoms, POC/GOC   Discharge Plan/Needs Pending clinical course   Education Will instruct as appropriate   --    RD to follow per protocol.      Electronically signed by:  Heidi Watts RD  03/07/23 13:50 EST

## 2023-03-07 NOTE — TELEPHONE ENCOUNTER
Caller: OTHER    Relationship: Other Relative    Best call back number:[phone]    What is the best time to reach you: ANYTIME     Who are you requesting to speak with (clinical staff, provider,  specific staff member): CLINICAL STAFF  Do you know the name of the person who called: SELF  What was the call regarding:PATIENTS DAUGHTER CALLED AND STATED THEY NEED A LETTER STATING THEIR MOTHER IS NOT IN ANY SHAPE TO TAKE CARE OF HER  . PLEASE CALL FOR DETAILS. THANKS   Do you require a callback YES

## 2023-03-07 NOTE — PROGRESS NOTES
Discharge Planning Assessment  [facility]     Patient Name: Alireza Carreon  MRN: [id number]  Today's Date: 3/7/2023    Admit Date: 3/5/2023    Plan: Undetermined  Daughter thinks sub acute rehab vs LTC  CCP following for choices   Discharge Needs Assessment     Row Name 03/07/23 1742       Living Environment    People in Home spouse    Current Living Arrangements home    Potentially Unsafe Housing Conditions none    Primary Care Provided by child(maria del carmen);self    Provides Primary Care For no one    Family Caregiver if Needed child(maria del carmen), adult    Quality of Family Relationships supportive    Able to Return to Prior Arrangements other (see comments)    Living Arrangement Comments lives with wife with Dementia       Resource/Environmental Concerns    Resource/Environmental Concerns other (see comments)    Transportation Concerns none       Food Insecurity    Within the past 12 months, you worried that your food would run out before you got the money to buy more. Never true    Within the past 12 months, the food you bought just didn't last and you didn't have money to get more. Never true       Transition Planning    Patient/Family Anticipates Transition to inpatient rehabilitation facility;long-term care facility    Patient/Family Anticipated Services at Transition none    Transportation Anticipated other (see comments)       Discharge Needs Assessment    Current Outpatient/Agency/Support Group inpatient rehabilitation facility;skilled nursing facility    Equipment Currently Used at Home cane, straight    Concerns to be Addressed discharge planning;patient refuses services    Anticipated Changes Related to Illness inability to care for self    Equipment Needed After Discharge other (see comments)               Discharge Plan     Row Name 03/07/23 1746       Plan    Plan Undetermined  Daughter thinks sub acute rehab vs LTC  CCP following for choices    Plan Comments IMM noted.  CCP spoke with daughter Lolly  [phone] at bedside.  Discharge planning discussed.  Pt lives in a single story house.  Daughter reports his wife has dementia and she feels they  cannot care for themselves.  Pt uses a cane sometimes to ambulate.  He has a past history of rehab at [facility].  He has no history of HH.  Daughter Lolly given contact info and information to obtain Emergency guardianship.  CCP following for SNF choices.              Continued Care and Services - Admitted Since 3/5/2023    Coordination has not been started for this encounter.          Demographic Summary    No documentation.                Functional Status    No documentation.                Psychosocial    No documentation.                Abuse/Neglect    No documentation.                Legal    No documentation.                Substance Abuse    No documentation.                Patient Forms    No documentation.                   Tova Whyte RN

## 2023-03-07 NOTE — PROGRESS NOTES
[facility] INPATIENT PROGRESS NOTE         [facility] CORONARY CARE    3/7/2023      PATIENT IDENTIFICATION:  Name: Alireza Carreon ADMIT: 3/5/2023   : 1935  PCP: Jose Bingham MD    MRN: [id number] LOS: 1 days   AGE/SEX: 87 y.o. male  ROOM: [facility]                     LOS 1    Reason for visit: Respiratory failure on ventilator, status epilepticus      SUBJECTIVE:      Per nursing staff neurology does not want to reduce the propofol at this time and currently getting continuous EEG.  Noted dark bloody urine output.  We will hold Lovenox this morning. I am seeing the patient for the first time today.  All patient problems are new to me.      Objective   OBJECTIVE:    Vital Sign Min/Max for last 24 hours  Temp  Min: 97.6 °F (36.4 °C)  Max: 98.7 °F (37.1 °C)   BP  Min: 68/45  Max: 197/96   Pulse  Min: 44  Max: 67   Resp  Min: 18  Max: 26   SpO2  Min: 94 %  Max: 100 %   No data recorded   Weight  Min: 68.6 kg (151 lb 3.8 oz)  Max: 68.6 kg (151 lb 3.8 oz)       FiO2 (%):  [35 %-40 %] 35 %  S RR:  [18] 18  PEEP/CPAP (cm H2O):  [5 cm H20] 5 cm H20  MAP (cm H2O):  [8-11] 10           FiO2 (%): 35 %     Body mass index is 17.48 kg/m².    Intake/Output Summary (Last 24 hours) at 3/7/2023 0853  Last data filed at 3/7/2023 0600  Gross per 24 hour   Intake 2470 ml   Output 1225 ml   Net 1245 ml         Exam:  GEN:  No distress, appears stated age  EYES:   PERRL, anicteric sclerae  ENT:    External ears/nose normal, OP clear  NECK:  No adenopathy, midline trachea  LUNGS: Normal chest on inspection, palpation and auscultation  CV:  Normal S1S2, without murmur  ABD:  Nontender, nondistended, no hepatosplenomegaly, +BS  EXT:  No edema.  No cyanosis or clubbing.  No mottling and normal cap refill.    Assessment     Scheduled meds:  chlorhexidine, 15 mL, Mouth/Throat, Q12H  enoxaparin, 1 mg/kg, Subcutaneous, Q12H  famotidine, 20 mg, Intravenous, BID  Lacosamide, 100 mg, Intravenous, Q12H  IVPB builder, ,  Intravenous, Q12H  sodium chloride, 10 mL, Intravenous, Q12H      IV meds:                      phenylephrine, 0.5-3 mcg/kg/min  propofol, 5-50 mcg/kg/min, Last Rate: 25 mcg/kg/min (03/07/23 0825)      Data Review:  Results from last 7 days   Lab Units 03/07/23  0515 03/06/23 2015 03/05/23  2300   SODIUM mmol/L 139  --  141   POTASSIUM mmol/L 3.9 3.5 3.0*   CHLORIDE mmol/L 108*  --  99   CO2 mmol/L 27.0  --  18.8*   BUN mg/dL 16  --  29*   CREATININE mg/dL 1.00  --  1.30*   GLUCOSE mg/dL 102*  --  84   CALCIUM mg/dL 7.8*  --  8.8         Estimated Creatinine Clearance: 50.5 mL/min (by C-G formula based on SCr of 1 mg/dL).  Results from last 7 days   Lab Units 03/07/23  0352 03/05/23  2300   WBC 10*3/mm3 11.00* 9.69   HEMOGLOBIN g/dL 13.0 12.5*   PLATELETS 10*3/mm3 162 184     Results from last 7 days   Lab Units 03/05/23  2300   INR  1.25*     Results from last 7 days   Lab Units 03/07/23  0515 03/05/23  2300   ALT (SGPT) U/L 16 17   AST (SGOT) U/L 19 20     Results from last 7 days   Lab Units 03/07/23  0457 03/06/23  0536 03/06/23  0114   PH, ARTERIAL pH units 7.391 7.389 7.349*   PO2 ART mm Hg 84.8 171.2* 325.7*   PCO2, ARTERIAL mm Hg 41.2 42.2 52.3*   HCO3 ART mmol/L 25.0 25.5 28.8*     Results from last 7 days   Lab Units 03/06/23  0212   LACTATE mmol/L 1.7         Glucose   Date/Time Value Ref Range Status   03/07/2023 0512 98 70 - 130 mg/dL Final     Comment:     Meter: [id number] : [id number] Darrel Kirk RN   03/06/2023 2326 100 70 - 130 mg/dL Final     Comment:     Meter: [id number] : [id number] Darrel Kirk RN   03/06/2023 2001 104 70 - 130 mg/dL Final     Comment:     Meter: [id number] : [id number] Darrel Kirk RN   03/06/2023 1834 103 70 - 130 mg/dL Final     Comment:     Meter: [id number] : [id number] Johnnie Caballero RN   03/06/2023 0442 109 70 - 130 mg/dL Final     Comment:     Meter: [id number] : [id number] Riley PEREZ RN     Chest x-ray 3/7 shows  increasing left basilar infiltrate and pleural effusion          Active Hospital Problems    Diagnosis  POA   • **Status epilepticus (HCC) [G40.901]  Yes      Resolved Hospital Problems   No resolved problems to display.         ASSESSMENT:    Airway compromise  Mgmt of [facility] ventilation  Status epilepticus  Left basilar pneumonia  Atrial fibrillation on anticoagulation  Hypertension  Hyperlipidemia  Coronary disease  History of stroke  History of seizure disorder  Immobility syndrome  Dementia  Hypokalemia  Creatinine elevation  Anion gap metabolic acidosis-we will send off lactate suspect this elevated in the setting of seizure  Anemia  NSTEMI type II  Acute and worsening hematuria      PLAN:    On continuous EEG.  Per nursing, neurology said that they did not want to reduce sedation at this time while on EEG.  When okay with neurology will consider weaning trials.  Make appropriate ventilator changes based on current ABG results.  Pressor as needed for blood pressure support.  Add antibiotic for left basilar pneumonia.  Likely aspiration related.  Holding this morning's Lovenox due to significant hematuria.  Ulcer prophylaxis.  Add electrolyte replacement protocol.  Place Cortrak and start enteral nutrition.    Discussed with multidisciplinary ICU team on rounds this morning.          CCT: 35 min    Henry Wick MD  Pulmonary and Critical Care Medicine  [facility], [facility]  3/7/2023    08:53 EST

## 2023-03-07 NOTE — SIGNIFICANT NOTE
03/07/23 0646   Readiness to Wean Daily Screen   Daily Screen Complete Y   Daily Screen Outcome fail  (not following commands, on 24 hour EEG)

## 2023-03-08 PROBLEM — E44.0 MODERATE MALNUTRITION: Status: ACTIVE | Noted: 2023-01-01

## 2023-03-08 NOTE — PROGRESS NOTES
"[facility] Clinical Pharmacy Services: Vancomycin Pharmacokinetic Initial Consult Note    Alireza Carreon is a 87 y.o. male who is on day 1 of pharmacy to dose vancomycin.    Indication: Bacteremia  Consulting Provider: Dr. Josh Riddle  Planned Duration of Therapy: 7 days  Loading Dose Ordered or Given: 1500 mg on 3/8 at 0700  Culture/Source:   3/7 Blood culture 1/2 GPC  Target: -600 mg/L.hr   Other Antimicrobials: Zosyn 3.375 g iv every 8 hours    Vitals/Labs  Ht: 198.1 cm (78\"); Wt: 68.1 kg (150 lb 2.1 oz)  Temp Readings from Last 1 Encounters:   03/08/23 99.6 °F (37.6 °C) (Oral)    Estimated Creatinine Clearance: 49.1 mL/min (by C-G formula based on SCr of 1.02 mg/dL).     Results from last 7 days   Lab Units 03/08/23  0349 03/07/23  0515 03/07/23  0352 03/05/23  2300   CREATININE mg/dL 1.02 1.00  --  1.30*   WBC 10*3/mm3 14.40*  --  11.00* 9.69     Assessment/Plan:    Vancomycin Dose:   1000 mg IV every  24  hours  Predictive AUC level for the dose ordered is 448 mg/L.hr, which is within the target of 400-600 mg/L.hr  Vanc Trough has been ordered for 3/9 at 1930     Pharmacy will follow patient's kidney function and will adjust doses and obtain levels as necessary. Thank you for involving pharmacy in this patient's care. Please contact pharmacy with any questions or concerns.                           Talat Meyer III, [facility]  Clinical Pharmacist    "

## 2023-03-08 NOTE — SIGNIFICANT NOTE
03/08/23 0644   Readiness to Wean Daily Screen   Daily Screen Complete Y   Daily Screen Outcome fail  (pt not following commands, no sedation)   liberator suction device started, large amts of thick yellow tan secretions

## 2023-03-08 NOTE — PLAN OF CARE
Goal Outcome Evaluation:Remains on vent with no evidence of seizure activity.   Remains very sleepy with periods of restlessness. Lovenox held due to Hematuria.   Hypertension this afternoon resolved with Hydralazine.

## 2023-03-08 NOTE — PROGRESS NOTES
LPC overnight crosscover    Called regarding gpc in bcs. Will dose vanco, allow daytime rounding team to determine if contaminant or active infection.    Josh Riddle MD  [facility]  03/08/23  05:59 EST

## 2023-03-08 NOTE — DISCHARGE PLACEMENT REQUEST
"Juanjo Carreon (87 y.o. Male)     Date of Birth   1935    Social Security Number       Address   [address] [place] [address]    Home Phone   [phone]    MRN   [id number]       Congregation   None    Marital Status                               Admission Date   3/5/23    Admission Type   Emergency    Admitting Provider   Josh Riddle MD    Attending Provider   Henry Wick MD    Department, Room/Bed   [facility], N334/1       Discharge Date       Discharge Disposition       Discharge Destination                               Attending Provider: Henry Wick MD    Allergies: Codeine, Ezetimibe, Hydrocodone-acetaminophen, Levetiracetam, Lorazepam, Phenytoin, Pseudoephedrine, Repatha [Evolocumab], Statins, Zolpidem    Isolation: None   Infection: None   Code Status: No CPR    Ht: 198.1 cm (78\")   Wt: 68.1 kg (150 lb 2.1 oz)    Admission Cmt: None   Principal Problem: Status epilepticus (HCC) [G40.901]                 Active Insurance as of 3/5/2023     Primary Coverage     Payor Plan Insurance Group Employer/Plan Group    ANTHEM MEDICARE REPLACEMENT ANTHEM MEDICARE ADVANTAGE [id number]     Payor Plan Address Payor Plan Phone Number Payor Plan Fax Number Effective Dates    [address] [phone]  1/1/2020 - None Entered    [facility] [address]       Subscriber Name Subscriber Birth Date Member ID       JUANJO CARREON 1935 [id number]                 Emergency Contacts      (Rel.) Home Phone Work Phone Mobile Phone    Elizabeth Carreon (Spouse) [phone] -- --    Lolly Chávez (Daughter) -- -- [phone]    LauriRobby (Son) [phone] -- --              "

## 2023-03-08 NOTE — CONSULTS
Purpose of the visit was to evaluate for: goals of care/advanced care planning, support for patient/family, pain/symptom management, withdrawal of interventions, transfer to comfort care bed/unit and comfort care. Spoke with MD and RN as well as family and discussed palliative care, goals of care, care options and resuscitation status.      Assessment:  Patient is palliative care appropriate for inpatient care given (list diagnosis/symptoms): status epilepticus, airway compromise requiring mechanical ventilation, pneumonia, afib, CAD, immobility syndrome, dementia, NSTEMI, acute and worsening hematuria. Patient off sedation but intubated and unable to communicate. He has needed suctioning for secretions and moves legs in a slightly restless manor at times. PPS 10%    Recommendations/Plan: Change code status to comfort measures only, gear all treatment options towards symptom management including compassionate extubation. Transfer to  if stable. Consult [facility].     Other Comments: Spoke with patient's spouse and 4 children at bedside. We discussed goals of care, treatment options, and code status in detail. All have good understanding of situation and are in agreement they would like to pursue comfort measures only. We discussed inpatient palliative care, compassionate extubation and medications used to alleviate suffering. Answered all questions and provided support, advised of availability. Discussed with Dr. Wick and Zahira SÁNCHEZ.

## 2023-03-08 NOTE — PROGRESS NOTES
Continued Stay Note  [facility]     Patient Name: Alireza Carreon  MRN: [id number]  Today's Date: 3/8/2023    Admit Date: 3/5/2023    Plan: SNF referrals pending   Discharge Plan     Row Name 03/08/23 1122       Plan    Plan SNF referrals pending    Plan Comments Referrals to [facility] and [facility]               Discharge Codes    No documentation.                     Tova Whyte RN

## 2023-03-08 NOTE — PROGRESS NOTES
"Nutrition Services    Patient Name:  Alireza Carreon  YOB: 1935  MRN: [id number]  Admit Date:  3/5/2023    PROGRESS NOTE - CLINICAL NUTRITION    Assessment Date:  03/08/23    Comments: Follow up for tube feeding    Current TF regimen: Fibersource HN at 40ml/hr and water [id number] and pt tolerating.  Labs: Glu 153  No BM yet      Plan/Recommendations:   1)  TF's with Fibersource HN at goal of 60ml/hr  (provides: 1728kcal, 77gm protein, 1166mL free water + 180mL water flushes)  2) Water flushes [id number]    RD to continue to monitor per protocol.     Encounter Information         Reason For Encounter Follow up for TF    Current Issues Respiratory failure on the vent     Estimated Requirements         Nutrient Needs:  Estimated kcal: 2825-4072  Estimated protein: 68-81  Estimated fluid: 1715     Current Nutrition Orders & Evaluation of Intake       Oral Nutrition     Current PO Diet NPO Diet NPO Type: Strict NPO   Supplement n/a   PO Evaluation     Trending % PO Intake     Factors Affecting Intake  altered respiratory status      Enteral Nutrition     Enteral Route ND    TF Delivery Method Continuous    Enteral Product Fibersource HN    Modular     Propofol Rate/Kcal     TF Current Rate (mL) 40    TF Goal Rate (mL) 60    Current Water Flush (mL) [id number]    Current TF Provision  1152kcal, 51 gm protein, 777mL free water + 180 mL water flushes         Calories 67% needs met         Protein  76 % needs met         TF Fluid (mL) 957         IV Fluids     Avg Volume Delivered (mL) 420 x 24hrs    % Goal Volume     TF Residual  no or minimal residual    TF Changes rate increased    TF Tolerance tolerating     Anthropometrics          Height    Weight Height: 198.1 cm (78\")  Weight: 68.1 kg (150 lb 2.1 oz) (03/08/23 0351)    BMI kg/m2 Body mass index is 17.35 kg/m².    Weight trend Stable     Labs        Pertinent Labs Reviewed, listed below     Results from last 7 days   Lab Units 03/08/23  3059 " 03/07/23 0515 03/06/23 2015 03/05/23  2300   SODIUM mmol/L 139 139  --  141   POTASSIUM mmol/L 3.8 3.9 3.5 3.0*   CHLORIDE mmol/L 106 108*  --  99   CO2 mmol/L 26.7 27.0  --  18.8*   BUN mg/dL 20 16  --  29*   CREATININE mg/dL 1.02 1.00  --  1.30*   CALCIUM mg/dL 8.1* 7.8*  --  8.8   BILIRUBIN mg/dL 0.5 0.3  --  0.3   ALK PHOS U/L 61 56  --  60   ALT (SGPT) U/L 15 16  --  17   AST (SGOT) U/L 14 19  --  20   GLUCOSE mg/dL 159* 102*  --  84     Results from last 7 days   Lab Units 03/08/23 0349 03/07/23 0515 03/07/23 0352 03/05/23  2300   MAGNESIUM mg/dL  --   --   --  2.0   PHOSPHORUS mg/dL  --   --   --  3.4   HEMOGLOBIN g/dL 12.7*  --    < > 12.5*   HEMATOCRIT % 38.0  --    < > 37.4*   WBC 10*3/mm3 14.40*  --    < > 9.69   TRIGLYCERIDES mg/dL  --  93  --   --    ALBUMIN g/dL 2.6* 2.7*  --  3.7    < > = values in this interval not displayed.     Results from last 7 days   Lab Units 03/08/23 0349 03/07/23 0352 03/05/23  2300   INR   --   --  1.25*   APTT seconds  --   --  27.5   PLATELETS 10*3/mm3 236 162 184     COVID19   Date Value Ref Range Status   06/03/2022 Not Detected Not Detected - Ref. Range Final     Lab Results   Component Value Date    HGBA1C 5.50 08/25/2020          Medications            Scheduled Medications chlorhexidine, 15 mL, Mouth/Throat, Q12H  famotidine, 20 mg, Intravenous, BID  Lacosamide, 100 mg, Intravenous, Q12H  IVPB builder, , Intravenous, Nightly  piperacillin-tazobactam, 3.375 g, Intravenous, Q8H  sodium chloride, 10 mL, Intravenous, Q12H        Infusions phenylephrine, 0.5-3 mcg/kg/min        PRN Medications •  aluminum-magnesium hydroxide-simethicone  •  Calcium Gluconate-NaCl **AND** calcium gluconate IVPB **AND** Calcium  •  fentaNYL citrate (PF)  •  hydrALAZINE  •  LORazepam  •  magnesium sulfate **OR** magnesium sulfate **OR** magnesium sulfate  •  magnesium sulfate **OR** magnesium sulfate in D5W 1g/100mL (PREMIX) **OR** magnesium sulfate  •  metoprolol tartrate  •   ondansetron **OR** ondansetron  •  potassium & sodium phosphates **OR** potassium & sodium phosphates  •  potassium & sodium phosphates **OR** potassium & sodium phosphates  •  potassium chloride **OR** potassium chloride **OR** potassium chloride  •  potassium chloride **OR** potassium chloride **OR** potassium chloride  •  sodium chloride  •  sodium chloride  •  sodium chloride     Physical Findings          Physical Appearance loss of muscle mass, loss of subcutaneous fat, underweight, ventilator support   Oral/Mouth Cavity teeth missing   Edema  1+ (trace), 2+ (mild)   Gastrointestinal normoactive   Skin  skin tear   Tubes/Drains Cortrak, ND tube, bridle in place   NFPE Date Completed: 3/7     NUTRITION INTERVENTION / PLAN OF CARE  Intervention Goal         Intervention Goal(s) Maintain nutrition status, Nutrition support treatment, Improved nutrition related labs, Reduce/improve symptoms, Meet estimated needs, Disease management/therapy, Tolerate TF/PN at goal and Maintain weight     Nutrition Intervention         RD Action Follow Tx Progress and Care plan reviewed     Prescription         Diet Prescription     Supplement Prescription    EN/PN Prescription    New Prescription Ordered? Continue same per protocol   --  Monitor/Evaluation        Monitor Per protocol, I&O, Pertinent labs, EN delivery/tolerance, Weight, Skin status, GI status, Symptoms, POC/GOC   Discharge Needs Pending clinical course   Education Will instruct as appropriate       Electronically signed by:  Heidi Watts RD  03/08/23 15:19 EST

## 2023-03-08 NOTE — PROGRESS NOTES
Patient Identification:  NAME:  Alireza Carreon  Age:  87 y.o.   Sex:  male   :  1935   MRN:  [id number]       Chief complaint: Status epilepticus, history of seizure disorder, history of strokes    History of present illness: No seizures clinically or electrographically.  He is on phenobarbital and Vimpat at this time but off of Diprivan.  He does overbreathing the ventilator in a nonpathological manner    Least 25 minutes was spent seeing this patient in counseling and coordination of care talking to the other daughter and explaining what is going on talking to nurses examining the patient getting on his computer writing this note on this still critically ill man  Review of systems no fever chills rash no seizures.  Some spontaneous movement noted in the right upper extremity, no constipation or diarrhea  Past medical history:  Past Medical History:   Diagnosis Date   • 3-vessel CAD    • Anticoagulated on warfarin    • Atrial fibrillation (HCC)    • BMI 28.0-28.9,adult    • Bunion, right foot    • Complaints of memory disturbance    • Dementia (HCC)    • Edema    • Encounter for immunization    • Generalized convulsive seizure (HCC)    • Gout    • History of double vision    • HL (hearing loss)    • Hyperlipidemia    • Hypertension    • Left foot pain    • 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    • Movement disorder    • Nocturnal leg cramps    • OA (osteoarthritis)    • Prepatellar effusion    • Pulmonary embolism (HCC)    • Puncture wound of hand, right    • Screening for cardiovascular condition    • Sleep apnea    • Stasis dermatitis    • Statin intolerance    • Stroke (HCC)    • Taking drug for chronic disease    • Thoracic back pain    • Transient ischemic attack        Allergies:  Codeine, Ezetimibe, Hydrocodone-acetaminophen, Levetiracetam, Lorazepam, Phenytoin, Pseudoephedrine, Repatha [evolocumab], Statins, and Zolpidem    Home medications:  Medications Prior to Admission    Medication Sig Dispense Refill Last Dose   • donepezil (ARICEPT) 10 MG tablet TAKE ONE TABLET BY MOUTH ONCE NIGHTLY 90 tablet 3 3/5/2023   • furosemide (LASIX) 20 MG tablet Take 1 tablet by mouth 2 (Two) Times a Day. 60 tablet 1 3/5/2023   • hydroCHLOROthiazide (HYDRODIURIL) 25 MG tablet TAKE ONE TABLET BY MOUTH DAILY 30 tablet 3 3/5/2023   • lisinopril (PRINIVIL,ZESTRIL) 20 MG tablet Take 1 tablet by mouth 2 (Two) Times a Day. 180 tablet 1 3/5/2023   • PHENobarbital (LUMINAL) 64.8 MG tablet Take 1 tablet by mouth Daily. 90 tablet 1 3/5/2023   • Potassium (Potassimin) 75 MG tablet Take 1 tablet by mouth Every Night. 30 each 1 Past Week   • Xarelto 15 MG tablet TAKE ONE TABLET BY MOUTH DAILY WITH DINNER 30 tablet 1 3/5/2023   • albuterol sulfate HFA (Proventil HFA) 108 (90 Base) MCG/ACT inhaler 180 mcg.           Hospital medications:  chlorhexidine, 15 mL, Mouth/Throat, Q12H  famotidine, 20 mg, Intravenous, BID  Lacosamide, 100 mg, Intravenous, Q12H  IVPB builder, , Intravenous, Q12H  piperacillin-tazobactam, 3.375 g, Intravenous, Q8H  sodium chloride, 10 mL, Intravenous, Q12H  vancomycin, 1,000 mg, Intravenous, Q24H  vancomycin, 1,500 mg, Intravenous, Once      Pharmacy to dose vancomycin,   phenylephrine, 0.5-3 mcg/kg/min      •  aluminum-magnesium hydroxide-simethicone  •  Calcium Gluconate-NaCl **AND** calcium gluconate IVPB **AND** Calcium  •  fentaNYL citrate (PF)  •  hydrALAZINE  •  LORazepam  •  magnesium sulfate **OR** magnesium sulfate **OR** magnesium sulfate  •  magnesium sulfate **OR** magnesium sulfate in D5W 1g/100mL (PREMIX) **OR** magnesium sulfate  •  metoprolol tartrate  •  ondansetron **OR** ondansetron  •  Pharmacy to dose vancomycin  •  potassium & sodium phosphates **OR** potassium & sodium phosphates  •  potassium & sodium phosphates **OR** potassium & sodium phosphates  •  potassium chloride **OR** potassium chloride **OR** potassium chloride  •  potassium chloride **OR** potassium  chloride **OR** potassium chloride  •  sodium chloride  •  sodium chloride  •  sodium chloride      Objective:  Vitals Ranges:   Temp:  [99.2 °F (37.3 °C)-100.6 °F (38.1 °C)] 100.3 °F (37.9 °C)  Heart Rate:  [] 75  Resp:  [21-22] 22  BP: (100-212)/() 146/75  FiO2 (%):  [29 %-30 %] 30 %      Physical Exam:  Pupils about 1-1/2 mm with minimal reaction.  He does have weak corneal reflexes.  He does have grimace and some withdraw to pain in the right upper extremity.  He has a spastic left hemiparesis left side more hyperreflexic left toe upgoing right toe downgoing    Results review:   I reviewed the patient's new clinical results.    Data review:  Lab Results (last 24 hours)     Procedure Component Value Units Date/Time    Blood Culture - Blood, Hand, Right [[id number]]  (Abnormal) Collected: 03/07/23 1030    Specimen: Blood from Hand, Right Updated: 03/08/23 0819     Blood Culture Abnormal Stain     Gram Stain Aerobic Bottle Gram positive cocci in clusters      Anaerobic Bottle Gram positive cocci in clusters    Blood Culture ID, PCR - Blood, Hand, Right [[id number]]  (Abnormal) Collected: 03/07/23 1030    Specimen: Blood from Hand, Right Updated: 03/08/23 0659     BCID, PCR Staph spp, not aureus or lugdunensis. Identification by BCID2 PCR.    POC Glucose Once [[id number]]  (Abnormal) Collected: 03/08/23 0620    Specimen: Blood Updated: 03/08/23 0621     Glucose 153 mg/dL      Comment: Meter: [id number] : [id number] Darrel Kirk RN       Comprehensive Metabolic Panel [[id number]]  (Abnormal) Collected: 03/08/23 0349    Specimen: Blood Updated: 03/08/23 0520     Glucose 159 mg/dL      BUN 20 mg/dL      Creatinine 1.02 mg/dL      Sodium 139 mmol/L      Potassium 3.8 mmol/L      Chloride 106 mmol/L      CO2 26.7 mmol/L      Calcium 8.1 mg/dL      Total Protein 5.1 g/dL      Albumin 2.6 g/dL      ALT (SGPT) 15 U/L      AST (SGOT) 14 U/L      Alkaline Phosphatase 61 U/L      Total Bilirubin 0.5 mg/dL       Globulin 2.5 gm/dL      A/G Ratio 1.0 g/dL      BUN/Creatinine Ratio 19.6     Anion Gap 6.3 mmol/L      eGFR 71.1 mL/min/1.73     Narrative:      GFR Normal >60  Chronic Kidney Disease <60  Kidney Failure <15    The GFR formula is only valid for adults with stable renal function between ages 18 and 70.    CBC & Differential [[id number]]  (Abnormal) Collected: 03/08/23 0349    Specimen: Blood Updated: 03/08/23 0514    Narrative:      The following orders were created for panel order CBC & Differential.  Procedure                               Abnormality         Status                     ---------                               -----------         ------                     CBC Auto Differential[[id number]]        Abnormal            Final result                 Please view results for these tests on the individual orders.    CBC Auto Differential [[id number]]  (Abnormal) Collected: 03/08/23 0349    Specimen: Blood Updated: 03/08/23 0514     WBC 14.40 10*3/mm3      RBC 4.26 10*6/mm3      Hemoglobin 12.7 g/dL      Hematocrit 38.0 %      MCV 89.2 fL      MCH 29.8 pg      MCHC 33.4 g/dL      RDW 12.4 %      RDW-SD 40.0 fl      MPV 9.4 fL      Platelets 236 10*3/mm3      Neutrophil % 85.4 %      Lymphocyte % 5.6 %      Monocyte % 7.8 %      Eosinophil % 0.2 %      Basophil % 0.6 %      Immature Grans % 0.4 %      Neutrophils, Absolute 12.29 10*3/mm3      Lymphocytes, Absolute 0.81 10*3/mm3      Monocytes, Absolute 1.13 10*3/mm3      Eosinophils, Absolute 0.03 10*3/mm3      Basophils, Absolute 0.08 10*3/mm3      Immature Grans, Absolute 0.06 10*3/mm3      nRBC 0.0 /100 WBC     Blood Gas, Arterial - [[id number]]  (Abnormal) Collected: 03/08/23 0404    Specimen: Arterial Blood Updated: 03/08/23 0406     Site Arterial: right radial     Mitch's Test Positive     pH, Arterial 7.435 pH units      pCO2, Arterial 36.4 mm Hg      pO2, Arterial 70.6 mm Hg      HCO3, Arterial 24.5 mmol/L      Base Excess, Arterial 0.6 mmol/L      O2  Saturation Calculated 94.6 %      Comment: Meter: [id number] : BRANDI Jose HARPERdiane DO2 0.4 mmHg      Barometric Pressure for Blood Gas 760.4 mmHg      Modality Adult Vent     FIO2 30 %      Ventilator Mode AC     Set Tidal Volume 450     Set Mech Resp Rate 18     Rate 25 Breaths/minute      PEEP 5    POC Glucose Once [[id number]]  (Normal) Collected: 03/07/23 2354    Specimen: Blood Updated: 03/07/23 2355     Glucose 129 mg/dL      Comment: Meter: [id number] : [id number] Darrel Kirk RN       Blood Culture - Blood, Arm, Left [[id number]] Collected: 03/07/23 1900    Specimen: Blood from Arm, Left Updated: 03/07/23 1910    POC Glucose Once [[id number]]  (Normal) Collected: 03/07/23 1804    Specimen: Blood Updated: 03/07/23 1805     Glucose 99 mg/dL      Comment: Meter: [id number] : [id number] Bertrand Pickering RN       Lactic Acid, Plasma [[id number]]  (Normal) Collected: 03/07/23 1030    Specimen: Blood Updated: 03/07/23 1158     Lactate 0.7 mmol/L            Imaging:  Imaging Results (Last 24 Hours)     Procedure Component Value Units Date/Time    XR Chest 1 View [[id number]] Collected: 03/08/23 0746     Updated: 03/08/23 0751    Narrative:      XR CHEST 1 VW-clinical: Intubated     COMPARISON 3/7/2023     FINDINGS: Endotracheal tube in position as before. Ventilator apparatus  superimposes the right apex obscuring underlying detail. There is a  persistent area of left basilar consolidation without interval change.  Minimal right base atelectasis suggested. The cardiomediastinal  silhouette is stable. No vascular congestion. No new area of airspace  disease has developed. The remainder is unremarkable.     CONCLUSION: Stable chest     This report was finalized on 3/8/2023 7:48 AM by Dr. Rustam Jimenez M.D.            PPE worn at all times washed before washed up afterwards disposed of everything properly is not within 6 feet of them for more than few minutes during my exam no aerosols used  at any point    Assessment and Plan:     Status post status epilepticus this patient has no further seizure activity clinically or by the continuous EEG at this point we will discontinue that EEG.  Note he is on Vimpat 100 mg IV every 12 and phenobarbital 100 mg IV every 12 hours which is in combination sedating.  Nonetheless he has some grimace and withdrawal in the right upper extremity today and I still think we are going in the right direction.  Lets watch him today.  At some point I can always attempt an MRI scan but it is not going to change our overall treatment this patient has a history of seizures history of several strokes that show up on the CT scan and there is no evidence of intracranial hemorrhage.  I am going to decrease the phenobarbital down to 120 mg IV nightly and continue the current twice daily, Vimpat    At this point I am still not really able to give a prognosis.  Other than to say he is not seizing at this time.  He does grimace and withdrawal on the right and we will see how he does overnight.    Montana Rizo MD  03/08/23  10:00 EST

## 2023-03-08 NOTE — PLAN OF CARE
Problem: Aspiration (Enteral Nutrition)  Goal: Absence of Aspiration Signs and Symptoms  Outcome: Ongoing, Progressing     Problem: Device-Related Complication Risk (Enteral Nutrition)  Goal: Safe, Effective Therapy Delivery  Outcome: Ongoing, Progressing     Problem: Feeding Intolerance (Enteral Nutrition)  Goal: Feeding Tolerance  Outcome: Ongoing, Progressing   Goal Outcome Evaluation:         TF's per MD order  RD to follow

## 2023-03-08 NOTE — PROGRESS NOTES
LPC INPATIENT PROGRESS NOTE         [facility] CORONARY CARE    3/8/2023      PATIENT IDENTIFICATION:  Name: Alireza Carreon ADMIT: 3/5/2023   : 1935  PCP: Jose Bingham MD    MRN: [id number] LOS: 2 days   AGE/SEX: 87 y.o. male  ROOM: [facility]                     LOS 2    Reason for visit: Respiratory failure on ventilator, status epilepticus      SUBJECTIVE:      Off propofol but still not waking up.  Discussed with family at bedside.  Not ready for ventilator weaning due to altered mental status.  Still with bloody urine output despite holding anticoagulation.  Consult urology.      Objective   OBJECTIVE:    Vital Sign Min/Max for last 24 hours  Temp  Min: 99.2 °F (37.3 °C)  Max: 100.6 °F (38.1 °C)   BP  Min: 100/66  Max: 212/127   Pulse  Min: 48  Max: 115   Resp  Min: 18  Max: 22   SpO2  Min: 92 %  Max: 100 %   No data recorded   Weight  Min: 68.1 kg (150 lb 2.1 oz)  Max: 68.1 kg (150 lb 2.1 oz)       FiO2 (%):  [29 %-30 %] 30 %  S RR:  [18] 18  PEEP/CPAP (cm H2O):  [5 cm H20] 5 cm H20  MAP (cm H2O):  [8.9-12] 8.9           FiO2 (%): 30 %     Body mass index is 17.35 kg/m².    Intake/Output Summary (Last 24 hours) at 3/8/2023 0906  Last data filed at 3/8/2023 0351  Gross per 24 hour   Intake 838 ml   Output 1350 ml   Net -512 ml         Exam:  GEN:  No distress, appears stated age  EYES:   PERRL, anicteric sclerae  ENT:    External ears/nose normal, OP clear  NECK:  No adenopathy, midline trachea  LUNGS: Normal chest on inspection, palpation and auscultation  CV:  Normal S1S2, without murmur  ABD:  Nontender, nondistended, no hepatosplenomegaly, +BS  EXT:  No edema.  No cyanosis or clubbing.  No mottling and normal cap refill.    Assessment     Scheduled meds:  chlorhexidine, 15 mL, Mouth/Throat, Q12H  famotidine, 20 mg, Intravenous, BID  Lacosamide, 100 mg, Intravenous, Q12H  IVPB builder, , Intravenous, Q12H  piperacillin-tazobactam, 3.375 g, Intravenous, Q8H  sodium chloride,  10 mL, Intravenous, Q12H  vancomycin, 1,000 mg, Intravenous, Q24H  vancomycin, 1,500 mg, Intravenous, Once      IV meds:                      Pharmacy to dose vancomycin,   phenylephrine, 0.5-3 mcg/kg/min      Data Review:  Results from last 7 days   Lab Units 03/08/23  0349 03/07/23  0515 03/06/23 2015 03/05/23  2300   SODIUM mmol/L 139 139  --  141   POTASSIUM mmol/L 3.8 3.9 3.5 3.0*   CHLORIDE mmol/L 106 108*  --  99   CO2 mmol/L 26.7 27.0  --  18.8*   BUN mg/dL 20 16  --  29*   CREATININE mg/dL 1.02 1.00  --  1.30*   GLUCOSE mg/dL 159* 102*  --  84   CALCIUM mg/dL 8.1* 7.8*  --  8.8         Estimated Creatinine Clearance: 49.1 mL/min (by C-G formula based on SCr of 1.02 mg/dL).  Results from last 7 days   Lab Units 03/08/23  0349 03/07/23  0352 03/05/23  2300   WBC 10*3/mm3 14.40* 11.00* 9.69   HEMOGLOBIN g/dL 12.7* 13.0 12.5*   PLATELETS 10*3/mm3 236 162 184     Results from last 7 days   Lab Units 03/05/23  2300   INR  1.25*     Results from last 7 days   Lab Units 03/08/23  0349 03/07/23  0515 03/05/23  2300   ALT (SGPT) U/L 15 16 17   AST (SGOT) U/L 14 19 20     Results from last 7 days   Lab Units 03/08/23  0404 03/07/23  0457 03/06/23  0536 03/06/23  0114   PH, ARTERIAL pH units 7.435 7.391 7.389 7.349*   PO2 ART mm Hg 70.6* 84.8 171.2* 325.7*   PCO2, ARTERIAL mm Hg 36.4 41.2 42.2 52.3*   HCO3 ART mmol/L 24.5 25.0 25.5 28.8*     Results from last 7 days   Lab Units 03/07/23  1030 03/06/23  0212   LACTATE mmol/L 0.7 1.7         Glucose   Date/Time Value Ref Range Status   03/08/2023 0620 153 (H) 70 - 130 mg/dL Final     Comment:     Meter: [id number] : [id number] Darrel Kirk RN   03/07/2023 2354 129 70 - 130 mg/dL Final     Comment:     Meter: [id number] : [id number] Darrel Kirk RN   03/07/2023 1804 99 70 - 130 mg/dL Final     Comment:     Meter: [id number] : [id number] Bertrand Pickering RN   03/07/2023 0942 89 70 - 130 mg/dL Final     Comment:     Meter: [id number] : [id number]  Ryland Bueno RN   03/07/2023 0512 98 70 - 130 mg/dL Final     Comment:     Meter: [id number] : [id number] Darrel Kirk RN   03/06/2023 2326 100 70 - 130 mg/dL Final     Comment:     Meter: [id number] : [id number] Darrel Kirk RN   03/06/2023 2001 104 70 - 130 mg/dL Final     Comment:     Meter: [id number] : [id number] Darrel Kirk RN     Chest x-ray 3/8 shows increasing left basilar infiltrate and pleural effusion      Microbiology reviewed            Active Hospital Problems    Diagnosis  POA   • **Status epilepticus (HCC) [G40.901]  Yes      Resolved Hospital Problems   No resolved problems to display.         ASSESSMENT:    Airway compromise  Mgmt of [facility] ventilation  Status epilepticus  Left basilar pneumonia  Atrial fibrillation on anticoagulation  Hypertension  Hyperlipidemia  Coronary disease  History of stroke  History of seizure disorder  Immobility syndrome  Dementia  Hypokalemia  Creatinine elevation  Anion gap metabolic acidosis-we will send off lactate suspect this elevated in the setting of seizure  Anemia  NSTEMI type II  Acute and worsening hematuria      PLAN:    Has been off all sedation.  Due to persistent altered mental status unable to consider ventilator weaning trials.  Make appropriate ventilator changes based on current ABG results.  Pressor as needed for blood pressure support.  Antibiotic for left basilar pneumonia.  Likely aspiration related.  Gram-positive cocci in clusters noted in blood culture.  Received dose of vancomycin.  Awaiting final isolate as this may be contaminant.  Holding Lovenox due to significant hematuria.  Consult urology for persistent hematuria.  Ulcer prophylaxis.  Electrolyte replacement protocol.  Tolerating tube feeds    Discussed with multidisciplinary ICU team on rounds this morning.          CCT: 36 min    Henry Wick MD  Pulmonary and Critical Care Medicine  [facility], [facility]  3/8/2023    09:06 EST

## 2023-03-09 NOTE — PROGRESS NOTES
LPC INPATIENT PROGRESS NOTE         [address]    3/9/2023      PATIENT IDENTIFICATION:  Name: Alireza Carreon ADMIT: 3/5/2023   : 1935  PCP: Jose Bingham MD    MRN: [id number] LOS: 3 days   AGE/SEX: 87 y.o. male  ROOM: [facility]                     LOS 3    Reason for visit: Respiratory failure on ventilator, status epilepticus      SUBJECTIVE:      Switch to comfort care and [facility].      Objective   OBJECTIVE:    Vital Sign Min/Max for last 24 hours  Temp  Min: 99.5 °F (37.5 °C)  Max: 100.3 °F (37.9 °C)   BP  Min: 89/48  Max: 180/80   Pulse  Min: 57  Max: 98   Resp  Min: 20  Max: 26   SpO2  Min: 92 %  Max: 100 %   No data recorded   No data recorded       FiO2 (%):  [29 %] 29 %  S RR:  [18] 18  PEEP/CPAP (cm H2O):  [5 cm H20] 5 cm H20  MAP (cm H2O):  [8.4-9.4] 9.4           FiO2 (%): 29 %     Body mass index is 17.35 kg/m².    Intake/Output Summary (Last 24 hours) at 3/9/2023 0750  Last data filed at 3/9/2023 0409  Gross per 24 hour   Intake 180 ml   Output 750 ml   Net -570 ml         Exam:  GEN:  No distress, appears stated age  NECK:  No adenopathy, midline trachea  LUNGS: Nonlabored      Assessment     Scheduled meds:     IV meds:                         Data Review:  Results from last 7 days   Lab Units 23  0349 23  0515 23  2300   SODIUM mmol/L 139 139  --  141   POTASSIUM mmol/L 3.8 3.9 3.5 3.0*   CHLORIDE mmol/L 106 108*  --  99   CO2 mmol/L 26.7 27.0  --  18.8*   BUN mg/dL 20 16  --  29*   CREATININE mg/dL 1.02 1.00  --  1.30*   GLUCOSE mg/dL 159* 102*  --  84   CALCIUM mg/dL 8.1* 7.8*  --  8.8         Estimated Creatinine Clearance: 49.1 mL/min (by C-G formula based on SCr of 1.02 mg/dL).  Results from last 7 days   Lab Units 23  0349 23  0352 23  2300   WBC 10*3/mm3 14.40* 11.00* 9.69   HEMOGLOBIN g/dL 12.7* 13.0 12.5*   PLATELETS 10*3/mm3 236 162 184     Results from last 7 days   Lab  Units 03/05/23  2300   INR  1.25*     Results from last 7 days   Lab Units 03/08/23  0349 03/07/23  0515 03/05/23  2300   ALT (SGPT) U/L 15 16 17   AST (SGOT) U/L 14 19 20     Results from last 7 days   Lab Units 03/08/23  0404 03/07/23  0457 03/06/23  0536 03/06/23  0114   PH, ARTERIAL pH units 7.435 7.391 7.389 7.349*   PO2 ART mm Hg 70.6* 84.8 171.2* 325.7*   PCO2, ARTERIAL mm Hg 36.4 41.2 42.2 52.3*   HCO3 ART mmol/L 24.5 25.0 25.5 28.8*     Results from last 7 days   Lab Units 03/07/23  1030 03/06/23  0212   LACTATE mmol/L 0.7 1.7         Glucose   Date/Time Value Ref Range Status   03/08/2023 1520 135 (H) 70 - 130 mg/dL Final     Comment:     Meter: [id number] : [id number] Rao Rodriges RN   03/08/2023 0620 153 (H) 70 - 130 mg/dL Final     Comment:     Meter: [id number] : [id number] Darrel Kirk RN   03/07/2023 2354 129 70 - 130 mg/dL Final     Comment:     Meter: [id number] : [id number] Darrel Kirk RN   03/07/2023 1804 99 70 - 130 mg/dL Final     Comment:     Meter: [id number] : [id number] Bertrand Pickering RN   03/07/2023 0942 89 70 - 130 mg/dL Final     Comment:     Meter: [id number] : [id number] Ryland Bueno RN   03/07/2023 0512 98 70 - 130 mg/dL Final     Comment:     Meter: [id number] : [id number] Darrel Kirk RN   03/06/2023 2326 100 70 - 130 mg/dL Final     Comment:     Meter: [id number] : [id number] Darrel Kirk RN     Chest x-ray 3/8 shows increasing left basilar infiltrate and pleural effusion      Microbiology reviewed            Active Hospital Problems    Diagnosis  POA   • **Status epilepticus (HCC) [G40.901]  Yes   • Moderate malnutrition (HCC) [E44.0]  Yes      Resolved Hospital Problems   No resolved problems to display.         ASSESSMENT:    Airway compromise  [facility]  Status epilepticus  Left basilar pneumonia  Atrial fibrillation on anticoagulation  Hypertension  Hyperlipidemia  Coronary disease  History of stroke  History  of seizure disorder  Immobility syndrome  Dementia  Hypokalemia  Creatinine elevation  Anion gap metabolic acidosis-we will send off lactate suspect this elevated in the setting of seizure  Anemia  NSTEMI type II  Acute and worsening hematuria      PLAN:    Comfort now is main goal.  [place] [address].  We will consult Dr. Bull to take over as palliative attending.    Henry Wick MD  Pulmonary and Critical Care Medicine  [facility], [facility]  3/9/2023    07:50 EST

## 2023-03-09 NOTE — PROGRESS NOTES
Discharge Planning Assessment  [facility]     Patient Name: Alireza Carreon  MRN: [id number]  Today's Date: 3/9/2023    Admit Date: 3/5/2023    Plan: SNF referrals pending   Discharge Needs Assessment    No documentation.                Discharge Plan     Row Name 03/09/23 2933       Plan    Plan Comments The patient transferred to [facility] from CCU on 3/8/23. The patient is palliative. [facility] evaluated today and admitted to a [facility] scattered bed today. DANA Gee RN CCP.    Final Discharge Disposition Code 51 - hospice medical facility    Final Note [facility] evaluated today and admitted to a [facility] scattered bed today. DANA Gee RN CCP.              Continued Care and Services - Admitted Since 3/5/2023     Destination Coordination complete.    Service Provider Request Status Selected Services Address Phone Fax Patient Preferred    [facility]  [facility] [address] [facility] [address] [phone] --    [facility] Pending - Request Sent N/A [address] [facility] [address] [phone] --    [facility] Pending - Request Sent N/A [address] [facility] [address] [phone] --                 Demographic Summary    No documentation.                Functional Status    No documentation.                Psychosocial    No documentation.                Abuse/Neglect    No documentation.                Legal    No documentation.                Substance Abuse    No documentation.                Patient Forms    No documentation.                   Constance Gee RN

## 2023-03-09 NOTE — CONSULTS
FIRST UROLOGY CONSULT      Patient Identification:  NAME:  Alireza Carreon  Age:  87 y.o.   Sex:  male   :  1935   MRN:  [id number]       Chief complaint: hematuria    History of present illness:  87 years old  Was in CCU on vent  Now in palliative care  Hematuria, on anticoagulation  Hogan in place  Tea colored urine  Creat 1.0  Cannot give history  [facility] consulted      Past medical history:  Past Medical History:   Diagnosis Date   • 3-vessel CAD    • Anticoagulated on warfarin    • Atrial fibrillation (HCC)    • BMI 28.0-28.9,adult    • Bunion, right foot    • Complaints of memory disturbance    • Dementia (HCC)    • Edema    • Encounter for immunization    • Generalized convulsive seizure (HCC)    • Gout    • History of double vision    • HL (hearing loss)    • Hyperlipidemia    • Hypertension    • Left foot pain    • 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    • Movement disorder    • Nocturnal leg cramps    • OA (osteoarthritis)    • Prepatellar effusion    • Pulmonary embolism (HCC)    • Puncture wound of hand, right    • Screening for cardiovascular condition    • Sleep apnea    • Stasis dermatitis    • Statin intolerance    • Stroke (HCC)    • Taking drug for chronic disease    • Thoracic back pain    • Transient ischemic attack        Past surgical history:  Past Surgical History:   Procedure Laterality Date   • ADENOIDECTOMY     • APPENDECTOMY     • CATARACT EXTRACTION, BILATERAL     • CORONARY ARTERY BYPASS GRAFT     • CORONARY ARTERY BYPASS GRAFT     • CYSTOSCOPY TRANSURETHRAL RESECTION OF PROSTATE     • HERNIA REPAIR     • OTHER SURGICAL HISTORY      carotid thromboendarterectomy   • SHOULDER SURGERY     • SKIN CANCER EXCISION         Allergies:  Codeine, Ezetimibe, Hydrocodone-acetaminophen, Levetiracetam, Lorazepam, Phenytoin, Pseudoephedrine, Repatha [evolocumab], Statins, and Zolpidem    Home medications:  Medications Prior to Admission   Medication Sig Dispense  Refill Last Dose   • donepezil (ARICEPT) 10 MG tablet TAKE ONE TABLET BY MOUTH ONCE NIGHTLY 90 tablet 3 3/5/2023   • furosemide (LASIX) 20 MG tablet Take 1 tablet by mouth 2 (Two) Times a Day. 60 tablet 1 3/5/2023   • hydroCHLOROthiazide (HYDRODIURIL) 25 MG tablet TAKE ONE TABLET BY MOUTH DAILY 30 tablet 3 3/5/2023   • lisinopril (PRINIVIL,ZESTRIL) 20 MG tablet Take 1 tablet by mouth 2 (Two) Times a Day. 180 tablet 1 3/5/2023   • PHENobarbital (LUMINAL) 64.8 MG tablet Take 1 tablet by mouth Daily. 90 tablet 1 3/5/2023   • Potassium (Potassimin) 75 MG tablet Take 1 tablet by mouth Every Night. 30 each 1 Past Week   • Xarelto 15 MG tablet TAKE ONE TABLET BY MOUTH DAILY WITH DINNER 30 tablet 1 3/5/2023   • albuterol sulfate HFA (Proventil HFA) 108 (90 Base) MCG/ACT inhaler 180 mcg.           Hospital medications:        •  acetaminophen **OR** acetaminophen **OR** acetaminophen  •  aluminum-magnesium hydroxide-simethicone  •  Calcium Gluconate-NaCl **AND** calcium gluconate IVPB **AND** Calcium  •  diphenoxylate-atropine  •  fentaNYL citrate (PF)  •  Glycerin-Hypromellose-  •  glycopyrrolate **OR** glycopyrrolate **OR** glycopyrrolate **OR** glycopyrrolate  •  hydrALAZINE  •  HYDROmorphone **OR** Morphine **OR** morphine **OR** ketorolac  •  HYDROmorphone **OR** Morphine **OR** morphine  •  HYDROmorphone **OR** Morphine **OR** morphine  •  LORazepam **OR** LORazepam **OR** LORazepam  •  LORazepam **OR** LORazepam **OR** LORazepam  •  LORazepam **OR** LORazepam **OR** LORazepam  •  LORazepam  •  magnesium sulfate **OR** magnesium sulfate **OR** magnesium sulfate  •  magnesium sulfate **OR** magnesium sulfate in D5W 1g/100mL (PREMIX) **OR** magnesium sulfate  •  metoprolol tartrate  •  ondansetron **OR** ondansetron  •  potassium & sodium phosphates **OR** potassium & sodium phosphates  •  potassium & sodium phosphates **OR** potassium & sodium phosphates  •  potassium chloride **OR** potassium chloride **OR**  potassium chloride  •  potassium chloride **OR** potassium chloride **OR** potassium chloride  •  Scopolamine  •  sodium chloride  •  sodium chloride  •  sodium chloride    Family history:  Family History   Problem Relation Age of Onset   • Hyperlipidemia Mother    • Dementia Mother        Social history:  Social History     Tobacco Use   • Smoking status: Former     Types: Cigarettes     Quit date: 10/30/1979     Years since quittin.3   • Smokeless tobacco: Never   Substance Use Topics   • Alcohol use: Yes   • Drug use: No       Review of systems:    Unable to obtain    Objective:  TMax 24 hours:   Temp (24hrs), Av.9 °F (37.7 °C), Min:99.5 °F (37.5 °C), Max:100.3 °F (37.9 °C)      Vitals Ranges:   Temp:  [99.5 °F (37.5 °C)-100.3 °F (37.9 °C)] 99.5 °F (37.5 °C)  Heart Rate:  [57-98] 85  Resp:  [20-26] 20  BP: ()/(47-80) 153/79  FiO2 (%):  [29 %] 29 %    Intake/Output Last 3 shifts:  I/O last 3 completed shifts:  In: 988 [I.V.:198; Other:370; NG/GT:420]  Out: 1375 [Urine:1375]     Physical Exam:    General Appearance:       HEENT:    No trauma, pupils reactive   Back:     No CVA tenderness   Lungs:     Respirations unlabored, no wheezing    Heart:    RRR, intact peripheral pulses   Abdomen:     Soft, NDNT, no masses, no guarding   :    Testes descended bilaterally, no nodules.  Penis normal.  No scrotal or penile rashes noted   Extremities:   No edema, no deformity   Lymphatic:   No neck or groin LAD   Skin:   No bleeding, bruising or rashes   Neuro/Psych:   Cannot give history       Results review:   I reviewed the patient's new clinical results.    Data review:  Lab Results (last 24 hours)     Procedure Component Value Units Date/Time    Blood Culture - Blood, Hand, Right [[id number]]  (Abnormal) Collected: 23 1030    Specimen: Blood from Hand, Right Updated: 23 0711     Blood Culture Staphylococcus, coagulase negative     Isolated from Aerobic and Anaerobic Bottles     Gram Stain  Aerobic Bottle Gram positive cocci in clusters      Anaerobic Bottle Gram positive cocci in clusters    Narrative:      Probable contaminant requires clinical correlation, susceptibility not performed unless requested by physician.      Blood Culture - Blood, Arm, Left [[id number]]  (Normal) Collected: 03/07/23 1900    Specimen: Blood from Arm, Left Updated: 03/08/23 1916     Blood Culture No growth at 24 hours    POC Glucose Once [[id number]]  (Abnormal) Collected: 03/08/23 1520    Specimen: Blood Updated: 03/08/23 1531     Glucose 135 mg/dL      Comment: Meter: [id number] : [id number] Rao Rodriges RN              Imaging:  Imaging Results (Last 24 Hours)     ** No results found for the last 24 hours. **             Assessment:       Status epilepticus (HCC)    Moderate malnutrition (HCC)  immobility  Dementia  Hematuria  In palliative care    Plan:   Given palliative care  Continue w demarcus  Could evaluate further if overall status improves    Arturo Goody Jr., MD  03/09/23  07:54 EST

## 2023-03-09 NOTE — CONSULTS
Narrative Summary - Certification/Recertification    Patient Name: Alireza Carreon                                                           : 1935    Terminal Diagnosis: Status Epilepticus G40.901    Hospice ID [id number] admit date to scatterbed 23 to manage pain/anxiety        Patient Active Problem List   Diagnosis   • Hypertension   • Atrial fibrillation (HCC)   • Hyperlipidemia   • Dementia (HCC)   • Gait abnormality   • Vascular dementia with behavior disturbance (HCC)   • Monoclonal gammopathy of unknown significance (MGUS)   • Chronic anticoagulation   • Arteriosclerosis of coronary artery   • Cerebrovascular accident (CVA) (HCC)   • Seizure disorder (HCC)   • Swelling of left lower extremity   • Hearing loss   • Localized edema   • Blister   • Skin nodule of toe of right foot   • Status epilepticus (HCC)   • Immobility syndrome   • Moderate malnutrition (HCC)     Pt admitted with seizure episode, intubated for airway protection, pt remained somnolent, compassionately extubated to comfort. PPS 10%, DNR. Pt's anti-seizure levels noted subtherapeutic, family believes pt was not taking meds correctly. Verbal Certification for inpatient hospice given by Dr. Broderick Gaytan, pt has required IV morphine 4mg x3, and lorazepam 1mg x2 for symptom mgmt.    RAC met with family for detailed hospice eos, family and spouse goals align w/hospice, consents signed by dtr Adalgisa Mcpherson with siblings and pt's spouse present who has dementia (baseline alert/confused), admission packet given reviewed.    Constance DOMINGO RN Tarun notified, Dr. Henry Wick agreeable to d/c readmit to scatterbed.    [facility] will begin following daily to meet family goals.    Amy Rodriguez RN   Referral and Admissions Coordinator  Please call [phone] with questions/concerns

## 2023-03-09 NOTE — PLAN OF CARE
Goal Outcome Evaluation:  Pt went palliative and care was withdrawn/extubation at 1740. Pt is currently resting comfortably.

## 2023-03-09 NOTE — PLAN OF CARE
Goal Outcome Evaluation:  Plan of Care Reviewed With: patient        Progress: declining  Outcome Evaluation: Transferred to [facility] early this morning for palliative care. Sleeping between care. Some discomfort noted when turned. On next turn medicated prior with morphine 4mg. Tolerated well. Appears calm, comfortable, and breathing unlabored. Will continue palliative measures.

## 2023-03-09 NOTE — PROGRESS NOTES
All notes are noted.  Including the fact that he is now a palliative patient and we are concentrating on comfort and mercy.  Neurology will sign off and follow-up as needed reconsult  Montana Rizo MD

## 2023-03-09 NOTE — PLAN OF CARE
Goal Outcome Evaluation:           Progress: declining  Outcome Evaluation: PPS 10%, minimally responsive, nonverbal. Pt premedicated for cares with morphine and ativan with good effect. Breathing shallow but regular. Pt warm to touch. Family at bedside all day today. Ongoing symptoms of decline discussed with dtrs and spouse. Some congestion present this evening. Pt placed in recovery position.

## 2023-03-09 NOTE — PROGRESS NOTES
Case Management Discharge Note      Final Note: [facility] evaluated today and admitted to a [facility] scattered bed today. DANA Gee RN CCP.         Selected Continued Care - Admitted Since 3/5/2023     Destination Coordination complete.    Service Provider Selected Services Address Phone Fax Patient Preferred    [facility] [address], [facility] [address] [phone] --          Durable Medical Equipment    No services have been selected for the patient.              Dialysis/Infusion    No services have been selected for the patient.              Home Medical Care    No services have been selected for the patient.              Therapy    No services have been selected for the patient.              Community Resources    No services have been selected for the patient.              Community & DME    No services have been selected for the patient.                       Final Discharge Disposition Code: 51 - hospice medical facility

## 2023-03-10 PROBLEM — G93.40 ENCEPHALOPATHY: Status: ACTIVE | Noted: 2021-07-28

## 2023-03-10 PROBLEM — G31.1 SENILE DEGENERATION OF BRAIN: Status: ACTIVE | Noted: 2023-01-01

## 2023-03-10 PROBLEM — Z86.73 HISTORY OF CVA (CEREBROVASCULAR ACCIDENT): Status: ACTIVE | Noted: 2023-01-01

## 2023-03-10 PROBLEM — I63.9 ENCEPHALOMALACIA WITH CEREBRAL INFARCTION: Status: ACTIVE | Noted: 2023-01-01

## 2023-03-10 PROBLEM — Z51.5 HOSPICE CARE PATIENT: Status: ACTIVE | Noted: 2023-01-01

## 2023-03-10 PROBLEM — G93.89 ENCEPHALOMALACIA WITH CEREBRAL INFARCTION (HCC): Status: ACTIVE | Noted: 2023-01-01

## 2023-03-10 PROBLEM — Z95.1 HX OF CABG: Status: ACTIVE | Noted: 2023-01-01

## 2023-03-10 PROBLEM — F02.80 DEMENTIA DUE TO MEDICAL CONDITION WITHOUT BEHAVIORAL DISTURBANCE (HCC): Status: ACTIVE | Noted: 2020-07-06

## 2023-03-10 PROBLEM — F01.50 VASCULAR DEMENTIA WITHOUT BEHAVIORAL DISTURBANCE: Status: ACTIVE | Noted: 2023-01-01

## 2023-03-10 NOTE — PROGRESS NOTES
\[facility]\"" Visit Report    Alireza Carreon  [id number]  3/10/2023    [facility]    Reason for [facility] Admission:  Epilepsy     Symptom  Management: Pain/dyspnea/restlessnes    Nursing/Medication Recommendations: monitor for condition changes    Psychosocial Issues and Recommendations:    Spiritual Concerns and Recommendations:    [facility] Discharge Plans:  None.  Pt meets GIP criteria for management of pain/dyspnea/restlessness requiring frequent iv med titration.    Review of Visit :  Pt is a pps of 10% and is non responsive with shallow non labored respirations and apnea present.  Pt requires GIP level care for management of pain/dyspnea/restlessness requiring IV Robinul 0.4 mg iV x4, Morphine 4 mg iV x5 and Ativan 1 mg iV x3 for comfort.  [facility] signs of dying process with goal remaining comfort.  Shanel RN collaborated with Shweta SÁNCHEZ who also verbalizes understanding.        Felix Leon RN

## 2023-03-10 NOTE — PLAN OF CARE
Goal Outcome Evaluation:  Plan of Care Reviewed With: patient        Progress: declining  Outcome Evaluation: Appears comfortable. Medicated with ativan, dilaudid, and robinul prior to turns. Will cotninue palliative care.

## 2023-03-10 NOTE — PROGRESS NOTES
[facility] Bed Team SW met with patient and family to explain role of team SW and assess for psychosocial needs. Patient was lying in his bed unresponsive with no signs of pain or discomfort.  Multiple family members at the bedside.  Family voiced that they have made the decision for comfort focused care. SW provided an opportunity for family to express their feelings and SW offered active listening and empathetic reflection. SW also educated on bereavement services provided by [facility] and they voiced understanding.    Discussed  planning and patient has plans in place with [facility] (family unsure of which location)    Disposition- patient and family would like for patient to remain in a SB for EOL care. If patient were to stabilize and need to be discharged, he will need to discharge  To a  LTC facility or back home  with hospice to follow.    SW will visit on a weekly and PRN basis to offer EOL support and assist with needs.    José Max, LAURA  [facility]

## 2023-03-10 NOTE — PLAN OF CARE
Goal Outcome Evaluation:           Progress: declining  Outcome Evaluation: Pt premedicated prior to turns with 4mg morphine, 2mg ativan, and 0.4mg robinul. Scop patch applied. New IV placed. Family at bedside, will cont ot monitor

## 2023-03-11 NOTE — H&P
Palliative Care/Hospice Admit/Consult Note     Referring Provider: Henry Wick MD   Reason for Consultation: Hospice care  Date of Admission:  3/9/2023    Patient Care Team:  Jose Bingham MD as PCP - General (Internal Medicine)  Yennifer Ruiz MD as Consulting Physician (Cardiology)  Obdulio Morelos MD as Consulting Physician (Hematology and Oncology)  Zahira Gee MD as Referring Physician (Neurology)  Talat Bull MD as Attending Provider (Hospice and Palliative Medicine)    Chief complaint:  Status Epilepticus     History of present illness:  The patient is a 87 y.o. male who has a known seizure disorder.  He was hospitalized at [facility] October 2022.  The patient presented to the ED the evening of 3/5/2023 with seizure.  The patient also has atrial fibrillation and he takes Xarelto.  The patient has a history of right middle cerebral artery CVA.  Stroke work-up in the ED demonstrated no obvious acute intracranial findings.  Patient was admitted to the intensive care unit for management of intubation and mechanical ventilation.  Neurology saw the patient and reported his known history of dementia/vascular dementia.  Medications were adjusted.  After discussion with the patient's daughter, the patient was extubated and transferred to [facility] for symptom management for comfort.  Hospice evaluated the patient and all agreed to discharge the patient from acute care and readmit him as a hospice scattered bed patient 3/9/2023.  I was asked to assume the patient's care.    At the time of my evaluation, discussed with the patient's daughter at bedside.  The patient was being turned.  The patient was not awake ROS obtainable.  With medications previously administered, the patient appears comfortable.    Review of Systems  Review of systems could not be obtained due to   patient sedation status. patient unresponsive.    Palliative Performance Scale  Palliative Performance Scale  Score: 10%  [place] Symptom Assessment System Revised  Pain Score: no pain   ESAS Tiredness Score: Worst possible tiredness  ESAS Nausea Score: No nausea  ESAS Depression Score: unable to assess  ESAS Anxiety Score: No anxiety  ESAS Drowsiness Score: Worst possible drowsiness  ESAS Lack of Appetite Score: Worst lack of appetite  ESAS Wellbeing Score: unable to assess  ESAS Dyspnea Score: 3  ESAS Other Problem Score: 4 (congestion)  ESAS Source of Information: healthcare professional caregiver  ESAS Intervention: medicated/see MAR  ESAS Intervention Response: tolerated    History  Past Medical History:   Diagnosis Date   • 3-vessel CAD    • Anticoagulated on warfarin    • Atrial fibrillation (HCC)    • BMI 28.0-28.9,adult    • Bunion, right foot    • Complaints of memory disturbance    • Dementia (HCC)    • Edema    • Encounter for immunization    • Generalized convulsive seizure (HCC)    • Gout    • History of double vision    • HL (hearing loss)    • Hyperlipidemia    • Hypertension    • Left foot pain    • 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    • Movement disorder    • Nocturnal leg cramps    • OA (osteoarthritis)    • Prepatellar effusion    • Pulmonary embolism (HCC)    • Puncture wound of hand, right    • Screening for cardiovascular condition    • Sleep apnea    • Stasis dermatitis    • Statin intolerance    • Stroke (HCC)    • Taking drug for chronic disease    • Thoracic back pain    • Transient ischemic attack    ,   Past Surgical History:   Procedure Laterality Date   • ADENOIDECTOMY     • APPENDECTOMY     • CATARACT EXTRACTION, BILATERAL     • CORONARY ARTERY BYPASS GRAFT     • CORONARY ARTERY BYPASS GRAFT     • CYSTOSCOPY TRANSURETHRAL RESECTION OF PROSTATE     • HERNIA REPAIR     • OTHER SURGICAL HISTORY      carotid thromboendarterectomy   • SHOULDER SURGERY     • SKIN CANCER EXCISION     ,   Family History   Problem Relation Age of Onset   • Hyperlipidemia Mother    • Dementia Mother      and   Social History     Socioeconomic History   • Marital status:      Spouse name: Elizabeth   Tobacco Use   • Smoking status: Former     Types: Cigarettes     Quit date: 10/30/1979     Years since quittin.3   • Smokeless tobacco: Never   Substance and Sexual Activity   • Alcohol use: Yes   • Drug use: No   • Sexual activity: Defer     E-cigarette/Vaping     E-cigarette/Vaping Substances     E-cigarette/Vaping Devices        Allergy Codeine, Ezetimibe, Hydrocodone-acetaminophen, Levetiracetam, Lorazepam, Phenytoin, Pseudoephedrine, Repatha [evolocumab], Statins, and Zolpidem    Vital Signs   Temp:  [99.5 °F (37.5 °C)-101 °F (38.3 °C)] 99.5 °F (37.5 °C)  Heart Rate:  [64-76] 64  Resp:  [14-18] 14  BP: ()/(29-46) 105/46  Device (Oxygen Therapy): nasal cannulaFlow (L/min):  [2] 2SpO2:  [79 %-91 %] 91 %    Physical Exam:  General Appearance:   Not awake and appears in no acute distress being turned to his side, chronically ill-appearing elderly male   Head:    Normocephalic, without obvious abnormality, atraumatic   Eyes:            Lids and lashes normal, conjunctivae and sclerae normal, no icterus   Ears:    Ears appear intact with no abnormalities noted   Throat:   No oral lesions, oral mucosa somewhat moist   Neck:   No adenopathy, supple, trachea midline, no thyromegaly   Back:     No scoliosis present   Lungs:     Clear to auscultation with mild scattered expiratory rhonchi, respirations diminished with slight increase inspiratory effort     Heart:    Irregular rhythm and normal rate   Breast Exam:    Deferred   Abdomen:     Soft and non-tender, non-distended   Genitalia:    Deferred   Extremities:   1+ pretibial edema, upper and lower extremity muscle atrophy noted, pale and some cyanosis evident    Pulses:  Radial pulses palpable and equal bilaterally   Skin:   No bleeding         Neurologic:  Not awake to test      Results Review:   I reviewed the patient's new clinical  results.    Impression:      Status epilepticus (HCC)    Encephalopathy by EEG    Hospice care patient    Dementia due to medical condition without behavioral disturbance (HCC)    Encephalomalacia with cerebral infarction (HCC); right parietal temporal occipital lobes    Senile degeneration of brain (HCC)    Vascular dementia without behavioral disturbance (HCC)    Hypertension    Atrial fibrillation (HCC)    Moderate malnutrition (HCC)    History of CVA (cerebrovascular accident); right parietal temporal occipital lobes    Hx of CABG      Plan:  I reviewed the patient's admission and previous medical records.  I reviewed records and Care Everywhere.  I reviewed with the RN.  I discussed with the patient's daughters at bedside.  I examined the patient.  With medications previously administered, patient appears comfortable.  All previous scheduled medications discontinued.  The patient has required glycopyrrolate for airway congestion.  The patient has required 3 doses of 4 mg morphine, 5 doses yesterday, and 2 doses of 1 mg and 1 dose of 2 mg IV Ativan, 4 doses yesterday, thus far today.  Medications will be continued and adjusted as needed for symptom management for comfort.  No attempts at resuscitation will be made.  I answered all of the patient's daughters questions.  She understands his declining terminal condition.      Talat Bull MD  Hospice and Palliative Medicine  03/10/23  19:52 EST

## 2023-03-11 NOTE — PROGRESS NOTES
Palliative Care/Hospice Follow Up Note       LOS: 2 days   Patient Care Team:  Jose Bingham MD as PCP - General (Internal Medicine)  Yennifer Ruiz MD as Consulting Physician (Cardiology)  Obdulio Morelos MD as Consulting Physician (Hematology and Oncology)  Zahira Gee MD as Referring Physician (Neurology)  Talat Bull MD as Attending Provider (Hospice and Palliative Medicine)    Chief Complaint:  Status Epilepticus     Interval History:     Patient Complaints: None  Patient Denies: None  History taken from: Family and RN  Review of Systems:  As above.    Palliative Performance Scale  Palliative Performance Scale Score: 10%  [place] Symptom Assessment System Revised  Pain Score: no pain   ESAS Tiredness Score: Worst possible tiredness  ESAS Nausea Score: No nausea  ESAS Depression Score: No depression  ESAS Anxiety Score: No anxiety  ESAS Drowsiness Score: Worst possible drowsiness  ESAS Lack of Appetite Score: Worst lack of appetite  ESAS Wellbeing Score: Best wellbeing  ESAS Dyspnea Score: No shortness of breath  ESAS Other Problem Score: Best possible response  ESAS Source of Information: healthcare professional caregiver  ESAS Intervention: medicated/see MAR  ESAS Intervention Response: tolerated    Vital Signs  Temp:  [97.9 °F (36.6 °C)-99.5 °F (37.5 °C)] 97.9 °F (36.6 °C)  Heart Rate:  [64-85] 85  Resp:  [14-32] 32  BP: (105-120)/(46-61) 120/61  Device (Oxygen Therapy): nasal cannulaFlow (L/min):  [2-2.5] 2.5SpO2:  [84 %-91 %] 84 %    Physical Exam:  General Appearance:    Not awake and in no acute distress lying on his right side, chronically ill-appearing elderly male   Throat:   No oral lesions, oral mucosa somewhat moist   Neck:   No adenopathy, supple, trachea midline   Lungs:     Clear to auscultation with mild scattered expiratory rhonchi, respirations diminished with increase inspiratory rate and slight increase inspiratory effort     Heart:    Irregular rhythm and  normal rate   Abdomen:     Soft and non-tender, non-distended   Extremities:  1+ pretibial edema, upper and lower extremity muscle atrophy noted, pale and ashen and cyanosis evident    Pulses:   Radial pulses palpable and equal bilaterally          Results Review:     I reviewed the patient's new clinical results.    Medication Reviewed.    Assessment & Plan       Status epilepticus (HCC)    Encephalopathy by EEG    Hospice care patient    Dementia due to medical condition without behavioral disturbance (HCC)    Encephalomalacia with cerebral infarction (HCC); right parietal temporal occipital lobes    Senile degeneration of brain (HCC)    Vascular dementia without behavioral disturbance (HCC)    Hypertension    Atrial fibrillation (HCC)    Moderate malnutrition (HCC)    History of CVA (cerebrovascular accident); right parietal temporal occipital lobes    Hx of CABG      I reviewed all with the patient's family at bedside.  I reviewed with the RN.  With medications previously administered, the patient appears comfortable.  The patient has required glycopyrrolate for airway congestion.  The patient has required 2 doses of 4 mg morphine, 6 doses yesterday, and 2 doses 2 mg Ativan, 6 doses yesterday, thus far today.  Medications will be continued and adjusted as needed for symptom management for comfort.  I answered all the patient's family's questions.    Plan for disposition:  SELMA Bull MD  Hospice and Palliative Medicine  03/11/23  08:49 EST

## 2023-03-11 NOTE — PLAN OF CARE
Goal Outcome Evaluation:           Progress: declining  Outcome Evaluation: Adjusted comfort medications for comfort--premedicate with 1mg dilaudid, 2mg ativan, and 0.8mg robinul. Sunction at bedside. Family at bedside and aware of decline. Will cont ot monitor

## 2023-03-11 NOTE — PLAN OF CARE
Goal Outcome Evaluation:  Progress: no change  Outcome summary  PRN meds admin prior to care/repositioning to maintain comfort, pt asymptomatic and unresponsive with most recent repositioning. Family at bsd and agrees to notify staff of needs.

## 2023-03-11 NOTE — PROGRESS NOTES
[facility] Visit Report    Alireza Carreon  [id number]  3/11/2023    [facility]mission [facility] Dx: YES      Reason for [facility] Admission: Epilepsy, unspecified with status epilepticus      Symptom  Management: Pain, restlessness, congestion and seizure precaution      Nursing/Medication Recommendations: Please contact [facility] at [phone] for any questions or concerns and continue to provide comfort care.      Psychosocial Issues and Recommendations: Provide support to patient and family      Spiritual Concerns and Recommendations: None at present      [facility] Discharge Plans:  None, patient in active dying process and unstable for transport. Requires [facility] RN assessment for symptom management of pain, restlessness, congestion and seizures using IV medications, titrating doses as needed to maintain comfort. Patient is meeting criteria for [facility] level of care.       Review of Visit: Arrived on unit. Spoke to staff PRINCESS Rock for report and reviewed Epic notes. Entered room and patient lying in bed, on his side in recovery position. Color flushed and ashen, nailbeds dusky. Breathing shallow with 02 sat 77% on 2.5L. PPS 10%, oral care only, bed bound. .4-100-22-86/46. Right forearm IV site intact and patent. F/C to BSD with dark urine noted. Close monitoring for safety, comfort care. Spoke to spouse and daughters at bedside regarding EOL care with symptom management and imminent status and they are aware and accepting. Emotional support provided. Discussed care needs with staff PRINCESS Rock and patient has received IV Morphine 4mg x 5 doses, switched to IV Dilaudid 1mg x 2 doses, IV Ativan 2mg x 7 doses and IV Robinul 0.4mg x 6 doses, titrated to 0.8mg x 1 dose, all in the last 24 hours. [facility] will continue to follow to assess needs, monitor status and offer support.        Miesha Garcia RN  [facility] Visit Nurse  Scattered Bed Team

## 2023-03-12 NOTE — PROGRESS NOTES
Palliative Care/Hospice Follow Up Note       LOS: 3 days   Patient Care Team:  Jose Bingham MD as PCP - General (Internal Medicine)  Yennifer Ruiz MD as Consulting Physician (Cardiology)  Obdulio Morelos MD as Consulting Physician (Hematology and Oncology)  Zahira Gee MD as Referring Physician (Neurology)  Talat Bull MD as Attending Provider (Hospice and Palliative Medicine)    Chief Complaint:  Status Epilepticus     Interval History:     Patient Complaints: None  Patient Denies: None  History taken from: Daughter and RN  Review of Systems:  As above.    Palliative Performance Scale  Palliative Performance Scale Score: 10%  [place] Symptom Assessment System Revised  Pain Score: no pain   ESAS Tiredness Score: Worst possible tiredness  ESAS Nausea Score: No nausea  ESAS Depression Score: No depression  ESAS Anxiety Score: No anxiety  ESAS Drowsiness Score: Worst possible drowsiness  ESAS Lack of Appetite Score: Worst lack of appetite  ESAS Wellbeing Score: Best wellbeing  ESAS Dyspnea Score: 4  ESAS Other Problem Score: Best possible response  ESAS Source of Information: healthcare professional caregiver  ESAS Intervention: medicated/see MAR  ESAS Intervention Response: tolerated    Vital Signs  Temp:  [100.4 °F (38 °C)-101.2 °F (38.4 °C)] 101.2 °F (38.4 °C)  Heart Rate:  [] 98  Resp:  [20-22] 20  BP: ()/(46-59) 104/59  Device (Oxygen Therapy): nasal cannula SpO2:  [77 %-94 %] 94 %    Physical Exam:  General Appearance:    Not awake and in no acute distress lying on his left side, chronically ill-appearing elderly male   Throat:   No oral lesions, oral mucosa somewhat moist   Neck:   No adenopathy, supple, trachea midline   Lungs:     Clear to auscultation, respirations diminished with increase rate and slight increase inspiratory effort     Heart:    Irregular rhythm and normal rate   Abdomen:     Soft and non-tender, non-distended   Extremities:  1+ pretibial edema,  upper and lower extremity muscle atrophy noted, pale and ashen and cyanosis evident    Pulses:   Radial pulses palpable and equal bilaterally          Results Review:     I reviewed the patient's new clinical results.    Medication Reviewed.    Assessment & Plan       Status epilepticus (HCC)    Encephalopathy by EEG    Hospice care patient    Dementia due to medical condition without behavioral disturbance (HCC)    Encephalomalacia with cerebral infarction (HCC); right parietal temporal occipital lobes    Senile degeneration of brain (HCC)    Vascular dementia without behavioral disturbance (HCC)    Hypertension    Atrial fibrillation (HCC)    Moderate malnutrition (HCC)    History of CVA (cerebrovascular accident); right parietal temporal occipital lobes    Hx of CABG      I reviewed all with the patient's daughter at bedside.  She stated the hospice RN said he would die before midnight yesterday?  I reviewed with the RN.  With medications previously administered, the patient appears comfortable.  The patient has required glycopyrrolate for airway congestion.  The patient has required 2 doses of 1 mg Dilaudid, 6 doses yesterday, and 2 doses 2 mg Ativan, 6 doses yesterday, thus far today.  Medications will be continued and adjusted as needed for symptom management for comfort.  The patient has demonstrated decline, especially with increasing temperature.  I answered all the patient's daughter's questions.    Plan for disposition:  SELMA Bull MD  Hospice and Palliative Medicine  03/12/23  09:06 EDT

## 2023-03-12 NOTE — PLAN OF CARE
Goal Outcome Evaluation:  Plan of Care Reviewed With: family  Progress: no change  Outcome summary  Pt unresponsive & asymptomatic, PRN meds admin prior to care/repositioning to maintain comfort. Family at bsd through shift and agrees to notify staff of needs.

## 2023-03-12 NOTE — DISCHARGE SUMMARY
Discharge As      Date of Admisssion:  3/9/2023  Date of Death:  3/12/2023  Time of Death:  2:43 PM    Patient Care Team:  Jose Bingham MD as PCP - General (Internal Medicine)  Yennifer Ruiz MD as Consulting Physician (Cardiology)  Obdulio Morelos MD as Consulting Physician (Hematology and Oncology)  Zahira Gee MD as Referring Physician (Neurology)  Talat Bull MD as Attending Provider (Hospice and Palliative Medicine)    Final Diagnosis:     Status epilepticus (HCC)    Encephalopathy by EEG    Hospice care patient    Dementia due to medical condition without behavioral disturbance (HCC)    Encephalomalacia with cerebral infarction (HCC); right parietal temporal occipital lobes    Senile degeneration of brain (HCC)    Vascular dementia without behavioral disturbance (HCC)    Hypertension    Atrial fibrillation (HCC)    Moderate malnutrition (HCC)    History of CVA (cerebrovascular accident); right parietal temporal occipital lobes    Hx of CABG      Hospital Course  Patient was a 88 y.o. male who has a known seizure disorder.  He was hospitalized at [facility] 2022.  The patient presented to the ED the evening of 3/5/2023 with seizure.  The patient also has atrial fibrillation and he takes Xarelto.  The patient has a history of right middle cerebral artery CVA.  Stroke work-up in the ED demonstrated no obvious acute intracranial findings.  Patient was admitted to the intensive care unit for management of intubation and mechanical ventilation.  Neurology saw the patient and reported his known history of dementia/vascular dementia.  Medications were adjusted.  After discussion with the patient's daughter, the patient was extubated and transferred to [facility] for symptom management for comfort.  Hospice evaluated the patient and all agreed to discharge the patient from acute care and readmit him as a hospice scattered bed patient 3/9/2023.  Medications were provided  and adjusted as needed for symptom management for comfort.  I was able to talk with the patient's daughters on several occasions.  Please see my progress note from earlier today at 0800 hrs. documenting his declining condition.  Subsequently, I was called that the patient's respirations ceased and no pulse palpable. No heart sounds audible. I pronounced the patient at 1443 hours.    Time: I spent 25 minutes on this discharge activity which included: face-to-face encounter with the patient, face-to-face encounter with the patient's daughter, reviewing the data in the system, coordination of the care with the nursing staff as well as documentation and entering orders.       Talat Bull MD  Hospice and Palliative Medicine  03/12/23  19:34 EDT

## 2023-03-13 NOTE — CASE MANAGEMENT/SOCIAL WORK
Case Management Discharge Note      Final Note: Patient  on 3/12/2023         Selected Continued Care - Discharged on 3/12/2023 Admission date: 3/9/2023 - Discharge disposition:     Destination Coordination complete.    Service Provider Selected Services Address Phone Fax Patient Preferred    [facility] [address], [facility] [id number] [phone] --          Durable Medical Equipment    No services have been selected for the patient.           Dialysis/Infusion    No services have been selected for the patient.           Home Medical Care    No services have been selected for the patient.           Therapy    No services have been selected for the patient.           Community Resources    No services have been selected for the patient.           Community & DME    No services have been selected for the patient.             Selected Continued Care - Prior Encounters Includes continued care and service providers with selected services from prior encounters from 2022 to 3/12/2023    Discharged on 3/9/2023 Admission date: 3/5/2023 - Discharge disposition: Hospice/Medical Facility ([facility])    Destination     Service Provider Selected Services Address Phone Fax Patient Preferred    [facility] [address], [facility] [id number] [phone] --                         Final Discharge Disposition Code: 41 -  in medical facility

## 2023-03-14 LAB — CREAT BLDA-MCNC: 1.4 MG/DL (ref 0.6–1.3)

## 2023-03-18 NOTE — DISCHARGE SUMMARY
See my note dated progress note 3/9/2023 to be used as discharge from hospital to inpatient scatter bed.

## 2023-04-29 DIAGNOSIS — I10 HYPERTENSION, UNSPECIFIED TYPE: ICD-10-CM

## 2023-04-29 RX ORDER — LISINOPRIL 20 MG/1
TABLET ORAL
Qty: 180 TABLET | Refills: 1 | OUTPATIENT
Start: 2023-04-29

## 2023-06-24 NOTE — H&P
.     Admission Note    Patient Identification:  Alireza Carreon  87 y.o.  male  1935  [id number]            CC: Seizure    History of Present Illness:  Patient is an 87-year-old male with history of coronary artery disease hypertension hyperlipidemia dementia immobility syndrome seizure disorder who presented to the ER from his care facility after seizure-like episode.  He was intubated for airway protection in the ER.  He is sedated on mechanical ventilation and cannot provide any reliable history.    Stroke work-up obtained stroke neurology consulted.  Patient intubated in the ER.  We are asked admit to the ICU for management of mechanical ventilation and status epilepticus.      Past Medical History:   Diagnosis Date   • 3-vessel CAD    • Anticoagulated on warfarin    • Atrial fibrillation (HCC)    • BMI 28.0-28.9,adult    • Bunion, right foot    • Complaints of memory disturbance    • Dementia (HCC)    • Edema    • Encounter for immunization    • Generalized convulsive seizure (HCC)    • Gout    • History of double vision    • HL (hearing loss)    • Hyperlipidemia    • Hypertension    • Left foot pain    • 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    • Movement disorder    • Nocturnal leg cramps    • OA (osteoarthritis)    • Prepatellar effusion    • Pulmonary embolism (HCC)    • Puncture wound of hand, right    • Screening for cardiovascular condition    • Sleep apnea    • Stasis dermatitis    • Statin intolerance    • Stroke (HCC)    • Taking drug for chronic disease    • Thoracic back pain    • Transient ischemic attack        Past Surgical History:   Procedure Laterality Date   • ADENOIDECTOMY     • APPENDECTOMY     • CATARACT EXTRACTION, BILATERAL     • CORONARY ARTERY BYPASS GRAFT     • CORONARY ARTERY BYPASS GRAFT     • CYSTOSCOPY TRANSURETHRAL RESECTION OF PROSTATE     • HERNIA REPAIR     • OTHER SURGICAL HISTORY      carotid thromboendarterectomy   • SHOULDER SURGERY     • SKIN  CANCER EXCISION          Social History     Socioeconomic History   • Marital status:      Spouse name: Elizabeth   Tobacco Use   • Smoking status: Former     Types: Cigarettes     Quit date: 10/30/1979     Years since quittin.3   • Smokeless tobacco: Never   Substance and Sexual Activity   • Alcohol use: Yes   • Drug use: No   • Sexual activity: Defer       Family History   Problem Relation Age of Onset   • Hyperlipidemia Mother    • Dementia Mother        (Not in a hospital admission)      Allergies   Allergen Reactions   • Codeine Itching   • Ezetimibe Rash   • Hydrocodone-Acetaminophen Other (See Comments)     Doesn't remember    • Levetiracetam Dizziness   • Lorazepam Unknown - Low Severity     Doesn't remember    • Phenytoin Itching   • Pseudoephedrine Dizziness   • Repatha [Evolocumab] Rash   • Statins Itching   • Zolpidem Unknown - Low Severity     Doesn't remember        Review of Systems:  Unable to obtain accurate ROS as patient is unable to answer questions due to on mech vent sedated  Physical Exam:  Vitals:  Vitals:    23 0032 23 0046 23 0101 23 0131   BP:  146/78 132/73 140/81   Pulse:  67 65 59   Resp:       Temp:       TempSrc:       SpO2: 100% 99% 99% 99%   Weight:       Height:               Body mass index is 18.49 kg/m².    Intake/Output Summary (Last 24 hours) at 3/6/2023 0151  Last data filed at 3/5/2023 2355  Gross per 24 hour   Intake 1000 ml   Output --   Net 1000 ml       Exam:  General appearance: Appears stated age on mechanical ventilation  Eyes: anicteric sclerae, moist conjunctivae;   HENT: Atraumatic; oropharynx positive ET tube  Neck: Trachea midline;   Lungs: Bilateral mechanical air entry  CV: RRR, no rub  Abdomen: Nonrigid bowel sounds positive  Extremities: Positive peripheral edema  Skin: Warm well perfused dry skin tears left upper extremity bandaged  Psych/neuro patient is sedated on mechanical ventilation no rhythmic movement.    Scheduled  meds:       Data Review:   I reviewed the patient's medications and new clinical results.  Lab Results   Component Value Date    CALCIUM 8.8 03/05/2023    PHOS 3.4 03/05/2023     Results from last 7 days   Lab Units 03/05/23  2300   AST (SGOT) U/L 20   MAGNESIUM mg/dL 2.0   SODIUM mmol/L 141   POTASSIUM mmol/L 3.0*   CHLORIDE mmol/L 99   CO2 mmol/L 18.8*   BUN mg/dL 29*   CREATININE mg/dL 1.30*   GLUCOSE mg/dL 84   CALCIUM mg/dL 8.8   WBC 10*3/mm3 9.69   HEMOGLOBIN g/dL 12.5*   PLATELETS 10*3/mm3 184   ALT (SGPT) U/L 17     Results from last 7 days   Lab Units 03/05/23  2300   HSTROP T ng/L 47*     Results from last 7 days   Lab Units 03/06/23  0114   PH, ARTERIAL pH units 7.349*   PO2 ART mm Hg 325.7*   PCO2, ARTERIAL mm Hg 52.3*   HCO3 ART mmol/L 28.8*     Estimated Creatinine Clearance: 41.1 mL/min (A) (by C-G formula based on SCr of 1.3 mg/dL (H)).     Results from last 7 days   Lab Units 03/06/23  0212 03/05/23  2300   AST (SGOT) U/L  --  20   ALT (SGPT) U/L  --  17   LACTATE mmol/L 1.7  --    PLATELETS 10*3/mm3  --  184         IMAGING:  I have reviewed all imaging studies since my last documentation.  Chest x-ray ET tube some atelectasis  CT angiogram/CT head no acute stroke or bleed    ASSESSMENT /   PLAN:  Airway compromise  Mgmt of [facility] ventilation  Status epilepticus  Atrial fibrillation on anticoagulation  Hypertension  Hyperlipidemia  Coronary disease  History of stroke  History of seizure disorder  Immobility syndrome  Dementia  Hypokalemia  Creatinine elevation  Anion gap metabolic acidosis-we will send off lactate suspect this elevated in the setting of seizure  Anemia  NSTEMI type II    Stroke neurology was consulted in the ER  Stat consult to neurology  Will order Keppra  Phenobarbital given in the ER further adjustments per neurology service  Propofol max to 50 if blood pressure can tolerate  Stat EEG  Stat continuous EEG  ?  Etiology related to subtherapeutic pentobarbital level    Mechanical  ventilation reviewed ABG reviewed  Daily chest x-ray  Daily ABG  Ordered the vent order set    Gentle IV fluids  Send off lactate for anion gap metabolic acidosis  Continue to trend troponin and renal function    No bleed on CT scan head will continue with anticoagulation for stroke prevention.    We will use Lovenox until oral access.    D/w er rn and provider.    Total critical care time was 60 minutes, excluding any separately billable procedure time.    Josh Riddle MD  [facility]  03/06/23  03:42 EST       Normal gait / station Normal gait / station Normal gait / station Normal gait / station Normal gait / station Normal gait / station Normal gait / station Normal gait / station hard copy, drawn during this pregnancy